# Patient Record
Sex: MALE | Race: WHITE | NOT HISPANIC OR LATINO | Employment: OTHER | URBAN - NONMETROPOLITAN AREA
[De-identification: names, ages, dates, MRNs, and addresses within clinical notes are randomized per-mention and may not be internally consistent; named-entity substitution may affect disease eponyms.]

---

## 2017-04-18 ENCOUNTER — TRANSCRIBE ORDERS (OUTPATIENT)
Dept: ADMINISTRATIVE | Facility: HOSPITAL | Age: 52
End: 2017-04-18

## 2017-04-18 ENCOUNTER — APPOINTMENT (OUTPATIENT)
Dept: LAB | Facility: HOSPITAL | Age: 52
End: 2017-04-18
Attending: PODIATRIST
Payer: MEDICARE

## 2017-04-18 ENCOUNTER — ALLSCRIPTS OFFICE VISIT (OUTPATIENT)
Dept: WOUND CARE | Facility: HOSPITAL | Age: 52
End: 2017-04-18
Payer: MEDICARE

## 2017-04-18 DIAGNOSIS — L60.0 INGROWING NAIL: ICD-10-CM

## 2017-04-18 PROCEDURE — 99213 OFFICE O/P EST LOW 20 MIN: CPT | Performed by: PODIATRIST

## 2017-04-18 PROCEDURE — 87205 SMEAR GRAM STAIN: CPT

## 2017-04-18 PROCEDURE — 87070 CULTURE OTHR SPECIMN AEROBIC: CPT

## 2017-04-18 PROCEDURE — 87077 CULTURE AEROBIC IDENTIFY: CPT

## 2017-04-18 PROCEDURE — 97597 DBRDMT OPN WND 1ST 20 CM/<: CPT | Performed by: PODIATRIST

## 2017-04-18 PROCEDURE — 87147 CULTURE TYPE IMMUNOLOGIC: CPT

## 2017-04-18 PROCEDURE — 11730 AVULSION NAIL PLATE SIMPLE 1: CPT | Performed by: PODIATRIST

## 2017-04-18 PROCEDURE — 87186 SC STD MICRODIL/AGAR DIL: CPT

## 2017-04-21 LAB
BACTERIA WND AEROBE CULT: ABNORMAL
BACTERIA WND AEROBE CULT: ABNORMAL
GRAM STN SPEC: ABNORMAL

## 2017-04-25 ENCOUNTER — ALLSCRIPTS OFFICE VISIT (OUTPATIENT)
Dept: WOUND CARE | Facility: HOSPITAL | Age: 52
End: 2017-04-25
Payer: MEDICARE

## 2017-04-25 PROCEDURE — 99212 OFFICE O/P EST SF 10 MIN: CPT | Performed by: PODIATRIST

## 2017-04-25 PROCEDURE — 97597 DBRDMT OPN WND 1ST 20 CM/<: CPT | Performed by: PODIATRIST

## 2017-05-09 ENCOUNTER — ALLSCRIPTS OFFICE VISIT (OUTPATIENT)
Dept: WOUND CARE | Facility: HOSPITAL | Age: 52
End: 2017-05-09
Payer: MEDICARE

## 2017-05-09 PROCEDURE — 99213 OFFICE O/P EST LOW 20 MIN: CPT | Performed by: PODIATRIST

## 2017-05-23 ENCOUNTER — ALLSCRIPTS OFFICE VISIT (OUTPATIENT)
Dept: WOUND CARE | Facility: HOSPITAL | Age: 52
End: 2017-05-23
Payer: MEDICARE

## 2017-05-23 PROCEDURE — 99213 OFFICE O/P EST LOW 20 MIN: CPT | Performed by: PODIATRIST

## 2017-06-06 ENCOUNTER — ALLSCRIPTS OFFICE VISIT (OUTPATIENT)
Dept: WOUND CARE | Facility: HOSPITAL | Age: 52
End: 2017-06-06
Payer: MEDICARE

## 2017-06-06 PROCEDURE — 99213 OFFICE O/P EST LOW 20 MIN: CPT | Performed by: PODIATRIST

## 2017-06-21 ENCOUNTER — GENERIC CONVERSION - ENCOUNTER (OUTPATIENT)
Dept: OTHER | Facility: OTHER | Age: 52
End: 2017-06-21

## 2017-06-27 ENCOUNTER — APPOINTMENT (OUTPATIENT)
Dept: WOUND CARE | Facility: HOSPITAL | Age: 52
End: 2017-06-27
Payer: MEDICARE

## 2017-06-27 PROCEDURE — 97597 DBRDMT OPN WND 1ST 20 CM/<: CPT

## 2017-07-13 ENCOUNTER — APPOINTMENT (OUTPATIENT)
Dept: WOUND CARE | Facility: HOSPITAL | Age: 52
End: 2017-07-13
Payer: MEDICARE

## 2017-07-13 PROCEDURE — 97597 DBRDMT OPN WND 1ST 20 CM/<: CPT | Performed by: REGISTERED NURSE

## 2017-08-01 ENCOUNTER — APPOINTMENT (OUTPATIENT)
Dept: WOUND CARE | Facility: HOSPITAL | Age: 52
End: 2017-08-01
Payer: MEDICARE

## 2017-08-01 PROCEDURE — 97597 DBRDMT OPN WND 1ST 20 CM/<: CPT

## 2017-08-01 PROCEDURE — 99214 OFFICE O/P EST MOD 30 MIN: CPT

## 2017-08-08 ENCOUNTER — APPOINTMENT (OUTPATIENT)
Dept: WOUND CARE | Facility: HOSPITAL | Age: 52
End: 2017-08-08
Payer: MEDICARE

## 2017-08-08 PROCEDURE — 97597 DBRDMT OPN WND 1ST 20 CM/<: CPT

## 2017-08-29 ENCOUNTER — APPOINTMENT (OUTPATIENT)
Dept: WOUND CARE | Facility: HOSPITAL | Age: 52
End: 2017-08-29
Payer: MEDICARE

## 2017-08-29 PROCEDURE — 99213 OFFICE O/P EST LOW 20 MIN: CPT

## 2017-08-29 PROCEDURE — 97597 DBRDMT OPN WND 1ST 20 CM/<: CPT

## 2017-09-12 ENCOUNTER — APPOINTMENT (OUTPATIENT)
Dept: WOUND CARE | Facility: HOSPITAL | Age: 52
End: 2017-09-12
Payer: MEDICARE

## 2017-09-12 PROCEDURE — 99214 OFFICE O/P EST MOD 30 MIN: CPT

## 2017-09-12 PROCEDURE — 97597 DBRDMT OPN WND 1ST 20 CM/<: CPT

## 2017-09-26 ENCOUNTER — APPOINTMENT (OUTPATIENT)
Dept: WOUND CARE | Facility: HOSPITAL | Age: 52
End: 2017-09-26
Payer: MEDICARE

## 2017-09-26 PROCEDURE — 97597 DBRDMT OPN WND 1ST 20 CM/<: CPT

## 2017-10-17 ENCOUNTER — APPOINTMENT (OUTPATIENT)
Dept: WOUND CARE | Facility: HOSPITAL | Age: 52
End: 2017-10-17
Payer: MEDICARE

## 2017-10-17 PROCEDURE — 99212 OFFICE O/P EST SF 10 MIN: CPT

## 2017-11-21 ENCOUNTER — APPOINTMENT (OUTPATIENT)
Dept: WOUND CARE | Facility: HOSPITAL | Age: 52
End: 2017-11-21
Payer: MEDICARE

## 2017-11-21 PROCEDURE — 97597 DBRDMT OPN WND 1ST 20 CM/<: CPT

## 2017-11-21 PROCEDURE — 99213 OFFICE O/P EST LOW 20 MIN: CPT

## 2017-11-21 PROCEDURE — 11042 DBRDMT SUBQ TIS 1ST 20SQCM/<: CPT

## 2017-11-28 ENCOUNTER — APPOINTMENT (OUTPATIENT)
Dept: WOUND CARE | Facility: HOSPITAL | Age: 52
End: 2017-11-28
Payer: MEDICARE

## 2017-11-28 PROCEDURE — 97597 DBRDMT OPN WND 1ST 20 CM/<: CPT

## 2017-11-28 PROCEDURE — 11042 DBRDMT SUBQ TIS 1ST 20SQCM/<: CPT

## 2017-12-05 ENCOUNTER — APPOINTMENT (OUTPATIENT)
Dept: WOUND CARE | Facility: HOSPITAL | Age: 52
End: 2017-12-05
Payer: MEDICARE

## 2017-12-05 PROCEDURE — 11042 DBRDMT SUBQ TIS 1ST 20SQCM/<: CPT

## 2017-12-12 ENCOUNTER — APPOINTMENT (OUTPATIENT)
Dept: WOUND CARE | Facility: HOSPITAL | Age: 52
End: 2017-12-12
Payer: MEDICARE

## 2017-12-12 PROCEDURE — 11042 DBRDMT SUBQ TIS 1ST 20SQCM/<: CPT

## 2017-12-19 ENCOUNTER — APPOINTMENT (OUTPATIENT)
Dept: WOUND CARE | Facility: HOSPITAL | Age: 52
End: 2017-12-19
Payer: MEDICARE

## 2017-12-19 PROCEDURE — 97597 DBRDMT OPN WND 1ST 20 CM/<: CPT

## 2017-12-27 ENCOUNTER — APPOINTMENT (OUTPATIENT)
Dept: WOUND CARE | Facility: HOSPITAL | Age: 52
End: 2017-12-27
Payer: MEDICARE

## 2017-12-27 PROCEDURE — 11042 DBRDMT SUBQ TIS 1ST 20SQCM/<: CPT

## 2018-01-02 ENCOUNTER — APPOINTMENT (OUTPATIENT)
Dept: WOUND CARE | Facility: HOSPITAL | Age: 53
End: 2018-01-02
Payer: MEDICARE

## 2018-01-02 PROCEDURE — 11042 DBRDMT SUBQ TIS 1ST 20SQCM/<: CPT

## 2018-01-09 ENCOUNTER — APPOINTMENT (OUTPATIENT)
Dept: WOUND CARE | Facility: HOSPITAL | Age: 53
End: 2018-01-09
Payer: MEDICARE

## 2018-01-09 PROCEDURE — 11042 DBRDMT SUBQ TIS 1ST 20SQCM/<: CPT

## 2018-01-12 VITALS
TEMPERATURE: 95.6 F | WEIGHT: 315 LBS | SYSTOLIC BLOOD PRESSURE: 110 MMHG | DIASTOLIC BLOOD PRESSURE: 58 MMHG | HEART RATE: 64 BPM | HEIGHT: 72 IN | RESPIRATION RATE: 20 BRPM | BODY MASS INDEX: 42.66 KG/M2

## 2018-01-12 VITALS
BODY MASS INDEX: 42.66 KG/M2 | DIASTOLIC BLOOD PRESSURE: 68 MMHG | SYSTOLIC BLOOD PRESSURE: 138 MMHG | RESPIRATION RATE: 20 BRPM | HEART RATE: 60 BPM | HEIGHT: 72 IN | WEIGHT: 315 LBS | TEMPERATURE: 96.1 F

## 2018-01-12 VITALS
WEIGHT: 315 LBS | DIASTOLIC BLOOD PRESSURE: 76 MMHG | TEMPERATURE: 97 F | SYSTOLIC BLOOD PRESSURE: 142 MMHG | HEIGHT: 72 IN | HEART RATE: 68 BPM | RESPIRATION RATE: 20 BRPM | BODY MASS INDEX: 42.66 KG/M2

## 2018-01-13 VITALS
BODY MASS INDEX: 42.66 KG/M2 | WEIGHT: 315 LBS | RESPIRATION RATE: 20 BRPM | HEIGHT: 72 IN | SYSTOLIC BLOOD PRESSURE: 136 MMHG | HEART RATE: 72 BPM | DIASTOLIC BLOOD PRESSURE: 78 MMHG | TEMPERATURE: 97.2 F

## 2018-01-14 VITALS
HEIGHT: 72 IN | RESPIRATION RATE: 20 BRPM | HEART RATE: 64 BPM | WEIGHT: 315 LBS | BODY MASS INDEX: 42.66 KG/M2 | TEMPERATURE: 97 F | SYSTOLIC BLOOD PRESSURE: 130 MMHG | DIASTOLIC BLOOD PRESSURE: 64 MMHG

## 2018-01-23 ENCOUNTER — APPOINTMENT (OUTPATIENT)
Dept: WOUND CARE | Facility: HOSPITAL | Age: 53
End: 2018-01-23
Payer: MEDICARE

## 2018-01-23 PROCEDURE — 11042 DBRDMT SUBQ TIS 1ST 20SQCM/<: CPT

## 2018-01-23 NOTE — PROGRESS NOTES
Assessment    1  Chronic venous hypertension with ulcer and inflammation (274 33) (U13 109)   2  Non-pressure chronic ulcer of other part of left lower leg limited to breakdown of skin   (707 19) (P61 302)    Plan  Non-pressure chronic ulcer of other part of left lower leg limited to breakdown of skin    · Follow-up visit in 1 week Evaluation and Treatment  Follow-up  Status: Hold For -  Scheduling  Requested for: [id number]   Ordered; For: Non-pressure chronic ulcer of other part of left lower leg limited to breakdown of skin; Ordered By: Maria Eugenia Currie Performed:  Due: 01PDM9267   · Silver Alginate ([employer], Silvercell, Aquacel Silver) instructions given;  Status:Complete;   Done: [id number] 01:13PM   Ordered;  For:Non-pressure chronic ulcer of other part of left lower leg limited to breakdown of skin; Ordered By:Jackie Denis;   · [facility]; Side::Bilateral; Status:Active - Perform Order; Requested  UIO:[id number];    Perform: In Office; [facility]:[id number];VCTQHLY;  For:Non-pressure chronic ulcer of other part of left lower leg limited to breakdown of skin; Ordered By:Sher Denis;  frequency: : weekly at wound center    Wound Care Orders/Instructions    Wound Identification and Instructions   Wound #2: right lower leg    Wound Care Instructions  Discussed with Patient/Caregiver  Dressing Type: Acticoat 7  Wash with mild soap and water, normal saline, wound cleanser  Apply specified dressing to wound base/bed  To periwound apply: Remedy skin repair cream  Secondary dressing apply: ABD  Dressing change frequency: Weekly  Unna boot -- Change every 7 days and as needed for increased drainage, discomfort or excessive swelling of toes   Apply compression usin" Ace Wrap, 6" Ace Wrap and Tubifast yellow  Wound #3: left lower leg   Wound Care Instructions  Discussed with Patient/Caregiver  Dressing Type: Acticoat 7  Wash with mild soap and water, normal saline, wound cleanser     Apply specified dressing to wound base/bed  To periwound apply: Remedy skin repair cream  Secondary dressing apply: ABD  Secure with: unna wrap  Dressing change frequency: Weekly  Unna boot -- Change every 7 days and as needed for increased drainage, discomfort or excessive swelling of toes   Apply compression usin" Ace Wrap, 6" Ace Wrap, Coban and Tubifast yellow  Wound Goals  Wound Goals:   Healing Goals:   Fair healing potential secondary to moderate comorbid conditions  Wound will be free of infection   Patient will achieve full wound closure and return to full ADLs       Discussion/Summary    Small wound to left leg improved today  Apply UNNA boots to both legs today  Last week had ordered new circ-aid stockings, still awaiting approval  F/U 1 week  The treatment plan was reviewed with the patient/guardian  The patient/guardian understands and agrees with the treatment plan      Chief Complaint  Follow up visit to the wound management center for bilateral lower leg wounds  History of Present Illness    Wound Identification HPI   Wound #1: left heel-healed    Wound #2: right lower leg    Wound #3: left lower leg    Wound #4: left lower leg posterior healed    Wound #5: right 3rd toe lateral-healed          The patient came to Wound Care and was ambulatory  The patient is being seen for a follow-up with MD and Lori Benton DPM at the [address] [place] [address]  The patient's identification was verified  A secondary verification process was completed  Orientation: oriented to person, oriented to place and oriented to time  Blood Glucose:  mg/dL as reported by patient  2016: dressing intact  Patient states PCP put him on Keflex 2 days ago for cellulitis  Redness has extended up over knee into thigh  Patient will go to ER for admission  2016: dressing intact  [facility]  Had intravenous antibiotics   Wound on right leg is covered with dry eschar- no drainage  Healed  Wound also healed on right 3rd toe  8/16/2016: dressings on B/L legs- anterior  Wounds reopened last week  8/23/2016: Patient arrived to the wound center with unna boot on the left lower leg and tubi over the dressing on the right   Patient reports the right dressing falls down and the drainage is moderate   Noted decrease in the size of the left lower leg   Patient with circaid over the unna and the tubi    History of Falling: Yes = 25   Secondary Diagnosis: No = 0   Ambulatory Aid None, Bedrest, Nurse Assist = 0   No = 0   Gait: Normal = 0   Mental Status: Oriented to own ability = 0   Total Score:    25 - 45 = Moderate Risk        The most recent fall occurred 5/2016 and Patient reports no falls  Number of falls in the last year were 0  The fall resulted from tripping  The fall resulted in abrasion(s) to left knee  Nutrition Assessment Screening: Food intake over the last 3 months due to the loss of appetite, digestive problems, chewing or swallowing difficulties is graded as: 2 = no decrease in food intake   Weight loss during the last 3 months: 3 = no weight loss   Mobility scored as: 2 = goes out  Neuropsychological problems scored as: 2 = no psychological problems  Body Mass Index (BMI) scored as: 3 = BMI 23 or greater  Nutritional Assessment Screening Score: 12 - 14 points - Normal nutritional status  Provider Wound Care HPI: Yayo Zavala is seen today for re-evaluation of left lower extremity ulceration which had recurred several weeks ago  No F/C/NS  No other acute complaints  Tolerated UNNA boot to left leg well this past week  Pain Assessment   the patient states they do not have pain  (on a scale of 0 to 10, the patient rates the pain at 0 )   Abuse And Domestic Violence Screen    Yes, the patient is safe at home  The patient states no one is hurting them  Depression And Suicide Screen  No, the patient has not had thoughts of hurting themself  No, the patient has not felt depressed in the past 7 days  Prefered Language is  [place]  Primary Language is  English  Readiness To Learn: Receptive  Barriers To Learning: none  Preferred Learning: demonstration and verbal   Education Completed: treatment/procedure   Teaching Method: verbal   Person Taught: patient   Evaluation Of Learning: verbalized/demonstrated understanding      Review of Systems    Constitutional: no fever or chills, feels well, no tiredness, no recent weight loss or weight gain  ENT: no complaints of earache, no loss of hearing, no nosebleeds or nasal discharge, no sore throat or hoarseness  Cardiovascular: no complaints of slow or fast heart rate, no chest pain, no palpitations, no leg claudication or lower extremity edema  Respiratory: no complaints of shortness of breath, no wheezing or cough, no dyspnea on exertion, no orthopnea or PND  Gastrointestinal: no complaints of abdominal pain, no constipation, no nausea or vomiting, no diarrhea or bloody stools  Genitourinary: no complaints of dysuria or incontinence, no hesitancy, no nocturia, no genital lesion, no inadequacy of penile erection  Musculoskeletal: no complaints of arthralgia, no myalgia, no joint swelling or stiffness, no limb pain or swelling  Integumentary: skin wound  Neurological: no complaints of headache, no confusion, no numbness or tingling, no dizziness or fainting  Psychiatric: no depression, no mood disorders, no anxiety  ROS reviewed  Active Problems    1  Cellulitis of right lower leg (682 6) (L03 115)   2  Chronic venous hypertension with ulcer and inflammation (459 33) (I87 339)   3  Congestive heart failure (428 0) (I50 9)   4  Constipation (564 00) (K59 00)   5  Diabetes Mellitus (250 00)   6  Diabetes mellitus with neuropathy (250 60,357 2) (E11 40)   7  Diabetic ulcer of left foot (250 80,707 15) (E11 621,L97 529)   8   Diabetic ulcer of right foot associated with diabetes mellitus due to underlying condition   (249 80,707 15) (E08 621,L97 519)   9  Edema (782 3) (R60 9)   10  Edema (782 3) (R60 9)   11  Hypertension (401 9) (I10)   12  Hypothyroidism (244 9) (E03 9)   13  Lymphedema (457 1) (I89 0)   14  Neuropathic ulcer of left heel (707 14) (L97 429)   15  Non-pressure chronic ulcer of other part of left lower leg limited to breakdown of skin    (707 19) (L97 821)   16  Osteomyelitis (730 20) (M86 9)   17  Pulmonary Embolism (V12 55)    Past Medical History    1  History of Abnormal weight gain (783 1) (R63 5)   2  History of Arthritis (V13 4)   3  History of Currently Wearing Eyeglasses   4  Diabetes Mellitus (250 00)   5  History of Hearing Loss (389 9)   6  History of constipation (V12 79) (Z87 19)   7  History of shortness of breath (V13 89) (Z87 898)   8  History of thyroid disease (V12 29) (Z86 39)   9  Hypertension (401 9) (I10)   10  History of Reported History Poor Vision   11  History of Staggering gait (781 2) (R26 0)   12  History of Thromboembolic Disease (J52 11)    The active problems and past medical history were reviewed and updated today  Surgical History    1  History of Gallbladder Surgery    The surgical history was reviewed and updated today  Family History    1  Family history of Cancer   2  Family history of Diabetes Mellitus (V18 0)    3  Family history of Acute Myocardial Infarction (V17 3)    The family history was reviewed and updated today  Social History    · Denied: History of Alcohol Use (History)   · Denied: History of Drug Use   · Never A Smoker  The social history was reviewed and updated today  Current Meds   1  Acetaminophen 500 MG Oral Tablet; TAKE 1 TABLET BID Recorded   2  AF Loratadine 10 MG TABS; TAKE 1 TABLET DAILY; Therapy: (Recorded:24May2016) to Recorded   3  Amitriptyline HCl - 25 MG Oral Tablet; TAKE 1 TABLET AT BEDTIME Recorded   4  Fenofibrate 145 MG Oral Tablet;    Therapy: (Recorded:01Mar2016) to Recorded 5  Furosemide 80 MG Oral Tablet; TAKE 2 TABLETS TWICE DAILY; Therapy: (Recorded:21Apr2015) to Recorded   6  Gabapentin 300 MG Oral Capsule; TAKE 1 CAPSULE TWICE DAILY; Therapy: (Recorded:01Mar2016) to Recorded   7  HumuLIN R SOLN; 25 units am and pm;   Therapy: (Recorded:23Aug2016) to Recorded   8  Klor-Con M20 20 MEQ Oral Tablet Extended Release; TAKE 2 TABLETS TWICE DAILY; Therapy: (Recorded:01Mar2016) to Recorded   9  Montelukast Sodium 10 MG Oral Tablet; TAKE 1 TABLET DAILY; Therapy: (Recorded:24May2016) to Recorded   10  Pantoprazole Sodium 40 MG Oral Tablet Delayed Release; Therapy: (Recorded:01Mar2016) to Recorded   11  ROPINIRole HCl - 0 5 MG Oral Tablet; Therapy: 60IUY7861 to Recorded   12  Saline Nasal Spray SOLN; USE 1 SPRAY IN EACH NOSTRIL TWICE DAILY; Therapy: (Recorded:21Apr2015) to Recorded   13  Simvastatin 40 MG Oral Tablet; TAKE 1 TABLET DAILY AT BEDTIME; Therapy: (Recorded:21Apr2015) to Recorded   14  Synthroid 150 MCG Oral Tablet; Therapy: (Recorded:01Mar2016) to Recorded   15  Synthroid 300 MCG Oral Tablet; TAKE 1 TABLET DAILY; Therapy: (Recorded:01Mar2016) to Recorded   16  Warfarin Sodium 5 MG Oral Tablet Recorded    The medication list was reviewed and updated today  Allergies    1  Dilantin CAPS    Vitals  Vital Signs    Recorded: 66YIV1904 00:83RP   Systolic 981   Diastolic 62   Heart Rate 72   Respiration 20   Temperature 97 F   Height 6 ft    Weight 400 lb    BMI Calculated 54 25   BSA Calculated 2 86     Physical Exam    Wound #2 Assessment wound #2 Location:, right lower leg, started on reopened 8/13/2016, Care for this wound started on 8/16/2016  Wound Status: not healed  Venous Ulcer: Full Thickness  Length: 0 7cm x Width: 0 7cm x Depth: 0 1cm   Total: 0 49sq cm   Wound Volume: 0 049cm3           Color of Wound: Red - 100% and Yellow -   Exudate Amount:  Moderate   Exudate Type: Serous   Odor: None   Exudate Color: Yellow   Wound Edges: Intact Periwound Skin Condition: Intact, Hemosiderin pigmentation, Scaly, lower leg edema        Physician/Provider Wound #2 Exam   I agree with the nursing assessment and documentation  Wound #3 Assessment wound #3 Location:, left lower leg, started on 8/12/2016  Care for this wound started on 8/16/2016   Wound Status: not healed  Full Thickness   Length: 0 7cm x Width: 0 5cm x Depth: 0 1cm   Total: 0 35sq cm   Wound Volume: 0 035cm3           Tissue type: Subcutaneous, Granulation and Slough   Color of Wound: Red - 100%, Yellow - and Pink - 60%   Exudate Amount: Minimal   Exudate Type: Serosangiunous   Odor: None   Exudate Color: Yellow and pink   Wound Edges: Intact   Periwound Skin Condition: Hemosiderin pigmentation, Scaly, lower leg edema        Physician/Provider Wound #3 Exam   I agree with the nursing assessment and documentation  Constitutional   General appearance: No acute distress, well appearing and well nourished  Eyes   Conjunctiva and lids: No swelling, erythema, or discharge  Pupils and irises: Equal, round and reactive to light  Sclera non-icteric  Ears, Nose, Mouth, and Throat   External inspection of ears and nose: Normal     Neck   Supple, symmetric, trachea midline, no masses   Pulmonary   Respiratory effort: No increased work of breathing or signs of respiratory distress  Auscultation of lungs: Clear to auscultation, equal breath sounds bilaterally, no wheezes, no rales, no rhonci  Cardiovascular   Auscultation of heart: Normal rate and rhythm, normal S1 and S2, without murmurs  Examination of extremities for edema and/or varicosities: Normal     Carotid pulses: Normal     Abdomen   Abdomen: Non-tender, no masses  Liver and spleen: No hepatomegaly or splenomegaly  Lymphatic   Palpation of lymph nodes in neck: No lymphadenopathy  Musculoskeletal   Digits and nails: Normal without clubbing or cyanosis      Extremities: No clubbing, no cyanosis, no edema   Skin Skin and subcutaneous tissue: Normal without rashes or lesions  Neurologic   Sensation: Motor and sensory grossly intact  Psychiatric   Orientation to person, place and time: Normal     Mood and affect: Normal        Trg Chintan 1: Wound Nursing Care Plan   Impaired Tissue Integrity related to: wounds right and left lower legs   Risk for Infection related to open wound:   Risk for Unstable Blood Glucose related to increased metabolic demand required for wound healing and/or immune response to wound infection:   Fluid Volume Excess related to venous insufficiency, ineffective offloading, improper positioning, and/or disease process:   Goals   Patient will achieve 100% epithelialization:  Patient or caregiver will demonstrate ability to perform wound dressing changes:    Barriers to plan of care will be identified and interventions to remove them will be initiated: Fide Araujo Wound Nursing Care Interventions:   Provide moist wound healing:  Teach patient and caregiver how to change wound dressing:  Teach patient on edema reducing measures:  Implement measures to remove or circumvent all barriers to care as able: Fide Araujo Evaluate effectiveness of all above measures every 4 weeks with Patient Specific CQI:    Other:  POC initiated 7/21/2016  Care plan resolved 8/2/2016  Reopened 8/16/2016  , Reviewed 8/23/16      Procedure      Wound #2: right lower leg     Nurse Dressing Change:   Wound #2 The wound located on the right lower leg  Applied 4% topical Lidocaine solution to wound/ulcer prior to debridement for pain control  Wound care rendered as per Physician/Advanced Practitioner order/plan  Return to [address] [place] [address] in 1 week    Comments:,    Procedure: Unna Boot Application (Two or Three Layers):   Prior to the procedure, the patient was identified using two identifiers, the general consent was signed, the proper site of procedure was identified, and a time out was taken  Unna boot application for the compression of right leg was performed  The wound was cleansed and dressed with acticoat 7  With the foot in a dorsiflexed position, a zinc oxide paste bandage was applied from the base of the toes to the patellar notch  CPT Code(s) H009901 RT [facility]  Wound #3: left lower leg     Nurse Dressing Change:   Wound #3 The wound located on the left lower leg  Applied 4% topical Lidocaine solution to wound/ulcer prior to debridement for pain control  Wound care rendered as per Physician/Advanced Practitioner order/plan  Return to [address] [place] [address] in 1 week  Comments:,    Procedure: Unna Boot Application (Two or Three Layers):   Prior to the procedure, the patient was identified using two identifiers, the general consent was signed, the proper site of procedure was identified, and a time out was taken  Unna boot application for the compression of left leg was performed  The wound was cleansed and dressed with acticoat 7  With the foot in a dorsiflexed position, a zinc oxide paste bandage was applied from the base of the toes to the patellar notch  CPT Code(s) G551601 LT [facility] Appl  Removal Devitalized Tissue: Prior to the procedure, the patient was identified using two identifiers, the general consent was signed, the proper site of procedure was identified, and a time out was taken  Due to the presence of senescent cells and/or biofilm in the wound, a medical decision was made to perform a selective debridement  Anesthesia: Local anesthesia with 4% topical lidocaine was utilized prior to the procedure for pain control  #15 surgical blade was utilized to debride non-viable tissue  The non-viable tissue was removed  There was minimal bleeding controlled with gentle pressure  The total surface area that was selectively debrided for the above site was 0 35sq cm  The patient tolerated the procedure well without complication    Codes:   67817 Removal Of Devitalized Tissue First 20 sq cm or less        Future Appointments    Date/Time Provider Specialty Site   08/30/2016 01:00 PM Jd Carbajal DPM [address]     Signatures   Electronically signed by : Nathan Tuttle DPM; Aug 23 2016  1:14PM EST                       (Author)    Electronically signed by : Nathan Tuttle DPM; Aug 23 2016  3:11PM EST                       (Author)

## 2018-01-30 ENCOUNTER — APPOINTMENT (OUTPATIENT)
Dept: WOUND CARE | Facility: HOSPITAL | Age: 53
End: 2018-01-30
Payer: MEDICARE

## 2018-01-30 PROCEDURE — 97597 DBRDMT OPN WND 1ST 20 CM/<: CPT

## 2018-02-06 ENCOUNTER — APPOINTMENT (OUTPATIENT)
Dept: WOUND CARE | Facility: HOSPITAL | Age: 53
End: 2018-02-06
Payer: MEDICARE

## 2018-02-06 PROCEDURE — 97597 DBRDMT OPN WND 1ST 20 CM/<: CPT

## 2018-02-06 PROCEDURE — 99213 OFFICE O/P EST LOW 20 MIN: CPT

## 2018-02-27 ENCOUNTER — APPOINTMENT (OUTPATIENT)
Dept: WOUND CARE | Facility: HOSPITAL | Age: 53
End: 2018-02-27
Payer: MEDICARE

## 2018-02-27 PROCEDURE — 97597 DBRDMT OPN WND 1ST 20 CM/<: CPT

## 2018-02-27 PROCEDURE — 11042 DBRDMT SUBQ TIS 1ST 20SQCM/<: CPT

## 2018-03-06 ENCOUNTER — APPOINTMENT (OUTPATIENT)
Dept: WOUND CARE | Facility: HOSPITAL | Age: 53
End: 2018-03-06
Payer: MEDICARE

## 2018-03-06 PROCEDURE — 97597 DBRDMT OPN WND 1ST 20 CM/<: CPT

## 2018-03-20 ENCOUNTER — APPOINTMENT (OUTPATIENT)
Dept: WOUND CARE | Facility: HOSPITAL | Age: 53
End: 2018-03-20
Payer: MEDICARE

## 2018-03-20 PROCEDURE — 97597 DBRDMT OPN WND 1ST 20 CM/<: CPT

## 2018-03-27 ENCOUNTER — APPOINTMENT (OUTPATIENT)
Dept: WOUND CARE | Facility: HOSPITAL | Age: 53
End: 2018-03-27
Payer: MEDICARE

## 2018-03-27 PROCEDURE — 97597 DBRDMT OPN WND 1ST 20 CM/<: CPT

## 2018-04-03 ENCOUNTER — APPOINTMENT (OUTPATIENT)
Dept: WOUND CARE | Facility: HOSPITAL | Age: 53
End: 2018-04-03
Payer: MEDICARE

## 2018-04-03 PROCEDURE — 97597 DBRDMT OPN WND 1ST 20 CM/<: CPT

## 2018-04-17 ENCOUNTER — APPOINTMENT (OUTPATIENT)
Dept: WOUND CARE | Facility: HOSPITAL | Age: 53
End: 2018-04-17
Payer: MEDICARE

## 2018-04-17 PROCEDURE — 97597 DBRDMT OPN WND 1ST 20 CM/<: CPT

## 2018-04-24 ENCOUNTER — APPOINTMENT (OUTPATIENT)
Dept: WOUND CARE | Facility: HOSPITAL | Age: 53
End: 2018-04-24
Attending: PODIATRIST
Payer: MEDICARE

## 2018-04-24 PROCEDURE — 97597 DBRDMT OPN WND 1ST 20 CM/<: CPT

## 2018-05-01 ENCOUNTER — APPOINTMENT (OUTPATIENT)
Dept: WOUND CARE | Facility: HOSPITAL | Age: 53
End: 2018-05-01
Attending: PODIATRIST
Payer: MEDICARE

## 2018-05-01 PROCEDURE — 99212 OFFICE O/P EST SF 10 MIN: CPT

## 2018-10-02 ENCOUNTER — APPOINTMENT (OUTPATIENT)
Dept: WOUND CARE | Facility: CLINIC | Age: 53
End: 2018-10-02
Payer: MEDICARE

## 2018-10-02 PROCEDURE — 99213 OFFICE O/P EST LOW 20 MIN: CPT

## 2018-10-02 PROCEDURE — 97597 DBRDMT OPN WND 1ST 20 CM/<: CPT

## 2018-10-16 ENCOUNTER — APPOINTMENT (OUTPATIENT)
Dept: WOUND CARE | Facility: CLINIC | Age: 53
End: 2018-10-16
Payer: MEDICARE

## 2018-10-16 PROCEDURE — 97597 DBRDMT OPN WND 1ST 20 CM/<: CPT

## 2018-11-06 ENCOUNTER — APPOINTMENT (OUTPATIENT)
Dept: WOUND CARE | Facility: CLINIC | Age: 53
End: 2018-11-06
Payer: MEDICARE

## 2018-11-06 PROCEDURE — 97598 DBRDMT OPN WND ADDL 20CM/<: CPT

## 2018-11-06 PROCEDURE — 97597 DBRDMT OPN WND 1ST 20 CM/<: CPT

## 2018-11-20 ENCOUNTER — APPOINTMENT (OUTPATIENT)
Dept: WOUND CARE | Facility: CLINIC | Age: 53
End: 2018-11-20
Payer: MEDICARE

## 2018-11-20 PROCEDURE — 99212 OFFICE O/P EST SF 10 MIN: CPT

## 2019-02-05 ENCOUNTER — APPOINTMENT (OUTPATIENT)
Dept: WOUND CARE | Facility: CLINIC | Age: 54
End: 2019-02-05
Payer: MEDICARE

## 2019-02-05 PROCEDURE — 11042 DBRDMT SUBQ TIS 1ST 20SQCM/<: CPT

## 2019-02-05 PROCEDURE — 99213 OFFICE O/P EST LOW 20 MIN: CPT

## 2019-02-26 ENCOUNTER — APPOINTMENT (OUTPATIENT)
Dept: WOUND CARE | Facility: CLINIC | Age: 54
End: 2019-02-26
Payer: MEDICARE

## 2019-02-26 PROCEDURE — 11042 DBRDMT SUBQ TIS 1ST 20SQCM/<: CPT

## 2019-03-12 ENCOUNTER — APPOINTMENT (OUTPATIENT)
Dept: WOUND CARE | Facility: CLINIC | Age: 54
End: 2019-03-12
Payer: MEDICARE

## 2019-03-12 PROCEDURE — 11042 DBRDMT SUBQ TIS 1ST 20SQCM/<: CPT

## 2019-03-19 ENCOUNTER — APPOINTMENT (OUTPATIENT)
Dept: WOUND CARE | Facility: CLINIC | Age: 54
End: 2019-03-19
Payer: MEDICARE

## 2019-03-19 PROCEDURE — 97597 DBRDMT OPN WND 1ST 20 CM/<: CPT

## 2019-03-26 ENCOUNTER — APPOINTMENT (OUTPATIENT)
Dept: WOUND CARE | Facility: CLINIC | Age: 54
End: 2019-03-26
Payer: MEDICARE

## 2019-03-26 PROCEDURE — 99213 OFFICE O/P EST LOW 20 MIN: CPT

## 2019-03-26 PROCEDURE — 11042 DBRDMT SUBQ TIS 1ST 20SQCM/<: CPT

## 2019-03-26 PROCEDURE — 97597 DBRDMT OPN WND 1ST 20 CM/<: CPT

## 2019-04-02 ENCOUNTER — APPOINTMENT (OUTPATIENT)
Dept: WOUND CARE | Facility: CLINIC | Age: 54
End: 2019-04-02

## 2019-04-09 ENCOUNTER — APPOINTMENT (OUTPATIENT)
Dept: WOUND CARE | Facility: CLINIC | Age: 54
End: 2019-04-09
Payer: MEDICARE

## 2019-04-09 PROCEDURE — 11042 DBRDMT SUBQ TIS 1ST 20SQCM/<: CPT

## 2019-04-16 ENCOUNTER — APPOINTMENT (OUTPATIENT)
Dept: WOUND CARE | Facility: CLINIC | Age: 54
End: 2019-04-16
Payer: MEDICARE

## 2019-04-16 PROCEDURE — 11042 DBRDMT SUBQ TIS 1ST 20SQCM/<: CPT

## 2019-04-23 ENCOUNTER — APPOINTMENT (OUTPATIENT)
Dept: WOUND CARE | Facility: CLINIC | Age: 54
End: 2019-04-23
Payer: MEDICARE

## 2019-04-23 PROCEDURE — 97597 DBRDMT OPN WND 1ST 20 CM/<: CPT

## 2019-04-30 ENCOUNTER — APPOINTMENT (OUTPATIENT)
Dept: WOUND CARE | Facility: CLINIC | Age: 54
End: 2019-04-30
Payer: MEDICARE

## 2019-04-30 PROCEDURE — 99213 OFFICE O/P EST LOW 20 MIN: CPT

## 2019-05-21 ENCOUNTER — APPOINTMENT (OUTPATIENT)
Dept: WOUND CARE | Facility: CLINIC | Age: 54
End: 2019-05-21
Payer: MEDICARE

## 2019-05-21 PROCEDURE — 99212 OFFICE O/P EST SF 10 MIN: CPT

## 2019-06-04 ENCOUNTER — APPOINTMENT (OUTPATIENT)
Dept: WOUND CARE | Facility: CLINIC | Age: 54
End: 2019-06-04
Payer: MEDICARE

## 2019-06-04 PROCEDURE — 11042 DBRDMT SUBQ TIS 1ST 20SQCM/<: CPT

## 2019-06-04 PROCEDURE — 97597 DBRDMT OPN WND 1ST 20 CM/<: CPT

## 2019-06-11 ENCOUNTER — APPOINTMENT (OUTPATIENT)
Dept: WOUND CARE | Facility: CLINIC | Age: 54
End: 2019-06-11
Payer: MEDICARE

## 2019-06-11 PROCEDURE — 97598 DBRDMT OPN WND ADDL 20CM/<: CPT

## 2019-06-11 PROCEDURE — 11042 DBRDMT SUBQ TIS 1ST 20SQCM/<: CPT

## 2019-06-11 PROCEDURE — 97597 DBRDMT OPN WND 1ST 20 CM/<: CPT

## 2019-06-18 ENCOUNTER — APPOINTMENT (OUTPATIENT)
Dept: WOUND CARE | Facility: CLINIC | Age: 54
End: 2019-06-18
Payer: MEDICARE

## 2019-06-18 PROCEDURE — 97597 DBRDMT OPN WND 1ST 20 CM/<: CPT

## 2019-06-25 ENCOUNTER — APPOINTMENT (OUTPATIENT)
Dept: WOUND CARE | Facility: CLINIC | Age: 54
End: 2019-06-25
Payer: MEDICARE

## 2019-06-25 PROCEDURE — 97597 DBRDMT OPN WND 1ST 20 CM/<: CPT

## 2019-06-25 PROCEDURE — 11042 DBRDMT SUBQ TIS 1ST 20SQCM/<: CPT

## 2019-06-25 PROCEDURE — 29581 APPL MULTLAYER CMPRN SYS LEG: CPT

## 2019-07-08 ENCOUNTER — APPOINTMENT (OUTPATIENT)
Dept: WOUND CARE | Facility: CLINIC | Age: 54
End: 2019-07-08
Payer: MEDICARE

## 2019-07-08 PROCEDURE — 29581 APPL MULTLAYER CMPRN SYS LEG: CPT | Performed by: REGISTERED NURSE

## 2019-07-16 ENCOUNTER — APPOINTMENT (OUTPATIENT)
Dept: WOUND CARE | Facility: CLINIC | Age: 54
End: 2019-07-16
Payer: MEDICARE

## 2019-07-16 PROCEDURE — 29581 APPL MULTLAYER CMPRN SYS LEG: CPT | Performed by: REGISTERED NURSE

## 2020-04-30 LAB — HCV AB SER-ACNC: NEGATIVE

## 2020-12-11 LAB
CREAT ?TM UR-SCNC: 86.7 UMOL/L
EXT PROTEIN URINE: 10.3
PROT/CREAT UR: 0.12 MG/G{CREAT}

## 2020-12-21 ENCOUNTER — OFFICE VISIT (OUTPATIENT)
Dept: FAMILY MEDICINE CLINIC | Facility: CLINIC | Age: 55
End: 2020-12-21
Payer: COMMERCIAL

## 2020-12-21 VITALS
HEART RATE: 98 BPM | OXYGEN SATURATION: 97 % | TEMPERATURE: 98.7 F | BODY MASS INDEX: 45.1 KG/M2 | WEIGHT: 315 LBS | HEIGHT: 70 IN

## 2020-12-21 DIAGNOSIS — Z79.4 TYPE 2 DIABETES MELLITUS WITHOUT COMPLICATION, WITH LONG-TERM CURRENT USE OF INSULIN (HCC): ICD-10-CM

## 2020-12-21 DIAGNOSIS — K59.00 CONSTIPATION, UNSPECIFIED CONSTIPATION TYPE: ICD-10-CM

## 2020-12-21 DIAGNOSIS — E11.9 TYPE 2 DIABETES MELLITUS WITHOUT COMPLICATION, WITH LONG-TERM CURRENT USE OF INSULIN (HCC): ICD-10-CM

## 2020-12-21 DIAGNOSIS — Z86.79 HISTORY OF HEART FAILURE: ICD-10-CM

## 2020-12-21 DIAGNOSIS — E66.01 MORBID OBESITY (HCC): Primary | ICD-10-CM

## 2020-12-21 DIAGNOSIS — I10 ESSENTIAL HYPERTENSION: ICD-10-CM

## 2020-12-21 PROBLEM — Z00.00 ENCOUNTER FOR MEDICAL EXAMINATION TO ESTABLISH CARE: Status: ACTIVE | Noted: 2020-12-21

## 2020-12-21 PROCEDURE — 99213 OFFICE O/P EST LOW 20 MIN: CPT | Performed by: FAMILY MEDICINE

## 2020-12-21 RX ORDER — INSULIN ASPART 100 [IU]/ML
INJECTION, SOLUTION INTRAVENOUS; SUBCUTANEOUS
COMMUNITY
Start: 2020-10-29 | End: 2021-02-05 | Stop reason: SDUPTHER

## 2020-12-21 RX ORDER — GABAPENTIN 600 MG/1
600 TABLET ORAL
COMMUNITY
Start: 2020-12-08 | End: 2021-12-02 | Stop reason: SDUPTHER

## 2020-12-21 RX ORDER — POLYETHYLENE GLYCOL 3350 17 G/17G
17 POWDER, FOR SOLUTION ORAL DAILY
Qty: 30 EACH | Refills: 0 | Status: SHIPPED | OUTPATIENT
Start: 2020-12-21 | End: 2021-04-15

## 2020-12-21 RX ORDER — PREGABALIN 100 MG/1
100 CAPSULE ORAL
COMMUNITY
End: 2021-01-08 | Stop reason: SDUPTHER

## 2020-12-21 RX ORDER — METOPROLOL TARTRATE 50 MG/1
25 TABLET, FILM COATED ORAL
COMMUNITY
End: 2021-02-05 | Stop reason: SDUPTHER

## 2020-12-21 RX ORDER — FLUOXETINE HYDROCHLORIDE 40 MG/1
40 CAPSULE ORAL
COMMUNITY
End: 2021-06-09 | Stop reason: CLARIF

## 2020-12-21 RX ORDER — LEVOTHYROXINE SODIUM 0.12 MG/1
125 TABLET ORAL DAILY
COMMUNITY
End: 2021-02-05 | Stop reason: SDUPTHER

## 2020-12-21 RX ORDER — TAMSULOSIN HYDROCHLORIDE 0.4 MG/1
0.4 CAPSULE ORAL
COMMUNITY
End: 2021-06-09 | Stop reason: SDUPTHER

## 2020-12-21 RX ORDER — LEVETIRACETAM 500 MG/1
500 TABLET ORAL 2 TIMES DAILY
COMMUNITY
Start: 2020-12-08 | End: 2021-02-05 | Stop reason: SDUPTHER

## 2020-12-21 RX ORDER — LISINOPRIL 2.5 MG/1
2.5 TABLET ORAL
COMMUNITY
Start: 2020-06-02 | End: 2021-02-05 | Stop reason: SDUPTHER

## 2020-12-21 RX ORDER — ECHINACEA PURPUREA EXTRACT 125 MG
1 TABLET ORAL 2 TIMES DAILY
COMMUNITY
End: 2021-02-05 | Stop reason: ALTCHOICE

## 2020-12-21 RX ORDER — POTASSIUM CHLORIDE 1.5 G/1.77G
20 POWDER, FOR SOLUTION ORAL
COMMUNITY
End: 2021-06-09 | Stop reason: SDUPTHER

## 2020-12-21 RX ORDER — AMOXICILLIN AND CLAVULANATE POTASSIUM 875; 125 MG/1; MG/1
TABLET, FILM COATED ORAL
COMMUNITY
Start: 2020-11-03 | End: 2021-02-05 | Stop reason: ALTCHOICE

## 2020-12-21 RX ORDER — MELOXICAM 15 MG/1
15 TABLET ORAL
COMMUNITY
End: 2021-06-09 | Stop reason: CLARIF

## 2020-12-21 RX ORDER — DOCUSATE SODIUM 100 MG/1
100 CAPSULE, LIQUID FILLED ORAL 2 TIMES DAILY PRN
COMMUNITY
End: 2021-06-09 | Stop reason: ALTCHOICE

## 2020-12-21 RX ORDER — APIXABAN 5 MG/1
5 TABLET, FILM COATED ORAL 2 TIMES DAILY
COMMUNITY
Start: 2020-12-07 | End: 2021-01-25 | Stop reason: SDUPTHER

## 2020-12-21 RX ORDER — ROPINIROLE 5 MG/1
5 TABLET, FILM COATED ORAL 2 TIMES DAILY
COMMUNITY
End: 2021-06-09 | Stop reason: DRUGHIGH

## 2020-12-21 RX ORDER — LEVOTHYROXINE SODIUM 0.1 MG/1
100 TABLET ORAL DAILY
COMMUNITY
End: 2021-02-05 | Stop reason: SDUPTHER

## 2020-12-21 RX ORDER — FLUTICASONE PROPIONATE 50 MCG
1 SPRAY, SUSPENSION (ML) NASAL
COMMUNITY
End: 2021-02-05 | Stop reason: SDUPTHER

## 2020-12-21 RX ORDER — INSULIN GLARGINE 100 [IU]/ML
25 INJECTION, SOLUTION SUBCUTANEOUS DAILY
COMMUNITY
Start: 2020-10-28 | End: 2021-02-05 | Stop reason: SDUPTHER

## 2020-12-21 RX ORDER — TRIAMCINOLONE ACETONIDE 1 MG/G
CREAM TOPICAL 2 TIMES DAILY
COMMUNITY
Start: 2020-09-04 | End: 2021-03-29 | Stop reason: SDUPTHER

## 2020-12-21 RX ORDER — CYCLOBENZAPRINE HCL 10 MG
10 TABLET ORAL 2 TIMES DAILY PRN
COMMUNITY
End: 2021-07-28

## 2020-12-21 RX ORDER — LAMOTRIGINE 25 MG/1
25 TABLET ORAL DAILY
COMMUNITY
Start: 2020-10-19 | End: 2021-01-25 | Stop reason: SDUPTHER

## 2020-12-21 RX ORDER — TEA TREE OIL 100 %
SPRAY, NON-AEROSOL (ML) TOPICAL
COMMUNITY
End: 2021-06-09

## 2020-12-21 RX ORDER — CLONAZEPAM 1 MG/1
2 TABLET ORAL 2 TIMES DAILY
COMMUNITY
Start: 2020-12-19 | End: 2021-02-05 | Stop reason: SDUPTHER

## 2020-12-21 RX ORDER — CALCIUM CARBONATE 300MG(750)
1 TABLET,CHEWABLE ORAL
COMMUNITY
End: 2021-02-05 | Stop reason: SDUPTHER

## 2020-12-21 RX ORDER — CETIRIZINE HYDROCHLORIDE 10 MG/1
10 TABLET ORAL
COMMUNITY
End: 2021-06-09 | Stop reason: CLARIF

## 2020-12-21 RX ORDER — ACETAMINOPHEN 500 MG
1 TABLET ORAL 2 TIMES DAILY
COMMUNITY
End: 2021-07-28

## 2020-12-21 RX ORDER — LISINOPRIL 2.5 MG/1
2.5 TABLET ORAL DAILY
COMMUNITY
Start: 2020-12-07 | End: 2021-02-05 | Stop reason: SDUPTHER

## 2020-12-21 RX ORDER — FENOFIBRATE 200 MG/1
200 CAPSULE ORAL
COMMUNITY
End: 2021-06-09 | Stop reason: CLARIF

## 2020-12-21 RX ORDER — ONDANSETRON 4 MG/1
4 TABLET, FILM COATED ORAL
COMMUNITY
End: 2021-06-09 | Stop reason: ALTCHOICE

## 2020-12-21 RX ORDER — ATORVASTATIN CALCIUM 20 MG/1
20 TABLET, FILM COATED ORAL
COMMUNITY
End: 2021-01-08 | Stop reason: SDUPTHER

## 2020-12-21 RX ORDER — SPIRONOLACTONE 50 MG/1
TABLET, FILM COATED ORAL
COMMUNITY
Start: 2020-12-17 | End: 2021-06-09 | Stop reason: CLARIF

## 2020-12-21 RX ORDER — ALBUTEROL SULFATE 90 UG/1
2 AEROSOL, METERED RESPIRATORY (INHALATION)
COMMUNITY
Start: 2020-06-02 | End: 2021-02-05 | Stop reason: SDUPTHER

## 2021-01-04 ENCOUNTER — OFFICE VISIT (OUTPATIENT)
Dept: FAMILY MEDICINE CLINIC | Facility: CLINIC | Age: 56
End: 2021-01-04
Payer: COMMERCIAL

## 2021-01-04 DIAGNOSIS — L97.929 CHRONIC VENOUS HYPERTENSION (IDIOPATHIC) WITH ULCER AND INFLAMMATION OF LEFT LOWER EXTREMITY (HCC): ICD-10-CM

## 2021-01-04 DIAGNOSIS — E66.01 MORBID OBESITY (HCC): ICD-10-CM

## 2021-01-04 DIAGNOSIS — M86.9 OSTEOMYELITIS, UNSPECIFIED SITE, UNSPECIFIED TYPE (HCC): ICD-10-CM

## 2021-01-04 DIAGNOSIS — J44.1 COPD WITH ACUTE EXACERBATION (HCC): ICD-10-CM

## 2021-01-04 DIAGNOSIS — L97.508 DIABETIC ULCER OF FOOT ASSOCIATED WITH TYPE 2 DIABETES MELLITUS, WITH OTHER ULCER SEVERITY, UNSPECIFIED LATERALITY, UNSPECIFIED PART OF FOOT (HCC): ICD-10-CM

## 2021-01-04 DIAGNOSIS — I87.332 CHRONIC VENOUS HYPERTENSION (IDIOPATHIC) WITH ULCER AND INFLAMMATION OF LEFT LOWER EXTREMITY (HCC): ICD-10-CM

## 2021-01-04 DIAGNOSIS — N40.1 BENIGN PROSTATIC HYPERPLASIA WITH URINARY HESITANCY: Primary | ICD-10-CM

## 2021-01-04 DIAGNOSIS — I48.91 ATRIAL FIBRILLATION, UNSPECIFIED TYPE (HCC): ICD-10-CM

## 2021-01-04 DIAGNOSIS — I50.9 CONGESTIVE HEART FAILURE, UNSPECIFIED HF CHRONICITY, UNSPECIFIED HEART FAILURE TYPE (HCC): ICD-10-CM

## 2021-01-04 DIAGNOSIS — E11.621 DIABETIC ULCER OF FOOT ASSOCIATED WITH TYPE 2 DIABETES MELLITUS, WITH OTHER ULCER SEVERITY, UNSPECIFIED LATERALITY, UNSPECIFIED PART OF FOOT (HCC): ICD-10-CM

## 2021-01-04 DIAGNOSIS — R39.11 BENIGN PROSTATIC HYPERPLASIA WITH URINARY HESITANCY: Primary | ICD-10-CM

## 2021-01-04 PROBLEM — L97.509 DIABETIC FOOT ULCER ASSOCIATED WITH TYPE 2 DIABETES MELLITUS (HCC): Status: ACTIVE | Noted: 2021-01-04

## 2021-01-04 PROCEDURE — 99214 OFFICE O/P EST MOD 30 MIN: CPT | Performed by: FAMILY MEDICINE

## 2021-01-04 RX ORDER — TAMSULOSIN HYDROCHLORIDE 0.4 MG/1
0.4 CAPSULE ORAL
Qty: 90 CAPSULE | Refills: 3 | Status: SHIPPED | OUTPATIENT
Start: 2021-01-04 | End: 2021-02-05 | Stop reason: SDUPTHER

## 2021-01-05 ENCOUNTER — TELEPHONE (OUTPATIENT)
Dept: FAMILY MEDICINE CLINIC | Facility: CLINIC | Age: 56
End: 2021-01-05

## 2021-01-05 NOTE — TELEPHONE ENCOUNTER
Patients nurse called and stated that she gave wrong inf for medication  Lyrica is to be 100mg BID  Instead of daily

## 2021-01-07 NOTE — PROGRESS NOTES
Virtual Regular Visit      Assessment/Plan:    Problem List Items Addressed This Visit        Endocrine    Diabetic foot ulcer associated with type 2 diabetes mellitus ([facility] )    Relevant Medications    tamsulosin (FLOMAX) 0 4 mg    Other Relevant Orders    CBC and differential    Comprehensive metabolic panel    TSH, 3rd generation    T4, free    NT-BNP PRO    HEMOGLOBIN A1C W/ EAG ESTIMATION    Lipid panel       Respiratory    COPD with acute exacerbation (HCC)    Relevant Medications    tamsulosin (FLOMAX) 0 4 mg    Other Relevant Orders    CBC and differential    Comprehensive metabolic panel    TSH, 3rd generation    T4, free    NT-BNP PRO    HEMOGLOBIN A1C W/ EAG ESTIMATION    Lipid panel       Cardiovascular and Mediastinum    Chronic venous hypertension (idiopathic) with ulcer and inflammation of left lower extremity (HCC)    Relevant Medications    tamsulosin (FLOMAX) 0 4 mg    Other Relevant Orders    CBC and differential    Comprehensive metabolic panel    TSH, 3rd generation    T4, free    NT-BNP PRO    HEMOGLOBIN A1C W/ EAG ESTIMATION    Lipid panel    Congestive heart failure (HCC)    Relevant Medications    tamsulosin (FLOMAX) 0 4 mg    Other Relevant Orders    CBC and differential    Comprehensive metabolic panel    TSH, 3rd generation    T4, free    NT-BNP PRO    HEMOGLOBIN A1C W/ EAG ESTIMATION    Lipid panel    Atrial fibrillation (HCC)    Relevant Medications    tamsulosin (FLOMAX) 0 4 mg    Other Relevant Orders    CBC and differential    Comprehensive metabolic panel    TSH, 3rd generation    T4, free    NT-BNP PRO    HEMOGLOBIN A1C W/ EAG ESTIMATION    Lipid panel       Musculoskeletal and Integument    Osteomyelitis (HCC)    Relevant Medications    tamsulosin (FLOMAX) 0 4 mg    Other Relevant Orders    CBC and differential    Comprehensive metabolic panel    TSH, 3rd generation    T4, free    NT-BNP PRO    HEMOGLOBIN A1C W/ EAG ESTIMATION    Lipid panel       Other    Morbid obesity ([facility] ) Relevant Medications    tamsulosin (FLOMAX) 0 4 mg    Other Relevant Orders    CBC and differential    Comprehensive metabolic panel    TSH, 3rd generation    T4, free    NT-BNP PRO    HEMOGLOBIN A1C W/ EAG ESTIMATION    Lipid panel      Other Visit Diagnoses     Benign prostatic hyperplasia with urinary hesitancy    -  Primary    Relevant Medications    tamsulosin (FLOMAX) 0 4 mg               Reason for visit is No chief complaint on file  Encounter provider Praveena Jara MD    Provider located at [address] [id number]      Recent Visits  Date Type Provider Dept   01/05/21 Telephone [facility] Carissa Johnson   01/04/21 Office Visit Praveena Jara MD [facility]   Showing recent visits within past 7 days and meeting all other requirements     Future Appointments  No visits were found meeting these conditions  Showing future appointments within next 150 days and meeting all other requirements        The patient was identified by name and date of birth  Luane Bence was informed that this is a telemedicine visit and that the visit is being conducted through [address] [place] [address] and patient was informed that this is not a secure, HIPAA-compliant platform  He agrees to proceed     My office door was closed  No one else was in the room  He acknowledged consent and understanding of privacy and security of the video platform  The patient has agreed to participate and understands they can discontinue the visit at any time  Patient is aware this is a billable service  Subjective  Luane Bence is a 60 y o  male    I spent greater than 20 minutes going over his medication list and current problem list with his visiting nurse  We systematically went through his discharge medication list, recent progress notes from other providers and the pill bottles he had at home  His medication list is up-to-date  He offer no complaints      His DM is not well controlled, but his medical regimen was not at all clear  His BP is at goal as per home health care  Past Medical History:   Diagnosis Date    Arthritis     Atrial fibrillation (HCC)     Chronic kidney disease, stage 3     COPD with acute exacerbation (HCC)     Hyperkalemia     Hyperlipidemia     Hypertension     Hypoglycemia     Hypomagnesemia     Hyponatremia     Hypothyroidism     Morbid obesity ([facility] )     Pulmonary embolism (HCC)     Thrombocytopenia (HCC)     [facility]     Urinary retention     Vitamin D deficiency        Past Surgical History:   Procedure Laterality Date    GALLBLADDER SURGERY      TONSILLECTOMY AND ADENOIDECTOMY         Current Outpatient Medications   Medication Sig Dispense Refill    acetaminophen (TYLENOL) 500 mg tablet Take 1 tablet by mouth 2 (two) times a day      albuterol (PROVENTIL HFA,VENTOLIN HFA) 90 mcg/act inhaler Inhale 2 puffs      amitriptyline (ELAVIL) 25 mg tablet Take 25 mg by mouth daily at bedtime   amoxicillin-clavulanate (AUGMENTIN) 875-125 mg per tablet take 1 tablet by mouth every 12 hours for 7 days      atorvastatin (LIPITOR) 20 mg tablet Take 20 mg by mouth      cetirizine (ZyrTEC) 10 mg tablet Take 10 mg by mouth      Cholecalciferol 25 MCG (1000 UT) capsule Take 2,000 Units by mouth      clonazePAM (KlonoPIN) 1 mg tablet 2 mg 2 (two) times a day       cyclobenzaprine (FLEXERIL) 10 mg tablet Take 10 mg by mouth 2 (two) times a day as needed      Dextrose, Diabetic Use, (Glucose) 15 GM/59ML LIQD Take by mouth      docusate sodium (COLACE) 100 mg capsule Take 100 mg by mouth 2 (two) times a day as needed      Eliquis 5 MG Take 5 mg by mouth 2 (two) times a day      fenofibrate (TRICOR) 145 mg tablet Take 145 mg by mouth daily        fenofibrate micronized (LOFIBRA) 200 MG capsule Take 200 mg by mouth every morning before breakfast      FLUoxetine (PROzac) 40 MG capsule Take 40 mg by mouth      fluticasone (FLONASE) 50 mcg/act nasal spray 1 spray into each nostril      FUROSEMIDE PO Take 160 mg by mouth 2 (two) times a day Indications: takes with breakfast and lunch   gabapentin (NEURONTIN) 300 mg capsule Take 300 mg by mouth 2 (two) times a day   gabapentin (NEURONTIN) 600 MG tablet Take 1,200 mg by mouth daily at bedtime      HumaLOG 100 UNIT/ML injection 5 Units       insulin aspart (NovoLOG FlexPen) 100 UNIT/ML injection pen 12 units with each meal plus ISF 20 scale as needed  TTD 75 units      insulin glargine (LANTUS) 100 units/mL subcutaneous injection 25 Units daily       insulin regular (HumuLIN R,NovoLIN R) 100 units/mL injection Inject under the skin 3 (three) times a day before meals   lamoTRIgine (LaMICtal) 25 mg tablet Take 25 mg by mouth daily      levETIRAcetam (KEPPRA) 500 mg tablet Take 500 mg by mouth 2 (two) times a day      levothyroxine 100 mcg tablet Take 100 mcg by mouth daily      levothyroxine 125 mcg tablet Take 125 mcg by mouth daily      levothyroxine 150 mcg tablet Take 125 mcg by mouth daily   levothyroxine 300 MCG tablet Take 300 mcg by mouth daily   lisinopril (ZESTRIL) 2 5 mg tablet Take 2 5 mg by mouth      lisinopril (ZESTRIL) 2 5 mg tablet Take 2 5 mg by mouth daily      loratadine (CLARITIN) 10 mg tablet Take 10 mg by mouth daily   Magnesium 400 MG TABS Take 1 tablet by mouth      meloxicam (MOBIC) 15 mg tablet Take 15 mg by mouth      metFORMIN (GLUCOPHAGE) 1000 MG tablet Take 1,000 mg by mouth      metoprolol tartrate (LOPRESSOR) 50 mg tablet Take 25 mg by mouth      montelukast (SINGULAIR) 10 mg tablet Take 10 mg by mouth daily at bedtime   ondansetron (ZOFRAN) 4 mg tablet Take 4 mg by mouth      pantoprazole (PROTONIX) 40 mg tablet Take 40 mg by mouth daily        polyethylene glycol (MIRALAX) 17 g packet Take 17 g by mouth daily 30 each 0    potassium chloride (K-DUR,KLOR-CON) 20 mEq tablet Take 20 mEq by mouth 2 (two) times a day   potassium chloride (KLOR-CON) 20 mEq packet Take 20 mEq by mouth      pregabalin (LYRICA) 100 mg capsule Take 100 mg by mouth      rOPINIRole (REQUIP) 0 5 mg tablet Take 0 5 mg by mouth daily   rOPINIRole (REQUIP) 5 MG tablet Take 5 mg by mouth 2 (two) times a day       simvastatin (ZOCOR) 40 mg tablet Take 40 mg by mouth daily at bedtime   sodium chloride (OCEAN) 0 65 % nasal spray 1 spray into each nostril 2 (two) times a day      spironolactone (ALDACTONE) 50 mg tablet       tamsulosin (FLOMAX) 0 4 mg Take 0 4 mg by mouth      tamsulosin (FLOMAX) 0 4 mg Take 1 capsule (0 4 mg total) by mouth daily with dinner 90 capsule 3    triamcinolone (KENALOG) 0 1 % cream Apply topically 2 (two) times a day      warfarin (COUMADIN) 7 5 mg tablet Take 7 5 mg by mouth daily  No current facility-administered medications for this visit  Allergies   Allergen Reactions    Carbamazepine Other (See Comments)     Elevated liver functions    "Enlarges liver"    Clindamycin Rash    Phenytoin Rash     Other reaction(s): Itching    Pneumococcal Vaccine Hives, Itching and Rash       Review of Systems   All other systems reviewed and are negative  Video Exam    There were no vitals filed for this visit  Physical Exam  Constitutional:       Appearance: Normal appearance  He is obese  Pulmonary:      Effort: Pulmonary effort is normal    Neurological:      General: No focal deficit present  Mental Status: He is alert and oriented to person, place, and time  Mental status is at baseline  Psychiatric:         Mood and Affect: Mood normal          Behavior: Behavior normal          Thought Content: Thought content normal          Judgment: Judgment normal           I spent 15 minutes directly with the patient during this visit      VIRTUAL VISIT ALDO Mckenzie Eliz acknowledges that he has consented to an online visit or consultation   He understands that the online visit is based solely on information provided by him, and that, in the absence of a face-to-face physical evaluation by the physician, the diagnosis he receives is both limited and provisional in terms of accuracy and completeness  This is not intended to replace a full medical face-to-face evaluation by the physician  Luane Bence understands and accepts these terms

## 2021-01-08 DIAGNOSIS — G89.29 OTHER CHRONIC PAIN: Primary | ICD-10-CM

## 2021-01-08 DIAGNOSIS — Z86.79 HISTORY OF HEART FAILURE: Primary | ICD-10-CM

## 2021-01-08 RX ORDER — ATORVASTATIN CALCIUM 20 MG/1
40 TABLET, FILM COATED ORAL DAILY
Qty: 90 TABLET | Refills: 3 | Status: SHIPPED | OUTPATIENT
Start: 2021-01-08 | End: 2021-02-05 | Stop reason: SDUPTHER

## 2021-01-08 RX ORDER — PREGABALIN 100 MG/1
100 CAPSULE ORAL 2 TIMES DAILY
Qty: 60 CAPSULE | Refills: 1 | Status: SHIPPED | OUTPATIENT
Start: 2021-01-08 | End: 2021-04-15

## 2021-01-13 ENCOUNTER — TELEPHONE (OUTPATIENT)
Dept: FAMILY MEDICINE CLINIC | Facility: CLINIC | Age: 56
End: 2021-01-13

## 2021-01-13 LAB — HBA1C MFR BLD HPLC: 9.9 %

## 2021-01-13 PROCEDURE — 3046F HEMOGLOBIN A1C LEVEL >9.0%: CPT | Performed by: FAMILY MEDICINE

## 2021-01-21 ENCOUNTER — TELEPHONE (OUTPATIENT)
Dept: FAMILY MEDICINE CLINIC | Facility: CLINIC | Age: 56
End: 2021-01-21

## 2021-01-25 DIAGNOSIS — I48.91 ATRIAL FIBRILLATION, UNSPECIFIED TYPE (HCC): Primary | ICD-10-CM

## 2021-01-25 RX ORDER — LAMOTRIGINE 25 MG/1
25 TABLET ORAL DAILY
Qty: 30 TABLET | Refills: 5 | Status: SHIPPED | OUTPATIENT
Start: 2021-01-25 | End: 2021-02-05 | Stop reason: SDUPTHER

## 2021-01-25 RX ORDER — APIXABAN 5 MG/1
5 TABLET, FILM COATED ORAL 2 TIMES DAILY
Qty: 60 TABLET | Refills: 5 | Status: SHIPPED | OUTPATIENT
Start: 2021-01-25 | End: 2021-02-05 | Stop reason: SDUPTHER

## 2021-02-05 DIAGNOSIS — Z86.79 HISTORY OF HEART FAILURE: ICD-10-CM

## 2021-02-05 DIAGNOSIS — E11.621 DIABETIC ULCER OF FOOT ASSOCIATED WITH TYPE 2 DIABETES MELLITUS, WITH OTHER ULCER SEVERITY, UNSPECIFIED LATERALITY, UNSPECIFIED PART OF FOOT (HCC): ICD-10-CM

## 2021-02-05 DIAGNOSIS — E11.65 TYPE 2 DIABETES MELLITUS WITH HYPERGLYCEMIA, WITH LONG-TERM CURRENT USE OF INSULIN (HCC): ICD-10-CM

## 2021-02-05 DIAGNOSIS — Z79.4 TYPE 2 DIABETES MELLITUS WITH HYPERGLYCEMIA, WITH LONG-TERM CURRENT USE OF INSULIN (HCC): ICD-10-CM

## 2021-02-05 DIAGNOSIS — M86.9 OSTEOMYELITIS, UNSPECIFIED SITE, UNSPECIFIED TYPE (HCC): ICD-10-CM

## 2021-02-05 DIAGNOSIS — J44.1 COPD WITH ACUTE EXACERBATION (HCC): ICD-10-CM

## 2021-02-05 DIAGNOSIS — N40.1 BENIGN PROSTATIC HYPERPLASIA WITH URINARY HESITANCY: ICD-10-CM

## 2021-02-05 DIAGNOSIS — I10 ESSENTIAL HYPERTENSION: ICD-10-CM

## 2021-02-05 DIAGNOSIS — I87.332 CHRONIC VENOUS HYPERTENSION (IDIOPATHIC) WITH ULCER AND INFLAMMATION OF LEFT LOWER EXTREMITY (HCC): ICD-10-CM

## 2021-02-05 DIAGNOSIS — L97.929 CHRONIC VENOUS HYPERTENSION (IDIOPATHIC) WITH ULCER AND INFLAMMATION OF LEFT LOWER EXTREMITY (HCC): ICD-10-CM

## 2021-02-05 DIAGNOSIS — E03.9 HYPOTHYROIDISM, UNSPECIFIED TYPE: Primary | ICD-10-CM

## 2021-02-05 DIAGNOSIS — I48.91 ATRIAL FIBRILLATION, UNSPECIFIED TYPE (HCC): ICD-10-CM

## 2021-02-05 DIAGNOSIS — E66.01 MORBID OBESITY (HCC): ICD-10-CM

## 2021-02-05 DIAGNOSIS — R39.11 BENIGN PROSTATIC HYPERPLASIA WITH URINARY HESITANCY: ICD-10-CM

## 2021-02-05 DIAGNOSIS — I50.9 CONGESTIVE HEART FAILURE, UNSPECIFIED HF CHRONICITY, UNSPECIFIED HEART FAILURE TYPE (HCC): ICD-10-CM

## 2021-02-05 DIAGNOSIS — L97.508 DIABETIC ULCER OF FOOT ASSOCIATED WITH TYPE 2 DIABETES MELLITUS, WITH OTHER ULCER SEVERITY, UNSPECIFIED LATERALITY, UNSPECIFIED PART OF FOOT (HCC): ICD-10-CM

## 2021-02-05 PROCEDURE — 4010F ACE/ARB THERAPY RXD/TAKEN: CPT | Performed by: FAMILY MEDICINE

## 2021-02-05 RX ORDER — POTASSIUM CHLORIDE 20 MEQ/1
20 TABLET, EXTENDED RELEASE ORAL 2 TIMES DAILY
Qty: 60 TABLET | Refills: 3 | Status: SHIPPED | OUTPATIENT
Start: 2021-02-05 | End: 2021-07-20 | Stop reason: SDUPTHER

## 2021-02-05 RX ORDER — LEVOTHYROXINE SODIUM 0.1 MG/1
100 TABLET ORAL DAILY
Qty: 30 TABLET | Refills: 3 | Status: SHIPPED | OUTPATIENT
Start: 2021-02-05 | End: 2022-04-21

## 2021-02-05 RX ORDER — LEVOTHYROXINE SODIUM 300 UG/1
300 TABLET ORAL DAILY
Qty: 30 TABLET | Refills: 3 | Status: SHIPPED | OUTPATIENT
Start: 2021-02-05 | End: 2022-04-21

## 2021-02-05 RX ORDER — LISINOPRIL 2.5 MG/1
2.5 TABLET ORAL DAILY
Qty: 30 TABLET | Refills: 3 | Status: SHIPPED | OUTPATIENT
Start: 2021-02-05 | End: 2021-06-08 | Stop reason: SDUPTHER

## 2021-02-05 RX ORDER — INSULIN GLARGINE 100 [IU]/ML
50 INJECTION, SOLUTION SUBCUTANEOUS DAILY
Qty: 10 ML | Refills: 5 | Status: SHIPPED | OUTPATIENT
Start: 2021-02-05 | End: 2021-12-02

## 2021-02-05 RX ORDER — MONTELUKAST SODIUM 10 MG/1
10 TABLET ORAL
Qty: 30 TABLET | Refills: 3 | Status: SHIPPED | OUTPATIENT
Start: 2021-02-05 | End: 2021-06-09 | Stop reason: CLARIF

## 2021-02-05 RX ORDER — AMITRIPTYLINE HYDROCHLORIDE 25 MG/1
25 TABLET, FILM COATED ORAL
Qty: 30 TABLET | Refills: 3 | Status: SHIPPED | OUTPATIENT
Start: 2021-02-05 | End: 2022-05-23 | Stop reason: SDUPTHER

## 2021-02-05 RX ORDER — FUROSEMIDE 40 MG/1
40 TABLET ORAL DAILY
Qty: 30 TABLET | Refills: 3 | Status: SHIPPED | OUTPATIENT
Start: 2021-02-05 | End: 2022-06-23

## 2021-02-05 RX ORDER — APIXABAN 5 MG/1
5 TABLET, FILM COATED ORAL 2 TIMES DAILY
Qty: 60 TABLET | Refills: 5 | Status: SHIPPED | OUTPATIENT
Start: 2021-02-05 | End: 2021-07-20 | Stop reason: SDUPTHER

## 2021-02-05 RX ORDER — ALBUTEROL SULFATE 90 UG/1
2 AEROSOL, METERED RESPIRATORY (INHALATION) EVERY 6 HOURS PRN
Qty: 3 INHALER | Refills: 3 | Status: SHIPPED | OUTPATIENT
Start: 2021-02-05

## 2021-02-05 RX ORDER — ROPINIROLE 4 MG/1
4 TABLET, FILM COATED ORAL 3 TIMES DAILY
Qty: 90 TABLET | Refills: 3 | Status: SHIPPED | OUTPATIENT
Start: 2021-02-05 | End: 2021-06-09 | Stop reason: CLARIF

## 2021-02-05 RX ORDER — LEVOTHYROXINE SODIUM 0.12 MG/1
125 TABLET ORAL DAILY
Qty: 30 TABLET | Refills: 3 | Status: SHIPPED | OUTPATIENT
Start: 2021-02-05 | End: 2022-04-21

## 2021-02-05 RX ORDER — LEVETIRACETAM 500 MG/1
500 TABLET ORAL 2 TIMES DAILY
Qty: 60 TABLET | Refills: 3 | Status: SHIPPED | OUTPATIENT
Start: 2021-02-05 | End: 2022-05-04 | Stop reason: SDUPTHER

## 2021-02-05 RX ORDER — INSULIN ASPART 100 [IU]/ML
12 INJECTION, SOLUTION INTRAVENOUS; SUBCUTANEOUS
Qty: 5 PEN | Refills: 5 | Status: SHIPPED | OUTPATIENT
Start: 2021-02-05 | End: 2021-12-02

## 2021-02-05 RX ORDER — CALCIUM CARBONATE 300MG(750)
1 TABLET,CHEWABLE ORAL 2 TIMES DAILY
Qty: 60 TABLET | Refills: 3 | Status: SHIPPED | OUTPATIENT
Start: 2021-02-05 | End: 2021-06-09 | Stop reason: SDUPTHER

## 2021-02-05 RX ORDER — ATORVASTATIN CALCIUM 20 MG/1
40 TABLET, FILM COATED ORAL DAILY
Qty: 30 TABLET | Refills: 3 | Status: SHIPPED | OUTPATIENT
Start: 2021-02-05 | End: 2021-03-26

## 2021-02-05 RX ORDER — FLUTICASONE PROPIONATE 50 MCG
1 SPRAY, SUSPENSION (ML) NASAL DAILY PRN
Qty: 3 BOTTLE | Refills: 3 | Status: SHIPPED | OUTPATIENT
Start: 2021-02-05 | End: 2021-07-28

## 2021-02-05 RX ORDER — LAMOTRIGINE 25 MG/1
25 TABLET ORAL DAILY
Qty: 30 TABLET | Refills: 3 | Status: SHIPPED | OUTPATIENT
Start: 2021-02-05 | End: 2022-05-23 | Stop reason: SDUPTHER

## 2021-02-05 RX ORDER — PANTOPRAZOLE SODIUM 40 MG/1
40 TABLET, DELAYED RELEASE ORAL DAILY
Qty: 30 TABLET | Refills: 3 | Status: SHIPPED | OUTPATIENT
Start: 2021-02-05 | End: 2021-07-20 | Stop reason: SDUPTHER

## 2021-02-05 RX ORDER — ROPINIROLE 4 MG/1
4 TABLET, FILM COATED ORAL 3 TIMES DAILY
COMMUNITY
End: 2021-02-05 | Stop reason: SDUPTHER

## 2021-02-05 RX ORDER — TAMSULOSIN HYDROCHLORIDE 0.4 MG/1
0.4 CAPSULE ORAL
Qty: 30 CAPSULE | Refills: 3 | Status: SHIPPED | OUTPATIENT
Start: 2021-02-05 | End: 2021-07-20 | Stop reason: SDUPTHER

## 2021-02-05 RX ORDER — CLONAZEPAM 1 MG/1
2 TABLET ORAL 2 TIMES DAILY
Qty: 60 TABLET | Refills: 3 | Status: SHIPPED | OUTPATIENT
Start: 2021-02-05 | End: 2021-03-26 | Stop reason: SDUPTHER

## 2021-02-09 ENCOUNTER — OFFICE VISIT (OUTPATIENT)
Dept: FAMILY MEDICINE CLINIC | Facility: CLINIC | Age: 56
End: 2021-02-09
Payer: COMMERCIAL

## 2021-02-09 VITALS
WEIGHT: 315 LBS | TEMPERATURE: 97.6 F | OXYGEN SATURATION: 98 % | BODY MASS INDEX: 50.94 KG/M2 | DIASTOLIC BLOOD PRESSURE: 58 MMHG | SYSTOLIC BLOOD PRESSURE: 100 MMHG

## 2021-02-09 DIAGNOSIS — N18.32 ANEMIA DUE TO STAGE 3B CHRONIC KIDNEY DISEASE (HCC): ICD-10-CM

## 2021-02-09 DIAGNOSIS — I48.91 ATRIAL FIBRILLATION, UNSPECIFIED TYPE (HCC): ICD-10-CM

## 2021-02-09 DIAGNOSIS — Z00.00 MEDICARE ANNUAL WELLNESS VISIT, SUBSEQUENT: ICD-10-CM

## 2021-02-09 DIAGNOSIS — Z23 NEED FOR INFLUENZA VACCINATION: ICD-10-CM

## 2021-02-09 DIAGNOSIS — D63.1 ANEMIA DUE TO STAGE 3B CHRONIC KIDNEY DISEASE (HCC): ICD-10-CM

## 2021-02-09 DIAGNOSIS — L97.421 DIABETIC ULCER OF LEFT HEEL ASSOCIATED WITH TYPE 2 DIABETES MELLITUS, LIMITED TO BREAKDOWN OF SKIN (HCC): Primary | ICD-10-CM

## 2021-02-09 DIAGNOSIS — E11.621 DIABETIC ULCER OF LEFT HEEL ASSOCIATED WITH TYPE 2 DIABETES MELLITUS, LIMITED TO BREAKDOWN OF SKIN (HCC): Primary | ICD-10-CM

## 2021-02-09 PROCEDURE — G0439 PPPS, SUBSEQ VISIT: HCPCS | Performed by: FAMILY MEDICINE

## 2021-02-09 PROCEDURE — 99214 OFFICE O/P EST MOD 30 MIN: CPT | Performed by: FAMILY MEDICINE

## 2021-02-09 RX ORDER — CALCIUM CITRATE/VITAMIN D3 200MG-6.25
TABLET ORAL 4 TIMES DAILY
COMMUNITY
Start: 2021-01-23 | End: 2021-07-28

## 2021-02-09 RX ORDER — CIPROFLOXACIN 500 MG/1
TABLET, FILM COATED ORAL
COMMUNITY
Start: 2021-01-20 | End: 2021-06-09 | Stop reason: ALTCHOICE

## 2021-02-09 RX ORDER — METRONIDAZOLE 500 MG/1
500 TABLET ORAL 3 TIMES DAILY
COMMUNITY
Start: 2021-01-20 | End: 2021-06-09 | Stop reason: ALTCHOICE

## 2021-02-09 RX ORDER — PEN NEEDLE, DIABETIC 31 GX5/16"
NEEDLE, DISPOSABLE MISCELLANEOUS
COMMUNITY
Start: 2021-01-18 | End: 2021-07-28

## 2021-02-09 NOTE — ASSESSMENT & PLAN NOTE
Lab Results   Component Value Date    HGBA1C 9 9 (H) 01/13/2021     Continue plan per [facility] wound care

## 2021-02-09 NOTE — PROGRESS NOTES
Assessment and Plan:     Problem List Items Addressed This Visit     None           Preventive health issues were discussed with patient, and age appropriate screening tests were ordered as noted in patient's After Visit Summary  Personalized health advice and appropriate referrals for health education or preventive services given if needed, as noted in patient's After Visit Summary  History of Present Illness:     Patient presents for Welcome to Medicare visit       Patient Care Team:  Gabbi Sandy MD as PCP - General (Family Medicine)  Merlinda Fan, DPM     Review of Systems:     Review of Systems   Problem List:     Patient Active Problem List   Diagnosis    Cellulitis of right lower extremity    [facility]    Diabetes mellitus ([facility]    Morbid obesity ([facility]    Hypothyroid    Anxiety    H/O CHF    Neuropathy    HTN (hypertension)    H/O osteomyelitis    Hx pulmonary embolism    Encounter for medical examination to establish care    Diabetic foot ulcer associated with type 2 diabetes mellitus ([facility]    Osteomyelitis ([address] )    Chronic venous hypertension (idiopathic) with ulcer and inflammation of left lower extremity ([facility] )    COPD with acute exacerbation (HCC)    Congestive heart failure ([address] )    Atrial fibrillation ([address] )      Past Medical and Surgical History:     Past Medical History:   Diagnosis Date    Arthritis     Atrial fibrillation ([address] [facility]     Chronic kidney disease, stage 3     COPD with acute exacerbation ([address] )     Hyperkalemia     Hyperlipidemia     Hypertension     Hypoglycemia     Hypomagnesemia     Hyponatremia     Hypothyroidism     Morbid obesity ([address] [facility]     Pulmonary embolism (HCC)     Thrombocytopenia (HCC)     Type 2 diabetes mellitus ([address]     Urinary retention     Vitamin D deficiency      Past Surgical History:   Procedure Laterality Date    GALLBLADDER SURGERY      TONSILLECTOMY AND ADENOIDECTOMY        Family History:     Family History   Problem Relation Age of Onset   Caroline Mustafa Cancer Mother     Hypertension Mother     Heart disease Father     Hypertension Father     Diabetes Sister     Cancer Maternal Grandmother       Social History:        Social History     Socioeconomic History    Marital status: Single     Spouse name: Not on file    Number of children: Not on file    Years of education: Not on file    Highest education level: Not on file   Occupational History    Not on file   Social Needs    Financial resource strain: Not on file    Food insecurity     Worry: Not on file     Inability: Not on file    Transportation needs     Medical: Not on file     Non-medical: Not on file   Tobacco Use    Smoking status: Never Smoker    Smokeless tobacco: Never Used   Substance and Sexual Activity    Alcohol use: No    Drug use: No    Sexual activity: Not on file   Lifestyle    Physical activity     Days per week: Not on file     Minutes per session: Not on file    Stress: Not on file   Relationships    Social connections     Talks on phone: Not on file     Gets together: Not on file     Attends Restorationism service: Not on file     Active member of club or organization: Not on file     Attends meetings of clubs or organizations: Not on file     Relationship status: Not on file    Intimate partner violence     Fear of current or ex partner: Not on file     Emotionally abused: Not on file     Physically abused: Not on file     Forced sexual activity: Not on file   Other Topics Concern    Not on file   Social History Narrative    Not on file      Medications and Allergies:     Current Outpatient Medications   Medication Sig Dispense Refill    acetaminophen (TYLENOL) 500 mg tablet Take 1 tablet by mouth 2 (two) times a day      albuterol (PROVENTIL HFA,VENTOLIN HFA) 90 mcg/act inhaler Inhale 2 puffs every 6 (six) hours as needed for wheezing 3 Inhaler 3    amitriptyline (ELAVIL) 25 mg tablet Take 1 tablet (25 mg total) by mouth daily at bedtime 30 tablet 3    atorvastatin (LIPITOR) 20 mg tablet Take 2 tablets (40 mg total) by mouth daily 30 tablet 3    B-D UF III MINI PEN NEEDLES 31G X 5 MM MISC use 1 four times a day      cetirizine (ZyrTEC) 10 mg tablet Take 10 mg by mouth      Cholecalciferol 25 MCG (1000 UT) capsule Take 2,000 Units by mouth      ciprofloxacin (CIPRO) 500 mg tablet take 1 tablet by mouth twice a day for 7 days      clonazePAM (KlonoPIN) 1 mg tablet Take 2 tablets (2 mg total) by mouth 2 (two) times a day 60 tablet 3    cyclobenzaprine (FLEXERIL) 10 mg tablet Take 10 mg by mouth 2 (two) times a day as needed      Dextrose, Diabetic Use, (Glucose) 15 GM/59ML LIQD Take by mouth      docusate sodium (COLACE) 100 mg capsule Take 100 mg by mouth 2 (two) times a day as needed      Eliquis 5 MG Take 1 tablet (5 mg total) by mouth 2 (two) times a day 60 tablet 5    fenofibrate (TRICOR) 145 mg tablet Take 145 mg by mouth daily   fenofibrate micronized (LOFIBRA) 200 MG capsule Take 200 mg by mouth every morning before breakfast      FLUoxetine (PROzac) 40 MG capsule Take 40 mg by mouth      fluticasone (FLONASE) 50 mcg/act nasal spray 1 spray into each nostril daily as needed for rhinitis 3 Bottle 3    furosemide (LASIX) 40 mg tablet Take 1 tablet (40 mg total) by mouth daily 30 tablet 3    gabapentin (NEURONTIN) 300 mg capsule Take 300 mg by mouth 2 (two) times a day   gabapentin (NEURONTIN) 600 MG tablet Take 1,200 mg by mouth daily at bedtime      HumaLOG 100 UNIT/ML injection 5 Units       insulin aspart (NovoLOG FlexPen) 100 UNIT/ML injection pen Inject 12 Units under the skin 3 (three) times a day with meals 5 pen 5    insulin glargine (LANTUS) 100 units/mL subcutaneous injection Inject 50 Units under the skin daily 10 mL 5    insulin regular (HumuLIN R,NovoLIN R) 100 units/mL injection Inject under the skin 3 (three) times a day before meals        lamoTRIgine (LaMICtal) 25 mg tablet Take 1 tablet (25 mg total) by mouth daily 30 tablet 3    levETIRAcetam (KEPPRA) 500 mg tablet Take 1 tablet (500 mg total) by mouth 2 (two) times a day 60 tablet 3    levothyroxine 100 mcg tablet Take 1 tablet (100 mcg total) by mouth daily 30 tablet 3    levothyroxine 125 mcg tablet Take 1 tablet (125 mcg total) by mouth daily 30 tablet 3    levothyroxine 150 mcg tablet Take 125 mcg by mouth daily   levothyroxine 300 MCG tablet Take 1 tablet (300 mcg total) by mouth daily 30 tablet 3    lisinopril (ZESTRIL) 2 5 mg tablet Take 1 tablet (2 5 mg total) by mouth daily 30 tablet 3    loratadine (CLARITIN) 10 mg tablet Take 10 mg by mouth daily   Magnesium 400 MG TABS Take 1 tablet (400 mg total) by mouth 2 (two) times a day 60 tablet 3    meloxicam (MOBIC) 15 mg tablet Take 15 mg by mouth      metFORMIN (GLUCOPHAGE) 1000 MG tablet Take 1,000 mg by mouth      metoprolol tartrate (LOPRESSOR) 25 mg tablet Take 1 tablet (25 mg total) by mouth 2 (two) times a day 60 tablet 3    metroNIDAZOLE (FLAGYL) 500 mg tablet Take 500 mg by mouth 3 (three) times a day      montelukast (SINGULAIR) 10 mg tablet Take 1 tablet (10 mg total) by mouth daily at bedtime 30 tablet 3    ondansetron (ZOFRAN) 4 mg tablet Take 4 mg by mouth      pantoprazole (PROTONIX) 40 mg tablet Take 1 tablet (40 mg total) by mouth daily 30 tablet 3    polyethylene glycol (MIRALAX) 17 g packet Take 17 g by mouth daily 30 each 0    potassium chloride (K-DUR,KLOR-CON) 20 mEq tablet Take 1 tablet (20 mEq total) by mouth 2 (two) times a day 60 tablet 3    potassium chloride (KLOR-CON) 20 mEq packet Take 20 mEq by mouth      pregabalin (LYRICA) 100 mg capsule Take 1 capsule (100 mg total) by mouth 2 (two) times a day 60 capsule 1    rOPINIRole (REQUIP) 0 5 mg tablet Take 0 5 mg by mouth daily        rOPINIRole (REQUIP) 4 mg tablet Take 1 tablet (4 mg total) by mouth 3 (three) times a day 90 tablet 3    rOPINIRole (REQUIP) 5 MG tablet Take 5 mg by mouth 2 (two) times a day       simvastatin (ZOCOR) 40 mg tablet Take 40 mg by mouth daily at bedtime   spironolactone (ALDACTONE) 50 mg tablet       tamsulosin (FLOMAX) 0 4 mg Take 0 4 mg by mouth      tamsulosin (FLOMAX) 0 4 mg Take 1 capsule (0 4 mg total) by mouth daily with dinner 30 capsule 3    triamcinolone (KENALOG) 0 1 % cream Apply topically 2 (two) times a day      True Metrix Blood Glucose Test test strip 4 (four) times a day Test      warfarin (COUMADIN) 7 5 mg tablet Take 7 5 mg by mouth daily  No current facility-administered medications for this visit  Allergies   Allergen Reactions    Carbamazepine Other (See Comments)     Elevated liver functions    "Enlarges liver"    Clindamycin Rash    Phenytoin Rash     Other reaction(s): Itching    Pneumococcal Vaccine Hives, Itching and Rash      Immunizations:     Immunization History   Administered Date(s) Administered    INFLUENZA 02/07/2020, 10/21/2020    Tdap 11/08/2019, 06/10/2020      Health Maintenance:         Topic Date Due    Hepatitis C Screening  1965    HIV Screening  07/11/1978    Colorectal Cancer Screening  07/11/2013         Topic Date Due    Pneumococcal Vaccine: Pediatrics (0 to 5 Years) and At-Risk Patients (6 to 59 Years) (1 of 1 - PPSV23) [birth year]11/1969      Medicare Screening Tests and Risk Assessments:     Rafael Nation is here for his Subsequent Wellness visit  Health Risk Assessment:   Patient rates overall health as poor  Patient feels that their physical health rating is much worse  Eyesight was rated as slightly worse  Hearing was rated as slightly worse  Patient feels that their emotional and mental health rating is same  Pain experienced in the last 7 days has been a lot  Patient's pain rating has been 7/10  Patient states that he has experienced no weight loss or gain in last 6 months  Fall Risk Screening:    In the past year, patient has experienced: history of falling in past year Feels unsteady when standing or walking?: Yes    Worried about falling?: Yes      Home Safety:  Patient does not have trouble with stairs inside or outside of their home  Patient has working smoke alarms and has no working carbon monoxide detector  Home safety hazards include: none  Nutrition:   Current diet is Diabetic, No Added Salt, Low Carb and Frequent junk food  Medications:   Patient is currently taking over-the-counter supplements  OTC medications include: see medication list  Patient is not able to manage medications  Activities of Daily Living (ADLs)/Instrumental Activities of Daily Living (IADLs):   Walk and transfer into and out of bed and chair?: Yes  Dress and groom yourself?: Yes    Bathe or shower yourself?: Yes    Feed yourself? Yes  Do your laundry/housekeeping?: No  Manage your money, pay your bills and track your expenses?: Yes  Make your own meals?: Yes    Do your own shopping?: Yes    Previous Hospitalizations:   Any hospitalizations or ED visits within the last 12 months?: Yes      Advance Care Planning:   Living will: Yes    Durable POA for healthcare: Yes    Advanced directive: Yes      Cognitive Screening:   Provider or family/friend/caregiver concerned regarding cognition?: Yes    Cognition Comments: He sees neurology on a regular basis      PREVENTIVE SCREENINGS      Cardiovascular Screening:    General: Screening Current      Diabetes Screening:     General: Screening Not Indicated and History Diabetes      Colorectal Cancer Screening:     General: Screening Current      Prostate Cancer Screening:    General: Screening Not Indicated      Osteoporosis Screening:    General: Screening Not Indicated      Abdominal Aortic Aneurysm (AAA) Screening:        General: Screening Not Indicated      Lung Cancer Screening:     General: Screening Not Indicated    No exam data present     Physical Exam:     Temp 97 6 °F (36 4 °C) (Temporal)   Wt (!) 161 kg (355 lb)   SpO2 98%   PF 70 L/min   BMI 50 94 kg/m²     Physical Exam     Pedrito Bear MD

## 2021-02-09 NOTE — PATIENT INSTRUCTIONS

## 2021-02-09 NOTE — PROGRESS NOTES
Diabetic Foot Exam    Patient's shoes and socks removed  Right Foot/Ankle   Right Foot Inspection  Skin Exam: skin normal and skin intact no dry skin, no warmth, no callus, no erythema, no maceration, no abnormal color, no pre-ulcer, no ulcer and no callus                          Toe Exam: ROM and strength within normal limits  Sensory   Vibration: absent  Proprioception: absent   Monofilament testing: absent  Vascular  Capillary refills: < 3 seconds  The right DP pulse is 1+  The right PT pulse is 1+  Left Foot/Ankle  Left Foot Inspection  Skin Exam: skin normal and skin intactno dry skin, no warmth, no erythema, no maceration, normal color, no pre-ulcer, no ulcer and no callus                         Toe Exam: ROM and strength within normal limits                   Sensory   Vibration: absent  Proprioception: absent  Monofilament: absent  Vascular  Capillary refills: < 3 seconds  The left DP pulse is 1+  The left PT pulse is 1+  Assign Risk Category:  No deformity present;  Loss of protective sensation; Weak pulses       Risk: 2

## 2021-02-11 ENCOUNTER — TELEPHONE (OUTPATIENT)
Dept: GASTROENTEROLOGY | Facility: CLINIC | Age: 56
End: 2021-02-11

## 2021-03-09 ENCOUNTER — NURSE TRIAGE (OUTPATIENT)
Dept: OTHER | Facility: OTHER | Age: 56
End: 2021-03-09

## 2021-03-09 DIAGNOSIS — R25.2 CRAMPS OF LEFT LOWER EXTREMITY: Primary | ICD-10-CM

## 2021-03-09 NOTE — TELEPHONE ENCOUNTER
Tiger connect message sent to Wally Beck "Patient Onel Antunez [birth year] had left hand and arm cramping and also right leg cramping  The cramping has subsided  Patient has no other symptoms  Patient did not forget to take his potassium  How should I advise patient?"    Tiger connect message from Wally Beck "Ill have his visiting nurse come out to draw his labs tomorrow  He can try Tylenol and warm compresses  He can take an additional 20 meq of potassium tonight "    Notified patient and patient verbalized understanding  Patient is having blood work tomorrow and order was placed in [employer], patient can have additional blood work drawn tomorrow

## 2021-03-09 NOTE — TELEPHONE ENCOUNTER
Regarding: cramps in my left arm, hand and my right leg  ----- Message from Nicolasa Arguello sent at 3/9/2021  5:20 PM EST -----  "I'm having lots of cramps in my left arm, hand and my right leg"

## 2021-03-09 NOTE — TELEPHONE ENCOUNTER
Reason for Disposition   Hand or wrist pain    Answer Assessment - Initial Assessment Questions  1  ONSET: "When did the pain start?"      40 minutes ago  2  LOCATION: "Where is the pain located?"      Left arm and hand, right leg  3  PAIN: "How bad is the pain?" (Scale 1-10; or mild, moderate, severe)    - MILD (1-3): doesn't interfere with normal activities    - MODERATE (4-7): interferes with normal activities (e g , work or school) or awakens from sleep    - SEVERE (8-10): excruciating pain, unable to use hand at all      No pain, cramping  4  WORK OR EXERCISE: "Has there been any recent work or exercise that involved this part of the body?"      no  5  CAUSE: "What do you think is causing the pain?"      unknown  6  AGGRAVATING FACTORS: "What makes the pain worse?" (e g , using computer)      nothing  7  OTHER SYMPTOMS: "Do you have any other symptoms?" (e g , neck pain, swelling, rash, numbness, fever)      denies    Patient states the cramping lasted maybe ten minutes then subsided      Protocols used: HAND AND WRIST PAIN-ADULT-

## 2021-03-16 ENCOUNTER — OFFICE VISIT (OUTPATIENT)
Dept: FAMILY MEDICINE CLINIC | Facility: CLINIC | Age: 56
End: 2021-03-16
Payer: COMMERCIAL

## 2021-03-16 VITALS
SYSTOLIC BLOOD PRESSURE: 110 MMHG | DIASTOLIC BLOOD PRESSURE: 64 MMHG | OXYGEN SATURATION: 97 % | WEIGHT: 315 LBS | TEMPERATURE: 97.8 F | HEART RATE: 101 BPM | HEIGHT: 70 IN | BODY MASS INDEX: 45.1 KG/M2

## 2021-03-16 DIAGNOSIS — L97.508 DIABETIC ULCER OF FOOT ASSOCIATED WITH TYPE 2 DIABETES MELLITUS, WITH OTHER ULCER SEVERITY, UNSPECIFIED LATERALITY, UNSPECIFIED PART OF FOOT (HCC): ICD-10-CM

## 2021-03-16 DIAGNOSIS — E66.01 MORBID OBESITY (HCC): ICD-10-CM

## 2021-03-16 DIAGNOSIS — D69.6 THROMBOCYTOPENIA (HCC): ICD-10-CM

## 2021-03-16 DIAGNOSIS — I87.332 CHRONIC VENOUS HYPERTENSION (IDIOPATHIC) WITH ULCER AND INFLAMMATION OF LEFT LOWER EXTREMITY (HCC): ICD-10-CM

## 2021-03-16 DIAGNOSIS — L97.929 CHRONIC VENOUS HYPERTENSION (IDIOPATHIC) WITH ULCER AND INFLAMMATION OF LEFT LOWER EXTREMITY (HCC): ICD-10-CM

## 2021-03-16 DIAGNOSIS — J96.90 RESPIRATORY FAILURE, UNSPECIFIED CHRONICITY, UNSPECIFIED WHETHER WITH HYPOXIA OR HYPERCAPNIA (HCC): ICD-10-CM

## 2021-03-16 DIAGNOSIS — J43.8 OTHER EMPHYSEMA (HCC): ICD-10-CM

## 2021-03-16 DIAGNOSIS — M86.9 OSTEOMYELITIS, UNSPECIFIED SITE, UNSPECIFIED TYPE (HCC): ICD-10-CM

## 2021-03-16 DIAGNOSIS — L97.421 DIABETIC ULCER OF LEFT HEEL ASSOCIATED WITH TYPE 2 DIABETES MELLITUS, LIMITED TO BREAKDOWN OF SKIN (HCC): Primary | ICD-10-CM

## 2021-03-16 DIAGNOSIS — I50.9 CONGESTIVE HEART FAILURE, UNSPECIFIED HF CHRONICITY, UNSPECIFIED HEART FAILURE TYPE (HCC): ICD-10-CM

## 2021-03-16 DIAGNOSIS — E11.621 DIABETIC ULCER OF FOOT ASSOCIATED WITH TYPE 2 DIABETES MELLITUS, WITH OTHER ULCER SEVERITY, UNSPECIFIED LATERALITY, UNSPECIFIED PART OF FOOT (HCC): ICD-10-CM

## 2021-03-16 DIAGNOSIS — E11.621 DIABETIC ULCER OF LEFT HEEL ASSOCIATED WITH TYPE 2 DIABETES MELLITUS, LIMITED TO BREAKDOWN OF SKIN (HCC): Primary | ICD-10-CM

## 2021-03-16 DIAGNOSIS — I48.91 ATRIAL FIBRILLATION, UNSPECIFIED TYPE (HCC): ICD-10-CM

## 2021-03-16 PROBLEM — G47.33 OBSTRUCTIVE SLEEP APNEA SYNDROME: Status: ACTIVE | Noted: 2020-03-18

## 2021-03-16 PROBLEM — F32.9 MAJOR DEPRESSIVE DISORDER: Status: ACTIVE | Noted: 2020-06-04

## 2021-03-16 PROBLEM — N18.30 STAGE 3 CHRONIC KIDNEY DISEASE (HCC): Status: ACTIVE | Noted: 2020-03-09

## 2021-03-16 PROCEDURE — 99214 OFFICE O/P EST MOD 30 MIN: CPT | Performed by: FAMILY MEDICINE

## 2021-03-16 RX ORDER — METOPROLOL SUCCINATE 25 MG/1
TABLET, EXTENDED RELEASE ORAL
COMMUNITY
End: 2021-06-09 | Stop reason: CLARIF

## 2021-03-16 RX ORDER — NYSTATIN 100000 [USP'U]/G
POWDER TOPICAL 4 TIMES DAILY
Qty: 15 G | Refills: 5 | Status: SHIPPED | OUTPATIENT
Start: 2021-03-16 | End: 2021-03-29 | Stop reason: SDUPTHER

## 2021-03-16 RX ORDER — FERROUS SULFATE 325(65) MG
325 TABLET ORAL
COMMUNITY
Start: 2021-03-11 | End: 2021-06-09 | Stop reason: CLARIF

## 2021-03-16 NOTE — PATIENT INSTRUCTIONS
Increase Lantus to 54 units at night  Continue to check your blood sugar with every meal   Bring your blood sugar log with you to your next visit

## 2021-03-16 NOTE — PROGRESS NOTES
Assessment/Plan:    No problem-specific Assessment & Plan notes found for this encounter  Diagnoses and all orders for this visit:    Diabetic ulcer of left heel associated with type 2 diabetes mellitus, limited to breakdown of skin (HCC)  -     nystatin (MYCOSTATIN) powder; Apply topically 4 (four) times a day    Diabetic ulcer of foot associated with type 2 diabetes mellitus, with other ulcer severity, unspecified laterality, unspecified part of foot (HCC)  -     nystatin (MYCOSTATIN) powder; Apply topically 4 (four) times a day    Chronic venous hypertension (idiopathic) with ulcer and inflammation of left lower extremity (HCC)  -     nystatin (MYCOSTATIN) powder; Apply topically 4 (four) times a day    Congestive heart failure, unspecified HF chronicity, unspecified heart failure type (HCC)  -     nystatin (MYCOSTATIN) powder; Apply topically 4 (four) times a day    Atrial fibrillation, unspecified type (HCC)  -     nystatin (MYCOSTATIN) powder; Apply topically 4 (four) times a day    Osteomyelitis, unspecified site, unspecified type (HCC)  -     nystatin (MYCOSTATIN) powder; Apply topically 4 (four) times a day    Morbid obesity (HCC)  -     nystatin (MYCOSTATIN) powder; Apply topically 4 (four) times a day    Other emphysema (HCC)  -     nystatin (MYCOSTATIN) powder; Apply topically 4 (four) times a day    Respiratory failure, unspecified chronicity, unspecified whether with hypoxia or hypercapnia (HCC)  -     nystatin (MYCOSTATIN) powder; Apply topically 4 (four) times a day    Thrombocytopenia (HCC)  -     nystatin (MYCOSTATIN) powder; Apply topically 4 (four) times a day    Other orders  -     metoprolol succinate (TOPROL-XL) 25 mg 24 hr tablet; Take by mouth  -     ferrous sulfate 325 (65 Fe) mg tablet; Take 325 mg by mouth          Subjective:      Patient ID: Tanner Feng is a 60 y o  male  He has anemia likely due to CKD  His baseline Hbg is about 10 g/dL    He has no increased SOB or weakness  He has T2DM  His A1c is not at goal   He has no hypoglycemia or side effects  He has diabetic peripheral neuropathy and stage 3 CKD  His BP is at goal   He has no CP or SOB  He has no PND or orthopnea  He has no MILLER or weight gain  His lipids are at goal on his current regimen  He has no myalgia or muscle weakness  He has COPD by history  He denies smoking  He has no sputum production or wheezing  He would benefit from diabetic shoes  He has b/l shoulder pain (L>R)  Some days are better than others  He's tried Tylenol with some relief  He has not done PT  He was told he had "bursitis "    He has thrombocytopenia  He has no bleeding or rash  He has afib  He is rate controlled and anticoagulated  He has no syncope or near syncope  He has no palpitations  The following portions of the patient's history were reviewed and updated as appropriate:   He  has a past medical history of Ambulatory dysfunction, Anemia, Anxiety, Arthritis, Atrial fibrillation (HCC), CHF (congestive heart failure) ([facility] ), Chronic kidney disease, stage 3, Chronic ulcer of left foot ([facility] ), COPD with acute exacerbation ([facility] ), Encephalopathy, History of depression, History of osteomyelitis, Hyperkalemia, Hyperlipidemia, Hypertension, Hypoglycemia, Hypomagnesemia, Hyponatremia, Hypothyroidism, Morbid obesity ([facility] ), Pneumonia, Pulmonary embolism ([facility] ), RLS (restless legs syndrome), Sepsis ([facility] ), Sleep apnea, Thrombocytopenia ([facility] ), Type 2 diabetes mellitus ([facility] ), Urinary retention, and Vitamin D deficiency    He   Patient Active Problem List    Diagnosis Date Noted    Other emphysema ([facility] ) 03/16/2021    Respiratory failure ([facility] ) 03/16/2021    Thrombocytopenia ([facility] ) 03/16/2021    Diabetic foot ulcer associated with type 2 diabetes mellitus ([facility] ) 01/04/2021    Osteomyelitis ([facility] ) 01/04/2021    Chronic venous hypertension (idiopathic) with ulcer and inflammation of left lower extremity ([facility] ) 01/04/2021    COPD with acute exacerbation ([place] [address] ) 01/04/2021    Congestive heart failure ([place] [address] ) 01/04/2021    Atrial fibrillation ([place] [address] ) 01/04/2021    Encounter for medical examination to establish care 12/21/2020    Major depressive disorder 06/04/2020    Obstructive sleep apnea syndrome 03/18/2020    Stage 3 chronic kidney disease 03/09/2020    Cellulitis of right lower extremity 07/21/2016    Diabetic foot ulcer ([place] [address] ) 07/21/2016    Diabetes mellitus ([place] [address] ) 07/21/2016    Morbid obesity ([place] [address] ) 07/21/2016    Hypothyroid 07/21/2016    Anxiety 07/21/2016    H/O CHF 07/21/2016    Neuropathy 07/21/2016    HTN (hypertension) 07/21/2016    H/O osteomyelitis 07/21/2016    Hx pulmonary embolism 07/21/2016     He  has a past surgical history that includes Tonsillectomy and adenoidectomy and Gallbladder surgery  His family history includes Cancer in his maternal grandmother and mother; Diabetes in his sister; Heart disease in his father; Hypertension in his father and mother  He  reports that he has never smoked  He has never used smokeless tobacco  He reports that he does not drink alcohol or use drugs    Current Outpatient Medications   Medication Sig Dispense Refill    acetaminophen (TYLENOL) 500 mg tablet Take 1 tablet by mouth 2 (two) times a day      albuterol (PROVENTIL HFA,VENTOLIN HFA) 90 mcg/act inhaler Inhale 2 puffs every 6 (six) hours as needed for wheezing 3 Inhaler 3    amitriptyline (ELAVIL) 25 mg tablet Take 1 tablet (25 mg total) by mouth daily at bedtime 30 tablet 3    atorvastatin (LIPITOR) 20 mg tablet Take 2 tablets (40 mg total) by mouth daily 30 tablet 3    B-D UF III MINI PEN NEEDLES 31G X 5 MM MISC use 1 four times a day      cetirizine (ZyrTEC) 10 mg tablet Take 10 mg by mouth      Cholecalciferol 25 MCG (1000 UT) capsule Take 2,000 Units by mouth      ciprofloxacin (CIPRO) 500 mg tablet take 1 tablet by mouth twice a day for 7 days      clonazePAM (KlonoPIN) 1 mg tablet Take 2 tablets (2 mg total) by mouth 2 (two) times a day 60 tablet 3    cyclobenzaprine (FLEXERIL) 10 mg tablet Take 10 mg by mouth 2 (two) times a day as needed      Dextrose, Diabetic Use, (Glucose) 15 GM/59ML LIQD Take by mouth      docusate sodium (COLACE) 100 mg capsule Take 100 mg by mouth 2 (two) times a day as needed      Eliquis 5 MG Take 1 tablet (5 mg total) by mouth 2 (two) times a day 60 tablet 5    fenofibrate (TRICOR) 145 mg tablet Take 145 mg by mouth daily   fenofibrate micronized (LOFIBRA) 200 MG capsule Take 200 mg by mouth every morning before breakfast      ferrous sulfate 325 (65 Fe) mg tablet Take 325 mg by mouth      FLUoxetine (PROzac) 40 MG capsule Take 40 mg by mouth      fluticasone (FLONASE) 50 mcg/act nasal spray 1 spray into each nostril daily as needed for rhinitis 3 Bottle 3    furosemide (LASIX) 40 mg tablet Take 1 tablet (40 mg total) by mouth daily 30 tablet 3    gabapentin (NEURONTIN) 300 mg capsule Take 300 mg by mouth 2 (two) times a day   gabapentin (NEURONTIN) 600 MG tablet Take 1,200 mg by mouth daily at bedtime      HumaLOG 100 UNIT/ML injection 5 Units       insulin aspart (NovoLOG FlexPen) 100 UNIT/ML injection pen Inject 12 Units under the skin 3 (three) times a day with meals 5 pen 5    insulin glargine (LANTUS) 100 units/mL subcutaneous injection Inject 50 Units under the skin daily 10 mL 5    insulin regular (HumuLIN R,NovoLIN R) 100 units/mL injection Inject under the skin 3 (three) times a day before meals        lamoTRIgine (LaMICtal) 25 mg tablet Take 1 tablet (25 mg total) by mouth daily 30 tablet 3    levETIRAcetam (KEPPRA) 500 mg tablet Take 1 tablet (500 mg total) by mouth 2 (two) times a day 60 tablet 3    levothyroxine 100 mcg tablet Take 1 tablet (100 mcg total) by mouth daily 30 tablet 3    levothyroxine 125 mcg tablet Take 1 tablet (125 mcg total) by mouth daily 30 tablet 3    levothyroxine 150 mcg tablet Take 125 mcg by mouth daily         levothyroxine 300 MCG tablet Take 1 tablet (300 mcg total) by mouth daily 30 tablet 3    lisinopril (ZESTRIL) 2 5 mg tablet Take 1 tablet (2 5 mg total) by mouth daily 30 tablet 3    loratadine (CLARITIN) 10 mg tablet Take 10 mg by mouth daily   Magnesium 400 MG TABS Take 1 tablet (400 mg total) by mouth 2 (two) times a day 60 tablet 3    meloxicam (MOBIC) 15 mg tablet Take 15 mg by mouth      metFORMIN (GLUCOPHAGE) 1000 MG tablet Take 1,000 mg by mouth      metoprolol succinate (TOPROL-XL) 25 mg 24 hr tablet Take by mouth      metoprolol tartrate (LOPRESSOR) 25 mg tablet Take 1 tablet (25 mg total) by mouth 2 (two) times a day 60 tablet 3    metroNIDAZOLE (FLAGYL) 500 mg tablet Take 500 mg by mouth 3 (three) times a day      montelukast (SINGULAIR) 10 mg tablet Take 1 tablet (10 mg total) by mouth daily at bedtime 30 tablet 3    ondansetron (ZOFRAN) 4 mg tablet Take 4 mg by mouth      pantoprazole (PROTONIX) 40 mg tablet Take 1 tablet (40 mg total) by mouth daily 30 tablet 3    polyethylene glycol (MIRALAX) 17 g packet Take 17 g by mouth daily 30 each 0    potassium chloride (K-DUR,KLOR-CON) 20 mEq tablet Take 1 tablet (20 mEq total) by mouth 2 (two) times a day 60 tablet 3    potassium chloride (KLOR-CON) 20 mEq packet Take 20 mEq by mouth      pregabalin (LYRICA) 100 mg capsule Take 1 capsule (100 mg total) by mouth 2 (two) times a day 60 capsule 1    rOPINIRole (REQUIP) 0 5 mg tablet Take 0 5 mg by mouth daily   rOPINIRole (REQUIP) 4 mg tablet Take 1 tablet (4 mg total) by mouth 3 (three) times a day 90 tablet 3    rOPINIRole (REQUIP) 5 MG tablet Take 5 mg by mouth 2 (two) times a day       simvastatin (ZOCOR) 40 mg tablet Take 40 mg by mouth daily at bedtime        spironolactone (ALDACTONE) 50 mg tablet       tamsulosin (FLOMAX) 0 4 mg Take 0 4 mg by mouth      tamsulosin (FLOMAX) 0 4 mg Take 1 capsule (0 4 mg total) by mouth daily with dinner 30 capsule 3    triamcinolone (KENALOG) 0 1 % cream Apply topically 2 (two) times a day      True Metrix Blood Glucose Test test strip 4 (four) times a day Test      nystatin (MYCOSTATIN) powder Apply topically 4 (four) times a day 15 g 5     No current facility-administered medications for this visit  Current Outpatient Medications on File Prior to Visit   Medication Sig    acetaminophen (TYLENOL) 500 mg tablet Take 1 tablet by mouth 2 (two) times a day    albuterol (PROVENTIL HFA,VENTOLIN HFA) 90 mcg/act inhaler Inhale 2 puffs every 6 (six) hours as needed for wheezing    amitriptyline (ELAVIL) 25 mg tablet Take 1 tablet (25 mg total) by mouth daily at bedtime    atorvastatin (LIPITOR) 20 mg tablet Take 2 tablets (40 mg total) by mouth daily    B-D UF III MINI PEN NEEDLES 31G X 5 MM MISC use 1 four times a day    cetirizine (ZyrTEC) 10 mg tablet Take 10 mg by mouth    Cholecalciferol 25 MCG (1000 UT) capsule Take 2,000 Units by mouth    ciprofloxacin (CIPRO) 500 mg tablet take 1 tablet by mouth twice a day for 7 days    clonazePAM (KlonoPIN) 1 mg tablet Take 2 tablets (2 mg total) by mouth 2 (two) times a day    cyclobenzaprine (FLEXERIL) 10 mg tablet Take 10 mg by mouth 2 (two) times a day as needed    Dextrose, Diabetic Use, (Glucose) 15 GM/59ML LIQD Take by mouth    docusate sodium (COLACE) 100 mg capsule Take 100 mg by mouth 2 (two) times a day as needed    Eliquis 5 MG Take 1 tablet (5 mg total) by mouth 2 (two) times a day    fenofibrate (TRICOR) 145 mg tablet Take 145 mg by mouth daily      fenofibrate micronized (LOFIBRA) 200 MG capsule Take 200 mg by mouth every morning before breakfast    ferrous sulfate 325 (65 Fe) mg tablet Take 325 mg by mouth    FLUoxetine (PROzac) 40 MG capsule Take 40 mg by mouth    fluticasone (FLONASE) 50 mcg/act nasal spray 1 spray into each nostril daily as needed for rhinitis    furosemide (LASIX) 40 mg tablet Take 1 tablet (40 mg total) by mouth daily    gabapentin (NEURONTIN) 300 mg capsule Take 300 mg by mouth 2 (two) times a day   gabapentin (NEURONTIN) 600 MG tablet Take 1,200 mg by mouth daily at bedtime    HumaLOG 100 UNIT/ML injection 5 Units     insulin aspart (NovoLOG FlexPen) 100 UNIT/ML injection pen Inject 12 Units under the skin 3 (three) times a day with meals    insulin glargine (LANTUS) 100 units/mL subcutaneous injection Inject 50 Units under the skin daily    insulin regular (HumuLIN R,NovoLIN R) 100 units/mL injection Inject under the skin 3 (three) times a day before meals   lamoTRIgine (LaMICtal) 25 mg tablet Take 1 tablet (25 mg total) by mouth daily    levETIRAcetam (KEPPRA) 500 mg tablet Take 1 tablet (500 mg total) by mouth 2 (two) times a day    levothyroxine 100 mcg tablet Take 1 tablet (100 mcg total) by mouth daily    levothyroxine 125 mcg tablet Take 1 tablet (125 mcg total) by mouth daily    levothyroxine 150 mcg tablet Take 125 mcg by mouth daily   levothyroxine 300 MCG tablet Take 1 tablet (300 mcg total) by mouth daily    lisinopril (ZESTRIL) 2 5 mg tablet Take 1 tablet (2 5 mg total) by mouth daily    loratadine (CLARITIN) 10 mg tablet Take 10 mg by mouth daily      Magnesium 400 MG TABS Take 1 tablet (400 mg total) by mouth 2 (two) times a day    meloxicam (MOBIC) 15 mg tablet Take 15 mg by mouth    metFORMIN (GLUCOPHAGE) 1000 MG tablet Take 1,000 mg by mouth    metoprolol succinate (TOPROL-XL) 25 mg 24 hr tablet Take by mouth    metoprolol tartrate (LOPRESSOR) 25 mg tablet Take 1 tablet (25 mg total) by mouth 2 (two) times a day    metroNIDAZOLE (FLAGYL) 500 mg tablet Take 500 mg by mouth 3 (three) times a day    montelukast (SINGULAIR) 10 mg tablet Take 1 tablet (10 mg total) by mouth daily at bedtime    ondansetron (ZOFRAN) 4 mg tablet Take 4 mg by mouth    pantoprazole (PROTONIX) 40 mg tablet Take 1 tablet (40 mg total) by mouth daily    polyethylene glycol (MIRALAX) 17 g packet Take 17 g by mouth daily    potassium chloride (K-DUR,KLOR-CON) 20 mEq tablet Take 1 tablet (20 mEq total) by mouth 2 (two) times a day    potassium chloride (KLOR-CON) 20 mEq packet Take 20 mEq by mouth    pregabalin (LYRICA) 100 mg capsule Take 1 capsule (100 mg total) by mouth 2 (two) times a day    rOPINIRole (REQUIP) 0 5 mg tablet Take 0 5 mg by mouth daily   rOPINIRole (REQUIP) 4 mg tablet Take 1 tablet (4 mg total) by mouth 3 (three) times a day    rOPINIRole (REQUIP) 5 MG tablet Take 5 mg by mouth 2 (two) times a day     simvastatin (ZOCOR) 40 mg tablet Take 40 mg by mouth daily at bedtime   spironolactone (ALDACTONE) 50 mg tablet     tamsulosin (FLOMAX) 0 4 mg Take 0 4 mg by mouth    tamsulosin (FLOMAX) 0 4 mg Take 1 capsule (0 4 mg total) by mouth daily with dinner    triamcinolone (KENALOG) 0 1 % cream Apply topically 2 (two) times a day    True Metrix Blood Glucose Test test strip 4 (four) times a day Test     No current facility-administered medications on file prior to visit  He is allergic to carbamazepine; clindamycin; phenytoin; and pneumococcal vaccine       Review of Systems   All other systems reviewed and are negative  Objective:      /64   Pulse 101   Temp 97 8 °F (36 6 °C)   Ht 5' 10" (1 778 m)   Wt (!) 161 kg (355 lb)   SpO2 97%   BMI 50 94 kg/m²          Physical Exam  Vitals signs and nursing note reviewed  Constitutional:       Appearance: Normal appearance  He is obese  Neck:      Musculoskeletal: Normal range of motion and neck supple  Cardiovascular:      Rate and Rhythm: Normal rate and regular rhythm  Pulmonary:      Effort: Pulmonary effort is normal       Breath sounds: Normal breath sounds  Abdominal:      General: Abdomen is flat  Bowel sounds are normal       Palpations: Abdomen is soft  Musculoskeletal: Normal range of motion  Skin:     General: Skin is warm and dry  Capillary Refill: Capillary refill takes less than 2 seconds     Neurological: General: No focal deficit present  Mental Status: He is alert and oriented to person, place, and time  Mental status is at baseline  Psychiatric:         Mood and Affect: Mood normal          Behavior: Behavior normal          Thought Content: Thought content normal          Judgment: Judgment normal        BMI Counseling: Body mass index is 50 94 kg/m²  The BMI is above normal  Nutrition recommendations include reducing portion sizes, decreasing overall calorie intake, 3-5 servings of fruits/vegetables daily, reducing fast food intake, consuming healthier snacks, decreasing soda and/or juice intake, moderation in carbohydrate intake, increasing intake of lean protein, reducing intake of saturated fat and trans fat and reducing intake of cholesterol  Exercise recommendations include moderate aerobic physical activity for 150 minutes/week, vigorous aerobic physical activity for 75 minutes/week, exercising 3-5 times per week, joining a gym and strength training exercises

## 2021-03-26 ENCOUNTER — TELEPHONE (OUTPATIENT)
Dept: FAMILY MEDICINE CLINIC | Facility: CLINIC | Age: 56
End: 2021-03-26

## 2021-03-26 DIAGNOSIS — E11.621 DIABETIC ULCER OF FOOT ASSOCIATED WITH TYPE 2 DIABETES MELLITUS, WITH OTHER ULCER SEVERITY, UNSPECIFIED LATERALITY, UNSPECIFIED PART OF FOOT (HCC): ICD-10-CM

## 2021-03-26 DIAGNOSIS — M86.9 OSTEOMYELITIS, UNSPECIFIED SITE, UNSPECIFIED TYPE (HCC): ICD-10-CM

## 2021-03-26 DIAGNOSIS — L97.508 DIABETIC ULCER OF FOOT ASSOCIATED WITH TYPE 2 DIABETES MELLITUS, WITH OTHER ULCER SEVERITY, UNSPECIFIED LATERALITY, UNSPECIFIED PART OF FOOT (HCC): ICD-10-CM

## 2021-03-26 DIAGNOSIS — R39.11 BENIGN PROSTATIC HYPERPLASIA WITH URINARY HESITANCY: ICD-10-CM

## 2021-03-26 DIAGNOSIS — I50.9 CONGESTIVE HEART FAILURE, UNSPECIFIED HF CHRONICITY, UNSPECIFIED HEART FAILURE TYPE (HCC): ICD-10-CM

## 2021-03-26 DIAGNOSIS — Z79.4 TYPE 2 DIABETES MELLITUS WITH HYPERGLYCEMIA, WITH LONG-TERM CURRENT USE OF INSULIN (HCC): ICD-10-CM

## 2021-03-26 DIAGNOSIS — E11.65 TYPE 2 DIABETES MELLITUS WITH HYPERGLYCEMIA, WITH LONG-TERM CURRENT USE OF INSULIN (HCC): ICD-10-CM

## 2021-03-26 DIAGNOSIS — E03.9 HYPOTHYROIDISM, UNSPECIFIED TYPE: ICD-10-CM

## 2021-03-26 DIAGNOSIS — J44.1 COPD WITH ACUTE EXACERBATION (HCC): ICD-10-CM

## 2021-03-26 DIAGNOSIS — I10 ESSENTIAL HYPERTENSION: ICD-10-CM

## 2021-03-26 DIAGNOSIS — I87.332 CHRONIC VENOUS HYPERTENSION (IDIOPATHIC) WITH ULCER AND INFLAMMATION OF LEFT LOWER EXTREMITY (HCC): ICD-10-CM

## 2021-03-26 DIAGNOSIS — I48.91 ATRIAL FIBRILLATION, UNSPECIFIED TYPE (HCC): ICD-10-CM

## 2021-03-26 DIAGNOSIS — L97.929 CHRONIC VENOUS HYPERTENSION (IDIOPATHIC) WITH ULCER AND INFLAMMATION OF LEFT LOWER EXTREMITY (HCC): ICD-10-CM

## 2021-03-26 DIAGNOSIS — N40.1 BENIGN PROSTATIC HYPERPLASIA WITH URINARY HESITANCY: ICD-10-CM

## 2021-03-26 DIAGNOSIS — Z86.79 HISTORY OF HEART FAILURE: ICD-10-CM

## 2021-03-26 DIAGNOSIS — E66.01 MORBID OBESITY (HCC): ICD-10-CM

## 2021-03-26 RX ORDER — ATORVASTATIN CALCIUM 40 MG/1
40 TABLET, FILM COATED ORAL DAILY
Qty: 30 TABLET | Refills: 2 | Status: SHIPPED | OUTPATIENT
Start: 2021-03-26 | End: 2021-07-20 | Stop reason: SDUPTHER

## 2021-03-26 RX ORDER — CLONAZEPAM 1 MG/1
2 TABLET ORAL 2 TIMES DAILY
Qty: 120 TABLET | Refills: 2 | Status: SHIPPED | OUTPATIENT
Start: 2021-03-26 | End: 2022-05-17 | Stop reason: SDUPTHER

## 2021-03-29 ENCOUNTER — OFFICE VISIT (OUTPATIENT)
Dept: FAMILY MEDICINE CLINIC | Facility: CLINIC | Age: 56
End: 2021-03-29
Payer: COMMERCIAL

## 2021-03-29 VITALS
DIASTOLIC BLOOD PRESSURE: 72 MMHG | SYSTOLIC BLOOD PRESSURE: 142 MMHG | HEART RATE: 100 BPM | BODY MASS INDEX: 45.1 KG/M2 | OXYGEN SATURATION: 97 % | HEIGHT: 70 IN | TEMPERATURE: 98.9 F | WEIGHT: 315 LBS

## 2021-03-29 DIAGNOSIS — L97.929 CHRONIC VENOUS HYPERTENSION (IDIOPATHIC) WITH ULCER AND INFLAMMATION OF LEFT LOWER EXTREMITY (HCC): ICD-10-CM

## 2021-03-29 DIAGNOSIS — E11.621 DIABETIC ULCER OF LEFT HEEL ASSOCIATED WITH TYPE 2 DIABETES MELLITUS, LIMITED TO BREAKDOWN OF SKIN (HCC): ICD-10-CM

## 2021-03-29 DIAGNOSIS — J43.8 OTHER EMPHYSEMA (HCC): ICD-10-CM

## 2021-03-29 DIAGNOSIS — I48.91 ATRIAL FIBRILLATION, UNSPECIFIED TYPE (HCC): ICD-10-CM

## 2021-03-29 DIAGNOSIS — E66.01 MORBID OBESITY (HCC): ICD-10-CM

## 2021-03-29 DIAGNOSIS — J96.90 RESPIRATORY FAILURE, UNSPECIFIED CHRONICITY, UNSPECIFIED WHETHER WITH HYPOXIA OR HYPERCAPNIA (HCC): ICD-10-CM

## 2021-03-29 DIAGNOSIS — D69.6 THROMBOCYTOPENIA (HCC): ICD-10-CM

## 2021-03-29 DIAGNOSIS — L85.3 XEROSIS OF SKIN: Primary | ICD-10-CM

## 2021-03-29 DIAGNOSIS — I87.332 CHRONIC VENOUS HYPERTENSION (IDIOPATHIC) WITH ULCER AND INFLAMMATION OF LEFT LOWER EXTREMITY (HCC): ICD-10-CM

## 2021-03-29 DIAGNOSIS — I50.9 CONGESTIVE HEART FAILURE, UNSPECIFIED HF CHRONICITY, UNSPECIFIED HEART FAILURE TYPE (HCC): ICD-10-CM

## 2021-03-29 DIAGNOSIS — L97.421 DIABETIC ULCER OF LEFT HEEL ASSOCIATED WITH TYPE 2 DIABETES MELLITUS, LIMITED TO BREAKDOWN OF SKIN (HCC): ICD-10-CM

## 2021-03-29 DIAGNOSIS — E11.621 DIABETIC ULCER OF FOOT ASSOCIATED WITH TYPE 2 DIABETES MELLITUS, WITH OTHER ULCER SEVERITY, UNSPECIFIED LATERALITY, UNSPECIFIED PART OF FOOT (HCC): ICD-10-CM

## 2021-03-29 DIAGNOSIS — M86.9 OSTEOMYELITIS, UNSPECIFIED SITE, UNSPECIFIED TYPE (HCC): ICD-10-CM

## 2021-03-29 DIAGNOSIS — L97.508 DIABETIC ULCER OF FOOT ASSOCIATED WITH TYPE 2 DIABETES MELLITUS, WITH OTHER ULCER SEVERITY, UNSPECIFIED LATERALITY, UNSPECIFIED PART OF FOOT (HCC): ICD-10-CM

## 2021-03-29 PROCEDURE — 99213 OFFICE O/P EST LOW 20 MIN: CPT | Performed by: FAMILY MEDICINE

## 2021-03-29 PROCEDURE — 1036F TOBACCO NON-USER: CPT | Performed by: FAMILY MEDICINE

## 2021-03-29 RX ORDER — AMMONIUM LACTATE 12 G/100G
LOTION TOPICAL 2 TIMES DAILY PRN
Qty: 400 G | Refills: 0 | Status: SHIPPED | OUTPATIENT
Start: 2021-03-29 | End: 2021-06-09 | Stop reason: ALTCHOICE

## 2021-03-29 RX ORDER — NYSTATIN 100000 [USP'U]/G
POWDER TOPICAL 4 TIMES DAILY
Qty: 15 G | Refills: 5 | Status: SHIPPED | OUTPATIENT
Start: 2021-03-29 | End: 2021-04-15

## 2021-03-29 RX ORDER — TRIAMCINOLONE ACETONIDE 1 MG/G
CREAM TOPICAL 2 TIMES DAILY
Qty: 30 G | Refills: 1 | Status: SHIPPED | OUTPATIENT
Start: 2021-03-29 | End: 2021-04-15

## 2021-03-29 NOTE — PROGRESS NOTES
Assessment/Plan:    Nikolai Gentile is a 60years old male with past medical history of type 2 diabetes, NEAL, atrial fibrillation on Eliquis, hypertension, CHF complaining of few days duration pruritic maculopapular rash in bilateral extensor surface of forearm and right knee  Likely Xerosis  Prescribed Lac-Hydrin and Kenalog 0 1% cream apply BID  Apply Lac-Hydrin on the rash especially after shower and frequent moisturizer application  F/u on 4/15/21 for scheduled visit  Case d/w Dr Jara Bolds  Diagnoses and all orders for this visit:    Xerosis of skin  -     triamcinolone (KENALOG) 0 1 % cream; Apply topically 2 (two) times a day  -     ammonium lactate (Lac-Hydrin) 12 % lotion; Apply topically 2 (two) times a day as needed for dry skin    Diabetic ulcer of left heel associated with type 2 diabetes mellitus, limited to breakdown of skin (HCC)  -     nystatin (MYCOSTATIN) powder; Apply topically 4 (four) times a day      Subjective:      Patient ID: Luane Bence is a 60 y o  male  HPI     Patient is a 60years old male with past medical history of type 2 diabetes, NEAL, atrial fibrillation on Eliquis, hypertension, CHF complaining of few days duration itchy rash in bilateral extensor surface of forearm and right knee  Denied any known triggers denied any new exposure to detergents, lotions, soaps  Patient tried applying Eucerin without relief  Denied any recent antibiotic use  The following portions of the patient's history were reviewed and updated as appropriate: allergies, current medications, past family history, past medical history, past social history, past surgical history and problem list     Review of Systems   Constitutional: Negative for chills and fever  HENT: Negative for congestion, rhinorrhea and sore throat  Eyes: Negative for visual disturbance  Respiratory: Negative for cough and shortness of breath  Cardiovascular: Negative for chest pain and palpitations  Gastrointestinal: Negative for abdominal pain, nausea and vomiting  Genitourinary: Negative for dysuria  Musculoskeletal: Negative for back pain  Skin: Positive for rash  Negative for color change  Neurological: Negative for dizziness and headaches  Hematological: Does not bruise/bleed easily  Psychiatric/Behavioral: Negative for confusion  Objective:      /72   Pulse 100   Temp 98 9 °F (37 2 °C)   Ht 5' 10" (1 778 m)   Wt (!) 161 kg (355 lb)   SpO2 97%   BMI 50 94 kg/m²          Physical Exam  Vitals signs and nursing note reviewed  Constitutional:       General: He is not in acute distress  Appearance: He is well-developed  HENT:      Head: Normocephalic  Mouth/Throat:      Pharynx: No oropharyngeal exudate  Eyes:      General: No scleral icterus  Right eye: No discharge  Left eye: No discharge  Conjunctiva/sclera: Conjunctivae normal    Neck:      Musculoskeletal: Normal range of motion  Cardiovascular:      Rate and Rhythm: Normal rate and regular rhythm  Heart sounds: Normal heart sounds  No murmur  Pulmonary:      Effort: Pulmonary effort is normal  No respiratory distress  Breath sounds: Normal breath sounds  No wheezing  Abdominal:      General: Bowel sounds are normal  There is no distension  Palpations: Abdomen is soft  Tenderness: There is no abdominal tenderness  Musculoskeletal:         General: No tenderness  Lymphadenopathy:      Cervical: No cervical adenopathy  Skin:     Findings: Rash present  No erythema  Comments: Nonblanching Maculopapular rash in bilateral extensor surface of forearm and right knee   Neurological:      Mental Status: He is alert and oriented to person, place, and time     Psychiatric:         Behavior: Behavior normal

## 2021-04-01 ENCOUNTER — PROCEDURE VISIT (OUTPATIENT)
Dept: CARDIOLOGY CLINIC | Facility: CLINIC | Age: 56
End: 2021-04-01
Payer: COMMERCIAL

## 2021-04-01 ENCOUNTER — OFFICE VISIT (OUTPATIENT)
Dept: CARDIOLOGY CLINIC | Facility: CLINIC | Age: 56
End: 2021-04-01
Payer: COMMERCIAL

## 2021-04-01 VITALS
HEART RATE: 63 BPM | BODY MASS INDEX: 45.1 KG/M2 | HEIGHT: 70 IN | DIASTOLIC BLOOD PRESSURE: 60 MMHG | SYSTOLIC BLOOD PRESSURE: 106 MMHG | WEIGHT: 315 LBS

## 2021-04-01 DIAGNOSIS — I48.91 ATRIAL FIBRILLATION, UNSPECIFIED TYPE (HCC): Primary | ICD-10-CM

## 2021-04-01 DIAGNOSIS — I48.91 ATRIAL FIBRILLATION, UNSPECIFIED TYPE (HCC): ICD-10-CM

## 2021-04-01 PROCEDURE — 93246 EXT ECG>7D<15D RECORDING: CPT | Performed by: INTERNAL MEDICINE

## 2021-04-01 PROCEDURE — 99204 OFFICE O/P NEW MOD 45 MIN: CPT | Performed by: INTERNAL MEDICINE

## 2021-04-01 PROCEDURE — 93000 ELECTROCARDIOGRAM COMPLETE: CPT | Performed by: INTERNAL MEDICINE

## 2021-04-01 PROCEDURE — 3008F BODY MASS INDEX DOCD: CPT | Performed by: FAMILY MEDICINE

## 2021-04-01 NOTE — PROGRESS NOTES
[facility]   Outpatient Note  June Zarate  1965  [id number]  [facility], [facility] Javon Patten  [address] [place] [id number] [phone]    June Zarate is a 60 y o  male    Assessment and Plan:   Hx pulmonary embolism  PE in 2013    Remains on life-long Eliquis  Most recent vascular study shows no DVT  No recent CT of the lungs   See additional comments     Atrial fibrillation (HCC)  Transient Afib that was related to infectious pneumonia    Will assess with Zio for two weeks  If there is no afib will not need Eliquis from a heart perspective  It is of note that he has been taking Eliquis for chronic PE  See additional comments   It will be up to PCP to consider discontinuation  Congestive heart failure (HCC)  Chronic dHFpEF of 55-60%   Edema is out of proportion to symptoms   Continue on lasix, potassium, metoprolol and ACE-I          HTN (hypertension)  Controlled on current medical regimen    Obstructive sleep apnea syndrome  Not wearing C-PAP or Bi-PAP        Additional Plan:   Medications as detailed above  Pertinent testing orders as outlined  Available lab and test results are reviewed with the patient and any additional required labs are ordered as above  Return visit will be in three months or earlier if there are problems  The patient is encouraged to call in the meantime if there are questions or concerns  Subjective: The patient is seen after having been hospitalized for pneumonia with reported but not recorded transient afib  He has Hx of PE in 2013 and has been on chronic AC tx, HTN, HLD morbid obesity and chronic dHF  He is doing fairly well from a heart perspective  He sleeps in a recliner due to a "back fracture " He has chronic edema and ulcers on his lower extremities which he normally keeps wrapped     From a cardiac perspective, he is without complaints of exertional chest pain   He has no palpitations syncope or near syncope, and denies TIA or CVA symptoms  He denies and exertional neck, jaw, arm or back pain           Social History  Social History     Tobacco Use   Smoking Status Never Smoker   Smokeless Tobacco Never Used   ,   Social History     Substance and Sexual Activity   Alcohol Use No   ,   Social History     Substance and Sexual Activity   Drug Use No     Family History   Problem Relation Age of Onset    Cancer Mother     Hypertension Mother     Heart disease Father     Hypertension Father     Diabetes Sister     Cancer Maternal Grandmother        Medical and Surgical History  Past Medical History:   Diagnosis Date    Ambulatory dysfunction     Anemia     Anxiety     Arthritis     Atrial fibrillation (HCC)     CHF (congestive heart failure) (HCC)     Chronic kidney disease, stage 3     Chronic ulcer of left foot (HCC)     COPD with acute exacerbation (HCC)     Encephalopathy     History of depression     History of osteomyelitis     Hyperkalemia     Hyperlipidemia     Hypertension     Hypoglycemia     Hypomagnesemia     Hyponatremia     Hypothyroidism     Morbid obesity (HCC)     Pneumonia     Pulmonary embolism (HCC)     RLS (restless legs syndrome)     Sepsis (HCC)     Sleep apnea     Thrombocytopenia (HCC)     Type 2 diabetes mellitus ([address] )     Urinary retention     Vitamin D deficiency      Past Surgical History:   Procedure Laterality Date    GALLBLADDER SURGERY      TONSILLECTOMY AND ADENOIDECTOMY           Current Outpatient Medications:     acetaminophen (TYLENOL) 500 mg tablet, Take 1 tablet by mouth 2 (two) times a day, Disp: , Rfl:     albuterol (PROVENTIL HFA,VENTOLIN HFA) 90 mcg/act inhaler, Inhale 2 puffs every 6 (six) hours as needed for wheezing, Disp: 3 Inhaler, Rfl: 3    amitriptyline (ELAVIL) 25 mg tablet, Take 1 tablet (25 mg total) by mouth daily at bedtime, Disp: 30 tablet, Rfl: 3    ammonium lactate (Lac-Hydrin) 12 % lotion, Apply topically 2 (two) times a day as needed for dry skin, Disp: 400 g, Rfl: 0    atorvastatin (LIPITOR) 40 mg tablet, Take 1 tablet (40 mg total) by mouth daily, Disp: 30 tablet, Rfl: 2    B-D UF III MINI PEN NEEDLES 31G X 5 MM MISC, use 1 four times a day, Disp: , Rfl:     Cholecalciferol 25 MCG (1000 UT) capsule, Take 2,000 Units by mouth, Disp: , Rfl:     ciprofloxacin (CIPRO) 500 mg tablet, take 1 tablet by mouth twice a day for 7 days, Disp: , Rfl:     clonazePAM (KlonoPIN) 1 mg tablet, Take 2 tablets (2 mg total) by mouth 2 (two) times a day, Disp: 120 tablet, Rfl: 2    docusate sodium (COLACE) 100 mg capsule, Take 100 mg by mouth 2 (two) times a day as needed, Disp: , Rfl:     Eliquis 5 MG, Take 1 tablet (5 mg total) by mouth 2 (two) times a day, Disp: 60 tablet, Rfl: 5    fluticasone (FLONASE) 50 mcg/act nasal spray, 1 spray into each nostril daily as needed for rhinitis, Disp: 3 Bottle, Rfl: 3    furosemide (LASIX) 40 mg tablet, Take 1 tablet (40 mg total) by mouth daily, Disp: 30 tablet, Rfl: 3    insulin aspart (NovoLOG FlexPen) 100 UNIT/ML injection pen, Inject 12 Units under the skin 3 (three) times a day with meals, Disp: 5 pen, Rfl: 5    insulin glargine (LANTUS) 100 units/mL subcutaneous injection, Inject 50 Units under the skin daily, Disp: 10 mL, Rfl: 5    lamoTRIgine (LaMICtal) 25 mg tablet, Take 1 tablet (25 mg total) by mouth daily, Disp: 30 tablet, Rfl: 3    levETIRAcetam (KEPPRA) 500 mg tablet, Take 1 tablet (500 mg total) by mouth 2 (two) times a day, Disp: 60 tablet, Rfl: 3    levothyroxine 100 mcg tablet, Take 1 tablet (100 mcg total) by mouth daily, Disp: 30 tablet, Rfl: 3    levothyroxine 125 mcg tablet, Take 1 tablet (125 mcg total) by mouth daily, Disp: 30 tablet, Rfl: 3    levothyroxine 300 MCG tablet, Take 1 tablet (300 mcg total) by mouth daily, Disp: 30 tablet, Rfl: 3    lisinopril (ZESTRIL) 2 5 mg tablet, Take 1 tablet (2 5 mg total) by mouth daily, Disp: 30 tablet, Rfl: 3    loratadine (CLARITIN) 10 mg tablet, Take 10 mg by mouth daily  , Disp: , Rfl:     Magnesium 400 MG TABS, Take 1 tablet (400 mg total) by mouth 2 (two) times a day, Disp: 60 tablet, Rfl: 3    metoprolol tartrate (LOPRESSOR) 25 mg tablet, Take 1 tablet (25 mg total) by mouth 2 (two) times a day, Disp: 60 tablet, Rfl: 3    metroNIDAZOLE (FLAGYL) 500 mg tablet, Take 500 mg by mouth 3 (three) times a day, Disp: , Rfl:     montelukast (SINGULAIR) 10 mg tablet, Take 1 tablet (10 mg total) by mouth daily at bedtime, Disp: 30 tablet, Rfl: 3    nystatin (MYCOSTATIN) powder, Apply topically 4 (four) times a day, Disp: 15 g, Rfl: 5    pantoprazole (PROTONIX) 40 mg tablet, Take 1 tablet (40 mg total) by mouth daily, Disp: 30 tablet, Rfl: 3    potassium chloride (K-DUR,KLOR-CON) 20 mEq tablet, Take 1 tablet (20 mEq total) by mouth 2 (two) times a day, Disp: 60 tablet, Rfl: 3    rOPINIRole (REQUIP) 4 mg tablet, Take 1 tablet (4 mg total) by mouth 3 (three) times a day, Disp: 90 tablet, Rfl: 3    tamsulosin (FLOMAX) 0 4 mg, Take 1 capsule (0 4 mg total) by mouth daily with dinner, Disp: 30 capsule, Rfl: 3    triamcinolone (KENALOG) 0 1 % cream, Apply topically 2 (two) times a day, Disp: 30 g, Rfl: 1    True Metrix Blood Glucose Test test strip, 4 (four) times a day Test, Disp: , Rfl:     cetirizine (ZyrTEC) 10 mg tablet, Take 10 mg by mouth, Disp: , Rfl:     cyclobenzaprine (FLEXERIL) 10 mg tablet, Take 10 mg by mouth 2 (two) times a day as needed, Disp: , Rfl:     Dextrose, Diabetic Use, (Glucose) 15 GM/59ML LIQD, Take by mouth, Disp: , Rfl:     fenofibrate (TRICOR) 145 mg tablet, Take 145 mg by mouth daily  , Disp: , Rfl:     fenofibrate micronized (LOFIBRA) 200 MG capsule, Take 200 mg by mouth every morning before breakfast, Disp: , Rfl:     ferrous sulfate 325 (65 Fe) mg tablet, Take 325 mg by mouth, Disp: , Rfl:     FLUoxetine (PROzac) 40 MG capsule, Take 40 mg by mouth, Disp: , Rfl:     gabapentin (NEURONTIN) 300 mg capsule, Take 300 mg by mouth 2 (two) times a day , Disp: , Rfl:     gabapentin (NEURONTIN) 600 MG tablet, Take 1,200 mg by mouth daily at bedtime, Disp: , Rfl:     HumaLOG 100 UNIT/ML injection, 5 Units , Disp: , Rfl:     insulin regular (HumuLIN R,NovoLIN R) 100 units/mL injection, Inject under the skin 3 (three) times a day before meals  , Disp: , Rfl:     levothyroxine 150 mcg tablet, Take 125 mcg by mouth daily  , Disp: , Rfl:     meloxicam (MOBIC) 15 mg tablet, Take 15 mg by mouth, Disp: , Rfl:     metFORMIN (GLUCOPHAGE) 1000 MG tablet, Take 1,000 mg by mouth, Disp: , Rfl:     metoprolol succinate (TOPROL-XL) 25 mg 24 hr tablet, Take by mouth, Disp: , Rfl:     ondansetron (ZOFRAN) 4 mg tablet, Take 4 mg by mouth, Disp: , Rfl:     polyethylene glycol (MIRALAX) 17 g packet, Take 17 g by mouth daily (Patient not taking: Reported on 4/1/2021), Disp: 30 each, Rfl: 0    potassium chloride (KLOR-CON) 20 mEq packet, Take 20 mEq by mouth, Disp: , Rfl:     pregabalin (LYRICA) 100 mg capsule, Take 1 capsule (100 mg total) by mouth 2 (two) times a day (Patient not taking: Reported on 4/1/2021), Disp: 60 capsule, Rfl: 1    rOPINIRole (REQUIP) 0 5 mg tablet, Take 0 5 mg by mouth daily  , Disp: , Rfl:     rOPINIRole (REQUIP) 5 MG tablet, Take 5 mg by mouth 2 (two) times a day , Disp: , Rfl:     simvastatin (ZOCOR) 40 mg tablet, Take 40 mg by mouth daily at bedtime  , Disp: , Rfl:     spironolactone (ALDACTONE) 50 mg tablet, , Disp: , Rfl:     tamsulosin (FLOMAX) 0 4 mg, Take 0 4 mg by mouth, Disp: , Rfl:   Allergies   Allergen Reactions    Carbamazepine Other (See Comments)     Elevated liver functions    "Enlarges liver"    Clindamycin Rash    Phenytoin Rash     Other reaction(s): Itching    Pneumococcal Vaccine Hives, Itching and Rash       Review of Systems   Constitution: Negative  HENT: Negative  Eyes: Negative      Cardiovascular: Positive for leg swelling (with ulcerations ) and orthopnea  Negative for chest pain, claudication, cyanosis, dyspnea on exertion, irregular heartbeat, near-syncope, palpitations, paroxysmal nocturnal dyspnea and syncope  Respiratory: Negative  Negative for cough, hemoptysis, shortness of breath, sleep disturbances due to breathing, snoring, sputum production and wheezing  Endocrine: Negative  Hematologic/Lymphatic: Negative  Skin: Negative  Musculoskeletal: Negative  Gastrointestinal: Negative  Genitourinary: Negative  Neurological: Negative  Psychiatric/Behavioral: Negative  Allergic/Immunologic: Negative  Objective:   /60   Pulse 63   Ht 5' 10" (1 778 m)   Wt (!) 159 kg (350 lb)   BMI 50 22 kg/m²   Physical Exam   Constitutional: He is oriented to person, place, and time  He appears well-developed and well-nourished  Morbid obesity    HENT:   Head: Normocephalic and atraumatic  Mouth/Throat: Oropharynx is clear and moist    Eyes: Conjunctivae are normal  No scleral icterus  Neck: Normal range of motion  Neck supple  No JVD present  No thyromegaly present  Cardiovascular: Normal rate, regular rhythm, S1 normal, S2 normal and normal heart sounds  Exam reveals no gallop and no friction rub  No murmur heard  Pulmonary/Chest: No respiratory distress  He has no wheezes  He has no rales  Abdominal: Soft  Bowel sounds are normal  He exhibits no distension  There is no abdominal tenderness  Aorta not palpable   Musculoskeletal: Normal range of motion  General: Edema (1+ bilaterally ) present  No tenderness or deformity  Neurological: He is alert and oriented to person, place, and time  Skin: Skin is warm and dry  Psychiatric: He has a normal mood and affect  His behavior is normal    Nursing note and vitals reviewed        Lab Review:   Lab Results   Component Value Date    CHOL 71 05/02/2015     Lab Results   Component Value Date    HDL 28 05/02/2015     Lab Results   Component Value Date    LDLCALC 17 05/02/2015     Lab Results   Component Value Date    TRIG 131 05/02/2015     Results Reviewed     None        Results Reviewed     None        Results Reviewed     None          Recent Cardiovascular Testing:   Echo 2-4-21 Normal LVEF 55-60%, no WMA    ECG Review:   Sinus rhythm   First degree AV block   NS ST changes

## 2021-04-01 NOTE — ASSESSMENT & PLAN NOTE
Chronic dHFpEF of 55-60%   Edema is out of proportion to symptoms   Continue on lasix, potassium, metoprolol and ACE-I

## 2021-04-01 NOTE — ASSESSMENT & PLAN NOTE
Transient Afib that was related to infectious pneumonia    Will assess with Zio for two weeks  If there is no afib will not need Eliquis from a heart perspective  It is of note that he has been taking Eliquis for chronic PE  See additional comments   It will be up to PCP to consider discontinuation

## 2021-04-01 NOTE — ASSESSMENT & PLAN NOTE
PE in 2013    Remains on life-long Eliquis  Most recent vascular study shows no DVT  No recent CT of the lungs   See additional comments

## 2021-04-12 ENCOUNTER — RA CDI HCC (OUTPATIENT)
Dept: OTHER | Facility: HOSPITAL | Age: 56
End: 2021-04-12

## 2021-04-15 ENCOUNTER — OFFICE VISIT (OUTPATIENT)
Dept: FAMILY MEDICINE CLINIC | Facility: CLINIC | Age: 56
End: 2021-04-15
Payer: COMMERCIAL

## 2021-04-15 VITALS
OXYGEN SATURATION: 96 % | DIASTOLIC BLOOD PRESSURE: 68 MMHG | HEIGHT: 70 IN | WEIGHT: 315 LBS | SYSTOLIC BLOOD PRESSURE: 116 MMHG | BODY MASS INDEX: 45.1 KG/M2 | HEART RATE: 61 BPM | TEMPERATURE: 97.6 F

## 2021-04-15 DIAGNOSIS — Z79.4 TYPE 2 DIABETES MELLITUS WITH HYPERGLYCEMIA, WITH LONG-TERM CURRENT USE OF INSULIN (HCC): Primary | ICD-10-CM

## 2021-04-15 DIAGNOSIS — L97.421 DIABETIC ULCER OF LEFT HEEL ASSOCIATED WITH TYPE 2 DIABETES MELLITUS, LIMITED TO BREAKDOWN OF SKIN (HCC): ICD-10-CM

## 2021-04-15 DIAGNOSIS — G47.33 OBSTRUCTIVE SLEEP APNEA SYNDROME: ICD-10-CM

## 2021-04-15 DIAGNOSIS — D69.6 THROMBOCYTOPENIA (HCC): ICD-10-CM

## 2021-04-15 DIAGNOSIS — I50.9 CONGESTIVE HEART FAILURE, UNSPECIFIED HF CHRONICITY, UNSPECIFIED HEART FAILURE TYPE (HCC): ICD-10-CM

## 2021-04-15 DIAGNOSIS — N18.30 STAGE 3 CHRONIC KIDNEY DISEASE, UNSPECIFIED WHETHER STAGE 3A OR 3B CKD (HCC): ICD-10-CM

## 2021-04-15 DIAGNOSIS — Z86.711 HX PULMONARY EMBOLISM: ICD-10-CM

## 2021-04-15 DIAGNOSIS — G47.33 OSA (OBSTRUCTIVE SLEEP APNEA): ICD-10-CM

## 2021-04-15 DIAGNOSIS — E11.621 DIABETIC ULCER OF LEFT HEEL ASSOCIATED WITH TYPE 2 DIABETES MELLITUS, LIMITED TO BREAKDOWN OF SKIN (HCC): ICD-10-CM

## 2021-04-15 DIAGNOSIS — I48.91 ATRIAL FIBRILLATION, UNSPECIFIED TYPE (HCC): ICD-10-CM

## 2021-04-15 DIAGNOSIS — Z11.4 SCREENING FOR HIV (HUMAN IMMUNODEFICIENCY VIRUS): ICD-10-CM

## 2021-04-15 DIAGNOSIS — E11.65 TYPE 2 DIABETES MELLITUS WITH HYPERGLYCEMIA, WITH LONG-TERM CURRENT USE OF INSULIN (HCC): Primary | ICD-10-CM

## 2021-04-15 DIAGNOSIS — Z12.11 SCREENING FOR COLORECTAL CANCER: ICD-10-CM

## 2021-04-15 DIAGNOSIS — Z11.59 NEED FOR HEPATITIS C SCREENING TEST: ICD-10-CM

## 2021-04-15 DIAGNOSIS — E66.01 MORBID OBESITY (HCC): ICD-10-CM

## 2021-04-15 DIAGNOSIS — L97.929 CHRONIC VENOUS HYPERTENSION (IDIOPATHIC) WITH ULCER AND INFLAMMATION OF LEFT LOWER EXTREMITY (HCC): ICD-10-CM

## 2021-04-15 DIAGNOSIS — I87.332 CHRONIC VENOUS HYPERTENSION (IDIOPATHIC) WITH ULCER AND INFLAMMATION OF LEFT LOWER EXTREMITY (HCC): ICD-10-CM

## 2021-04-15 DIAGNOSIS — Z87.39 H/O OSTEOMYELITIS: ICD-10-CM

## 2021-04-15 DIAGNOSIS — Z12.12 SCREENING FOR COLORECTAL CANCER: ICD-10-CM

## 2021-04-15 PROBLEM — D63.1 ANEMIA IN CHRONIC KIDNEY DISEASE: Status: ACTIVE | Noted: 2020-03-18

## 2021-04-15 PROBLEM — R26.2 AMBULATORY DYSFUNCTION: Status: ACTIVE | Noted: 2020-05-15

## 2021-04-15 PROBLEM — N18.9 ANEMIA IN CHRONIC KIDNEY DISEASE: Status: ACTIVE | Noted: 2020-03-18

## 2021-04-15 PROBLEM — G25.81 RESTLESS LEGS SYNDROME (RLS): Status: ACTIVE | Noted: 2020-02-06

## 2021-04-15 LAB — SL AMB POCT HEMOGLOBIN AIC: 10.1 (ref ?–6.5)

## 2021-04-15 PROCEDURE — 83036 HEMOGLOBIN GLYCOSYLATED A1C: CPT | Performed by: FAMILY MEDICINE

## 2021-04-15 PROCEDURE — 3725F SCREEN DEPRESSION PERFORMED: CPT | Performed by: FAMILY MEDICINE

## 2021-04-15 PROCEDURE — 3008F BODY MASS INDEX DOCD: CPT | Performed by: FAMILY MEDICINE

## 2021-04-15 PROCEDURE — 1036F TOBACCO NON-USER: CPT | Performed by: FAMILY MEDICINE

## 2021-04-15 PROCEDURE — 99214 OFFICE O/P EST MOD 30 MIN: CPT | Performed by: FAMILY MEDICINE

## 2021-04-15 RX ORDER — GLUCOSAM/CHON-MSM1/C/MANG/BOSW 500-416.6
TABLET ORAL 4 TIMES DAILY
COMMUNITY
Start: 2021-03-29 | End: 2021-07-28

## 2021-04-15 RX ORDER — BLOOD-GLUCOSE METER
EACH MISCELLANEOUS
COMMUNITY
Start: 2021-04-02 | End: 2021-07-28

## 2021-04-15 NOTE — PROGRESS NOTES
Assessment/Plan:    No problem-specific Assessment & Plan notes found for this encounter  Diagnoses and all orders for this visit:    Type 2 diabetes mellitus with hyperglycemia, with long-term current use of insulin ([facility] )  -     POCT hemoglobin A1c  -     Ambulatory referral to Ophthalmology; Future  -     TSH, 3rd generation; Future  -     HEMOGLOBIN A1C W/ EAG ESTIMATION; Future    Need for hepatitis C screening test  -     Hepatitis C Antibody ([facility]); Future  -     TSH, 3rd generation; Future  -     HEMOGLOBIN A1C W/ EAG ESTIMATION; Future    Screening for HIV (human immunodeficiency virus)  -     HIV 1/2 Antigen/Antibody (4th Generation) w Reflex [facility]; Future  -     TSH, 3rd generation; Future  -     HEMOGLOBIN A1C W/ EAG ESTIMATION; Future    Screening for colorectal cancer  -     TSH, 3rd generation; Future  -     HEMOGLOBIN A1C W/ EAG ESTIMATION; Future    Diabetic ulcer of left heel associated with type 2 diabetes mellitus, limited to breakdown of skin (HCC)  -     TSH, 3rd generation; Future  -     HEMOGLOBIN A1C W/ EAG ESTIMATION; Future    NEAL (obstructive sleep apnea)  -     Diagnostic Sleep Study; Future  -     TSH, 3rd generation; Future  -     HEMOGLOBIN A1C W/ EAG ESTIMATION; Future    Obstructive sleep apnea syndrome    Chronic venous hypertension (idiopathic) with ulcer and inflammation of left lower extremity (HCC)    Congestive heart failure, unspecified HF chronicity, unspecified heart failure type (HCC)    Atrial fibrillation, unspecified type (HCC)    Stage 3 chronic kidney disease, unspecified whether stage 3a or 3b CKD    Morbid obesity ([facility] )    H/O osteomyelitis    Hx pulmonary embolism    Thrombocytopenia ([facility] )    Other orders  -     Cancel: Ambulatory referral to Gastroenterology; Future  -     Cancel: Hemoglobin A1C ([facility]);  Future  -     TRUEplus Lancets 33G MISC; 4 (four) times a day Test  -     Easy Comfort Insulin Syringe 31G X 5/16" 1 ML MISC; Use as directed          Subjective:      Patient ID: June Zarate is a 60 y o  male  His A1c is 10 1% in the office today  He sees Dr Yonas Santiago (endocrinolgy)  He is managing his insulin  He c/o fatigue  This is multifactorial:  Anemia (iron deficiency and CKD), NEAL, medications    He needs to follow-up with GI for work-up of iron deficiency  His BP is well controlled on his current regimen  He has no CP or SOB  He has no PND or orthopnea  His lipids are at goal on his current regimen  He has PAF  He is chronically anticoagulated for recurrent DVT/PE      The following portions of the patient's history were reviewed and updated as appropriate:   He  has a past medical history of Ambulatory dysfunction, Anemia, Anxiety, Arthritis, Atrial fibrillation ([facility] ), CHF (congestive heart failure) ([facility] ), Chronic kidney disease, stage 3, Chronic ulcer of left foot ([facility] ), COPD with acute exacerbation ([facility] ), Encephalopathy, History of depression, History of osteomyelitis, Hyperkalemia, Hyperlipidemia, Hypertension, Hypoglycemia, Hypomagnesemia, Hyponatremia, Hypothyroidism, Morbid obesity ([address] ), Pneumonia, Pulmonary embolism ([address] ), RLS (restless legs syndrome), Sepsis ([facility] ), Sleep apnea, Thrombocytopenia ([facility] ), Type 2 diabetes mellitus ([address] ), Urinary retention, and Vitamin D deficiency    He   Patient Active Problem List    Diagnosis Date Noted    Other emphysema ([facility] ) 03/16/2021    Respiratory failure ([facility] ) 03/16/2021    Thrombocytopenia ([facility] ) 03/16/2021    Diabetic foot ulcer associated with type 2 diabetes mellitus ([address] ) 01/04/2021    Osteomyelitis ([address] ) 01/04/2021    Chronic venous hypertension (idiopathic) with ulcer and inflammation of left lower extremity ([address] ) 01/04/2021    COPD with acute exacerbation ([address] ) 01/04/2021    Congestive heart failure ([address] ) 01/04/2021    Atrial fibrillation ([address] ) 01/04/2021    Encounter for medical examination to establish care 12/21/2020    Major depressive disorder 06/04/2020    Ambulatory dysfunction 05/15/2020    Obstructive sleep apnea syndrome 03/18/2020    Anemia in chronic kidney disease 03/18/2020    Stage 3 chronic kidney disease 03/09/2020    Restless legs syndrome (RLS) 02/06/2020    Cellulitis of right lower extremity 07/21/2016    Diabetic foot ulcer ([facility] ) 07/21/2016    Diabetes mellitus ([facility] ) 07/21/2016    Morbid obesity ([facility] ) 07/21/2016    Hypothyroid 07/21/2016    Anxiety 07/21/2016    Neuropathy 07/21/2016    HTN (hypertension) 07/21/2016    H/O osteomyelitis 07/21/2016    Hx pulmonary embolism 07/21/2016     He  has a past surgical history that includes Tonsillectomy and adenoidectomy and Gallbladder surgery  His family history includes Cancer in his maternal grandmother and mother; Diabetes in his sister; Heart disease in his father; Hypertension in his father and mother  He  reports that he has never smoked  He has never used smokeless tobacco  He reports that he does not drink alcohol or use drugs    Current Outpatient Medications   Medication Sig Dispense Refill    acetaminophen (TYLENOL) 500 mg tablet Take 1 tablet by mouth 2 (two) times a day      albuterol (PROVENTIL HFA,VENTOLIN HFA) 90 mcg/act inhaler Inhale 2 puffs every 6 (six) hours as needed for wheezing 3 Inhaler 3    amitriptyline (ELAVIL) 25 mg tablet Take 1 tablet (25 mg total) by mouth daily at bedtime 30 tablet 3    atorvastatin (LIPITOR) 40 mg tablet Take 1 tablet (40 mg total) by mouth daily 30 tablet 2    cetirizine (ZyrTEC) 10 mg tablet Take 10 mg by mouth      Cholecalciferol 25 MCG (1000 UT) capsule Take 2,000 Units by mouth      ciprofloxacin (CIPRO) 500 mg tablet take 1 tablet by mouth twice a day for 7 days      clonazePAM (KlonoPIN) 1 mg tablet Take 2 tablets (2 mg total) by mouth 2 (two) times a day 120 tablet 2    docusate sodium (COLACE) 100 mg capsule Take 100 mg by mouth 2 (two) times a day as needed      Eliquis 5 MG Take 1 tablet (5 mg total) by mouth 2 (two) times a day 60 tablet 5    fluticasone (FLONASE) 50 mcg/act nasal spray 1 spray into each nostril daily as needed for rhinitis 3 Bottle 3    furosemide (LASIX) 40 mg tablet Take 1 tablet (40 mg total) by mouth daily 30 tablet 3    insulin aspart (NovoLOG FlexPen) 100 UNIT/ML injection pen Inject 12 Units under the skin 3 (three) times a day with meals 5 pen 5    lamoTRIgine (LaMICtal) 25 mg tablet Take 1 tablet (25 mg total) by mouth daily 30 tablet 3    levETIRAcetam (KEPPRA) 500 mg tablet Take 1 tablet (500 mg total) by mouth 2 (two) times a day 60 tablet 3    levothyroxine 100 mcg tablet Take 1 tablet (100 mcg total) by mouth daily 30 tablet 3    levothyroxine 125 mcg tablet Take 1 tablet (125 mcg total) by mouth daily 30 tablet 3    levothyroxine 300 MCG tablet Take 1 tablet (300 mcg total) by mouth daily 30 tablet 3    lisinopril (ZESTRIL) 2 5 mg tablet Take 1 tablet (2 5 mg total) by mouth daily 30 tablet 3    loratadine (CLARITIN) 10 mg tablet Take 10 mg by mouth daily        Magnesium 400 MG TABS Take 1 tablet (400 mg total) by mouth 2 (two) times a day 60 tablet 3    metoprolol tartrate (LOPRESSOR) 25 mg tablet Take 1 tablet (25 mg total) by mouth 2 (two) times a day 60 tablet 3    metroNIDAZOLE (FLAGYL) 500 mg tablet Take 500 mg by mouth 3 (three) times a day      montelukast (SINGULAIR) 10 mg tablet Take 1 tablet (10 mg total) by mouth daily at bedtime 30 tablet 3    pantoprazole (PROTONIX) 40 mg tablet Take 1 tablet (40 mg total) by mouth daily 30 tablet 3    potassium chloride (K-DUR,KLOR-CON) 20 mEq tablet Take 1 tablet (20 mEq total) by mouth 2 (two) times a day 60 tablet 3    rOPINIRole (REQUIP) 4 mg tablet Take 1 tablet (4 mg total) by mouth 3 (three) times a day 90 tablet 3    tamsulosin (FLOMAX) 0 4 mg Take 1 capsule (0 4 mg total) by mouth daily with dinner 30 capsule 3    ammonium lactate (Lac-Hydrin) 12 % lotion Apply topically 2 (two) times a day as needed for dry skin (Patient not taking: Reported on 4/15/2021) 400 g 0    B-D UF III MINI PEN NEEDLES 31G X 5 MM MISC use 1 four times a day      cyclobenzaprine (FLEXERIL) 10 mg tablet Take 10 mg by mouth 2 (two) times a day as needed      Dextrose, Diabetic Use, (Glucose) 15 GM/59ML LIQD Take by mouth      Easy Comfort Insulin Syringe 31G X 5/16" 1 ML MISC Use as directed      fenofibrate (TRICOR) 145 mg tablet Take 145 mg by mouth daily   fenofibrate micronized (LOFIBRA) 200 MG capsule Take 200 mg by mouth every morning before breakfast      ferrous sulfate 325 (65 Fe) mg tablet Take 325 mg by mouth      FLUoxetine (PROzac) 40 MG capsule Take 40 mg by mouth      gabapentin (NEURONTIN) 300 mg capsule Take 300 mg by mouth 2 (two) times a day   gabapentin (NEURONTIN) 600 MG tablet Take 1,200 mg by mouth daily at bedtime      HumaLOG 100 UNIT/ML injection 5 Units       insulin glargine (LANTUS) 100 units/mL subcutaneous injection Inject 50 Units under the skin daily (Patient not taking: Reported on 4/15/2021) 10 mL 5    insulin regular (HumuLIN R,NovoLIN R) 100 units/mL injection Inject under the skin 3 (three) times a day before meals   levothyroxine 150 mcg tablet Take 125 mcg by mouth daily          meloxicam (MOBIC) 15 mg tablet Take 15 mg by mouth      metFORMIN (GLUCOPHAGE) 1000 MG tablet Take 1,000 mg by mouth      metoprolol succinate (TOPROL-XL) 25 mg 24 hr tablet Take by mouth      nystatin (MYCOSTATIN) powder Apply topically 4 (four) times a day (Patient not taking: Reported on 4/15/2021) 15 g 5    ondansetron (ZOFRAN) 4 mg tablet Take 4 mg by mouth      polyethylene glycol (MIRALAX) 17 g packet Take 17 g by mouth daily (Patient not taking: Reported on 4/1/2021) 30 each 0    potassium chloride (KLOR-CON) 20 mEq packet Take 20 mEq by mouth      pregabalin (LYRICA) 100 mg capsule Take 1 capsule (100 mg total) by mouth 2 (two) times a day (Patient not taking: Reported on 4/1/2021) 60 capsule 1    rOPINIRole (REQUIP) 0 5 mg tablet Take 0 5 mg by mouth daily   rOPINIRole (REQUIP) 5 MG tablet Take 5 mg by mouth 2 (two) times a day       simvastatin (ZOCOR) 40 mg tablet Take 40 mg by mouth daily at bedtime   spironolactone (ALDACTONE) 50 mg tablet       tamsulosin (FLOMAX) 0 4 mg Take 0 4 mg by mouth      triamcinolone (KENALOG) 0 1 % cream Apply topically 2 (two) times a day (Patient not taking: Reported on 4/15/2021) 30 g 1    True Metrix Blood Glucose Test test strip 4 (four) times a day Test      TRUEplus Lancets 33G MISC 4 (four) times a day Test       No current facility-administered medications for this visit        Current Outpatient Medications on File Prior to Visit   Medication Sig    acetaminophen (TYLENOL) 500 mg tablet Take 1 tablet by mouth 2 (two) times a day    albuterol (PROVENTIL HFA,VENTOLIN HFA) 90 mcg/act inhaler Inhale 2 puffs every 6 (six) hours as needed for wheezing    amitriptyline (ELAVIL) 25 mg tablet Take 1 tablet (25 mg total) by mouth daily at bedtime    atorvastatin (LIPITOR) 40 mg tablet Take 1 tablet (40 mg total) by mouth daily    cetirizine (ZyrTEC) 10 mg tablet Take 10 mg by mouth    Cholecalciferol 25 MCG (1000 UT) capsule Take 2,000 Units by mouth    ciprofloxacin (CIPRO) 500 mg tablet take 1 tablet by mouth twice a day for 7 days    clonazePAM (KlonoPIN) 1 mg tablet Take 2 tablets (2 mg total) by mouth 2 (two) times a day    docusate sodium (COLACE) 100 mg capsule Take 100 mg by mouth 2 (two) times a day as needed    Eliquis 5 MG Take 1 tablet (5 mg total) by mouth 2 (two) times a day    fluticasone (FLONASE) 50 mcg/act nasal spray 1 spray into each nostril daily as needed for rhinitis    furosemide (LASIX) 40 mg tablet Take 1 tablet (40 mg total) by mouth daily    insulin aspart (NovoLOG FlexPen) 100 UNIT/ML injection pen Inject 12 Units under the skin 3 (three) times a day with meals    lamoTRIgine (LaMICtal) 25 mg tablet Take 1 tablet (25 mg total) by mouth daily    levETIRAcetam (KEPPRA) 500 mg tablet Take 1 tablet (500 mg total) by mouth 2 (two) times a day    levothyroxine 100 mcg tablet Take 1 tablet (100 mcg total) by mouth daily    levothyroxine 125 mcg tablet Take 1 tablet (125 mcg total) by mouth daily    levothyroxine 300 MCG tablet Take 1 tablet (300 mcg total) by mouth daily    lisinopril (ZESTRIL) 2 5 mg tablet Take 1 tablet (2 5 mg total) by mouth daily    loratadine (CLARITIN) 10 mg tablet Take 10 mg by mouth daily   Magnesium 400 MG TABS Take 1 tablet (400 mg total) by mouth 2 (two) times a day    metoprolol tartrate (LOPRESSOR) 25 mg tablet Take 1 tablet (25 mg total) by mouth 2 (two) times a day    metroNIDAZOLE (FLAGYL) 500 mg tablet Take 500 mg by mouth 3 (three) times a day    montelukast (SINGULAIR) 10 mg tablet Take 1 tablet (10 mg total) by mouth daily at bedtime    pantoprazole (PROTONIX) 40 mg tablet Take 1 tablet (40 mg total) by mouth daily    potassium chloride (K-DUR,KLOR-CON) 20 mEq tablet Take 1 tablet (20 mEq total) by mouth 2 (two) times a day    rOPINIRole (REQUIP) 4 mg tablet Take 1 tablet (4 mg total) by mouth 3 (three) times a day    tamsulosin (FLOMAX) 0 4 mg Take 1 capsule (0 4 mg total) by mouth daily with dinner    ammonium lactate (Lac-Hydrin) 12 % lotion Apply topically 2 (two) times a day as needed for dry skin (Patient not taking: Reported on 4/15/2021)    B-D UF III MINI PEN NEEDLES 31G X 5 MM MISC use 1 four times a day    cyclobenzaprine (FLEXERIL) 10 mg tablet Take 10 mg by mouth 2 (two) times a day as needed    Dextrose, Diabetic Use, (Glucose) 15 GM/59ML LIQD Take by mouth    Easy Comfort Insulin Syringe 31G X 5/16" 1 ML MISC Use as directed    fenofibrate (TRICOR) 145 mg tablet Take 145 mg by mouth daily      fenofibrate micronized (LOFIBRA) 200 MG capsule Take 200 mg by mouth every morning before breakfast    ferrous sulfate 325 (65 Fe) mg tablet Take 325 mg by mouth    FLUoxetine (PROzac) 40 MG capsule Take 40 mg by mouth    gabapentin (NEURONTIN) 300 mg capsule Take 300 mg by mouth 2 (two) times a day   gabapentin (NEURONTIN) 600 MG tablet Take 1,200 mg by mouth daily at bedtime    HumaLOG 100 UNIT/ML injection 5 Units     insulin glargine (LANTUS) 100 units/mL subcutaneous injection Inject 50 Units under the skin daily (Patient not taking: Reported on 4/15/2021)    insulin regular (HumuLIN R,NovoLIN R) 100 units/mL injection Inject under the skin 3 (three) times a day before meals   levothyroxine 150 mcg tablet Take 125 mcg by mouth daily   meloxicam (MOBIC) 15 mg tablet Take 15 mg by mouth    metFORMIN (GLUCOPHAGE) 1000 MG tablet Take 1,000 mg by mouth    metoprolol succinate (TOPROL-XL) 25 mg 24 hr tablet Take by mouth    nystatin (MYCOSTATIN) powder Apply topically 4 (four) times a day (Patient not taking: Reported on 4/15/2021)    ondansetron (ZOFRAN) 4 mg tablet Take 4 mg by mouth    polyethylene glycol (MIRALAX) 17 g packet Take 17 g by mouth daily (Patient not taking: Reported on 4/1/2021)    potassium chloride (KLOR-CON) 20 mEq packet Take 20 mEq by mouth    pregabalin (LYRICA) 100 mg capsule Take 1 capsule (100 mg total) by mouth 2 (two) times a day (Patient not taking: Reported on 4/1/2021)    rOPINIRole (REQUIP) 0 5 mg tablet Take 0 5 mg by mouth daily   rOPINIRole (REQUIP) 5 MG tablet Take 5 mg by mouth 2 (two) times a day     simvastatin (ZOCOR) 40 mg tablet Take 40 mg by mouth daily at bedtime   spironolactone (ALDACTONE) 50 mg tablet     tamsulosin (FLOMAX) 0 4 mg Take 0 4 mg by mouth    triamcinolone (KENALOG) 0 1 % cream Apply topically 2 (two) times a day (Patient not taking: Reported on 4/15/2021)    True Metrix Blood Glucose Test test strip 4 (four) times a day Test    TRUEplus Lancets 33G MISC 4 (four) times a day Test     No current facility-administered medications on file prior to visit        He is allergic to carbamazepine; clindamycin; phenytoin; and pneumococcal vaccine       Review of Systems   All other systems reviewed and are negative  Objective:      /68   Pulse 61   Temp 97 6 °F (36 4 °C)   Ht 5' 10" (1 778 m)   Wt (!) 160 kg (353 lb)   SpO2 96%   BMI 50 65 kg/m²          Physical Exam  Vitals signs and nursing note reviewed  Constitutional:       Appearance: Normal appearance  He is obese  HENT:      Head: Normocephalic and atraumatic  Neck:      Musculoskeletal: Normal range of motion and neck supple  Cardiovascular:      Rate and Rhythm: Normal rate and regular rhythm  Pulses: Normal pulses  Heart sounds: Normal heart sounds  Pulmonary:      Effort: Pulmonary effort is normal       Breath sounds: Normal breath sounds  Abdominal:      General: Abdomen is flat  Bowel sounds are normal       Palpations: Abdomen is soft  Musculoskeletal: Normal range of motion  Skin:     General: Skin is warm and dry  Capillary Refill: Capillary refill takes less than 2 seconds  Neurological:      General: No focal deficit present  Mental Status: He is alert and oriented to person, place, and time  Mental status is at baseline  Psychiatric:         Mood and Affect: Mood normal          Behavior: Behavior normal          Thought Content:  Thought content normal          Judgment: Judgment normal

## 2021-04-15 NOTE — PATIENT INSTRUCTIONS
You need to follow-up with Dr Eleazar Cobos group  We need to investigate the cause of your iron deficiency  Anemia will make you tired  You need to have a sleep study  Sleep apnea can make you tired

## 2021-04-19 ENCOUNTER — CLINICAL SUPPORT (OUTPATIENT)
Dept: CARDIOLOGY CLINIC | Facility: CLINIC | Age: 56
End: 2021-04-19
Payer: COMMERCIAL

## 2021-04-19 DIAGNOSIS — I48.91 ATRIAL FIBRILLATION, UNSPECIFIED TYPE (HCC): ICD-10-CM

## 2021-04-19 PROCEDURE — 93248 EXT ECG>7D<15D REV&INTERPJ: CPT | Performed by: INTERNAL MEDICINE

## 2021-04-21 LAB — HBA1C MFR BLD HPLC: 10.8 %

## 2021-04-21 PROCEDURE — 3046F HEMOGLOBIN A1C LEVEL >9.0%: CPT | Performed by: FAMILY MEDICINE

## 2021-04-25 NOTE — RESULT ENCOUNTER NOTE
Sinus rhythm throughout with rate ranging from /minute  Isolated SVEs were rare (<1 0%), SVE Couplets were  rare (<1 0%), and no SVE Triplets were present  Isolated VEs were rare(<1 0%), and no VE Couplets or VE Triplets were present  No change in rate or rhythm with triggered events      Kayla Martinez MD

## 2021-04-26 ENCOUNTER — TELEPHONE (OUTPATIENT)
Dept: FAMILY MEDICINE CLINIC | Facility: CLINIC | Age: 56
End: 2021-04-26

## 2021-04-26 NOTE — TELEPHONE ENCOUNTER
Dr Shannan Corrigan office called informing they are prescribing his Clonazepam, and the pharmacy is not going to fill his script that was sent in

## 2021-05-17 ENCOUNTER — OFFICE VISIT (OUTPATIENT)
Dept: FAMILY MEDICINE CLINIC | Facility: CLINIC | Age: 56
End: 2021-05-17
Payer: COMMERCIAL

## 2021-05-17 VITALS
DIASTOLIC BLOOD PRESSURE: 68 MMHG | BODY MASS INDEX: 45.1 KG/M2 | SYSTOLIC BLOOD PRESSURE: 110 MMHG | HEIGHT: 70 IN | OXYGEN SATURATION: 96 % | TEMPERATURE: 97.8 F | HEART RATE: 62 BPM | WEIGHT: 315 LBS

## 2021-05-17 DIAGNOSIS — E66.01 MORBID OBESITY (HCC): ICD-10-CM

## 2021-05-17 DIAGNOSIS — I48.91 ATRIAL FIBRILLATION, UNSPECIFIED TYPE (HCC): ICD-10-CM

## 2021-05-17 DIAGNOSIS — I10 ESSENTIAL HYPERTENSION: Primary | ICD-10-CM

## 2021-05-17 PROCEDURE — 1036F TOBACCO NON-USER: CPT | Performed by: FAMILY MEDICINE

## 2021-05-17 PROCEDURE — 3008F BODY MASS INDEX DOCD: CPT | Performed by: FAMILY MEDICINE

## 2021-05-17 PROCEDURE — 3078F DIAST BP <80 MM HG: CPT | Performed by: FAMILY MEDICINE

## 2021-05-17 PROCEDURE — 99214 OFFICE O/P EST MOD 30 MIN: CPT | Performed by: FAMILY MEDICINE

## 2021-05-17 PROCEDURE — 3074F SYST BP LT 130 MM HG: CPT | Performed by: FAMILY MEDICINE

## 2021-05-17 RX ORDER — MAGNESIUM OXIDE 400 MG/1
1 TABLET ORAL 2 TIMES DAILY
COMMUNITY
Start: 2021-04-23 | End: 2021-07-20 | Stop reason: SDUPTHER

## 2021-05-17 RX ORDER — SENNOSIDES 8.6 MG
8.6 TABLET ORAL 2 TIMES DAILY
COMMUNITY
Start: 2021-04-23 | End: 2021-06-09 | Stop reason: ALTCHOICE

## 2021-05-17 RX ORDER — CITALOPRAM 10 MG/1
10 TABLET ORAL DAILY
COMMUNITY
Start: 2021-04-26 | End: 2022-06-22 | Stop reason: SDUPTHER

## 2021-05-17 NOTE — PROGRESS NOTES
Assessment/Plan:    No problem-specific Assessment & Plan notes found for this encounter  Diagnoses and all orders for this visit:    Essential hypertension    Atrial fibrillation, unspecified type ([facility] )    Morbid obesity ([facility] )    Other orders  -     senna (SENOKOT) 8 6 mg; Take 8 6 mg by mouth 2 (two) times a day  -     magnesium oxide (MAG-OX) 400 mg tablet; Take 1 tablet by mouth 2 (two) times a day  -     citalopram (CeleXA) 10 mg tablet; Take 10 mg by mouth daily        Xiao Tam is to [facility] in 3 months for f/u  He understood, acknowledged and agrees to plan above  All his questions and concerns were answered and addressed  Case discussed with Dr Karel Spain    Subjective:      Patient ID: Megan Alba is a 60 y o  male  Xiao Tam is a 61 y/o man that was seen and examined in the office today for follow-up  Has no complaints at this time  Has a colonoscopy scheduled for next week  He has a lump on his right shoulder from COVID vaccine and continues to "pick" at it  The following portions of the patient's history were reviewed and updated as appropriate: allergies, current medications, past family history, past medical history, past social history, past surgical history and problem list     Review of Systems   All other systems reviewed and are negative  Objective:      /68 (BP Location: Right arm, Patient Position: Sitting, Cuff Size: Extra-Large)   Pulse 62   Temp 97 8 °F (36 6 °C)   Ht 5' 10" (1 778 m)   Wt (!) 160 kg (353 lb)   SpO2 96%   BMI 50 65 kg/m²          Physical Exam  Vitals signs and nursing note reviewed  Constitutional:       Appearance: Normal appearance  He is obese  HENT:      Head: Normocephalic and atraumatic  Cardiovascular:      Rate and Rhythm: Normal rate and regular rhythm  Pulmonary:      Effort: Pulmonary effort is normal       Breath sounds: Normal breath sounds  Abdominal:      General: Abdomen is flat   Bowel sounds are normal  Palpations: Abdomen is soft  Musculoskeletal:      Right lower leg: No edema  Left lower leg: No edema  Comments: Has a hard boot on left foot ambulating with walker   Skin:     General: Skin is warm  Comments: Right shoulder healed scab   Neurological:      General: No focal deficit present  Mental Status: He is alert and oriented to person, place, and time

## 2021-06-04 ENCOUNTER — DOCTOR'S OFFICE (OUTPATIENT)
Dept: URBAN - NONMETROPOLITAN AREA CLINIC 1 | Facility: CLINIC | Age: 56
Setting detail: OPHTHALMOLOGY
End: 2021-06-04
Payer: COMMERCIAL

## 2021-06-04 DIAGNOSIS — H43.813: ICD-10-CM

## 2021-06-04 DIAGNOSIS — E11.3293: ICD-10-CM

## 2021-06-04 DIAGNOSIS — H43.393: ICD-10-CM

## 2021-06-04 PROCEDURE — 92134 CPTRZ OPH DX IMG PST SGM RTA: CPT | Performed by: OPHTHALMOLOGY

## 2021-06-04 PROCEDURE — 92201 OPSCPY EXTND RTA DRAW UNI/BI: CPT | Performed by: OPHTHALMOLOGY

## 2021-06-04 PROCEDURE — 92014 COMPRE OPH EXAM EST PT 1/>: CPT | Performed by: OPHTHALMOLOGY

## 2021-06-04 ASSESSMENT — REFRACTION_MANIFEST
OS_CYLINDER: SPH
OD_CYLINDER: -0.50
OS_VA2: 20/25-2
OD_VA2: 20/30-2
OD_AXIS: 020
OD_VA1: 20/30-2
OS_VA1: 20/25-2
OD_ADD: +2.50
OD_SPHERE: +0.50
OS_SPHERE: +1.00
OS_ADD: +2.50

## 2021-06-04 ASSESSMENT — REFRACTION_AUTOREFRACTION
OS_AXIS: 077
OD_AXIS: 013
OS_CYLINDER: -0.25
OD_SPHERE: +0.50
OS_SPHERE: +1.25
OD_CYLINDER: -1.00

## 2021-06-04 ASSESSMENT — REFRACTION_CURRENTRX
OS_SPHERE: +0.75
OD_CYLINDER: -2.00
OD_SPHERE: +0.50
OS_VPRISM_DIRECTION: BF
OD_AXIS: 023
OS_ADD: +2.50
OS_OVR_VA: 20/
OD_VPRISM_DIRECTION: BF
OD_OVR_VA: 20/
OS_CYLINDER: SPH
OD_ADD: +2.50

## 2021-06-04 ASSESSMENT — SPHEQUIV_DERIVED
OS_SPHEQUIV: 1.125
OD_SPHEQUIV: 0.25
OD_SPHEQUIV: 0

## 2021-06-04 ASSESSMENT — CONFRONTATIONAL VISUAL FIELD TEST (CVF)
OD_FINDINGS: FULL
OS_FINDINGS: FULL

## 2021-06-04 ASSESSMENT — VISUAL ACUITY
OS_BCVA: 20/40
OD_BCVA: 20/50-1

## 2021-06-08 DIAGNOSIS — I10 ESSENTIAL HYPERTENSION: ICD-10-CM

## 2021-06-09 PROCEDURE — 4010F ACE/ARB THERAPY RXD/TAKEN: CPT | Performed by: FAMILY MEDICINE

## 2021-06-09 RX ORDER — LISINOPRIL 2.5 MG/1
2.5 TABLET ORAL DAILY
Qty: 30 TABLET | Refills: 3 | Status: SHIPPED | OUTPATIENT
Start: 2021-06-09 | End: 2021-11-02 | Stop reason: SDUPTHER

## 2021-06-09 RX ORDER — MONTELUKAST SODIUM 10 MG/1
10 TABLET ORAL
COMMUNITY
End: 2021-07-23 | Stop reason: SDUPTHER

## 2021-06-21 ENCOUNTER — DOCTOR'S OFFICE (OUTPATIENT)
Dept: URBAN - NONMETROPOLITAN AREA CLINIC 1 | Facility: CLINIC | Age: 56
Setting detail: OPHTHALMOLOGY
End: 2021-06-21
Payer: COMMERCIAL

## 2021-06-21 DIAGNOSIS — H43.393: ICD-10-CM

## 2021-06-21 DIAGNOSIS — H43.813: ICD-10-CM

## 2021-06-21 DIAGNOSIS — E11.3293: ICD-10-CM

## 2021-06-21 PROCEDURE — 92014 COMPRE OPH EXAM EST PT 1/>: CPT | Performed by: OPHTHALMOLOGY

## 2021-06-21 PROCEDURE — 92250 FUNDUS PHOTOGRAPHY W/I&R: CPT | Performed by: OPHTHALMOLOGY

## 2021-06-21 PROCEDURE — 92235 FLUORESCEIN ANGRPH MLTIFRAME: CPT | Performed by: OPHTHALMOLOGY

## 2021-06-21 ASSESSMENT — REFRACTION_MANIFEST
OD_VA2: 20/30-2
OD_SPHERE: +0.50
OD_CYLINDER: -0.50
OS_VA1: 20/25-2
OS_CYLINDER: SPH
OS_ADD: +2.50
OS_SPHERE: +1.00
OS_VA2: 20/25-2
OD_AXIS: 020
OD_VA1: 20/30-2
OD_ADD: +2.50

## 2021-06-21 ASSESSMENT — REFRACTION_CURRENTRX
OS_SPHERE: +0.75
OD_VPRISM_DIRECTION: BF
OD_SPHERE: +0.50
OD_AXIS: 023
OS_CYLINDER: SPH
OD_ADD: +2.50
OS_VPRISM_DIRECTION: BF
OD_CYLINDER: -2.00
OS_ADD: +2.50
OD_OVR_VA: 20/
OS_OVR_VA: 20/

## 2021-06-21 ASSESSMENT — SPHEQUIV_DERIVED
OS_SPHEQUIV: 1.125
OD_SPHEQUIV: 0.25
OD_SPHEQUIV: 0

## 2021-06-21 ASSESSMENT — CONFRONTATIONAL VISUAL FIELD TEST (CVF)
OD_FINDINGS: FULL
OS_FINDINGS: FULL

## 2021-06-21 ASSESSMENT — REFRACTION_AUTOREFRACTION
OS_CYLINDER: -0.25
OD_AXIS: 013
OS_SPHERE: +1.25
OS_AXIS: 077
OD_CYLINDER: -1.00
OD_SPHERE: +0.50

## 2021-06-21 ASSESSMENT — VISUAL ACUITY
OS_BCVA: 20/30-1
OD_BCVA: 20/25-1

## 2021-07-01 ENCOUNTER — OFFICE VISIT (OUTPATIENT)
Dept: CARDIOLOGY CLINIC | Facility: CLINIC | Age: 56
End: 2021-07-01
Payer: COMMERCIAL

## 2021-07-01 VITALS
HEART RATE: 60 BPM | SYSTOLIC BLOOD PRESSURE: 114 MMHG | HEIGHT: 70 IN | DIASTOLIC BLOOD PRESSURE: 64 MMHG | BODY MASS INDEX: 45.1 KG/M2 | WEIGHT: 315 LBS

## 2021-07-01 DIAGNOSIS — Z86.711 HX PULMONARY EMBOLISM: ICD-10-CM

## 2021-07-01 DIAGNOSIS — E66.01 MORBID OBESITY (HCC): Primary | ICD-10-CM

## 2021-07-01 DIAGNOSIS — Z86.718 HISTORY OF DEEP VEIN THROMBOSIS (DVT) OF LOWER EXTREMITY: ICD-10-CM

## 2021-07-01 DIAGNOSIS — E11.9 TYPE 2 DIABETES MELLITUS WITHOUT COMPLICATION, WITH LONG-TERM CURRENT USE OF INSULIN (HCC): ICD-10-CM

## 2021-07-01 DIAGNOSIS — I10 ESSENTIAL HYPERTENSION: ICD-10-CM

## 2021-07-01 DIAGNOSIS — Z79.4 TYPE 2 DIABETES MELLITUS WITHOUT COMPLICATION, WITH LONG-TERM CURRENT USE OF INSULIN (HCC): ICD-10-CM

## 2021-07-01 DIAGNOSIS — I48.0 PAROXYSMAL ATRIAL FIBRILLATION (HCC): ICD-10-CM

## 2021-07-01 DIAGNOSIS — G47.33 OBSTRUCTIVE SLEEP APNEA SYNDROME: ICD-10-CM

## 2021-07-01 DIAGNOSIS — I50.32 CHRONIC DIASTOLIC CONGESTIVE HEART FAILURE (HCC): ICD-10-CM

## 2021-07-01 PROCEDURE — 99214 OFFICE O/P EST MOD 30 MIN: CPT | Performed by: INTERNAL MEDICINE

## 2021-07-01 PROCEDURE — 1036F TOBACCO NON-USER: CPT | Performed by: INTERNAL MEDICINE

## 2021-07-01 PROCEDURE — 3008F BODY MASS INDEX DOCD: CPT | Performed by: INTERNAL MEDICINE

## 2021-07-01 PROCEDURE — 3078F DIAST BP <80 MM HG: CPT | Performed by: INTERNAL MEDICINE

## 2021-07-01 PROCEDURE — 3074F SYST BP LT 130 MM HG: CPT | Performed by: INTERNAL MEDICINE

## 2021-07-01 RX ORDER — ROPINIROLE 4 MG/1
4 TABLET, FILM COATED ORAL 4 TIMES DAILY
COMMUNITY
End: 2022-05-12 | Stop reason: SDUPTHER

## 2021-07-01 NOTE — PATIENT INSTRUCTIONS
A-fib (Atrial Fibrillation)   AMBULATORY CARE:   Atrial fibrillation (A-fib)  is an irregular heartbeat  It reduces your heart's ability to pump blood through your body  A-fib may come and go, or it may be a long-term condition  A-fib can cause life-threatening blood clots, stroke, or heart failure  It is important to treat and manage A-fib to help prevent these problems  Common signs and symptoms include the following:   · A heartbeat that races, pounds, or flutters    · Weakness, severe tiredness, or confusion    · Feeling lightheaded, sweaty, dizzy, or faint    · Shortness of breath or anxiety    · Chest pain or pressure    Call your local emergency number (911 in the [address] [facility] [facility]) or have someone call if:   · You have any of the following signs of a heart attack:      ? Squeezing, pressure, or pain in your chest    ? You may  also have any of the following:     § Discomfort or pain in your back, neck, jaw, stomach, or arm    § Shortness of breath    § Nausea or vomiting    § Lightheadedness or a sudden cold sweat    · You have any of the following signs of a stroke:      ? Numbness or drooping on one side of your face     ? Weakness in an arm or leg    ? Confusion or difficulty speaking    ? Dizziness, a severe headache, or vision loss    Call your doctor or cardiologist if:   · Your arm or leg feels warm, tender, and painful  It may look swollen and red  · Your heart rate is more than 110 beats per minute  · You have new or worsening swelling in your legs, feet, ankles, or abdomen  · You are short of breath, even at rest     · You have questions or concerns about your condition or care  Treatment for A-fib:  Conditions that cause A-fib, such as thyroid disease, will be treated  You may also need any of the following:  · Heart medicines  help control your heart rate or rhythm  You may need more than one medicine to treat your symptoms      · Antiplatelet or blood thinner medicines  help prevent blood clots and stroke  · Cardioversion  is a procedure to return your heart rate and rhythm to normal  It can be done using medicines or electric shock  · A-fib ablation  is a procedure that uses energy to burn a small area of heart tissue  This creates scar tissue and prevents electrical signals that cause A-fib  You may need this procedure more than once  Ask for more information on A-fib ablation  · A pacemaker  may be inserted into your heart  A pacemaker is a device that controls your heartbeat  A pacemaker may be inserted during an ablation procedure or surgery  Ask your healthcare provider for more information on pacemakers  · Surgery  may be needed if other procedures do not work  During surgery your healthcare provider will make cuts in the upper part of your heart  The provider will stitch the cuts together to create scar tissue  The scar tissue will prevent electrical signals that cause A-fib  Manage A-fib:   · Know your target heart rate  Learn how to check your pulse and monitor your heart rate  · Know the risks if you choose to drink alcohol  Alcohol can increase your risk for A-fib or make A-fib harder to manage  Ask your healthcare provider if it is okay for you to drink any alcohol  He or she can help you set limits for the number of drinks you have in 24 hours and in a week  A drink of alcohol is 12 ounces of beer, 5 ounces of wine, or 1½ ounces of liquor  · Do not smoke  Nicotine can cause heart damage and make it more difficult to manage your A-fib  Do not use e-cigarettes or smokeless tobacco in place of cigarettes or to help you quit  They still contain nicotine  Ask your healthcare provider for information if you currently smoke and need help quitting  · Eat heart-healthy foods  Heart healthy foods will help keep your cholesterol low  These include fruits, vegetables, whole-grain breads, low-fat dairy products, beans, lean meats, and fish   Replace butter and margarine with heart-healthy oils such as olive oil and canola oil  · Maintain a healthy weight  Ask your healthcare provider what a healthy weight is for you  Ask him or her to help you create a safe weight loss plan if you are overweight  Even a small goal of a 10% weight loss can improve your heart health  · Get regular physical activity  Physical activity helps improve your heart health  Get at least 150 minutes of moderate aerobic physical activity each week  Your healthcare provider can help you create an activity plan  · Manage other health conditions  This includes high blood pressure or cholesterol, sleep apnea, diabetes, and other heart conditions  Take medicine as directed and follow your treatment plan  Your healthcare provider may need to change a medicine you are taking if it is causing your A-fib  Do not  stop taking any medicine unless directed by your provider  Follow up with your doctor or cardiologist as directed: You will need regular blood tests and monitoring  Write down your questions so you remember to ask them during your visits  © Copyright [employer] 2021 Information is for End User's use only and may not be sold, redistributed or otherwise used for commercial purposes  All illustrations and images included in CareNotes® are the copyrighted property of A D A M , Inc  or [address] [place], [place] [address]   The above information is an  only  It is not intended as medical advice for individual conditions or treatments  Talk to your doctor, nurse or pharmacist before following any medical regimen to see if it is safe and effective for you

## 2021-07-01 NOTE — PROGRESS NOTES
Subjective:        Patient ID: Sanjiv Fernandez is a 60 y o  male  Chief Complaint:  [facility] is new to me here to review ambulatory Holter testing  This showed no AFib  He has a CHADS2 stroke risk score of at least 2, documented PAF, clinically remains in sinus rhythm by recent monitor and pulse today, has a history of recurrent DVT on chronic NOAC therapy  He offers no chest pain complaints no alarming shortness of breath complaints, is not using his CPAP, he says no one taught him how to clean his sleep apnea machine  He has transportation difficulties and does not thinking get a sleep study done at [facility] because he does not know what he would do for 5 hours at [facility] waiting for a ride  My appointment today is rushed so he can get a ride back to home  I did the best I could reviewing his records in the short amount of time allotted  I relayed this information to him today  He is not having any unusual edema,  unaboot on left lower extremity  Denies any palpitations presyncope or syncope  No overt bleeding or bruising  Tells me he has anemia, I look back to 2016 CBC, his hemoglobin is unchanged since  Tells me had a normal gastroenterology workup recently  This I cannot find on the portal     Echo Recently ordered not completed      The following portions of the patient's history were reviewed and updated as appropriate: allergies, current medications, past family history, past medical history, past social history, past surgical history and problem list   Review of Systems   Constitutional: Negative for chills, diaphoresis, malaise/fatigue and weight gain  HENT: Negative for nosebleeds and stridor  Eyes: Negative for double vision, vision loss in left eye, vision loss in right eye and visual disturbance  Cardiovascular: Positive for leg swelling ( chronic, no recent flare)   Negative for chest pain, claudication, cyanosis, dyspnea on exertion, irregular heartbeat, near-syncope, orthopnea, palpitations, paroxysmal nocturnal dyspnea and syncope  Respiratory: Negative for cough, shortness of breath, snoring and wheezing  Endocrine: Negative for polydipsia, polyphagia and polyuria  Hematologic/Lymphatic: Negative for bleeding problem  Does not bruise/bleed easily  Skin: Negative for flushing and rash  Musculoskeletal: Positive for arthritis, muscle weakness (Generalized, uses walker) and stiffness  Negative for falls and myalgias  Gastrointestinal: Negative for abdominal pain, heartburn, hematemesis, hematochezia, melena and nausea  Genitourinary: Negative for hematuria  Neurological: Negative for brief paralysis, dizziness, focal weakness, headaches, light-headedness, loss of balance and vertigo  Psychiatric/Behavioral: Negative for altered mental status and substance abuse  Allergic/Immunologic: Negative for hives  Objective:      /64   Pulse 60   Ht 5' 10" (1 778 m)   Wt (!) 160 kg (353 lb)   BMI 50 65 kg/m²   Physical Exam  Constitutional:       General: He is not in acute distress  Appearance: He is well-developed  HENT:      Head: Normocephalic and atraumatic  Eyes:      General: No scleral icterus  Pupils: Pupils are equal, round, and reactive to light  Neck:      Thyroid: No thyromegaly  Vascular: No JVD  Cardiovascular:      Rate and Rhythm: Normal rate and regular rhythm  Heart sounds: Normal heart sounds  No murmur heard  No friction rub  No gallop  Pulmonary:      Effort: Pulmonary effort is normal  No respiratory distress  Breath sounds: Normal breath sounds  No stridor  No wheezing or rales  Abdominal:      General: Bowel sounds are normal  There is no distension  Palpations: Abdomen is soft  There is no mass  Tenderness: There is no abdominal tenderness  Musculoskeletal:         General: Swelling (Legs wrapped) present  No deformity  Normal range of motion        Cervical back: Normal range of motion and neck supple  Right lower leg: Edema present  Left lower leg: Edema present  Skin:     General: Skin is warm and dry  Coloration: Skin is not pale  Findings: No erythema  Neurological:      Mental Status: He is alert and oriented to person, place, and time  Coordination: Coordination normal    Psychiatric:         Behavior: Behavior normal          Lab Review:   No visits with results within 2 Month(s) from this visit  Latest known visit with results is:   Orders Only on 04/21/2021   Component Date Value    Hemoglobin A1C 04/21/2021 10 8      No results found  Assessment:       1  Morbid obesity (HCC)     2  Paroxysmal atrial fibrillation ([facility] )     3  Chronic diastolic congestive [facility] failure [facility] )     4  Essential hypertension     5  Hx pulmonary embolism     6  Type 2 diabetes mellitus without complication, with long-term current use of insulin ([facility] )     7  Obstructive sleep apnea syndrome     8  History of deep vein thrombosis (DVT) of lower extremity          Plan:         Ayad Goff has no signs or symptoms reminiscent of unstable angina, decompensated heart failure, nor malignant dysrhythmia  Ayad Goff has multiple indications for chronic anticoagulation, feel benefit outweighs risk of ongoing novel oral anticoagulant therapy  No changes here advised  His blood pressure is well controlled, no changes here advised  He is tolerating statin therapy without myalgia, goal LDL recommended less than 100 with his diabetes  He currently examines euvolemic, diuretic dose seems appropriate  I reschedule the echocardiogram     I encouraged she discuss with you whether not he should go back on CPAP, it appears you trying to get him retested appropriately, this may prove difficult with his transportation issues      As long as the echocardiogram returns without significant abnormality I will see him back every 6 months with you, asked to give me a call sooner with any concerning potential cardiac symptoms in the meantime  Certainly should any major surgery be warranted we may consider entertaining preoperative risk stratifying stress testing as he lives quite a sedentary lifestyle

## 2021-07-08 ENCOUNTER — OPTICAL OFFICE (OUTPATIENT)
Dept: URBAN - NONMETROPOLITAN AREA CLINIC 4 | Facility: CLINIC | Age: 56
Setting detail: OPHTHALMOLOGY
End: 2021-07-08
Payer: COMMERCIAL

## 2021-07-08 ENCOUNTER — DOCTOR'S OFFICE (OUTPATIENT)
Dept: URBAN - NONMETROPOLITAN AREA CLINIC 1 | Facility: CLINIC | Age: 56
Setting detail: OPHTHALMOLOGY
End: 2021-07-08
Payer: COMMERCIAL

## 2021-07-08 DIAGNOSIS — H52.4: ICD-10-CM

## 2021-07-08 DIAGNOSIS — H52.223: ICD-10-CM

## 2021-07-08 PROCEDURE — V2020 VISION SVCS FRAMES PURCHASES: HCPCS | Performed by: OPTOMETRIST

## 2021-07-08 PROCEDURE — V2203 LENS SPHCYL BIFOCAL 4.00D/.1: HCPCS | Performed by: OPTOMETRIST

## 2021-07-08 PROCEDURE — 92012 INTRM OPH EXAM EST PATIENT: CPT | Performed by: OPTOMETRIST

## 2021-07-08 PROCEDURE — 92015 DETERMINE REFRACTIVE STATE: CPT | Performed by: OPTOMETRIST

## 2021-07-08 ASSESSMENT — REFRACTION_AUTOREFRACTION
OD_SPHERE: +0.12
OS_AXIS: 30
OS_CYLINDER: -0.75
OD_AXIS: 15
OD_CYLINDER: -0.62
OS_SPHERE: +0.62

## 2021-07-08 ASSESSMENT — SPHEQUIV_DERIVED
OS_SPHEQUIV: 0.245
OS_SPHEQUIV: 0.625
OD_SPHEQUIV: 0
OD_SPHEQUIV: -0.19

## 2021-07-08 ASSESSMENT — REFRACTION_MANIFEST
OS_VA2: 20/25-2
OD_AXIS: 020
OS_CYLINDER: -0.25
OD_ADD: +2.50
OS_SPHERE: +0.75
OD_VA1: 20/40+
OS_AXIS: 025
OD_SPHERE: +0.25
OS_ADD: +2.50
OD_CYLINDER: -0.50
OS_VA1: 20/40+

## 2021-07-08 ASSESSMENT — REFRACTION_CURRENTRX
OD_CYLINDER: -0.50
OS_CYLINDER: SPH
OD_SPHERE: +0.50
OD_VPRISM_DIRECTION: BF
OD_ADD: +2.50
OD_AXIS: 179
OS_SPHERE: +0.75
OS_VPRISM_DIRECTION: BF
OS_OVR_VA: 20/
OS_ADD: +2.50
OD_OVR_VA: 20/

## 2021-07-08 ASSESSMENT — VISUAL ACUITY
OD_BCVA: 20/40
OS_BCVA: 20/40

## 2021-07-20 DIAGNOSIS — R39.11 BENIGN PROSTATIC HYPERPLASIA WITH URINARY HESITANCY: ICD-10-CM

## 2021-07-20 DIAGNOSIS — L97.929 CHRONIC VENOUS HYPERTENSION (IDIOPATHIC) WITH ULCER AND INFLAMMATION OF LEFT LOWER EXTREMITY (HCC): ICD-10-CM

## 2021-07-20 DIAGNOSIS — I87.332 CHRONIC VENOUS HYPERTENSION (IDIOPATHIC) WITH ULCER AND INFLAMMATION OF LEFT LOWER EXTREMITY (HCC): ICD-10-CM

## 2021-07-20 DIAGNOSIS — E66.01 MORBID OBESITY (HCC): ICD-10-CM

## 2021-07-20 DIAGNOSIS — J44.1 COPD WITH ACUTE EXACERBATION (HCC): ICD-10-CM

## 2021-07-20 DIAGNOSIS — E03.9 HYPOTHYROIDISM, UNSPECIFIED TYPE: ICD-10-CM

## 2021-07-20 DIAGNOSIS — M86.9 OSTEOMYELITIS, UNSPECIFIED SITE, UNSPECIFIED TYPE (HCC): ICD-10-CM

## 2021-07-20 DIAGNOSIS — I10 ESSENTIAL HYPERTENSION: ICD-10-CM

## 2021-07-20 DIAGNOSIS — I50.9 CONGESTIVE HEART FAILURE, UNSPECIFIED HF CHRONICITY, UNSPECIFIED HEART FAILURE TYPE (HCC): ICD-10-CM

## 2021-07-20 DIAGNOSIS — I48.91 ATRIAL FIBRILLATION, UNSPECIFIED TYPE (HCC): ICD-10-CM

## 2021-07-20 DIAGNOSIS — E11.621 DIABETIC ULCER OF FOOT ASSOCIATED WITH TYPE 2 DIABETES MELLITUS, WITH OTHER ULCER SEVERITY, UNSPECIFIED LATERALITY, UNSPECIFIED PART OF FOOT (HCC): ICD-10-CM

## 2021-07-20 DIAGNOSIS — E11.65 TYPE 2 DIABETES MELLITUS WITH HYPERGLYCEMIA, WITH LONG-TERM CURRENT USE OF INSULIN (HCC): ICD-10-CM

## 2021-07-20 DIAGNOSIS — Z79.4 TYPE 2 DIABETES MELLITUS WITH HYPERGLYCEMIA, WITH LONG-TERM CURRENT USE OF INSULIN (HCC): ICD-10-CM

## 2021-07-20 DIAGNOSIS — L97.508 DIABETIC ULCER OF FOOT ASSOCIATED WITH TYPE 2 DIABETES MELLITUS, WITH OTHER ULCER SEVERITY, UNSPECIFIED LATERALITY, UNSPECIFIED PART OF FOOT (HCC): ICD-10-CM

## 2021-07-20 DIAGNOSIS — N40.1 BENIGN PROSTATIC HYPERPLASIA WITH URINARY HESITANCY: ICD-10-CM

## 2021-07-20 DIAGNOSIS — Z86.79 HISTORY OF HEART FAILURE: ICD-10-CM

## 2021-07-20 RX ORDER — POTASSIUM CHLORIDE 20 MEQ/1
20 TABLET, EXTENDED RELEASE ORAL 2 TIMES DAILY
Qty: 60 TABLET | Refills: 3 | Status: SHIPPED | OUTPATIENT
Start: 2021-07-20 | End: 2021-11-19 | Stop reason: SDUPTHER

## 2021-07-20 RX ORDER — PANTOPRAZOLE SODIUM 40 MG/1
40 TABLET, DELAYED RELEASE ORAL DAILY
Qty: 30 TABLET | Refills: 3 | Status: SHIPPED | OUTPATIENT
Start: 2021-07-20 | End: 2021-12-03 | Stop reason: SDUPTHER

## 2021-07-20 RX ORDER — MAGNESIUM OXIDE 400 MG/1
1 TABLET ORAL 2 TIMES DAILY
Qty: 60 TABLET | Refills: 3 | Status: SHIPPED | OUTPATIENT
Start: 2021-07-20 | End: 2021-11-19 | Stop reason: SDUPTHER

## 2021-07-20 RX ORDER — APIXABAN 5 MG/1
5 TABLET, FILM COATED ORAL 2 TIMES DAILY
Qty: 60 TABLET | Refills: 5 | Status: SHIPPED | OUTPATIENT
Start: 2021-07-20 | End: 2022-01-17 | Stop reason: SDUPTHER

## 2021-07-20 RX ORDER — TAMSULOSIN HYDROCHLORIDE 0.4 MG/1
0.4 CAPSULE ORAL
Qty: 30 CAPSULE | Refills: 3 | Status: SHIPPED | OUTPATIENT
Start: 2021-07-20 | End: 2021-12-03 | Stop reason: SDUPTHER

## 2021-07-20 RX ORDER — ATORVASTATIN CALCIUM 40 MG/1
40 TABLET, FILM COATED ORAL DAILY
Qty: 30 TABLET | Refills: 2 | Status: SHIPPED | OUTPATIENT
Start: 2021-07-20 | End: 2021-10-26 | Stop reason: SDUPTHER

## 2021-07-23 ENCOUNTER — TELEPHONE (OUTPATIENT)
Dept: FAMILY MEDICINE CLINIC | Facility: CLINIC | Age: 56
End: 2021-07-23

## 2021-07-23 DIAGNOSIS — J44.1 COPD WITH ACUTE EXACERBATION (HCC): Primary | ICD-10-CM

## 2021-07-23 RX ORDER — MONTELUKAST SODIUM 10 MG/1
10 TABLET ORAL
Qty: 90 TABLET | Refills: 3 | Status: SHIPPED | OUTPATIENT
Start: 2021-07-23 | End: 2022-06-30

## 2021-07-28 ENCOUNTER — OFFICE VISIT (OUTPATIENT)
Dept: FAMILY MEDICINE CLINIC | Facility: CLINIC | Age: 56
End: 2021-07-28
Payer: COMMERCIAL

## 2021-07-28 VITALS
DIASTOLIC BLOOD PRESSURE: 64 MMHG | HEART RATE: 66 BPM | WEIGHT: 315 LBS | HEIGHT: 70 IN | SYSTOLIC BLOOD PRESSURE: 114 MMHG | TEMPERATURE: 98.4 F | OXYGEN SATURATION: 95 % | BODY MASS INDEX: 45.1 KG/M2

## 2021-07-28 DIAGNOSIS — E11.9 TYPE 2 DIABETES MELLITUS WITHOUT COMPLICATION, WITH LONG-TERM CURRENT USE OF INSULIN (HCC): ICD-10-CM

## 2021-07-28 DIAGNOSIS — Z86.79 HISTORY OF HEART FAILURE: ICD-10-CM

## 2021-07-28 DIAGNOSIS — L97.508 DIABETIC ULCER OF FOOT ASSOCIATED WITH TYPE 2 DIABETES MELLITUS, WITH OTHER ULCER SEVERITY, UNSPECIFIED LATERALITY, UNSPECIFIED PART OF FOOT (HCC): ICD-10-CM

## 2021-07-28 DIAGNOSIS — Z12.12 SCREENING FOR COLORECTAL CANCER: ICD-10-CM

## 2021-07-28 DIAGNOSIS — Z11.59 NEED FOR HEPATITIS C SCREENING TEST: ICD-10-CM

## 2021-07-28 DIAGNOSIS — N39.41 URGE INCONTINENCE: ICD-10-CM

## 2021-07-28 DIAGNOSIS — Z79.4 TYPE 2 DIABETES MELLITUS WITHOUT COMPLICATION, WITH LONG-TERM CURRENT USE OF INSULIN (HCC): ICD-10-CM

## 2021-07-28 DIAGNOSIS — Z12.11 SCREENING FOR COLORECTAL CANCER: ICD-10-CM

## 2021-07-28 DIAGNOSIS — R21 RASH: ICD-10-CM

## 2021-07-28 DIAGNOSIS — J44.1 COPD WITH ACUTE EXACERBATION (HCC): Primary | ICD-10-CM

## 2021-07-28 DIAGNOSIS — Z00.00 ROUTINE GENERAL MEDICAL EXAMINATION AT A HEALTH CARE FACILITY: ICD-10-CM

## 2021-07-28 DIAGNOSIS — I48.91 ATRIAL FIBRILLATION, UNSPECIFIED TYPE (HCC): ICD-10-CM

## 2021-07-28 DIAGNOSIS — E11.621 DIABETIC ULCER OF FOOT ASSOCIATED WITH TYPE 2 DIABETES MELLITUS, WITH OTHER ULCER SEVERITY, UNSPECIFIED LATERALITY, UNSPECIFIED PART OF FOOT (HCC): ICD-10-CM

## 2021-07-28 DIAGNOSIS — Z11.4 SCREENING FOR HIV (HUMAN IMMUNODEFICIENCY VIRUS): ICD-10-CM

## 2021-07-28 PROCEDURE — 3008F BODY MASS INDEX DOCD: CPT | Performed by: INTERNAL MEDICINE

## 2021-07-28 PROCEDURE — 99213 OFFICE O/P EST LOW 20 MIN: CPT | Performed by: FAMILY MEDICINE

## 2021-07-28 RX ORDER — SULFAMETHOXAZOLE AND TRIMETHOPRIM 800; 160 MG/1; MG/1
TABLET ORAL
COMMUNITY
Start: 2021-07-15 | End: 2021-07-28

## 2021-07-28 RX ORDER — TRIAMCINOLONE ACETONIDE 1 MG/ML
LOTION TOPICAL 3 TIMES DAILY
Qty: 60 ML | Refills: 0 | Status: SHIPPED | OUTPATIENT
Start: 2021-07-28 | End: 2021-07-28 | Stop reason: SDUPTHER

## 2021-07-28 RX ORDER — TRIAMCINOLONE ACETONIDE 1 MG/ML
LOTION TOPICAL 3 TIMES DAILY
Qty: 60 ML | Refills: 0 | Status: SHIPPED | OUTPATIENT
Start: 2021-07-28 | End: 2022-04-21 | Stop reason: ALTCHOICE

## 2021-07-28 RX ORDER — COLLAGENASE SANTYL 250 [ARB'U]/G
OINTMENT TOPICAL
COMMUNITY
Start: 2021-07-09 | End: 2021-07-28

## 2021-07-28 NOTE — PROGRESS NOTES
Assessment/Plan:     Problem List Items Addressed This Visit        Endocrine    Diabetes mellitus ([facility] )    Relevant Orders    Ambulatory Referral to Ophthalmology    Diabetic foot ulcer associated with type 2 diabetes mellitus ([facility] )       Respiratory    COPD with acute exacerbation ([place] [address] ) - Primary       Cardiovascular and Mediastinum    Atrial fibrillation ([place] [address] )      Other Visit Diagnoses     Need for hepatitis C screening test        Relevant Orders    Hepatitis C Antibody ([facility])    Screening for HIV (human immunodeficiency virus)        Relevant Orders    HIV 1/2 Antigen/Antibody (4th Generation) w Reflex [facility]    Screening for colorectal cancer        History of heart failure        Rash        Relevant Medications    triamcinolone (KENALOG) 0 1 % lotion    Urge incontinence        Relevant Orders    UA w Reflex to Microscopic w Reflex to Culture -Lab Collect          Discussed with Dr Zack Servin:    A 25-year-old male with past medical history of AFib, diabetes mellitus, hypertension presents to office today for follow-up  Patient also has a rash on bilateral lower extremities and abdominal region  Patient rash likely dermatitis  Patient prescribed Kenalog to be used 3 times a day  Additionally patient having urge incontinence likely secondary to mechanical causes  Patient has a boot on which is probably compromised his activity and thus causing him to have urge incontinence when going to the bathroom  Patient given a urinalysis screen test to complete  Patient also educated on lifestyle modification including have an alarm set when going to use the bathroom periodically  Additionally patient is to set up appointment for an echocardiogram and sleep study  Both orders were placed inb the past   Patient currently hemodynamically stable in no acute distress      Subjective:      Patient ID: Jeremías Lyles is a 60 y o  male past medical history of morbid obesity, paroxysmal atrial fibrillation, congestive heart failure, essential hypertension, pulmonary embolism, type 2 diabetes presents to office today for follow-up visit  Patient denies fevers, chills, night sweats, shortness of breath, chest pain  Patient's only complaint today he H a rash that has been ongoing for the past 2 days  Rashes itches  Rashes evident around upper abdominal region in bilateral lower extremities  Patient denies any recent lotions or creams  Patient denies any recent new contacts or traveling  Patient thinks maybe to change in weather could be causing this  He also admits to incontinence for the past 2 days  Patient states that while trying to go to the biopsy urinary which is drip alone resolved  This has never happened before in the past   Patient has no symptoms of dysuria hematuria  Patient otherwise is hemodynamically stable and afebrile  He is compliant with all his medications  He follows a Cardiology Dr Deana Wiley    The following portions of the patient's history were reviewed and updated as appropriate: allergies, current medications, past family history, past medical history, past social history, past surgical history and problem list       Review of Systems   Constitutional: Negative for chills and fever  HENT: Negative for ear pain and sore throat  Eyes: Negative for pain and visual disturbance  Respiratory: Negative for cough and shortness of breath  Cardiovascular: Negative for chest pain and palpitations  Gastrointestinal: Negative for abdominal pain and vomiting  Genitourinary: Negative for dysuria and hematuria  Urge incontinence   Musculoskeletal: Negative for arthralgias and back pain  Skin: Positive for rash  Negative for color change  Neurological: Negative for seizures and syncope  Hematological: Negative  Psychiatric/Behavioral: Negative  All other systems reviewed and are negative          Objective:  /64   Pulse 66   Temp 98 4 °F (36 9 °C)   Ht 5' 10" (1 778 m)   Wt (!) 162 kg (358 lb)   SpO2 95%   BMI 51 37 kg/m²      Physical Exam  Constitutional:       Appearance: Normal appearance  He is obese  HENT:      Head: Normocephalic and atraumatic  Cardiovascular:      Rate and Rhythm: Normal rate and regular rhythm  Pulses: Normal pulses  Heart sounds: Normal heart sounds  Pulmonary:      Effort: Pulmonary effort is normal       Breath sounds: Normal breath sounds  Abdominal:      General: Abdomen is flat  Bowel sounds are normal       Palpations: Abdomen is soft  Musculoskeletal:         General: Normal range of motion  Skin:     General: Skin is warm  Capillary Refill: Capillary refill takes less than 2 seconds  Comments: Patient has a rash  On bilateral lower extremities  Erythematous macular presentation slightly excoriated  Neurological:      General: No focal deficit present  Mental Status: He is alert and oriented to person, place, and time     Psychiatric:         Mood and Affect: Mood normal          Behavior: Behavior normal

## 2021-07-29 ENCOUNTER — DOCTOR'S OFFICE (OUTPATIENT)
Dept: URBAN - NONMETROPOLITAN AREA CLINIC 1 | Facility: CLINIC | Age: 56
Setting detail: OPHTHALMOLOGY
End: 2021-07-29
Payer: COMMERCIAL

## 2021-07-29 DIAGNOSIS — E11.3293: ICD-10-CM

## 2021-07-29 DIAGNOSIS — H43.813: ICD-10-CM

## 2021-07-29 DIAGNOSIS — H52.4: ICD-10-CM

## 2021-07-29 DIAGNOSIS — H43.393: ICD-10-CM

## 2021-07-29 PROCEDURE — 92134 CPTRZ OPH DX IMG PST SGM RTA: CPT | Performed by: OPHTHALMOLOGY

## 2021-07-29 PROCEDURE — 92201 OPSCPY EXTND RTA DRAW UNI/BI: CPT | Performed by: OPHTHALMOLOGY

## 2021-07-29 PROCEDURE — 92014 COMPRE OPH EXAM EST PT 1/>: CPT | Performed by: OPHTHALMOLOGY

## 2021-07-29 ASSESSMENT — REFRACTION_CURRENTRX
OD_SPHERE: +0.50
OD_OVR_VA: 20/
OD_ADD: +2.50
OS_VPRISM_DIRECTION: BF
OS_SPHERE: +0.75
OD_CYLINDER: -0.50
OD_AXIS: 179
OS_OVR_VA: 20/
OD_VPRISM_DIRECTION: BF
OS_ADD: +2.50
OS_CYLINDER: SPH

## 2021-07-29 ASSESSMENT — REFRACTION_AUTOREFRACTION
OD_AXIS: 15
OS_SPHERE: +0.62
OS_AXIS: 30
OD_SPHERE: +0.12
OD_CYLINDER: -0.62
OS_CYLINDER: -0.75

## 2021-07-29 ASSESSMENT — REFRACTION_MANIFEST
OD_VA1: 20/40+
OD_ADD: +2.50
OS_AXIS: 025
OS_SPHERE: +0.75
OS_ADD: +2.50
OS_VA2: 20/25-2
OD_SPHERE: +0.25
OS_VA1: 20/40+
OD_AXIS: 020
OD_CYLINDER: -0.50
OS_CYLINDER: -0.25

## 2021-07-29 ASSESSMENT — VISUAL ACUITY
OD_BCVA: 20/30-1
OS_BCVA: 20/30+1

## 2021-07-29 ASSESSMENT — SPHEQUIV_DERIVED
OD_SPHEQUIV: -0.19
OS_SPHEQUIV: 0.625
OS_SPHEQUIV: 0.245
OD_SPHEQUIV: 0

## 2021-07-29 ASSESSMENT — CONFRONTATIONAL VISUAL FIELD TEST (CVF)
OS_FINDINGS: FULL
OD_FINDINGS: FULL

## 2021-10-13 ENCOUNTER — TELEPHONE (OUTPATIENT)
Dept: SLEEP CENTER | Facility: CLINIC | Age: 56
End: 2021-10-13

## 2021-10-13 DIAGNOSIS — I50.32 CHRONIC DIASTOLIC CONGESTIVE HEART FAILURE (HCC): Primary | ICD-10-CM

## 2021-10-14 ENCOUNTER — DOCTOR'S OFFICE (OUTPATIENT)
Dept: URBAN - NONMETROPOLITAN AREA CLINIC 1 | Facility: CLINIC | Age: 56
Setting detail: OPHTHALMOLOGY
End: 2021-10-14
Payer: COMMERCIAL

## 2021-10-14 DIAGNOSIS — H43.813: ICD-10-CM

## 2021-10-14 DIAGNOSIS — H43.393: ICD-10-CM

## 2021-10-14 DIAGNOSIS — E11.3293: ICD-10-CM

## 2021-10-14 PROCEDURE — 92014 COMPRE OPH EXAM EST PT 1/>: CPT | Performed by: OPHTHALMOLOGY

## 2021-10-14 PROCEDURE — 92134 CPTRZ OPH DX IMG PST SGM RTA: CPT | Performed by: OPHTHALMOLOGY

## 2021-10-14 PROCEDURE — 92201 OPSCPY EXTND RTA DRAW UNI/BI: CPT | Performed by: OPHTHALMOLOGY

## 2021-10-14 ASSESSMENT — REFRACTION_MANIFEST
OS_SPHERE: +0.75
OS_VA1: 20/40+
OD_SPHERE: +0.25
OS_CYLINDER: -0.25
OD_AXIS: 020
OD_CYLINDER: -0.50
OS_VA2: 20/25-2
OD_VA1: 20/40+
OS_ADD: +2.50
OS_AXIS: 025
OD_ADD: +2.50

## 2021-10-14 ASSESSMENT — REFRACTION_AUTOREFRACTION
OS_CYLINDER: -0.75
OS_AXIS: 30
OS_SPHERE: +0.62
OD_CYLINDER: -0.62
OD_SPHERE: +0.12
OD_AXIS: 15

## 2021-10-14 ASSESSMENT — SPHEQUIV_DERIVED
OS_SPHEQUIV: 0.625
OD_SPHEQUIV: -0.19
OS_SPHEQUIV: 0.245
OD_SPHEQUIV: 0

## 2021-10-14 ASSESSMENT — REFRACTION_CURRENTRX
OD_SPHERE: +0.50
OD_VPRISM_DIRECTION: BF
OS_VPRISM_DIRECTION: BF
OD_ADD: +2.50
OS_CYLINDER: SPH
OS_OVR_VA: 20/
OS_SPHERE: +0.75
OD_AXIS: 179
OD_CYLINDER: -0.50
OS_ADD: +2.50
OD_OVR_VA: 20/

## 2021-10-14 ASSESSMENT — VISUAL ACUITY
OS_BCVA: 20/40
OD_BCVA: 20/30

## 2021-10-14 ASSESSMENT — CONFRONTATIONAL VISUAL FIELD TEST (CVF)
OS_FINDINGS: FULL
OD_FINDINGS: FULL

## 2021-10-22 ENCOUNTER — RA CDI HCC (OUTPATIENT)
Dept: OTHER | Facility: HOSPITAL | Age: 56
End: 2021-10-22

## 2021-10-26 ENCOUNTER — TELEPHONE (OUTPATIENT)
Dept: ADMINISTRATIVE | Facility: OTHER | Age: 56
End: 2021-10-26

## 2021-10-26 DIAGNOSIS — Z86.79 HISTORY OF HEART FAILURE: ICD-10-CM

## 2021-10-26 RX ORDER — ATORVASTATIN CALCIUM 40 MG/1
40 TABLET, FILM COATED ORAL DAILY
Qty: 30 TABLET | Refills: 2 | Status: SHIPPED | OUTPATIENT
Start: 2021-10-26 | End: 2022-02-03

## 2021-11-02 DIAGNOSIS — I10 ESSENTIAL HYPERTENSION: ICD-10-CM

## 2021-11-02 RX ORDER — LISINOPRIL 2.5 MG/1
2.5 TABLET ORAL DAILY
Qty: 30 TABLET | Refills: 3 | Status: SHIPPED | OUTPATIENT
Start: 2021-11-02 | End: 2022-03-29

## 2021-11-19 DIAGNOSIS — M86.9 OSTEOMYELITIS, UNSPECIFIED SITE, UNSPECIFIED TYPE (HCC): ICD-10-CM

## 2021-11-19 DIAGNOSIS — Z79.4 TYPE 2 DIABETES MELLITUS WITH HYPERGLYCEMIA, WITH LONG-TERM CURRENT USE OF INSULIN (HCC): ICD-10-CM

## 2021-11-19 DIAGNOSIS — I48.91 ATRIAL FIBRILLATION, UNSPECIFIED TYPE (HCC): ICD-10-CM

## 2021-11-19 DIAGNOSIS — I10 ESSENTIAL HYPERTENSION: ICD-10-CM

## 2021-11-19 DIAGNOSIS — I50.9 CONGESTIVE HEART FAILURE, UNSPECIFIED HF CHRONICITY, UNSPECIFIED HEART FAILURE TYPE (HCC): ICD-10-CM

## 2021-11-19 DIAGNOSIS — N40.1 BENIGN PROSTATIC HYPERPLASIA WITH URINARY HESITANCY: ICD-10-CM

## 2021-11-19 DIAGNOSIS — E11.65 TYPE 2 DIABETES MELLITUS WITH HYPERGLYCEMIA, WITH LONG-TERM CURRENT USE OF INSULIN (HCC): ICD-10-CM

## 2021-11-19 DIAGNOSIS — L97.508 DIABETIC ULCER OF FOOT ASSOCIATED WITH TYPE 2 DIABETES MELLITUS, WITH OTHER ULCER SEVERITY, UNSPECIFIED LATERALITY, UNSPECIFIED PART OF FOOT (HCC): ICD-10-CM

## 2021-11-19 DIAGNOSIS — R39.11 BENIGN PROSTATIC HYPERPLASIA WITH URINARY HESITANCY: ICD-10-CM

## 2021-11-19 DIAGNOSIS — Z86.79 HISTORY OF HEART FAILURE: ICD-10-CM

## 2021-11-19 DIAGNOSIS — E66.01 MORBID OBESITY (HCC): ICD-10-CM

## 2021-11-19 DIAGNOSIS — J44.1 COPD WITH ACUTE EXACERBATION (HCC): ICD-10-CM

## 2021-11-19 DIAGNOSIS — E11.621 DIABETIC ULCER OF FOOT ASSOCIATED WITH TYPE 2 DIABETES MELLITUS, WITH OTHER ULCER SEVERITY, UNSPECIFIED LATERALITY, UNSPECIFIED PART OF FOOT (HCC): ICD-10-CM

## 2021-11-19 DIAGNOSIS — E03.9 HYPOTHYROIDISM, UNSPECIFIED TYPE: ICD-10-CM

## 2021-11-19 DIAGNOSIS — L97.929 CHRONIC VENOUS HYPERTENSION (IDIOPATHIC) WITH ULCER AND INFLAMMATION OF LEFT LOWER EXTREMITY (HCC): ICD-10-CM

## 2021-11-19 DIAGNOSIS — I87.332 CHRONIC VENOUS HYPERTENSION (IDIOPATHIC) WITH ULCER AND INFLAMMATION OF LEFT LOWER EXTREMITY (HCC): ICD-10-CM

## 2021-11-19 RX ORDER — POTASSIUM CHLORIDE 20 MEQ/1
20 TABLET, EXTENDED RELEASE ORAL 2 TIMES DAILY
Qty: 60 TABLET | Refills: 3 | Status: SHIPPED | OUTPATIENT
Start: 2021-11-19 | End: 2022-04-08

## 2021-11-19 RX ORDER — MAGNESIUM OXIDE 400 MG/1
1 TABLET ORAL 2 TIMES DAILY
Qty: 60 TABLET | Refills: 3 | Status: SHIPPED | OUTPATIENT
Start: 2021-11-19 | End: 2022-03-11

## 2021-12-02 ENCOUNTER — OFFICE VISIT (OUTPATIENT)
Dept: FAMILY MEDICINE CLINIC | Facility: CLINIC | Age: 56
End: 2021-12-02
Payer: COMMERCIAL

## 2021-12-02 VITALS
BODY MASS INDEX: 45.1 KG/M2 | HEIGHT: 70 IN | TEMPERATURE: 97.9 F | HEART RATE: 88 BPM | RESPIRATION RATE: 18 BRPM | SYSTOLIC BLOOD PRESSURE: 150 MMHG | WEIGHT: 315 LBS | DIASTOLIC BLOOD PRESSURE: 80 MMHG | OXYGEN SATURATION: 92 %

## 2021-12-02 DIAGNOSIS — Z23 ENCOUNTER FOR IMMUNIZATION: ICD-10-CM

## 2021-12-02 DIAGNOSIS — Z79.4 TYPE 2 DIABETES MELLITUS WITH HYPERGLYCEMIA, WITH LONG-TERM CURRENT USE OF INSULIN (HCC): Primary | ICD-10-CM

## 2021-12-02 DIAGNOSIS — Z11.4 SCREENING FOR HIV (HUMAN IMMUNODEFICIENCY VIRUS): ICD-10-CM

## 2021-12-02 DIAGNOSIS — E11.65 TYPE 2 DIABETES MELLITUS WITH HYPERGLYCEMIA, WITH LONG-TERM CURRENT USE OF INSULIN (HCC): Primary | ICD-10-CM

## 2021-12-02 DIAGNOSIS — E66.01 MORBID OBESITY (HCC): ICD-10-CM

## 2021-12-02 DIAGNOSIS — E08.42 DIABETIC POLYNEUROPATHY ASSOCIATED WITH DIABETES MELLITUS DUE TO UNDERLYING CONDITION (HCC): ICD-10-CM

## 2021-12-02 DIAGNOSIS — E03.9 ACQUIRED HYPOTHYROIDISM: ICD-10-CM

## 2021-12-02 DIAGNOSIS — N18.31 STAGE 3A CHRONIC KIDNEY DISEASE (HCC): ICD-10-CM

## 2021-12-02 DIAGNOSIS — I10 ESSENTIAL HYPERTENSION: ICD-10-CM

## 2021-12-02 PROCEDURE — 3008F BODY MASS INDEX DOCD: CPT | Performed by: FAMILY MEDICINE

## 2021-12-02 PROCEDURE — 3077F SYST BP >= 140 MM HG: CPT | Performed by: FAMILY MEDICINE

## 2021-12-02 PROCEDURE — 99214 OFFICE O/P EST MOD 30 MIN: CPT | Performed by: FAMILY MEDICINE

## 2021-12-02 PROCEDURE — 1036F TOBACCO NON-USER: CPT | Performed by: FAMILY MEDICINE

## 2021-12-02 PROCEDURE — 3079F DIAST BP 80-89 MM HG: CPT | Performed by: FAMILY MEDICINE

## 2021-12-02 RX ORDER — INSULIN GLARGINE 100 [IU]/ML
54 INJECTION, SOLUTION SUBCUTANEOUS DAILY
Qty: 15 ML | Refills: 0
Start: 2021-12-02 | End: 2022-08-09

## 2021-12-02 RX ORDER — INSULIN ASPART 100 [IU]/ML
18 INJECTION, SOLUTION INTRAVENOUS; SUBCUTANEOUS
Qty: 15 ML | Refills: 0
Start: 2021-12-02 | End: 2022-07-26

## 2021-12-02 RX ORDER — PEN NEEDLE, DIABETIC 31 GX5/16"
1 NEEDLE, DISPOSABLE MISCELLANEOUS 3 TIMES DAILY
COMMUNITY
Start: 2021-10-14

## 2021-12-02 RX ORDER — CALCIUM CITRATE/VITAMIN D3 200MG-6.25
TABLET ORAL
COMMUNITY
Start: 2021-11-02 | End: 2022-04-27

## 2021-12-02 RX ORDER — SENNA PLUS 8.6 MG/1
1 TABLET ORAL 2 TIMES DAILY
COMMUNITY

## 2021-12-02 RX ORDER — DOCUSATE SODIUM 100 MG/1
100 CAPSULE, LIQUID FILLED ORAL 2 TIMES DAILY
COMMUNITY

## 2021-12-02 RX ORDER — GABAPENTIN 600 MG/1
600 TABLET ORAL
Qty: 90 TABLET | Refills: 0
Start: 2021-12-02 | End: 2022-05-10 | Stop reason: SDUPTHER

## 2021-12-03 DIAGNOSIS — Z86.79 HISTORY OF HEART FAILURE: ICD-10-CM

## 2021-12-03 DIAGNOSIS — E11.65 TYPE 2 DIABETES MELLITUS WITH HYPERGLYCEMIA, WITH LONG-TERM CURRENT USE OF INSULIN (HCC): ICD-10-CM

## 2021-12-03 DIAGNOSIS — Z79.4 TYPE 2 DIABETES MELLITUS WITH HYPERGLYCEMIA, WITH LONG-TERM CURRENT USE OF INSULIN (HCC): ICD-10-CM

## 2021-12-03 DIAGNOSIS — M86.9 OSTEOMYELITIS, UNSPECIFIED SITE, UNSPECIFIED TYPE (HCC): ICD-10-CM

## 2021-12-03 DIAGNOSIS — I87.332 CHRONIC VENOUS HYPERTENSION (IDIOPATHIC) WITH ULCER AND INFLAMMATION OF LEFT LOWER EXTREMITY (HCC): ICD-10-CM

## 2021-12-03 DIAGNOSIS — L97.508 DIABETIC ULCER OF FOOT ASSOCIATED WITH TYPE 2 DIABETES MELLITUS, WITH OTHER ULCER SEVERITY, UNSPECIFIED LATERALITY, UNSPECIFIED PART OF FOOT (HCC): ICD-10-CM

## 2021-12-03 DIAGNOSIS — E11.621 DIABETIC ULCER OF FOOT ASSOCIATED WITH TYPE 2 DIABETES MELLITUS, WITH OTHER ULCER SEVERITY, UNSPECIFIED LATERALITY, UNSPECIFIED PART OF FOOT (HCC): ICD-10-CM

## 2021-12-03 DIAGNOSIS — I10 ESSENTIAL HYPERTENSION: ICD-10-CM

## 2021-12-03 DIAGNOSIS — L97.929 CHRONIC VENOUS HYPERTENSION (IDIOPATHIC) WITH ULCER AND INFLAMMATION OF LEFT LOWER EXTREMITY (HCC): ICD-10-CM

## 2021-12-03 DIAGNOSIS — E66.01 MORBID OBESITY (HCC): ICD-10-CM

## 2021-12-03 DIAGNOSIS — N40.1 BENIGN PROSTATIC HYPERPLASIA WITH URINARY HESITANCY: ICD-10-CM

## 2021-12-03 DIAGNOSIS — I48.91 ATRIAL FIBRILLATION, UNSPECIFIED TYPE (HCC): ICD-10-CM

## 2021-12-03 DIAGNOSIS — R39.11 BENIGN PROSTATIC HYPERPLASIA WITH URINARY HESITANCY: ICD-10-CM

## 2021-12-03 DIAGNOSIS — J44.1 COPD WITH ACUTE EXACERBATION (HCC): ICD-10-CM

## 2021-12-03 DIAGNOSIS — E03.9 HYPOTHYROIDISM, UNSPECIFIED TYPE: ICD-10-CM

## 2021-12-03 DIAGNOSIS — I50.9 CONGESTIVE HEART FAILURE, UNSPECIFIED HF CHRONICITY, UNSPECIFIED HEART FAILURE TYPE (HCC): ICD-10-CM

## 2021-12-05 RX ORDER — TAMSULOSIN HYDROCHLORIDE 0.4 MG/1
0.4 CAPSULE ORAL
Qty: 30 CAPSULE | Refills: 3 | Status: SHIPPED | OUTPATIENT
Start: 2021-12-05 | End: 2022-04-08

## 2021-12-05 RX ORDER — PANTOPRAZOLE SODIUM 40 MG/1
40 TABLET, DELAYED RELEASE ORAL DAILY
Qty: 30 TABLET | Refills: 3 | Status: SHIPPED | OUTPATIENT
Start: 2021-12-05 | End: 2022-04-08

## 2021-12-08 ENCOUNTER — TELEPHONE (OUTPATIENT)
Dept: SLEEP CENTER | Facility: CLINIC | Age: 56
End: 2021-12-08

## 2021-12-20 ENCOUNTER — HOSPITAL ENCOUNTER (OUTPATIENT)
Dept: SLEEP CENTER | Facility: HOSPITAL | Age: 56
Discharge: HOME | End: 2021-12-20
Attending: FAMILY MEDICINE
Payer: COMMERCIAL

## 2021-12-20 DIAGNOSIS — G47.33 OSA (OBSTRUCTIVE SLEEP APNEA): ICD-10-CM

## 2021-12-20 PROCEDURE — 95810 POLYSOM 6/> YRS 4/> PARAM: CPT

## 2021-12-21 DIAGNOSIS — G47.33 OSA (OBSTRUCTIVE SLEEP APNEA): Primary | ICD-10-CM

## 2021-12-22 ENCOUNTER — TELEPHONE (OUTPATIENT)
Dept: SLEEP CENTER | Facility: CLINIC | Age: 56
End: 2021-12-22

## 2022-01-13 ENCOUNTER — HOSPITAL ENCOUNTER (OUTPATIENT)
Dept: NON INVASIVE DIAGNOSTICS | Facility: CLINIC | Age: 57
Discharge: HOME | End: 2022-01-13
Payer: COMMERCIAL

## 2022-01-13 ENCOUNTER — OFFICE VISIT (OUTPATIENT)
Dept: CARDIOLOGY CLINIC | Facility: CLINIC | Age: 57
End: 2022-01-13
Payer: COMMERCIAL

## 2022-01-13 VITALS
HEIGHT: 70 IN | BODY MASS INDEX: 45.1 KG/M2 | SYSTOLIC BLOOD PRESSURE: 144 MMHG | HEART RATE: 76 BPM | WEIGHT: 315 LBS | DIASTOLIC BLOOD PRESSURE: 80 MMHG

## 2022-01-13 VITALS
BODY MASS INDEX: 45.1 KG/M2 | WEIGHT: 315 LBS | SYSTOLIC BLOOD PRESSURE: 150 MMHG | HEART RATE: 78 BPM | DIASTOLIC BLOOD PRESSURE: 80 MMHG | HEIGHT: 70 IN

## 2022-01-13 DIAGNOSIS — I48.0 PAROXYSMAL ATRIAL FIBRILLATION (HCC): Primary | ICD-10-CM

## 2022-01-13 DIAGNOSIS — E78.5 DYSLIPIDEMIA ASSOCIATED WITH TYPE 2 DIABETES MELLITUS (HCC): ICD-10-CM

## 2022-01-13 DIAGNOSIS — I10 PRIMARY HYPERTENSION: ICD-10-CM

## 2022-01-13 DIAGNOSIS — I50.32 CHRONIC DIASTOLIC CONGESTIVE HEART FAILURE (HCC): ICD-10-CM

## 2022-01-13 DIAGNOSIS — E11.69 DYSLIPIDEMIA ASSOCIATED WITH TYPE 2 DIABETES MELLITUS (HCC): ICD-10-CM

## 2022-01-13 DIAGNOSIS — Z86.711 HX PULMONARY EMBOLISM: ICD-10-CM

## 2022-01-13 DIAGNOSIS — G47.33 OBSTRUCTIVE SLEEP APNEA SYNDROME: ICD-10-CM

## 2022-01-13 LAB
AORTIC ROOT: 3.7 CM
AORTIC VALVE MEAN VELOCITY: 10.2 M/S
ASCENDING AORTA: 4 CM
AV AREA BY CONTINUOUS VTI: 2.5 CM2
AV AREA PEAK VELOCITY: 2.9 CM2
AV LVOT MEAN GRADIENT: 2 MMHG
AV LVOT PEAK GRADIENT: 4 MMHG
AV MEAN GRADIENT: 5 MMHG
AV PEAK GRADIENT: 9 MMHG
AV VALVE AREA: 2.51 CM2
AV VELOCITY RATIO: 0.64
DOP CALC AO PEAK VEL: 1.5 M/S
DOP CALC AO VTI: 35.2 CM
DOP CALC LVOT AREA: 4.52 CM2
DOP CALC LVOT DIAMETER: 2.4 CM
DOP CALC LVOT PEAK VEL VTI: 19.53 CM
DOP CALC LVOT PEAK VEL: 0.96 M/S
DOP CALC LVOT STROKE INDEX: 31.8 ML/M2
DOP CALC LVOT STROKE VOLUME: 88.31 CM3
E WAVE DECELERATION TIME: 209 MS
FRACTIONAL SHORTENING: 42 % (ref 28–44)
INTERVENTRICULAR SEPTUM IN DIASTOLE (PARASTERNAL SHORT AXIS VIEW): 1.4 CM
LEFT ATRIUM SIZE: 4 CM
LEFT INTERNAL DIMENSION IN SYSTOLE: 2.5 CM (ref 2.1–4)
LEFT VENTRICULAR INTERNAL DIMENSION IN DIASTOLE: 4.3 CM (ref 50.86–75.87)
LEFT VENTRICULAR POSTERIOR WALL IN END DIASTOLE: 1.3 CM
LEFT VENTRICULAR STROKE VOLUME: 62 ML
MV E'TISSUE VEL-SEP: 10 CM/S
MV PEAK A VEL: 1.05 M/S
MV PEAK E VEL: 117 CM/S
SL CV LV EF: 60
SL CV PED ECHO LEFT VENTRICLE DIASTOLIC VOLUME (MOD BIPLANE) 2D: 84 ML
SL CV PED ECHO LEFT VENTRICLE SYSTOLIC VOLUME (MOD BIPLANE) 2D: 22 ML
Z-SCORE OF LEFT VENTRICULAR DIMENSION IN END SYSTOLE: -26.7

## 2022-01-13 PROCEDURE — 93306 TTE W/DOPPLER COMPLETE: CPT | Performed by: INTERNAL MEDICINE

## 2022-01-13 PROCEDURE — 3079F DIAST BP 80-89 MM HG: CPT | Performed by: INTERNAL MEDICINE

## 2022-01-13 PROCEDURE — 3008F BODY MASS INDEX DOCD: CPT | Performed by: INTERNAL MEDICINE

## 2022-01-13 PROCEDURE — 1036F TOBACCO NON-USER: CPT | Performed by: INTERNAL MEDICINE

## 2022-01-13 PROCEDURE — 93306 TTE W/DOPPLER COMPLETE: CPT

## 2022-01-13 PROCEDURE — 99214 OFFICE O/P EST MOD 30 MIN: CPT | Performed by: INTERNAL MEDICINE

## 2022-01-13 PROCEDURE — 3077F SYST BP >= 140 MM HG: CPT | Performed by: INTERNAL MEDICINE

## 2022-01-13 NOTE — PATIENT INSTRUCTIONS
A-fib (Atrial Fibrillation)   AMBULATORY CARE:   Atrial fibrillation (A-fib)  is an irregular heartbeat  It reduces your heart's ability to pump blood through your body  A-fib may come and go, or it may be a long-term condition  A-fib can cause life-threatening blood clots, stroke, or heart failure  It is important to treat and manage A-fib to help prevent these problems  Common signs and symptoms include the following:   · A heartbeat that races, pounds, or flutters    · Weakness, severe tiredness, or confusion    · Feeling lightheaded, sweaty, dizzy, or faint    · Shortness of breath or anxiety    · Chest pain or pressure    Call your local emergency number (911 in the [address] [facility]) or have someone call if:   · You have any of the following signs of a heart attack:      ? Squeezing, pressure, or pain in your chest    ? You may  also have any of the following:     § Discomfort or pain in your back, neck, jaw, stomach, or arm    § Shortness of breath    § Nausea or vomiting    § Lightheadedness or a sudden cold sweat    · You have any of the following signs of a stroke:      ? Numbness or drooping on one side of your face     ? Weakness in an arm or leg    ? Confusion or difficulty speaking    ? Dizziness, a severe headache, or vision loss    Call your doctor or cardiologist if:   · Your arm or leg feels warm, tender, and painful  It may look swollen and red  · Your heart rate is more than 110 beats per minute  · You have new or worsening swelling in your legs, feet, ankles, or abdomen  · You are short of breath, even at rest     · You have questions or concerns about your condition or care  Treatment for A-fib:  Conditions that cause A-fib, such as thyroid disease, will be treated  You may also need any of the following:  · Heart medicines  help control your heart rate or rhythm  You may need more than one medicine to treat your symptoms      · Antiplatelet or blood thinner medicines  help prevent blood clots and stroke  · Cardioversion  is a procedure to return your heart rate and rhythm to normal  It can be done using medicines or electric shock  · A-fib ablation  is a procedure that uses energy to burn a small area of heart tissue  This creates scar tissue and prevents electrical signals that cause A-fib  You may need this procedure more than once  Ask for more information on A-fib ablation  · A pacemaker  may be inserted into your heart  A pacemaker is a device that controls your heartbeat  A pacemaker may be inserted during an ablation procedure or surgery  Ask your healthcare provider for more information on pacemakers  · Surgery  may be needed if other procedures do not work  During surgery your healthcare provider will make cuts in the upper part of your heart  The provider will stitch the cuts together to create scar tissue  The scar tissue will prevent electrical signals that cause A-fib  Manage A-fib:   · Know your target heart rate  Learn how to check your pulse and monitor your heart rate  · Know the risks if you choose to drink alcohol  Alcohol can increase your risk for A-fib or make A-fib harder to manage  Ask your healthcare provider if it is okay for you to drink any alcohol  He or she can help you set limits for the number of drinks you have in 24 hours and in a week  A drink of alcohol is 12 ounces of beer, 5 ounces of wine, or 1½ ounces of liquor  · Do not smoke  Nicotine can cause heart damage and make it more difficult to manage your A-fib  Do not use e-cigarettes or smokeless tobacco in place of cigarettes or to help you quit  They still contain nicotine  Ask your healthcare provider for information if you currently smoke and need help quitting  · Eat heart-healthy foods  Heart healthy foods will help keep your cholesterol low  These include fruits, vegetables, whole-grain breads, low-fat dairy products, beans, lean meats, and fish   Replace butter and margarine with heart-healthy oils such as olive oil and canola oil  · Maintain a healthy weight  Ask your healthcare provider what a healthy weight is for you  Ask him or her to help you create a safe weight loss plan if you are overweight  Even a small goal of a 10% weight loss can improve your heart health  · Get regular physical activity  Physical activity helps improve your heart health  Get at least 150 minutes of moderate aerobic physical activity each week  Your healthcare provider can help you create an activity plan  · Manage other health conditions  This includes high blood pressure or cholesterol, sleep apnea, diabetes, and other heart conditions  Take medicine as directed and follow your treatment plan  Your healthcare provider may need to change a medicine you are taking if it is causing your A-fib  Do not  stop taking any medicine unless directed by your provider  Follow up with your doctor or cardiologist as directed: You will need regular blood tests and monitoring  Write down your questions so you remember to ask them during your visits  © Copyright [employer] 2021 Information is for End User's use only and may not be sold, redistributed or otherwise used for commercial purposes  All illustrations and images included in CareNotes® are the copyrighted property of A D A M , Inc  or [address] [place], [place] [address]   The above information is an  only  It is not intended as medical advice for individual conditions or treatments  Talk to your doctor, nurse or pharmacist before following any medical regimen to see if it is safe and effective for you

## 2022-01-17 DIAGNOSIS — I48.91 ATRIAL FIBRILLATION, UNSPECIFIED TYPE (HCC): ICD-10-CM

## 2022-01-18 ENCOUNTER — TELEPHONE (OUTPATIENT)
Dept: SLEEP CENTER | Facility: HOSPITAL | Age: 57
End: 2022-01-18

## 2022-01-18 RX ORDER — APIXABAN 5 MG/1
5 TABLET, FILM COATED ORAL 2 TIMES DAILY
Qty: 60 TABLET | Refills: 5 | Status: SHIPPED | OUTPATIENT
Start: 2022-01-18 | End: 2022-06-30

## 2022-01-18 NOTE — TELEPHONE ENCOUNTER
----- Message from Ashley Dickens DO sent at 1/9/2022 11:28 PM EST -----  approved  ----- Message -----  From: Bob Fraser  Sent: 1/5/2022  12:07 PM EST  To: Sleep Medicine [facility] Provider    This sleep study needs approval      If approved please sign and return to clerical pool  If denied please include reasons why  Also provide alternative testing if warranted  Please sign and return to clerical pool

## 2022-02-02 DIAGNOSIS — Z86.79 HISTORY OF HEART FAILURE: ICD-10-CM

## 2022-02-03 DIAGNOSIS — Z86.79 HISTORY OF HEART FAILURE: ICD-10-CM

## 2022-02-03 RX ORDER — ATORVASTATIN CALCIUM 40 MG/1
40 TABLET, FILM COATED ORAL DAILY
Qty: 30 TABLET | Refills: 1 | Status: SHIPPED | OUTPATIENT
Start: 2022-02-03 | End: 2022-04-08

## 2022-02-03 RX ORDER — ATORVASTATIN CALCIUM 40 MG/1
TABLET, FILM COATED ORAL
Qty: 30 TABLET | Refills: 0 | Status: SHIPPED | OUTPATIENT
Start: 2022-02-03 | End: 2022-02-03 | Stop reason: SDUPTHER

## 2022-02-28 ENCOUNTER — DOCTOR'S OFFICE (OUTPATIENT)
Dept: URBAN - NONMETROPOLITAN AREA CLINIC 1 | Facility: CLINIC | Age: 57
Setting detail: OPHTHALMOLOGY
End: 2022-02-28
Payer: COMMERCIAL

## 2022-02-28 DIAGNOSIS — H43.813: ICD-10-CM

## 2022-02-28 DIAGNOSIS — H43.393: ICD-10-CM

## 2022-02-28 DIAGNOSIS — E11.3293: ICD-10-CM

## 2022-02-28 PROCEDURE — 92134 CPTRZ OPH DX IMG PST SGM RTA: CPT | Performed by: OPHTHALMOLOGY

## 2022-02-28 PROCEDURE — 99213 OFFICE O/P EST LOW 20 MIN: CPT | Performed by: OPHTHALMOLOGY

## 2022-02-28 ASSESSMENT — REFRACTION_AUTOREFRACTION
OD_CYLINDER: -0.62
OS_CYLINDER: -0.75
OS_SPHERE: +0.62
OS_AXIS: 30
OD_SPHERE: +0.12
OD_AXIS: 15

## 2022-02-28 ASSESSMENT — REFRACTION_CURRENTRX
OD_VPRISM_DIRECTION: BF
OD_AXIS: 179
OS_ADD: +2.50
OS_CYLINDER: SPH
OD_ADD: +2.50
OS_SPHERE: +0.75
OS_VPRISM_DIRECTION: BF
OS_OVR_VA: 20/
OD_CYLINDER: -0.50
OD_SPHERE: +0.50
OD_OVR_VA: 20/

## 2022-02-28 ASSESSMENT — SPHEQUIV_DERIVED
OD_SPHEQUIV: 0
OS_SPHEQUIV: 0.625
OS_SPHEQUIV: 0.245
OD_SPHEQUIV: -0.19

## 2022-02-28 ASSESSMENT — CONFRONTATIONAL VISUAL FIELD TEST (CVF)
OS_FINDINGS: FULL
OD_FINDINGS: FULL

## 2022-02-28 ASSESSMENT — REFRACTION_MANIFEST
OS_ADD: +2.50
OD_VA1: 20/40+
OS_VA1: 20/40+
OS_CYLINDER: -0.25
OD_SPHERE: +0.25
OS_SPHERE: +0.75
OD_CYLINDER: -0.50
OS_AXIS: 025
OS_VA2: 20/25-2
OD_ADD: +2.50
OD_AXIS: 020

## 2022-02-28 ASSESSMENT — VISUAL ACUITY
OD_BCVA: 20/30
OS_BCVA: 20/50

## 2022-03-07 ENCOUNTER — DOCTOR'S OFFICE (OUTPATIENT)
Dept: URBAN - NONMETROPOLITAN AREA CLINIC 1 | Facility: CLINIC | Age: 57
Setting detail: OPHTHALMOLOGY
End: 2022-03-07
Payer: COMMERCIAL

## 2022-03-07 DIAGNOSIS — E11.3293: ICD-10-CM

## 2022-03-07 DIAGNOSIS — H43.393: ICD-10-CM

## 2022-03-07 DIAGNOSIS — H43.813: ICD-10-CM

## 2022-03-07 PROCEDURE — 92250 FUNDUS PHOTOGRAPHY W/I&R: CPT | Performed by: OPHTHALMOLOGY

## 2022-03-07 PROCEDURE — 92235 FLUORESCEIN ANGRPH MLTIFRAME: CPT | Performed by: OPHTHALMOLOGY

## 2022-03-07 PROCEDURE — 99213 OFFICE O/P EST LOW 20 MIN: CPT | Performed by: OPHTHALMOLOGY

## 2022-03-07 ASSESSMENT — REFRACTION_CURRENTRX
OS_VPRISM_DIRECTION: BF
OD_OVR_VA: 20/
OS_OVR_VA: 20/
OD_AXIS: 179
OD_VPRISM_DIRECTION: BF
OS_SPHERE: +0.75
OD_ADD: +2.50
OS_ADD: +2.50
OD_SPHERE: +0.50
OD_CYLINDER: -0.50
OS_CYLINDER: SPH

## 2022-03-07 ASSESSMENT — SPHEQUIV_DERIVED
OS_SPHEQUIV: 0.245
OD_SPHEQUIV: 0
OD_SPHEQUIV: -0.19
OS_SPHEQUIV: 0.625

## 2022-03-07 ASSESSMENT — REFRACTION_AUTOREFRACTION
OS_SPHERE: +0.62
OD_SPHERE: +0.12
OD_CYLINDER: -0.62
OS_CYLINDER: -0.75
OS_AXIS: 30
OD_AXIS: 15

## 2022-03-07 ASSESSMENT — REFRACTION_MANIFEST
OS_ADD: +2.50
OS_CYLINDER: -0.25
OD_AXIS: 020
OS_VA2: 20/25-2
OS_SPHERE: +0.75
OD_SPHERE: +0.25
OD_ADD: +2.50
OS_AXIS: 025
OD_CYLINDER: -0.50
OS_VA1: 20/40+
OD_VA1: 20/40+

## 2022-03-07 ASSESSMENT — VISUAL ACUITY: OS_BCVA: 20/30-1

## 2022-03-07 ASSESSMENT — CONFRONTATIONAL VISUAL FIELD TEST (CVF)
OD_FINDINGS: FULL
OS_FINDINGS: FULL

## 2022-03-11 DIAGNOSIS — E11.65 TYPE 2 DIABETES MELLITUS WITH HYPERGLYCEMIA, WITH LONG-TERM CURRENT USE OF INSULIN (HCC): ICD-10-CM

## 2022-03-11 DIAGNOSIS — I50.9 CONGESTIVE HEART FAILURE, UNSPECIFIED HF CHRONICITY, UNSPECIFIED HEART FAILURE TYPE (HCC): ICD-10-CM

## 2022-03-11 DIAGNOSIS — E03.9 HYPOTHYROIDISM, UNSPECIFIED TYPE: ICD-10-CM

## 2022-03-11 DIAGNOSIS — Z86.79 HISTORY OF HEART FAILURE: ICD-10-CM

## 2022-03-11 DIAGNOSIS — L97.508 DIABETIC ULCER OF FOOT ASSOCIATED WITH TYPE 2 DIABETES MELLITUS, WITH OTHER ULCER SEVERITY, UNSPECIFIED LATERALITY, UNSPECIFIED PART OF FOOT (HCC): ICD-10-CM

## 2022-03-11 DIAGNOSIS — I10 ESSENTIAL HYPERTENSION: ICD-10-CM

## 2022-03-11 DIAGNOSIS — I48.91 ATRIAL FIBRILLATION, UNSPECIFIED TYPE (HCC): ICD-10-CM

## 2022-03-11 DIAGNOSIS — Z79.4 TYPE 2 DIABETES MELLITUS WITH HYPERGLYCEMIA, WITH LONG-TERM CURRENT USE OF INSULIN (HCC): ICD-10-CM

## 2022-03-11 DIAGNOSIS — R39.11 BENIGN PROSTATIC HYPERPLASIA WITH URINARY HESITANCY: ICD-10-CM

## 2022-03-11 DIAGNOSIS — M86.9 OSTEOMYELITIS, UNSPECIFIED SITE, UNSPECIFIED TYPE (HCC): ICD-10-CM

## 2022-03-11 DIAGNOSIS — I87.332 CHRONIC VENOUS HYPERTENSION (IDIOPATHIC) WITH ULCER AND INFLAMMATION OF LEFT LOWER EXTREMITY (HCC): ICD-10-CM

## 2022-03-11 DIAGNOSIS — N40.1 BENIGN PROSTATIC HYPERPLASIA WITH URINARY HESITANCY: ICD-10-CM

## 2022-03-11 DIAGNOSIS — J44.1 COPD WITH ACUTE EXACERBATION (HCC): ICD-10-CM

## 2022-03-11 DIAGNOSIS — L97.929 CHRONIC VENOUS HYPERTENSION (IDIOPATHIC) WITH ULCER AND INFLAMMATION OF LEFT LOWER EXTREMITY (HCC): ICD-10-CM

## 2022-03-11 DIAGNOSIS — E66.01 MORBID OBESITY (HCC): ICD-10-CM

## 2022-03-11 DIAGNOSIS — E11.621 DIABETIC ULCER OF FOOT ASSOCIATED WITH TYPE 2 DIABETES MELLITUS, WITH OTHER ULCER SEVERITY, UNSPECIFIED LATERALITY, UNSPECIFIED PART OF FOOT (HCC): ICD-10-CM

## 2022-03-29 DIAGNOSIS — I10 ESSENTIAL HYPERTENSION: ICD-10-CM

## 2022-03-29 RX ORDER — LISINOPRIL 2.5 MG/1
TABLET ORAL
Qty: 90 TABLET | Refills: 0 | Status: SHIPPED | OUTPATIENT
Start: 2022-03-29

## 2022-04-08 DIAGNOSIS — M86.9 OSTEOMYELITIS, UNSPECIFIED SITE, UNSPECIFIED TYPE (HCC): ICD-10-CM

## 2022-04-08 DIAGNOSIS — J44.1 COPD WITH ACUTE EXACERBATION (HCC): ICD-10-CM

## 2022-04-08 DIAGNOSIS — Z79.4 TYPE 2 DIABETES MELLITUS WITH HYPERGLYCEMIA, WITH LONG-TERM CURRENT USE OF INSULIN (HCC): ICD-10-CM

## 2022-04-08 DIAGNOSIS — E11.65 TYPE 2 DIABETES MELLITUS WITH HYPERGLYCEMIA, WITH LONG-TERM CURRENT USE OF INSULIN (HCC): ICD-10-CM

## 2022-04-08 DIAGNOSIS — N40.1 BENIGN PROSTATIC HYPERPLASIA WITH URINARY HESITANCY: ICD-10-CM

## 2022-04-08 DIAGNOSIS — I50.9 CONGESTIVE HEART FAILURE, UNSPECIFIED HF CHRONICITY, UNSPECIFIED HEART FAILURE TYPE (HCC): ICD-10-CM

## 2022-04-08 DIAGNOSIS — I87.332 CHRONIC VENOUS HYPERTENSION (IDIOPATHIC) WITH ULCER AND INFLAMMATION OF LEFT LOWER EXTREMITY (HCC): ICD-10-CM

## 2022-04-08 DIAGNOSIS — L97.929 CHRONIC VENOUS HYPERTENSION (IDIOPATHIC) WITH ULCER AND INFLAMMATION OF LEFT LOWER EXTREMITY (HCC): ICD-10-CM

## 2022-04-08 DIAGNOSIS — E11.621 DIABETIC ULCER OF FOOT ASSOCIATED WITH TYPE 2 DIABETES MELLITUS, WITH OTHER ULCER SEVERITY, UNSPECIFIED LATERALITY, UNSPECIFIED PART OF FOOT (HCC): ICD-10-CM

## 2022-04-08 DIAGNOSIS — L97.508 DIABETIC ULCER OF FOOT ASSOCIATED WITH TYPE 2 DIABETES MELLITUS, WITH OTHER ULCER SEVERITY, UNSPECIFIED LATERALITY, UNSPECIFIED PART OF FOOT (HCC): ICD-10-CM

## 2022-04-08 DIAGNOSIS — E03.9 HYPOTHYROIDISM, UNSPECIFIED TYPE: ICD-10-CM

## 2022-04-08 DIAGNOSIS — I48.91 ATRIAL FIBRILLATION, UNSPECIFIED TYPE (HCC): ICD-10-CM

## 2022-04-08 DIAGNOSIS — I10 ESSENTIAL HYPERTENSION: ICD-10-CM

## 2022-04-08 DIAGNOSIS — R39.11 BENIGN PROSTATIC HYPERPLASIA WITH URINARY HESITANCY: ICD-10-CM

## 2022-04-08 DIAGNOSIS — E66.01 MORBID OBESITY (HCC): ICD-10-CM

## 2022-04-08 DIAGNOSIS — Z86.79 HISTORY OF HEART FAILURE: ICD-10-CM

## 2022-04-08 RX ORDER — ATORVASTATIN CALCIUM 40 MG/1
TABLET, FILM COATED ORAL
Qty: 90 TABLET | Refills: 0 | Status: SHIPPED | OUTPATIENT
Start: 2022-04-08

## 2022-04-08 RX ORDER — TAMSULOSIN HYDROCHLORIDE 0.4 MG/1
CAPSULE ORAL
Qty: 90 CAPSULE | Refills: 0 | Status: SHIPPED | OUTPATIENT
Start: 2022-04-08

## 2022-04-08 RX ORDER — POTASSIUM CHLORIDE 20 MEQ/1
TABLET, EXTENDED RELEASE ORAL
Qty: 180 TABLET | Refills: 0 | Status: SHIPPED | OUTPATIENT
Start: 2022-04-08

## 2022-04-08 RX ORDER — PANTOPRAZOLE SODIUM 40 MG/1
TABLET, DELAYED RELEASE ORAL
Qty: 90 TABLET | Refills: 0 | Status: SHIPPED | OUTPATIENT
Start: 2022-04-08

## 2022-04-08 NOTE — TELEPHONE ENCOUNTER
meds refilled  I placed labs for the pt to have before his visit with me in June  Ideal to have prior so we can discuss these at the visit  [facility]  He can have them a few days prior to seeing me    Shlomo Felipe, DO

## 2022-04-15 ENCOUNTER — RA CDI HCC (OUTPATIENT)
Dept: OTHER | Facility: HOSPITAL | Age: 57
End: 2022-04-15

## 2022-04-15 NOTE — PROGRESS NOTES
[facility]  coding opportunities          Chart Reviewed number of suggestions sent to Provider: 5    [id number]  [id number]  [id number]  [id number] [phone]  [id number] [phone]         Patients Insurance     Medicare Insurance: [facility]

## 2022-04-18 RX ORDER — LINACLOTIDE 290 UG/1
1 CAPSULE, GELATIN COATED ORAL DAILY
COMMUNITY
Start: 2022-03-29

## 2022-04-18 RX ORDER — LATANOPROST 50 UG/ML
1 SOLUTION/ DROPS OPHTHALMIC DAILY
COMMUNITY
Start: 2022-03-07

## 2022-04-18 RX ORDER — SYRINGE AND NEEDLE,INSULIN,1ML 31 GX5/16"
1 SYRINGE, EMPTY DISPOSABLE MISCELLANEOUS 3 TIMES DAILY
COMMUNITY
Start: 2022-04-06

## 2022-04-18 RX ORDER — SEMAGLUTIDE 1.34 MG/ML
0.5 INJECTION, SOLUTION SUBCUTANEOUS
COMMUNITY
Start: 2022-03-25

## 2022-04-18 RX ORDER — SUVOREXANT 20 MG/1
20 TABLET, FILM COATED ORAL
COMMUNITY
Start: 2022-04-08 | End: 2022-05-10 | Stop reason: SDUPTHER

## 2022-04-21 ENCOUNTER — TELEPHONE (OUTPATIENT)
Dept: FAMILY MEDICINE CLINIC | Facility: CLINIC | Age: 57
End: 2022-04-21

## 2022-04-21 ENCOUNTER — OFFICE VISIT (OUTPATIENT)
Dept: FAMILY MEDICINE CLINIC | Facility: CLINIC | Age: 57
End: 2022-04-21
Payer: COMMERCIAL

## 2022-04-21 VITALS
TEMPERATURE: 97.9 F | HEART RATE: 76 BPM | OXYGEN SATURATION: 96 % | BODY MASS INDEX: 45.1 KG/M2 | DIASTOLIC BLOOD PRESSURE: 60 MMHG | WEIGHT: 315 LBS | HEIGHT: 70 IN | RESPIRATION RATE: 18 BRPM | SYSTOLIC BLOOD PRESSURE: 118 MMHG

## 2022-04-21 DIAGNOSIS — D50.9 IRON DEFICIENCY ANEMIA, UNSPECIFIED IRON DEFICIENCY ANEMIA TYPE: ICD-10-CM

## 2022-04-21 DIAGNOSIS — E11.9 TYPE 2 DIABETES MELLITUS WITHOUT COMPLICATION, WITH LONG-TERM CURRENT USE OF INSULIN (HCC): ICD-10-CM

## 2022-04-21 DIAGNOSIS — I48.91 ATRIAL FIBRILLATION, UNSPECIFIED TYPE (HCC): ICD-10-CM

## 2022-04-21 DIAGNOSIS — D69.6 THROMBOCYTOPENIA (HCC): ICD-10-CM

## 2022-04-21 DIAGNOSIS — I10 ESSENTIAL HYPERTENSION: ICD-10-CM

## 2022-04-21 DIAGNOSIS — Z79.4 TYPE 2 DIABETES MELLITUS WITHOUT COMPLICATION, WITH LONG-TERM CURRENT USE OF INSULIN (HCC): ICD-10-CM

## 2022-04-21 DIAGNOSIS — G25.81 RESTLESS LEG SYNDROME: ICD-10-CM

## 2022-04-21 DIAGNOSIS — E11.42 DIABETIC PERIPHERAL NEUROPATHY (HCC): ICD-10-CM

## 2022-04-21 DIAGNOSIS — E11.69 DYSLIPIDEMIA ASSOCIATED WITH TYPE 2 DIABETES MELLITUS (HCC): ICD-10-CM

## 2022-04-21 DIAGNOSIS — E66.01 MORBID OBESITY (HCC): ICD-10-CM

## 2022-04-21 DIAGNOSIS — Z79.899 POLYPHARMACY: ICD-10-CM

## 2022-04-21 DIAGNOSIS — Z11.4 SCREENING FOR HIV (HUMAN IMMUNODEFICIENCY VIRUS): ICD-10-CM

## 2022-04-21 DIAGNOSIS — E03.9 HYPOTHYROIDISM, UNSPECIFIED TYPE: ICD-10-CM

## 2022-04-21 DIAGNOSIS — E78.5 DYSLIPIDEMIA ASSOCIATED WITH TYPE 2 DIABETES MELLITUS (HCC): ICD-10-CM

## 2022-04-21 DIAGNOSIS — Z00.00 MEDICARE ANNUAL WELLNESS VISIT, SUBSEQUENT: Primary | ICD-10-CM

## 2022-04-21 LAB — SL AMB POCT HEMOGLOBIN AIC: 8.3 (ref ?–6.5)

## 2022-04-21 PROCEDURE — 3078F DIAST BP <80 MM HG: CPT | Performed by: FAMILY MEDICINE

## 2022-04-21 PROCEDURE — 99214 OFFICE O/P EST MOD 30 MIN: CPT | Performed by: FAMILY MEDICINE

## 2022-04-21 PROCEDURE — 3052F HG A1C>EQUAL 8.0%<EQUAL 9.0%: CPT | Performed by: FAMILY MEDICINE

## 2022-04-21 PROCEDURE — 83036 HEMOGLOBIN GLYCOSYLATED A1C: CPT | Performed by: FAMILY MEDICINE

## 2022-04-21 PROCEDURE — 3074F SYST BP LT 130 MM HG: CPT | Performed by: FAMILY MEDICINE

## 2022-04-21 PROCEDURE — G0439 PPPS, SUBSEQ VISIT: HCPCS | Performed by: FAMILY MEDICINE

## 2022-04-21 PROCEDURE — 1036F TOBACCO NON-USER: CPT | Performed by: FAMILY MEDICINE

## 2022-04-21 PROCEDURE — 3008F BODY MASS INDEX DOCD: CPT | Performed by: FAMILY MEDICINE

## 2022-04-21 RX ORDER — LEVOTHYROXINE SODIUM 0.12 MG/1
125 TABLET ORAL DAILY
Qty: 30 TABLET | Refills: 3
Start: 2022-04-21 | End: 2022-07-26 | Stop reason: ALTCHOICE

## 2022-04-21 RX ORDER — LEVOTHYROXINE SODIUM 0.1 MG/1
100 TABLET ORAL DAILY
Qty: 30 TABLET | Refills: 3
Start: 2022-04-21 | End: 2022-06-23

## 2022-04-21 RX ORDER — LEVOTHYROXINE SODIUM 300 UG/1
300 TABLET ORAL DAILY
Qty: 30 TABLET | Refills: 3
Start: 2022-04-21 | End: 2022-07-26 | Stop reason: ALTCHOICE

## 2022-04-21 NOTE — PROGRESS NOTES
Assessment and Plan:     AMW Visit --     Preventive health issues were discussed with patient, and age appropriate screening tests were ordered as noted in patient's After Visit Summary  Personalized health advice and appropriate referrals for health education or preventive services given if needed, as noted in patient's After Visit Summary       History of Present Illness:     Patient presents for Medicare Annual Wellness visit    Patient Care Team:  Jan Salazar DO as PCP - General (Family Medicine)  Leeanna Han DPM  [facility] (Ophthalmology)     Problem List:     Patient Active Problem List   Diagnosis    Cellulitis of right lower extremity    Diabetic foot ulcer ([address]    Dyslipidemia associated with type 2 diabetes mellitus ([address] )    Morbid obesity ([address] )    Hypothyroid    Anxiety    Neuropathy    Essential hypertension    H/O osteomyelitis    Hx pulmonary embolism    Encounter for medical examination to establish care    Diabetic foot ulcer associated with type 2 diabetes mellitus ([address] )    Osteomyelitis ([address] )    Chronic venous hypertension (idiopathic) with ulcer and inflammation of left lower extremity ([address] )    COPD with acute exacerbation ([address] )    Congestive heart failure ([address] )    Atrial fibrillation ([address] )    Major depressive disorder    Obstructive sleep apnea syndrome    Stage 3a chronic kidney disease ([address] )    Other emphysema ([address] )    Respiratory failure ([address] )    Thrombocytopenia ([address] )    Ambulatory dysfunction    Anemia in chronic kidney disease    Restless leg syndrome    Polypharmacy    Diabetic peripheral neuropathy (HCC)    Iron deficiency anemia      Past Medical and Surgical History:     Past Medical History:   Diagnosis Date    Ambulatory dysfunction     Anemia     Anxiety     Arthritis     Atrial fibrillation (HCC)     CHF (congestive heart failure) (HCC)     Chronic kidney disease, stage 3 (HCC)     Chronic ulcer of left foot ([address] )  COPD with acute exacerbation (HCC)     Encephalopathy     History of depression     History of osteomyelitis     Hyperkalemia     Hyperlipidemia     Hypertension     Hypoglycemia     Hypomagnesemia     Hyponatremia     Hypothyroidism     Morbid obesity (HCC)     Pneumonia     Pulmonary embolism (HCC)     RLS (restless legs syndrome)     Sepsis (HCC)     Sleep apnea     Thrombocytopenia (HCC)     Type 2 diabetes mellitus (HCC)     Urinary retention     Vitamin D deficiency      Past Surgical History:   Procedure Laterality Date    GALLBLADDER SURGERY      TONSILLECTOMY AND ADENOIDECTOMY        Family History:     Family History   Problem Relation Age of Onset    Cancer Mother     Hypertension Mother     Heart disease Father     Hypertension Father     Diabetes Sister     Cancer Maternal Grandmother       Social History:     Social History     Socioeconomic History    Marital status: Single     Spouse name: None    Number of children: None    Years of education: None    Highest education level: None   Occupational History    None   Tobacco Use    Smoking status: Never Smoker    Smokeless tobacco: Never Used   Vaping Use    Vaping Use: Never used   Substance and Sexual Activity    Alcohol use: No    Drug use: No    Sexual activity: None   Other Topics Concern    None   Social History Narrative    None     Social Determinants of Health     Financial Resource Strain: Not on file   Food Insecurity: Not on file   Transportation Needs: Not on file   Physical Activity: Not on file   Stress: Not on file   Social Connections: Not on file   Intimate Partner Violence: Not on file   Housing Stability: Not on file      Medications and Allergies:     Current Outpatient Medications   Medication Sig Dispense Refill    albuterol (PROVENTIL HFA,VENTOLIN HFA) 90 mcg/act inhaler Inhale 2 puffs every 6 (six) hours as needed for wheezing 3 Inhaler 3    amitriptyline (ELAVIL) 25 mg tablet Take 1 tablet (25 mg total) by mouth daily at bedtime 30 tablet 3    atorvastatin (LIPITOR) 40 mg tablet TAKE 1 TABLET BY MOUTH ONCE DAILY   90 tablet 0    Belsomra 20 MG TABS        citalopram (CeleXA) 10 mg tablet Take 10 mg by mouth daily      clonazePAM (KlonoPIN) 1 mg tablet Take 2 tablets (2 mg total) by mouth 2 (two) times a day 120 tablet 2    docusate sodium (COLACE) 100 mg capsule Take 100 mg by mouth 2 (two) times a day      Easy Touch Insulin Syringe 31G X 5/16" 1 ML MISC       Easy Touch Pen Needles 31G X 8 MM MISC       Eliquis 5 MG Take 1 tablet (5 mg total) by mouth 2 (two) times a day 60 tablet 5    furosemide (LASIX) 40 mg tablet Take 1 tablet (40 mg total) by mouth daily 30 tablet 3    gabapentin (NEURONTIN) 600 MG tablet Take 1 tablet (600 mg total) by mouth daily at bedtime Rx by Neurology 90 tablet 0    insulin aspart (NovoLOG FlexPen) 100 UNIT/ML injection pen Inject 18 Units under the skin 3 (three) times a day with meals Per sliding scale - managed by Dr Kosta Benedict + scheduled (Patient taking differently: Inject 18 Units under the skin 3 (three) times a day with meals Per pt- injects 22 units and follows sliding scale 3xs daily- managed by Dr Kosta Benedict + schedule ) 15 mL 0    insulin glargine (LANTUS) 100 units/mL subcutaneous injection Inject 54 Units under the skin daily Rx by Endo 15 mL 0    lamoTRIgine (LaMICtal) 25 mg tablet Take 1 tablet (25 mg total) by mouth daily (Patient taking differently: Take 25 mg by mouth 2 (two) times a day  ) 30 tablet 3    levETIRAcetam (KEPPRA) 500 mg tablet Take 1 tablet (500 mg total) by mouth 2 (two) times a day 60 tablet 3    levothyroxine 100 mcg tablet Take 1 tablet (100 mcg total) by mouth daily Rx by endocrinology 30 tablet 3    levothyroxine 125 mcg tablet Take 1 tablet (125 mcg total) by mouth daily Rx by endo 30 tablet 3    levothyroxine 300 MCG tablet Take 1 tablet (300 mcg total) by mouth daily Rx by endo 30 tablet 3    Linzess 290 MCG CAPS       lisinopril (ZESTRIL) 2 5 mg tablet TAKE 1 TABLET BY MOUTH ONCE DAILY  90 tablet 0    magnesium oxide (MAG-OX) 400 mg TAKE 1 TABLET BY MOUTH TWO TIMES A DAY  60 tablet 0    metoprolol tartrate (LOPRESSOR) 25 mg tablet TAKE 1 TABLET BY MOUTH TWO TIMES A DAY  180 tablet 0    montelukast (SINGULAIR) 10 mg tablet Take 1 tablet (10 mg total) by mouth daily at bedtime 90 tablet 3    Ozempic, 0 25 or 0 5 MG/DOSE, 2 MG/1 5ML SOPN 0 5 mg Inject 0 50 mg weekly       pantoprazole (PROTONIX) 40 mg tablet TAKE 1 TABLET BY MOUTH ONCE DAILY  90 tablet 0    potassium chloride (K-DUR,KLOR-CON) 20 mEq tablet TAKE 1 TABLET BY MOUTH TWO TIMES A DAY  180 tablet 0    rOPINIRole (REQUIP) 4 mg tablet Take 4 mg by mouth 4 (four) times a day        senna (SENOKOT) 8 6 MG tablet Take 1 tablet by mouth 2 (two) times a day      tamsulosin (FLOMAX) 0 4 mg TAKE 1 CAP DAILY WITH DINNER 90 capsule 0    latanoprost (XALATAN) 0 005 % ophthalmic solution       True Metrix Blood Glucose Test test strip  (Patient not taking: Reported on 4/21/2022 )       No current facility-administered medications for this visit  Allergies   Allergen Reactions    Carbamazepine Other (See Comments)     Elevated liver functions -"Enlarges liver"    Clindamycin Rash    Phenytoin Itching and Rash    Pneumococcal Vaccine Hives, Itching and Rash      Immunizations:     Immunization History   Administered Date(s) Administered    COVID-19 MODERNA VACC 0 5 ML IM 03/16/2021, 04/13/2021, 12/17/2021    INFLUENZA 02/07/2020, 10/21/2020    Tdap 11/08/2019, 06/10/2020      Health Maintenance:         Topic Date Due    HIV Screening  Never done    Colorectal Cancer Screening  03/18/2031    Hepatitis C Screening  Completed     There are no preventive care reminders to display for this patient     Medicare Health Risk Assessment:     /60   Pulse 76   Temp 97 9 °F (36 6 °C)   Resp 18   Ht 5' 10" (1 778 m)   Wt (!) 162 kg (358 lb)   SpO2 96%   BMI 51 37 kg/m²      Last Medicare Wellness visit information reviewed, patient interviewed and updates made to the record today  Health Risk Assessment:   Patient rates overall health as poor  Patient feels that their physical health rating is much worse  Patient is very dissatisfied with their life  Eyesight was rated as much worse  Hearing was rated as slightly worse  Patient feels that their emotional and mental health rating is slightly worse  Patients states they are never, rarely angry  Patient states they are never, rarely unusually tired/fatigued  Pain experienced in the last 7 days has been a lot  Patient's pain rating has been 7/10  Patient states that he has experienced weight loss or gain in last 6 months  He is living at [facility] in [place]  He has a multidisciplinary team of caretakers  He lives by his sister in the [facility] in [place]  Fall Risk Screening: In the past year, patient has experienced: history of falling in past year    Number of falls: 2 or more  Injured during fall?: No    Feels unsteady when standing or walking?: Yes    Worried about falling?: Yes      Home Safety:  Patient does not have trouble with stairs inside or outside of their home  Patient has working smoke alarms and has working carbon monoxide detector  Home safety hazards include: none  Nutrition:   Current diet is Regular  Medications:   Patient is currently taking over-the-counter supplements  OTC medications include: see medication list  Patient is not able to manage medications  Polypharmacy and he is not sure what he is/is not taking     Activities of Daily Living (ADLs)/Instrumental Activities of Daily Living (IADLs):   Walk and transfer into and out of bed and chair?: Yes  Dress and groom yourself?: Yes    Bathe or shower yourself?: Yes    Feed yourself?  Yes  Do your laundry/housekeeping?: No  Manage your money, pay your bills and track your expenses?: No  Make your own meals?: Yes    Do your own shopping?: Yes    Advance Care Planning:   Living will: Yes    Durable POA for healthcare: Yes    Advanced directive: Yes    Advanced directive counseling given: Yes    Five wishes given: Yes    End of Life Decisions reviewed with patient: Yes      Comments: His Sister, Presley Bee is his POA  Cognitive Screening:   Provider or family/friend/caregiver concerned regarding cognition?: No    PREVENTIVE SCREENINGS      Cardiovascular Screening:    General: Screening Current      Diabetes Screening:     General: History Diabetes and Screening Current      Colorectal Cancer Screening:     General: Screening Current      Prostate Cancer Screening:    General: Patient Declines      Abdominal Aortic Aneurysm (AAA) Screening:        General: Screening Not Indicated      Lung Cancer Screening:     General: Screening Not Indicated      Hepatitis C Screening:    General: Screening Current    Screening, Brief Intervention, and Referral to Treatment (SBIRT)    Screening  Typical number of drinks in a day: 0  Typical number of drinks in a week: 0  Interpretation: Low risk drinking behavior  Single Item Drug Screening:  How often have you used an illegal drug (including marijuana) or a prescription medication for non-medical reasons in the past year? never    Single Item Drug Screen Score: 0  Interpretation: Negative screen for possible drug use disorder    Other Counseling Topics:   Car/seat belt/driving safety, sunscreen and calcium and vitamin D intake and regular weightbearing exercise  BMI Counseling: Body mass index is 51 37 kg/m²  The BMI is above normal  Nutrition recommendations include decreasing portion sizes, encouraging healthy choices of fruits and vegetables, decreasing fast food intake, consuming healthier snacks, limiting drinks that contain sugar, reducing intake of saturated and trans fat and reducing intake of cholesterol   Exercise recommendations include exercising 3-5 times per week and strength training exercises  Rationale for BMI follow-up plan is due to patient being overweight or obese           Eri Horan DO

## 2022-04-21 NOTE — Clinical Note
Dr Jt Vásquez -- Neurology in [place]  He is now retired  Can we get records for this patient from him?   He was seeing for pain and neurology needs

## 2022-04-21 NOTE — PATIENT INSTRUCTIONS

## 2022-04-21 NOTE — Clinical Note
Sees Podiatry, Dr Rafael Sierra for his diabetic foot exams    I cannot otherwise see his feet as they are wrapped

## 2022-04-21 NOTE — TELEPHONE ENCOUNTER
----- Message from Diaz  [address], DO sent at 4/21/2022 11:18 AM EDT -----  Sees Podiatry, Dr Jeevan Mauricio for his diabetic foot exams    I cannot otherwise see his feet as they are wrapped

## 2022-04-21 NOTE — PROGRESS NOTES
Assessment/Plan:    1  Screening for HIV (human immunodeficiency virus)  - HIV 1/2 Antigen/Antibody (4th Generation) w Reflex [facility]; Future    2  Dyslipidemia associated with type 2 diabetes mellitus (HCC)  - cont regimen  Discussed the importance of maintaining a healthy lifestyle through heart healthy diet and exercise  4  Essential hypertension  - stable  He is on board with cardiology as well and keeps his appts up to date  - Comprehensive metabolic panel; Future    5  Atrial fibrillation, unspecified type (HCC)  - NSR today  On Eliquis  - Comprehensive metabolic panel; Future    7  Morbid obesity ([facility] [address] )  - eats poor diet  He has very little financial resources and this plays a role in this as well  He is on a fixed income  8  Hypothyroidism, unspecified type  - follows with Endo and is on alternating regimen     - TSH, 3rd generation with Free T4 reflex; Future    9  Iron deficiency anemia, unspecified iron deficiency anemia type  - did receive iron infusions  Levels stable  He was not able to fully complete a colonoscopy due to prep issues  He does follow with GI as he has Irritable Bowel     - CBC and differential; Future    10  Restless leg syndrome  - he is without Neurology now and is worried about who will control his medications  His neurologist has retired  It appears, per , he was Rx Klonopin and Belsomra - he shares there were others' that are not controlled  11  Type 2 diabetes mellitus without complication, with long-term current use of insulin (HCC)  - A1C is much improved  He is now on Ozempic  He will cont  Endo manages his DM  He is on ACE and Statin  He sees podiatry and they do his diabetic foot exam   He is also seeing Eye Doctor in [facility] [address] and is followed closely  - POCT hemoglobin A1c    12  Diabetic peripheral neuropathy (HCC)  - on suzie  Is with pain and asking what I can do for him for his chronic low back pain    He is already on polypharmacy and I do not feel that adding more to his regimen is reasonable today  We could bump his suzie up  Apparently his "neurologist" was also serving as pain management  We will acquire records  - Ambulatory Referral to Neurology; Future    13  Thrombocytopenia ([address] )  - needs to be monitored  14  Polypharmacy  - referral to pharmacy done as the patient has no idea why he is on what medications  He has multiple specialists and is unsure who is Rx what  There are many controlled meds that could potentially be interacting with one another, suppress him  We discussed this  I see Katelin Bryson on his list but do not see a history of seizure disorder on his problem list -- will need to inquire about this with him in f/u  He is new to me, was a patient to the practice however  I think in f/u if we can rectify his meds and explain to him what he is on for what, this will help him a great deal   He does not have ideal vision, either  He tells me the pharmacy prepares his medications  - Ambulatory referral to clinical pharmacy; Future    RTC in 3 months or sooner prn - pending above  To have labs done  In regards to his NEAL - he will have f/u sleep appt  He does have NEAL and will get machine  He Is worried as his sleep patterns are off  Patient/Caretaker verbalized understanding and were in agreement with today's assessment and plan  Time was taken to address any questions patient/caretaker had  Indication/Risks/Benefits of medication(s) as prescribed were discussed with the patient/caretaker  The patient verbalized understanding and agreement and elects to take medications as prescribed  Time was taken to answer any questions the patient/caretaker may have had  Chief Complaint   Patient presents with   [facility] Wellness Visit     pt would like referral to neurology as Dr Berto Brown retired       Back Pain     compression fracture - states would like to discuss       Subjective:      Patient ID: Irene Knox is a 60 y o  male  3/18/21 - colonoscopy attempted but poor prep  He tells me he did attempt to have another one, it did not work, either  He does see Dr Artem Almeida for Irritable Bowel - constipation and has f/u with him over the next one month  Iron deficiency anemia - Nephrology (Dr Yahir Humphries) is following this  Did have infusions  Current dz state is CKD 3A  Is on board with Endocrinology as well for his Hypothyroidism and DM - poorly controlled - ozempic most recently added  He does not feel it is helping (though A1C is improved today)  He has poor diet  Has very little funds to purchase healthy foods  Lives in the housing authority here in [place] ad does not like living here  He has other family near the [facility] but he will not leave his sister who lives in the unit next to him  They are close  Neurology -- Dr Andreina Leong is who he was seeing for these needs  He is just retired  He will need someone to assume his meds he was rx  He is asking for a new referral to Neurology  He tells me he saw him for neuropathy, RLS and "my vascular dz In my head "  He is unsure what all he Rx for him  He has significant neuropathy and Back Pain - he is asking what I can do to help his back pain  He does take suzie tid  He tells me tylenol does nothing to help his pain  Points to his low back  He shares also that Dr Keshawn Niño also treated his pain  [place] was in [address]  He is seeing a Wound care specialist for his vascular disease and ulcerations  NEAL - needs to f/u with Sleep clinic for tx options  He is worried about this as he does not sleep on routine schedule        The following portions of the patient's history were reviewed and updated as appropriate: allergies, current medications, past family history, past medical history, past social history, past surgical history and problem list     Review of Systems   All other systems reviewed and are negative  Objective:      /60   Pulse 76   Temp 97 9 °F (36 6 °C)   Resp 18   Ht 5' 10" (1 778 m)   Wt (!) 162 kg (358 lb)   SpO2 96%   BMI 51 37 kg/m²          Physical Exam  Vitals and nursing note reviewed  Constitutional:       General: He is not in acute distress  Appearance: Normal appearance  He is obese  He is not ill-appearing  HENT:      Head: Normocephalic and atraumatic  Eyes:      Conjunctiva/sclera: Conjunctivae normal    Cardiovascular:      Rate and Rhythm: Normal rate and regular rhythm  Comments: He is euvolemic on exam today   Pulmonary:      Effort: Pulmonary effort is normal       Breath sounds: Normal breath sounds  No wheezing, rhonchi or rales  Abdominal:      General: Bowel sounds are normal       Palpations: Abdomen is soft  Musculoskeletal:      Cervical back: Neck supple  Comments: Walks with antalgic gait  He is using a rollator  He is forward flexed at the hips    Lymphadenopathy:      Cervical: No cervical adenopathy  Skin:     General: Skin is warm and dry  Comments: He is wearing wraps to his b/l LEs  He is seeing wound care for Vascular Insufficiency    Neurological:      General: No focal deficit present  Mental Status: He is alert and oriented to person, place, and time  Psychiatric:         Mood and Affect: Mood normal          Behavior: Behavior normal          Thought Content:  Thought content normal          Judgment: Judgment normal  Patient requests all Lab and Radiology Results on their Discharge Instructions

## 2022-04-27 ENCOUNTER — CLINICAL SUPPORT (OUTPATIENT)
Dept: FAMILY MEDICINE CLINIC | Facility: CLINIC | Age: 57
End: 2022-04-27

## 2022-04-27 DIAGNOSIS — Z79.899 POLYPHARMACY: ICD-10-CM

## 2022-04-27 NOTE — PROGRESS NOTES
[address] [facility]  Lacy Patten, RichD, BCACP    Vishal Valerio is a 60 y o  male who was referred to the pharmacist for medication reconciliation and education, referred by Dr Charla Beckman  Called pharmacy to have them reconcile current med list with what is being put into his medications packages  Assessment/ Plan   Patient was counseled on current active medications and provided with a medication list which included, but was not limited to, names, dosages, indications and directions for use  Medication list updated to reflect medications pt is currently taking    Current Medication Problem Identified Recommendation for Resolution   Furosemide Taking BID instead of daily  Updated med list    Clonazepam  Taking 1 mg BID instead of 2 mg BID Updated med list    COPD  No maintenance inhaler  On montelukast which is not typically used for COPD  Recommend PFTs to assess for COPD  If pt has COPD consider discontinuing montelukast and starting a maintenance inhaler    Belsomra Intended for short-term use (?4 to 8 weeks), preferably in conjunction with nonpharmacologic therapies (ACP [Qaseem 2016]; ESRS [Riemann 2017]; Coral Caper 2021)  Limit long-term use to cases for which nonpharmacologic treatments are not available or not effective and benefits are felt to outweigh risks (AASM [Sateia 2017])  Patient on other sedating agents at bedtime such as amitriptyline, ropinirole, and clonazepam  Can consider discontinuing  CPAP would also help with insomnia  Multiple CNS depressing agents - lamotrigine, gabapentin, levetiracetam, amitriptyline, ropinerole, clonazepam, Belsoma  Recommend patient follow up with new neurologist  Unclear hx of seizure disorder versus restless legs versus signs of parkinson   Ferrous sulfate Not on med list Added to medication list        Follow-Up: 2 weeks to review med list again with patient       Subjective   Medication list reviewed with patient, reports the following discrepancies/problems:    Diabetes- managed by endo Dr Kosta Benedict   · Novolog 20 units TID + sliding scale  · Lantus 54 units daily   · Ozempic 0 5 mg weekly    Hypothyroid - managed by anisa Benedict   · Levothyroxine 100 mcg  · Levothyroxine 125 mcg  · Levothyroxine 300 mcg   · Dose is 400 mcg 5 of 7 days and 425 mcg 2 of 7 days weekly     COPD  · Albuterol   · Motelukast 10 mg daily    Atrial Fibrillation   · Eliquis 5 mg BID    HTN / CHF:  · Furosemide 40 mg daily  · Metoprolol tartrate 25 mg BID  · Potassium 20 mEq BID    HLD  · Atorvastatin 40 mg daily     Neuropathy pain (managed previously by neurologist Dr Riley Hilton who retired)   · Amitriptyline 25 mg daily  · Gabapentin 600 mg Q HS     · Citalopram 10 mg daily    Sleep (managed previously by neurologist Dr Riley Hilton who retired)   · Belsomra 20 mg daily     Seizure disorder/ Restless legs / signs of parkinsonism (managed previously by neurologist Dr Riley Hilton who retired)   · Ropinerole 4 mg QID  · Clonazepam 1 mg - 1 tabs BID  · Lamotrigine 25 mg BID  · Levetiracetam 500 mg BID     GI- IBS-C & Gastritis (managed by GI):  · Linzess 290 mcg  · Pantoprazole 40 mg daily   · Senna 8 6 BID   · Docusate 100 mg   · Follows with GI    BPH:  · tamsulosin 0 4 mg daily     Other/PRN:  · Magnesium oxide 400 mg BID  · Zyprobi (probiotic)   · Eye drops    Misses doses:  no  # of missed doses in the past week: 0    Use of supportive adherence tools:Yes, describe: pharmacy puts meds into daily packages for patient     Med cost concerns: no      Objective     BP Readings from Last 3 Encounters:   04/21/22 118/60   01/13/22 150/80   01/13/22 144/80       Pulse Readings from Last 3 Encounters:   04/21/22 76   01/13/22 78   01/13/22 76       Lab Results   Component Value Date    SODIUM 138 07/23/2016    K 3 8 07/23/2016     07/23/2016    CO2 30 07/23/2016    BUN 21 07/23/2016    CREATININE 1 33 (H) 07/23/2016    GLUC 236 (H) 07/23/2016 CALCIUM 8 6 07/23/2016         Education Provided       Reason For [employer] Pharmacist    Demographics  Interaction Method: Phone  Type of Intervention: New    Topic(s) Addressed  Polypharmacy    Intervention(s) Made    Pharmacologic:    -- Medication reconciliation    Non-Pharmacologic:    -- Chart update    Tool(s) Used  Not Applicable    Time Spent:     Time Spent in Care Coordination: 60 minutes    Recommendations  Recipient: Not Applicable  Outcome: Not Applicable

## 2022-04-28 RX ORDER — FERROUS SULFATE 325(65) MG
325 TABLET ORAL 2 TIMES DAILY WITH MEALS
COMMUNITY

## 2022-05-04 DIAGNOSIS — I10 ESSENTIAL HYPERTENSION: ICD-10-CM

## 2022-05-04 DIAGNOSIS — N40.1 BENIGN PROSTATIC HYPERPLASIA WITH URINARY HESITANCY: ICD-10-CM

## 2022-05-04 DIAGNOSIS — E03.9 HYPOTHYROIDISM, UNSPECIFIED TYPE: ICD-10-CM

## 2022-05-04 DIAGNOSIS — Z79.4 TYPE 2 DIABETES MELLITUS WITH HYPERGLYCEMIA, WITH LONG-TERM CURRENT USE OF INSULIN (HCC): ICD-10-CM

## 2022-05-04 DIAGNOSIS — L97.929 CHRONIC VENOUS HYPERTENSION (IDIOPATHIC) WITH ULCER AND INFLAMMATION OF LEFT LOWER EXTREMITY (HCC): ICD-10-CM

## 2022-05-04 DIAGNOSIS — Z86.79 HISTORY OF HEART FAILURE: ICD-10-CM

## 2022-05-04 DIAGNOSIS — M86.9 OSTEOMYELITIS, UNSPECIFIED SITE, UNSPECIFIED TYPE (HCC): ICD-10-CM

## 2022-05-04 DIAGNOSIS — I48.91 ATRIAL FIBRILLATION, UNSPECIFIED TYPE (HCC): ICD-10-CM

## 2022-05-04 DIAGNOSIS — E11.621 DIABETIC ULCER OF FOOT ASSOCIATED WITH TYPE 2 DIABETES MELLITUS, WITH OTHER ULCER SEVERITY, UNSPECIFIED LATERALITY, UNSPECIFIED PART OF FOOT (HCC): ICD-10-CM

## 2022-05-04 DIAGNOSIS — E11.65 TYPE 2 DIABETES MELLITUS WITH HYPERGLYCEMIA, WITH LONG-TERM CURRENT USE OF INSULIN (HCC): ICD-10-CM

## 2022-05-04 DIAGNOSIS — I50.9 CONGESTIVE HEART FAILURE, UNSPECIFIED HF CHRONICITY, UNSPECIFIED HEART FAILURE TYPE (HCC): ICD-10-CM

## 2022-05-04 DIAGNOSIS — L97.508 DIABETIC ULCER OF FOOT ASSOCIATED WITH TYPE 2 DIABETES MELLITUS, WITH OTHER ULCER SEVERITY, UNSPECIFIED LATERALITY, UNSPECIFIED PART OF FOOT (HCC): ICD-10-CM

## 2022-05-04 DIAGNOSIS — E66.01 MORBID OBESITY (HCC): ICD-10-CM

## 2022-05-04 DIAGNOSIS — J44.1 COPD WITH ACUTE EXACERBATION (HCC): ICD-10-CM

## 2022-05-04 DIAGNOSIS — R39.11 BENIGN PROSTATIC HYPERPLASIA WITH URINARY HESITANCY: ICD-10-CM

## 2022-05-04 DIAGNOSIS — I87.332 CHRONIC VENOUS HYPERTENSION (IDIOPATHIC) WITH ULCER AND INFLAMMATION OF LEFT LOWER EXTREMITY (HCC): ICD-10-CM

## 2022-05-04 RX ORDER — LEVETIRACETAM 500 MG/1
500 TABLET ORAL 2 TIMES DAILY
Qty: 180 TABLET | Refills: 0 | Status: SHIPPED | OUTPATIENT
Start: 2022-05-04 | End: 2022-07-29

## 2022-05-10 DIAGNOSIS — E08.42 DIABETIC POLYNEUROPATHY ASSOCIATED WITH DIABETES MELLITUS DUE TO UNDERLYING CONDITION (HCC): ICD-10-CM

## 2022-05-10 RX ORDER — SUVOREXANT 20 MG/1
20 TABLET, FILM COATED ORAL
Qty: 90 TABLET | Refills: 0 | Status: SHIPPED | OUTPATIENT
Start: 2022-05-10 | End: 2022-06-23 | Stop reason: ALTCHOICE

## 2022-05-10 RX ORDER — GABAPENTIN 600 MG/1
600 TABLET ORAL
Qty: 90 TABLET | Refills: 0 | Status: SHIPPED | OUTPATIENT
Start: 2022-05-10

## 2022-05-10 RX ORDER — ROPINIROLE 4 MG/1
4 TABLET, FILM COATED ORAL 4 TIMES DAILY
Refills: 0 | Status: CANCELLED | OUTPATIENT
Start: 2022-05-10

## 2022-05-10 NOTE — TELEPHONE ENCOUNTER
Chart reviewed, medication(s) refilled  Jo Pringle,       Staff - please verify he is on this extremely high dose Ropinirole  Verify who was Rx this  TY    Jo Pringle, DO

## 2022-05-12 DIAGNOSIS — E11.42 DIABETIC PERIPHERAL NEUROPATHY (HCC): Primary | ICD-10-CM

## 2022-05-12 RX ORDER — ROPINIROLE 4 MG/1
4 TABLET, FILM COATED ORAL 4 TIMES DAILY
Qty: 120 TABLET | Refills: 0 | Status: SHIPPED | OUTPATIENT
Start: 2022-05-12 | End: 2022-05-24 | Stop reason: SDUPTHER

## 2022-05-16 DIAGNOSIS — E61.2 MAGNESIUM DEFICIENCY: Primary | ICD-10-CM

## 2022-05-16 RX ORDER — LANOLIN ALCOHOL/MO/W.PET/CERES
CREAM (GRAM) TOPICAL
Qty: 60 TABLET | Refills: 0 | Status: SHIPPED | OUTPATIENT
Start: 2022-05-16 | End: 2022-06-30

## 2022-05-17 DIAGNOSIS — E11.621 DIABETIC ULCER OF FOOT ASSOCIATED WITH TYPE 2 DIABETES MELLITUS, WITH OTHER ULCER SEVERITY, UNSPECIFIED LATERALITY, UNSPECIFIED PART OF FOOT (HCC): ICD-10-CM

## 2022-05-17 DIAGNOSIS — L97.929 CHRONIC VENOUS HYPERTENSION (IDIOPATHIC) WITH ULCER AND INFLAMMATION OF LEFT LOWER EXTREMITY (HCC): ICD-10-CM

## 2022-05-17 DIAGNOSIS — J44.1 COPD WITH ACUTE EXACERBATION (HCC): ICD-10-CM

## 2022-05-17 DIAGNOSIS — M86.9 OSTEOMYELITIS, UNSPECIFIED SITE, UNSPECIFIED TYPE (HCC): ICD-10-CM

## 2022-05-17 DIAGNOSIS — Z79.4 TYPE 2 DIABETES MELLITUS WITH HYPERGLYCEMIA, WITH LONG-TERM CURRENT USE OF INSULIN (HCC): ICD-10-CM

## 2022-05-17 DIAGNOSIS — E11.65 TYPE 2 DIABETES MELLITUS WITH HYPERGLYCEMIA, WITH LONG-TERM CURRENT USE OF INSULIN (HCC): ICD-10-CM

## 2022-05-17 DIAGNOSIS — I50.9 CONGESTIVE HEART FAILURE, UNSPECIFIED HF CHRONICITY, UNSPECIFIED HEART FAILURE TYPE (HCC): ICD-10-CM

## 2022-05-17 DIAGNOSIS — N40.1 BENIGN PROSTATIC HYPERPLASIA WITH URINARY HESITANCY: ICD-10-CM

## 2022-05-17 DIAGNOSIS — E03.9 HYPOTHYROIDISM, UNSPECIFIED TYPE: ICD-10-CM

## 2022-05-17 DIAGNOSIS — I10 ESSENTIAL HYPERTENSION: ICD-10-CM

## 2022-05-17 DIAGNOSIS — E66.01 MORBID OBESITY (HCC): ICD-10-CM

## 2022-05-17 DIAGNOSIS — I87.332 CHRONIC VENOUS HYPERTENSION (IDIOPATHIC) WITH ULCER AND INFLAMMATION OF LEFT LOWER EXTREMITY (HCC): ICD-10-CM

## 2022-05-17 DIAGNOSIS — R39.11 BENIGN PROSTATIC HYPERPLASIA WITH URINARY HESITANCY: ICD-10-CM

## 2022-05-17 DIAGNOSIS — L97.508 DIABETIC ULCER OF FOOT ASSOCIATED WITH TYPE 2 DIABETES MELLITUS, WITH OTHER ULCER SEVERITY, UNSPECIFIED LATERALITY, UNSPECIFIED PART OF FOOT (HCC): ICD-10-CM

## 2022-05-17 DIAGNOSIS — Z86.79 HISTORY OF HEART FAILURE: ICD-10-CM

## 2022-05-17 DIAGNOSIS — I48.91 ATRIAL FIBRILLATION, UNSPECIFIED TYPE (HCC): ICD-10-CM

## 2022-05-17 RX ORDER — CLONAZEPAM 1 MG/1
1 TABLET ORAL 2 TIMES DAILY
Qty: 60 TABLET | Refills: 0 | Status: SHIPPED | OUTPATIENT
Start: 2022-05-17 | End: 2022-07-01 | Stop reason: SDUPTHER

## 2022-05-23 DIAGNOSIS — I87.332 CHRONIC VENOUS HYPERTENSION (IDIOPATHIC) WITH ULCER AND INFLAMMATION OF LEFT LOWER EXTREMITY (HCC): ICD-10-CM

## 2022-05-23 DIAGNOSIS — E03.9 HYPOTHYROIDISM, UNSPECIFIED TYPE: ICD-10-CM

## 2022-05-23 DIAGNOSIS — N40.1 BENIGN PROSTATIC HYPERPLASIA WITH URINARY HESITANCY: ICD-10-CM

## 2022-05-23 DIAGNOSIS — I50.9 CONGESTIVE HEART FAILURE, UNSPECIFIED HF CHRONICITY, UNSPECIFIED HEART FAILURE TYPE (HCC): ICD-10-CM

## 2022-05-23 DIAGNOSIS — E11.621 DIABETIC ULCER OF FOOT ASSOCIATED WITH TYPE 2 DIABETES MELLITUS, WITH OTHER ULCER SEVERITY, UNSPECIFIED LATERALITY, UNSPECIFIED PART OF FOOT (HCC): ICD-10-CM

## 2022-05-23 DIAGNOSIS — E11.65 TYPE 2 DIABETES MELLITUS WITH HYPERGLYCEMIA, WITH LONG-TERM CURRENT USE OF INSULIN (HCC): ICD-10-CM

## 2022-05-23 DIAGNOSIS — E11.42 DIABETIC PERIPHERAL NEUROPATHY (HCC): ICD-10-CM

## 2022-05-23 DIAGNOSIS — L97.508 DIABETIC ULCER OF FOOT ASSOCIATED WITH TYPE 2 DIABETES MELLITUS, WITH OTHER ULCER SEVERITY, UNSPECIFIED LATERALITY, UNSPECIFIED PART OF FOOT (HCC): ICD-10-CM

## 2022-05-23 DIAGNOSIS — J44.1 COPD WITH ACUTE EXACERBATION (HCC): ICD-10-CM

## 2022-05-23 DIAGNOSIS — Z86.79 HISTORY OF HEART FAILURE: ICD-10-CM

## 2022-05-23 DIAGNOSIS — L97.929 CHRONIC VENOUS HYPERTENSION (IDIOPATHIC) WITH ULCER AND INFLAMMATION OF LEFT LOWER EXTREMITY (HCC): ICD-10-CM

## 2022-05-23 DIAGNOSIS — R39.11 BENIGN PROSTATIC HYPERPLASIA WITH URINARY HESITANCY: ICD-10-CM

## 2022-05-23 DIAGNOSIS — I48.91 ATRIAL FIBRILLATION, UNSPECIFIED TYPE (HCC): ICD-10-CM

## 2022-05-23 DIAGNOSIS — Z79.4 TYPE 2 DIABETES MELLITUS WITH HYPERGLYCEMIA, WITH LONG-TERM CURRENT USE OF INSULIN (HCC): ICD-10-CM

## 2022-05-23 DIAGNOSIS — M86.9 OSTEOMYELITIS, UNSPECIFIED SITE, UNSPECIFIED TYPE (HCC): ICD-10-CM

## 2022-05-23 DIAGNOSIS — E66.01 MORBID OBESITY (HCC): ICD-10-CM

## 2022-05-23 DIAGNOSIS — I10 ESSENTIAL HYPERTENSION: ICD-10-CM

## 2022-05-23 RX ORDER — ROPINIROLE 4 MG/1
4 TABLET, FILM COATED ORAL 4 TIMES DAILY
Qty: 120 TABLET | Refills: 0 | Status: CANCELLED | OUTPATIENT
Start: 2022-05-23

## 2022-05-24 RX ORDER — LAMOTRIGINE 25 MG/1
25 TABLET ORAL 2 TIMES DAILY
Qty: 180 TABLET | Refills: 1 | Status: SHIPPED | OUTPATIENT
Start: 2022-05-24

## 2022-05-24 RX ORDER — ROPINIROLE 4 MG/1
4 TABLET, FILM COATED ORAL 4 TIMES DAILY
Qty: 360 TABLET | Refills: 0 | Status: SHIPPED | OUTPATIENT
Start: 2022-05-24

## 2022-05-24 RX ORDER — AMITRIPTYLINE HYDROCHLORIDE 25 MG/1
25 TABLET, FILM COATED ORAL
Qty: 90 TABLET | Refills: 1 | Status: SHIPPED | OUTPATIENT
Start: 2022-05-24

## 2022-05-24 NOTE — TELEPHONE ENCOUNTER
I keep getting a repeat request for ropinirole  I filled this on 5/12 and it went through? Please also ask how pt is taking lamictal   Rx states daily and another sig on the script says take 2, daily        [address] , DO

## 2022-05-24 NOTE — TELEPHONE ENCOUNTER
Per pt, he takes his lamictal BID  He gets pill packs made up by his pharmacy and that is the instructions listed on his pill pack  Pharmacy tells us pt had been getting his lamictal 25mg BID from Dr Lorenza Ghosh who is now retired  This is our first time filling it since Dr Lorenza Ghosh stopped  Pharmacy also states requip was sent with no refills, which is why they are trying to be proactive and get refill sent now so they do not delay his pill pack fills  Their pharmacist likes to work ahead a bit to avoid delays

## 2022-05-26 ENCOUNTER — RX ONLY (RX ONLY)
Age: 57
End: 2022-05-26

## 2022-05-26 ENCOUNTER — DOCTOR'S OFFICE (OUTPATIENT)
Dept: URBAN - NONMETROPOLITAN AREA CLINIC 1 | Facility: CLINIC | Age: 57
Setting detail: OPHTHALMOLOGY
End: 2022-05-26
Payer: COMMERCIAL

## 2022-05-26 DIAGNOSIS — H40.053: ICD-10-CM

## 2022-05-26 PROCEDURE — 92133 CPTRZD OPH DX IMG PST SGM ON: CPT | Performed by: OPHTHALMOLOGY

## 2022-05-26 PROCEDURE — 99213 OFFICE O/P EST LOW 20 MIN: CPT | Performed by: OPHTHALMOLOGY

## 2022-05-26 ASSESSMENT — REFRACTION_CURRENTRX
OS_ADD: +2.50
OD_OVR_VA: 20/
OS_CYLINDER: SPH
OD_AXIS: 179
OS_OVR_VA: 20/
OD_SPHERE: +0.50
OS_VPRISM_DIRECTION: BF
OD_ADD: +2.50
OD_CYLINDER: -0.50
OD_VPRISM_DIRECTION: BF
OS_SPHERE: +0.75

## 2022-05-26 ASSESSMENT — REFRACTION_MANIFEST
OS_AXIS: 025
OD_CYLINDER: -0.50
OS_VA1: 20/40+
OS_ADD: +2.50
OS_CYLINDER: -0.25
OD_ADD: +2.50
OD_VA1: 20/40+
OD_AXIS: 020
OS_VA2: 20/25-2
OD_SPHERE: +0.25
OS_SPHERE: +0.75

## 2022-05-26 ASSESSMENT — SPHEQUIV_DERIVED
OD_SPHEQUIV: -0.19
OS_SPHEQUIV: 0.245
OD_SPHEQUIV: 0
OS_SPHEQUIV: 0.625

## 2022-05-26 ASSESSMENT — REFRACTION_AUTOREFRACTION
OS_SPHERE: +0.62
OS_AXIS: 30
OD_CYLINDER: -0.62
OD_SPHERE: +0.12
OS_CYLINDER: -0.75
OD_AXIS: 15

## 2022-05-26 ASSESSMENT — VISUAL ACUITY
OD_BCVA: 20/25-1
OS_BCVA: 20/30-2

## 2022-05-27 ENCOUNTER — DOCTOR'S OFFICE (OUTPATIENT)
Dept: URBAN - NONMETROPOLITAN AREA CLINIC 1 | Facility: CLINIC | Age: 57
Setting detail: OPHTHALMOLOGY
End: 2022-05-27
Payer: COMMERCIAL

## 2022-05-27 DIAGNOSIS — H40.053: ICD-10-CM

## 2022-05-27 PROCEDURE — 99213 OFFICE O/P EST LOW 20 MIN: CPT | Performed by: OPHTHALMOLOGY

## 2022-05-27 ASSESSMENT — CONFRONTATIONAL VISUAL FIELD TEST (CVF)
OS_FINDINGS: FULL
OD_FINDINGS: FULL

## 2022-05-27 ASSESSMENT — REFRACTION_MANIFEST
OS_SPHERE: +0.75
OD_CYLINDER: -0.50
OS_VA1: 20/40+
OS_AXIS: 025
OS_VA2: 20/25-2
OD_VA1: 20/40+
OD_ADD: +2.50
OS_ADD: +2.50
OD_AXIS: 020
OS_CYLINDER: -0.25
OD_SPHERE: +0.25

## 2022-05-27 ASSESSMENT — AXIALLENGTH_DERIVED
OD_AL: 22.708
OS_AL: 22.7377
OD_AL: 23.0283
OS_AL: 22.8742

## 2022-05-27 ASSESSMENT — REFRACTION_CURRENTRX
OD_SPHERE: +0.50
OS_OVR_VA: 20/
OD_CYLINDER: -0.50
OD_OVR_VA: 20/
OD_VPRISM_DIRECTION: BF
OS_VPRISM_DIRECTION: BF
OS_ADD: +2.50
OS_CYLINDER: SPH
OS_SPHERE: +0.75
OD_AXIS: 179
OD_ADD: +2.50

## 2022-05-27 ASSESSMENT — SPHEQUIV_DERIVED
OD_SPHEQUIV: -0.875
OS_SPHEQUIV: 0.25
OD_SPHEQUIV: 0
OS_SPHEQUIV: 0.625

## 2022-05-27 ASSESSMENT — KERATOMETRY
OS_K1POWER_DIOPTERS: 45.00
OD_AXISANGLE_DEGREES: 013
OD_K2POWER_DIOPTERS: 45.25
OS_K2POWER_DIOPTERS: 45.50
OD_K1POWER_DIOPTERS: 46.75
OS_AXISANGLE_DEGREES: 012

## 2022-05-27 ASSESSMENT — VISUAL ACUITY
OS_BCVA: 20/30-1
OD_BCVA: 20/30+1

## 2022-05-27 ASSESSMENT — REFRACTION_AUTOREFRACTION
OS_CYLINDER: -0.50
OD_SPHERE: -0.25
OD_AXIS: 008
OS_AXIS: 33
OS_SPHERE: +0.50
OD_CYLINDER: -1.25

## 2022-05-27 ASSESSMENT — TONOMETRY
OD_IOP_MMHG: 10
OS_IOP_MMHG: 10

## 2022-06-01 ENCOUNTER — HOSPITAL ENCOUNTER (OUTPATIENT)
Dept: SLEEP CENTER | Facility: HOSPITAL | Age: 57
Discharge: HOME | End: 2022-06-01
Attending: FAMILY MEDICINE
Payer: COMMERCIAL

## 2022-06-01 DIAGNOSIS — G47.33 OSA (OBSTRUCTIVE SLEEP APNEA): ICD-10-CM

## 2022-06-01 PROCEDURE — 95811 POLYSOM 6/>YRS CPAP 4/> PARM: CPT

## 2022-06-02 ENCOUNTER — TELEPHONE (OUTPATIENT)
Dept: FAMILY MEDICINE CLINIC | Facility: CLINIC | Age: 57
End: 2022-06-02

## 2022-06-02 DIAGNOSIS — G47.33 OSA (OBSTRUCTIVE SLEEP APNEA): Primary | ICD-10-CM

## 2022-06-02 NOTE — PROGRESS NOTES
Sleep Study Documentation    Pre-Sleep Study       Sleep testing procedure explained to patient:YES    Patient napped prior to study:NO    Caffeine:Dayshift worker after 12PM   Caffeine use:YES- soda  12 to 26 ounces    Alcohol:Dayshift workers after 5PM: Alcohol use:NO    Typical day for patient:YES       Study Documentation    Sleep Study Indications: The patient is here for a CPAP titration  He had a diagnostic study done that resulted in an AHI of 12 8 consisting of hypopneas, obstructive and central apneas  Sleep Study: Treatment   Optimal PAP pressure: 10cm  Leak:Small  Snore:Eliminated  REM Obtained:yes  Supplemental O2: no    Minimum SaO2 85  Baseline SaO2 92  PAP mask tried (list all)ResMed AirFit F20  PAP mask choice (final)ResMed AirFit F20,size medium  PAP mask type:full face  PAP pressure at which snoring was eliminated 6cm  Mode of Therapy:CPAP  ETCO2:No  CPAP changed to BiPAP:No    Mode of Therapy:CPAP    EKG abnormalities: no     EEG abnormalities: no    Sleep Study Recorded < 2 hours: N/A    Sleep Study Recorded > 2 hours but incomplete study: N/A    Sleep Study Recorded 6 hours but no sleep obtained: NO    Patient classification: disabled       Post-Sleep Study    Medication used at bedtime or during sleep study:YES other prescription medications    Patient reports time it took to fall asleep:20 to 30 minutes    Patient reports waking up during study:3 or more times  Patient reports returning to sleep in 10 to 30 minutes  Patient reports sleeping 2 to 4 hours without dreaming  Patient reports sleep during study:typical    Patient rated sleepiness: Somewhat sleepy or tired    PAP treatment:yes: Post PAP treatment patient reports feeling worse and  would wear PAP mask at home

## 2022-06-03 ENCOUNTER — DOCTOR'S OFFICE (OUTPATIENT)
Dept: URBAN - NONMETROPOLITAN AREA CLINIC 1 | Facility: CLINIC | Age: 57
Setting detail: OPHTHALMOLOGY
End: 2022-06-03
Payer: COMMERCIAL

## 2022-06-03 DIAGNOSIS — H43.393: ICD-10-CM

## 2022-06-03 DIAGNOSIS — E11.3293: ICD-10-CM

## 2022-06-03 DIAGNOSIS — H40.053: ICD-10-CM

## 2022-06-03 DIAGNOSIS — H43.813: ICD-10-CM

## 2022-06-03 LAB
LEFT EYE DIABETIC RETINOPATHY: NORMAL
RIGHT EYE DIABETIC RETINOPATHY: NORMAL

## 2022-06-03 PROCEDURE — 99212 OFFICE O/P EST SF 10 MIN: CPT | Performed by: OPHTHALMOLOGY

## 2022-06-03 PROCEDURE — 2022F DILAT RTA XM EVC RTNOPTHY: CPT | Performed by: FAMILY MEDICINE

## 2022-06-03 PROCEDURE — 92134 CPTRZ OPH DX IMG PST SGM RTA: CPT | Performed by: OPHTHALMOLOGY

## 2022-06-03 ASSESSMENT — KERATOMETRY
OD_K1POWER_DIOPTERS: 46.75
OD_AXISANGLE_DEGREES: 013
OS_AXISANGLE_DEGREES: 012
OD_K2POWER_DIOPTERS: 45.25
OS_K2POWER_DIOPTERS: 45.50
OS_K1POWER_DIOPTERS: 45.00

## 2022-06-03 ASSESSMENT — REFRACTION_MANIFEST
OS_VA1: 20/40+
OS_SPHERE: +0.75
OS_AXIS: 025
OD_VA1: 20/40+
OS_ADD: +2.50
OS_VA2: 20/25-2
OD_AXIS: 020
OD_ADD: +2.50
OD_CYLINDER: -0.50
OS_CYLINDER: -0.25
OD_SPHERE: +0.25

## 2022-06-03 ASSESSMENT — REFRACTION_CURRENTRX
OS_CYLINDER: SPH
OD_CYLINDER: -0.50
OD_ADD: +2.50
OS_VPRISM_DIRECTION: BF
OS_SPHERE: +0.75
OD_SPHERE: +0.50
OD_OVR_VA: 20/
OS_ADD: +2.50
OD_VPRISM_DIRECTION: BF
OS_OVR_VA: 20/
OD_AXIS: 179

## 2022-06-03 ASSESSMENT — AXIALLENGTH_DERIVED
OS_AL: 22.7377
OS_AL: 22.8742
OD_AL: 22.708
OD_AL: 23.0283

## 2022-06-03 ASSESSMENT — SPHEQUIV_DERIVED
OD_SPHEQUIV: 0
OS_SPHEQUIV: 0.625
OD_SPHEQUIV: -0.875
OS_SPHEQUIV: 0.25

## 2022-06-03 ASSESSMENT — REFRACTION_AUTOREFRACTION
OD_CYLINDER: -1.25
OD_SPHERE: -0.25
OS_AXIS: 33
OS_SPHERE: +0.50
OD_AXIS: 008
OS_CYLINDER: -0.50

## 2022-06-03 ASSESSMENT — VISUAL ACUITY
OS_BCVA: 20/30-1
OD_BCVA: 20/25

## 2022-06-03 ASSESSMENT — CONFRONTATIONAL VISUAL FIELD TEST (CVF)
OS_FINDINGS: FULL
OD_FINDINGS: FULL

## 2022-06-06 ENCOUNTER — TELEPHONE (OUTPATIENT)
Dept: FAMILY MEDICINE CLINIC | Facility: CLINIC | Age: 57
End: 2022-06-06

## 2022-06-06 NOTE — PROGRESS NOTES
Patient left message on clinical pharmacist  with the following message  "My name is Liseth Castro  Phone number is [phone]  I'm trying to get a hold of the [facility] family practice in [place] before they changed my Synthroid medication, they must talk to my endocrinologist before they change anything  So if you could call me back, I would appreciate it because Doctor THE [facility] office does not have time to go through these different phone numbers   Thank you "    I did call patient back to confirm which dose of levothyroxine he is currently taking since he has multiple doses on med list      Tablets on hand at home  · Levothyroxine 100 mcg  · Levothyroxine 125 mcg  · Levothyroxine 300 mcg   Dose is he taking   · Takes 400 mcg on 5 of 7 days (Mon - Fri) and 425 mcg on 2 of 7 days (Sat & Sun)

## 2022-06-09 NOTE — TELEPHONE ENCOUNTER
"Patient left message on clinical pharmacist  with the following message  "My name is Yarelis Hilliard  Phone number is [phone]  I'm trying to get a hold of the [facility] family practice in [place] before they changed my Synthroid medication, they must talk to my endocrinologist before they change anything  So if you could call me back, I would appreciate it because Doctor THE [facility] office does not have time to go through these different phone numbers  Thank you "     I did call patient back to confirm which dose of levothyroxine he is currently taking since he has multiple doses on med list      Tablets on hand at home  · Levothyroxine 100 mcg  · Levothyroxine 125 mcg  · Levothyroxine 300 mcg   Dose is he taking   · Takes 400 mcg on 5 of 7 days (Mon - Fri) and 425 mcg on 2 of 7 days (Sat & Sun)"      Pt again confirmed dose Kai Meraz reported is what he is currently taking  Pt asking for update  I did fax BW to endocrinology in case they cannot see it

## 2022-06-09 NOTE — TELEPHONE ENCOUNTER
Called endo  Their nurse agreed that she could see the TSH that I faxed this morning  She also states their office will call pt with dosing instructions  Called pt  Pt verbalized understanding

## 2022-06-09 NOTE — TELEPHONE ENCOUNTER
I would have the patient follow up with his endocrinologist since the labs have been faxed  They need to know his numbers are off  I definitely would not begin to adjust as his regimen is quite complicated  Can we phone the endo office to be sure they received this? If so, he needs to follow up with them for directions on this as to avoid confusion  NANCY Roldan, DO

## 2022-06-16 ENCOUNTER — TELEPHONE (OUTPATIENT)
Dept: FAMILY MEDICINE CLINIC | Facility: CLINIC | Age: 57
End: 2022-06-16

## 2022-06-16 NOTE — TELEPHONE ENCOUNTER
Called ot f/u on foot exam - Dr Audrey Lee office says he was also seen 05/2022 now and they will send this office note

## 2022-06-22 DIAGNOSIS — F41.9 ANXIETY: Primary | ICD-10-CM

## 2022-06-22 RX ORDER — CITALOPRAM 10 MG/1
10 TABLET ORAL DAILY
Qty: 90 TABLET | Refills: 1 | Status: SHIPPED | OUTPATIENT
Start: 2022-06-22 | End: 2022-08-09 | Stop reason: DRUGHIGH

## 2022-06-22 NOTE — TELEPHONE ENCOUNTER
Requested medication(s) are due for refill today: Yes  Patient has already received a courtesy refill: No  Other reason request has been forwarded to provider: passes protocol but no diagnosis associated - please review

## 2022-06-22 NOTE — TELEPHONE ENCOUNTER
Called Dr Vivian Grimm office again  She states she did fax it this morning but we did not receive  I gave her local fax to send it to  Consent (Ear)/Introductory Paragraph: The rationale for Mohs was explained to the patient and consent was obtained. The risks, benefits and alternatives to therapy were discussed in detail. Specifically, the risks of ear deformity, infection, scarring, bleeding, prolonged wound healing, incomplete removal, allergy to anesthesia, nerve injury and recurrence were addressed. Prior to the procedure, the treatment site was clearly identified and confirmed by the patient. All components of Universal Protocol/PAUSE Rule completed.

## 2022-06-23 ENCOUNTER — OFFICE VISIT (OUTPATIENT)
Dept: FAMILY MEDICINE CLINIC | Facility: CLINIC | Age: 57
End: 2022-06-23
Payer: COMMERCIAL

## 2022-06-23 VITALS
OXYGEN SATURATION: 97 % | RESPIRATION RATE: 20 BRPM | SYSTOLIC BLOOD PRESSURE: 130 MMHG | TEMPERATURE: 97.8 F | HEIGHT: 70 IN | DIASTOLIC BLOOD PRESSURE: 60 MMHG | BODY MASS INDEX: 45.1 KG/M2 | HEART RATE: 67 BPM | WEIGHT: 315 LBS

## 2022-06-23 DIAGNOSIS — M86.9 OSTEOMYELITIS, UNSPECIFIED SITE, UNSPECIFIED TYPE (HCC): ICD-10-CM

## 2022-06-23 DIAGNOSIS — I50.9 CONGESTIVE HEART FAILURE, UNSPECIFIED HF CHRONICITY, UNSPECIFIED HEART FAILURE TYPE (HCC): ICD-10-CM

## 2022-06-23 DIAGNOSIS — I48.91 ATRIAL FIBRILLATION, UNSPECIFIED TYPE (HCC): ICD-10-CM

## 2022-06-23 DIAGNOSIS — Z79.4 TYPE 2 DIABETES MELLITUS WITH HYPERGLYCEMIA, WITH LONG-TERM CURRENT USE OF INSULIN (HCC): ICD-10-CM

## 2022-06-23 DIAGNOSIS — R06.00 DOE (DYSPNEA ON EXERTION): ICD-10-CM

## 2022-06-23 DIAGNOSIS — L97.929 CHRONIC VENOUS HYPERTENSION (IDIOPATHIC) WITH ULCER AND INFLAMMATION OF LEFT LOWER EXTREMITY (HCC): ICD-10-CM

## 2022-06-23 DIAGNOSIS — I10 ESSENTIAL HYPERTENSION: ICD-10-CM

## 2022-06-23 DIAGNOSIS — R26.2 AMBULATORY DYSFUNCTION: ICD-10-CM

## 2022-06-23 DIAGNOSIS — L97.508 DIABETIC ULCER OF FOOT ASSOCIATED WITH TYPE 2 DIABETES MELLITUS, WITH OTHER ULCER SEVERITY, UNSPECIFIED LATERALITY, UNSPECIFIED PART OF FOOT (HCC): ICD-10-CM

## 2022-06-23 DIAGNOSIS — Z86.79 HISTORY OF HEART FAILURE: ICD-10-CM

## 2022-06-23 DIAGNOSIS — E66.01 MORBID OBESITY (HCC): Primary | ICD-10-CM

## 2022-06-23 DIAGNOSIS — L21.9 SEBORRHEIC DERMATITIS OF SCALP: ICD-10-CM

## 2022-06-23 DIAGNOSIS — E11.65 TYPE 2 DIABETES MELLITUS WITH HYPERGLYCEMIA, WITH LONG-TERM CURRENT USE OF INSULIN (HCC): ICD-10-CM

## 2022-06-23 DIAGNOSIS — E11.42 DIABETIC PERIPHERAL NEUROPATHY (HCC): ICD-10-CM

## 2022-06-23 DIAGNOSIS — E03.9 HYPOTHYROIDISM, UNSPECIFIED TYPE: ICD-10-CM

## 2022-06-23 DIAGNOSIS — G62.9 NEUROPATHY: ICD-10-CM

## 2022-06-23 DIAGNOSIS — R39.11 BENIGN PROSTATIC HYPERPLASIA WITH URINARY HESITANCY: ICD-10-CM

## 2022-06-23 DIAGNOSIS — E11.621 DIABETIC ULCER OF FOOT ASSOCIATED WITH TYPE 2 DIABETES MELLITUS, WITH OTHER ULCER SEVERITY, UNSPECIFIED LATERALITY, UNSPECIFIED PART OF FOOT (HCC): ICD-10-CM

## 2022-06-23 DIAGNOSIS — N40.1 BENIGN PROSTATIC HYPERPLASIA WITH URINARY HESITANCY: ICD-10-CM

## 2022-06-23 DIAGNOSIS — I87.332 CHRONIC VENOUS HYPERTENSION (IDIOPATHIC) WITH ULCER AND INFLAMMATION OF LEFT LOWER EXTREMITY (HCC): ICD-10-CM

## 2022-06-23 DIAGNOSIS — G47.33 OSA (OBSTRUCTIVE SLEEP APNEA): ICD-10-CM

## 2022-06-23 DIAGNOSIS — J44.1 COPD WITH ACUTE EXACERBATION (HCC): ICD-10-CM

## 2022-06-23 PROCEDURE — 1036F TOBACCO NON-USER: CPT | Performed by: FAMILY MEDICINE

## 2022-06-23 PROCEDURE — 3725F SCREEN DEPRESSION PERFORMED: CPT | Performed by: FAMILY MEDICINE

## 2022-06-23 PROCEDURE — 3008F BODY MASS INDEX DOCD: CPT | Performed by: FAMILY MEDICINE

## 2022-06-23 PROCEDURE — 3078F DIAST BP <80 MM HG: CPT | Performed by: FAMILY MEDICINE

## 2022-06-23 PROCEDURE — 3075F SYST BP GE 130 - 139MM HG: CPT | Performed by: FAMILY MEDICINE

## 2022-06-23 PROCEDURE — 99215 OFFICE O/P EST HI 40 MIN: CPT | Performed by: FAMILY MEDICINE

## 2022-06-23 RX ORDER — FUROSEMIDE 40 MG/1
40 TABLET ORAL 2 TIMES DAILY
Qty: 180 TABLET | Refills: 0
Start: 2022-06-23

## 2022-06-23 RX ORDER — KETOCONAZOLE 20 MG/ML
1 SHAMPOO TOPICAL 2 TIMES WEEKLY
Qty: 120 ML | Refills: 3 | Status: SHIPPED | OUTPATIENT
Start: 2022-06-23 | End: 2022-07-29

## 2022-06-23 RX ORDER — LEVOTHYROXINE SODIUM 0.1 MG/1
100 TABLET ORAL DAILY
Qty: 90 TABLET | Refills: 0 | Status: SHIPPED | OUTPATIENT
Start: 2022-06-23 | End: 2022-07-26 | Stop reason: ALTCHOICE

## 2022-06-23 NOTE — PROGRESS NOTES
Assessment/Plan:    1  Hypothyroidism, unspecified type  - levothyroxine 100 mcg tablet; Take 1 tablet (100 mcg total) by mouth daily Take with 300 mcg dose  Dispense: 90 tablet; Refill: 0  - furosemide (LASIX) 40 mg tablet; Take 1 tablet (40 mg total) by mouth 2 (two) times a day  Dispense: 180 tablet; Refill: 0    2  Morbid obesity ([facility] [address] )  - Durable Medical Equipment  - Complete PFT with post bronchodilator; Future  - furosemide (LASIX) 40 mg tablet; Take 1 tablet (40 mg total) by mouth 2 (two) times a day  Dispense: 180 tablet; Refill: 0    3  Diabetic peripheral neuropathy ([facility] )  - Durable Medical Equipment    4  Neuropathy    5  Ambulatory dysfunction  - Durable Medical Equipment    6  MILLER (dyspnea on exertion)  - Complete PFT with post bronchodilator; Future    7  NEAL (obstructive sleep apnea)  - Complete PFT with post bronchodilator; Future    8  Seborrheic dermatitis of scalp  - ketoconazole (NIZORAL) 2 % shampoo; Apply 1 application topically 2 (two) times a week for 8 doses  Dispense: 120 mL; Refill: 3    9  Atrial fibrillation, unspecified type (HCC)  - furosemide (LASIX) 40 mg tablet; Take 1 tablet (40 mg total) by mouth 2 (two) times a day  Dispense: 180 tablet; Refill: 0    10  History of heart failure  - furosemide (LASIX) 40 mg tablet; Take 1 tablet (40 mg total) by mouth 2 (two) times a day  Dispense: 180 tablet; Refill: 0    11  Diabetic ulcer of foot associated with type 2 diabetes mellitus, with other ulcer severity, unspecified laterality, unspecified part of foot ([facility] )  - furosemide (LASIX) 40 mg tablet; Take 1 tablet (40 mg total) by mouth 2 (two) times a day  Dispense: 180 tablet; Refill: 0    12  Osteomyelitis, unspecified site, unspecified type (HCC)  - furosemide (LASIX) 40 mg tablet; Take 1 tablet (40 mg total) by mouth 2 (two) times a day  Dispense: 180 tablet; Refill: 0    13   Chronic venous hypertension (idiopathic) with ulcer and inflammation of left lower extremity (HCC)  - furosemide (LASIX) 40 mg tablet; Take 1 tablet (40 mg total) by mouth 2 (two) times a day  Dispense: 180 tablet; Refill: 0    14  COPD with acute exacerbation (HCC)  - furosemide (LASIX) 40 mg tablet; Take 1 tablet (40 mg total) by mouth 2 (two) times a day  Dispense: 180 tablet; Refill: 0    15  Congestive heart failure, unspecified HF chronicity, unspecified heart failure type (HCC)  - furosemide (LASIX) 40 mg tablet; Take 1 tablet (40 mg total) by mouth 2 (two) times a day  Dispense: 180 tablet; Refill: 0    16  Benign prostatic hyperplasia with urinary hesitancy  - furosemide (LASIX) 40 mg tablet; Take 1 tablet (40 mg total) by mouth 2 (two) times a day  Dispense: 180 tablet; Refill: 0    17  Essential hypertension  - furosemide (LASIX) 40 mg tablet; Take 1 tablet (40 mg total) by mouth 2 (two) times a day  Dispense: 180 tablet; Refill: 0    18  Type 2 diabetes mellitus with hyperglycemia, with long-term current use of insulin (HCC)  - furosemide (LASIX) 40 mg tablet; Take 1 tablet (40 mg total) by mouth 2 (two) times a day  Dispense: 180 tablet; Refill: 0    RTC in 6 weeks     Patient/Caretaker verbalized understanding and were in agreement with today's assessment and plan  Time was taken to address any questions patient/caretaker had  Indication/Risks/Benefits of medication(s) as prescribed were discussed with the patient/caretaker  The patient verbalized understanding and agreement and elects to take medications as prescribed  Time was taken to answer any questions the patient/caretaker may have had  Total time I spent caring for this patient on the day of the encounter - 40 minutes  5 minutes spent before the visit  25 minutes spent during the visit  10 minutes spent after the visit - day of  Chief Complaint   Patient presents with    Follow-up     Medication discussion and also want to talk about getting an walker, been sleeping a lot more then often   Went for cpap and they had to keep waking him up there  Want to talk about busing on left forearm recheck bp 130/60 early it was 148/72       Subjective:      Patient ID: Grant Ballard is a 60 y o  male  This patient is extremely complicated by polypharmacy and unsure of what he is on and why he is on it? Further complicating this is Dr Kylie Amanda had pt on many of these meds and has since retired and I have been asked to assume these  Patient is on board with cardiology  He has pAfib, uncontrolled NEAL, HTN, Chronic Diastolic HF, h/o PE  He is on eliquis and is compliant with regimen  He has a high CHADS2 score  He is on statin, BB, low dose ACEi, bid lasix  He will cont to see them, intervally  Pt apparently has seizure disorder and is on Keppra  He tells me these came on later in life but cannot elaborate  If he is not going to see a Neurologist In July, told me he had an appt but is not sure when, will need to get est with one to help us sort through this  He denies recent seizure disorder; it has been many years  CKD -- on board with nephrology -- [address] Nephrology - this is secondary to diabetic nephropathy and benign hypertensive nephrosclerosis, and currently is at stage IIIA, with as baseline sCr of 1 2 to 1 4 mg/dL  He is on ACEi - low dose  Avoid NSAIDs and other nephrotoxic agents  Anemia in chronic kidney disease (Chronic) - iron deficient as well  Per nephrology note, Does not require AUSTIN at this time although since transferrin saturation is low at 16% will require IV Venofer  This is facilitated through Nephrology  Completed these weekly X 4 weeks  CRC screening  Dr Efrain Ocampo  Was not able to satisfactorily complete the prep  Will need to check with pt status of this in f/u  Did have an EGD as well  Denies dark, tarry stools, BRBPR     DM - Sees Dr Chloé Hernandez for this  Insulin was adjusted  He tells me his BSs are still poorly controlled, in general   This AM it was 197    AT lunch/dinner it is between 2-300  He is getting his eye and foot exams - has podiatrist (Dr Baltazar Pham) and also sees Dr Celis Parents - ortho foot  He has Diabetic Ulcers  He uses a walker  He is asking for a bigger walker that can accommodate his size  Lab Results   Component Value Date    HGBA1C 8 5 (H) 04/29/2022     Hypothyroid - followed by Dr Serena Lopez and on a strict regimen with 3 different doses - 300 mg/day  M-F + 100 mcg + 300 mcg = 400 mcg  Sat/Sun + 125 mcg + 300 mcg = 425 mcg  This was supratherapeutic - we will have him take 400 mcg/day and stop the 425 mcg on sat/sun  He tells me he did not hear back from Dr Serena Lopez but it appears as though, per chart review that he suggested he cont regimen EXCEPT stop the 125 mcg on Sat     Pt was not aware of this        NEAL - he is not wearing his PAP, is having difficulty with this  ? COPD - does not smoke, no h/o smoking  Has not had PFTs  Is not on maintenance inhalers  If anything I worry about obesity hypoventilation syndrome (OHS)            Current Medication Problem Identified Recommendation for Resolution   Furosemide Taking BID instead of daily  Updated med list    Clonazepam  Taking 1 mg BID instead of 2 mg BID Updated med list    COPD  No maintenance inhaler  On montelukast which is not typically used for COPD  Recommend PFTs to assess for COPD  If pt has COPD consider discontinuing montelukast and starting a maintenance inhaler    Belsomra Intended for short-term use (?4 to 8 weeks), preferably in conjunction with nonpharmacologic therapies (ACP [Qaseem 2016]; ESRS [Riemann 2017]; Luli Romo 2021)  Limit long-term use to cases for which nonpharmacologic treatments are not available or not effective and benefits are felt to outweigh risks (AASM [Sateia 2017])      Patient on other sedating agents at bedtime such as amitriptyline, ropinirole, and clonazepam  Can consider discontinuing   CPAP would also help with insomnia        Multiple CNS depressing agents - lamotrigine, gabapentin, levetiracetam, amitriptyline, ropinerole, clonazepam, Belsoma  Recommend patient follow up with new neurologist  Unclear hx of seizure disorder versus restless legs versus signs of parkinson   Ferrous sulfate Not on med list Added to medication list          Follow-Up: 2 weeks to review med list again with patient       Subjective   Medication list reviewed with patient, reports the following discrepancies/problems:     Diabetes- managed by endo Dr Stoney Johansen   · Novolog 20 units TID + sliding scale  · Lantus 54 units daily   · Ozempic 0 5 mg weekly     Hypothyroid - managed by endo Dr Stoney Johansen   · Levothyroxine 100 mcg  · Levothyroxine 125 mcg  · Levothyroxine 300 mcg   · Dose is 400 mcg 5 of 7 days and 425 mcg 2 of 7 days weekly      COPD  · Albuterol   · Motelukast 10 mg daily     Atrial Fibrillation   · Eliquis 5 mg BID     HTN / CHF:  · Furosemide 40 mg daily  · Metoprolol tartrate 25 mg BID  · Potassium 20 mEq BID     HLD  · Atorvastatin 40 mg daily      Neuropathy pain (managed previously by neurologist Dr John Henry who retired)   · Amitriptyline 25 mg daily  · Gabapentin 600 mg Q HS      · Citalopram 10 mg daily     Sleep (managed previously by neurologist Dr John Henry who retired)   · Belsomra 20 mg daily      Seizure disorder/ Restless legs / signs of parkinsonism (managed previously by neurologist Dr John Henry who retired)   · Ropinerole 4 mg QID  · Clonazepam 1 mg - 1 tabs BID  · Lamotrigine 25 mg BID  · Levetiracetam 500 mg BID      GI- IBS-C & Gastritis (managed by GI):  · Linzess 290 mcg  · Pantoprazole 40 mg daily   · Senna 8 6 BID   · Docusate 100 mg   · Follows with GI     BPH:  · tamsulosin 0 4 mg daily      Other/PRN:  · Magnesium oxide 400 mg BID  · Zyprobi (probiotic)   · Eye drops            The following portions of the patient's history were reviewed and updated as appropriate: allergies, current medications, past family history, past medical history, past social history, past surgical history and problem list     Review of Systems   All other systems reviewed and are negative  Objective:      /60 (BP Location: Left arm, Patient Position: Sitting, Cuff Size: Large)   Pulse 67   Temp 97 8 °F (36 6 °C) (Temporal)   Resp 20   Ht 5' 10" (1 778 m)   Wt (!) 172 kg (380 lb)   SpO2 97%   BMI 54 52 kg/m²          Physical Exam  Vitals and nursing note reviewed  Constitutional:       General: He is not in acute distress  Appearance: Normal appearance  Comments: Morbid obesity; sleepy on exam    HENT:      Head: Normocephalic and atraumatic  Eyes:      Conjunctiva/sclera: Conjunctivae normal    Cardiovascular:      Rate and Rhythm: Normal rate and regular rhythm  Pulmonary:      Effort: Pulmonary effort is normal       Breath sounds: Normal breath sounds  Musculoskeletal:      Comments: Walks with a rollator    Skin:     General: Skin is warm  Comments: Chronic staining/venous stasis changes/likely a component of lymphedema a well to b/l LEs     Neurological:      General: No focal deficit present  Mental Status: He is alert and oriented to person, place, and time  Psychiatric:         Mood and Affect: Mood normal          Behavior: Behavior normal          Thought Content:  Thought content normal          Judgment: Judgment normal

## 2022-06-30 DIAGNOSIS — I48.91 ATRIAL FIBRILLATION, UNSPECIFIED TYPE (HCC): ICD-10-CM

## 2022-06-30 DIAGNOSIS — E61.2 MAGNESIUM DEFICIENCY: ICD-10-CM

## 2022-06-30 DIAGNOSIS — J44.1 COPD WITH ACUTE EXACERBATION (HCC): ICD-10-CM

## 2022-06-30 RX ORDER — APIXABAN 5 MG/1
TABLET, FILM COATED ORAL
Qty: 60 TABLET | Refills: 0 | Status: SHIPPED | OUTPATIENT
Start: 2022-06-30

## 2022-06-30 RX ORDER — MONTELUKAST SODIUM 10 MG/1
TABLET ORAL
Qty: 90 TABLET | Refills: 0 | Status: SHIPPED | OUTPATIENT
Start: 2022-06-30

## 2022-06-30 RX ORDER — LANOLIN ALCOHOL/MO/W.PET/CERES
CREAM (GRAM) TOPICAL
Qty: 60 TABLET | Refills: 0 | Status: SHIPPED | OUTPATIENT
Start: 2022-06-30

## 2022-07-01 ENCOUNTER — DOCTOR'S OFFICE (OUTPATIENT)
Dept: URBAN - NONMETROPOLITAN AREA CLINIC 1 | Facility: CLINIC | Age: 57
Setting detail: OPHTHALMOLOGY
End: 2022-07-01
Payer: COMMERCIAL

## 2022-07-01 ENCOUNTER — RX ONLY (RX ONLY)
Age: 57
End: 2022-07-01

## 2022-07-01 DIAGNOSIS — E66.01 MORBID OBESITY (HCC): ICD-10-CM

## 2022-07-01 DIAGNOSIS — E03.9 HYPOTHYROIDISM, UNSPECIFIED TYPE: ICD-10-CM

## 2022-07-01 DIAGNOSIS — L97.508 DIABETIC ULCER OF FOOT ASSOCIATED WITH TYPE 2 DIABETES MELLITUS, WITH OTHER ULCER SEVERITY, UNSPECIFIED LATERALITY, UNSPECIFIED PART OF FOOT (HCC): ICD-10-CM

## 2022-07-01 DIAGNOSIS — I87.332 CHRONIC VENOUS HYPERTENSION (IDIOPATHIC) WITH ULCER AND INFLAMMATION OF LEFT LOWER EXTREMITY (HCC): ICD-10-CM

## 2022-07-01 DIAGNOSIS — L97.929 CHRONIC VENOUS HYPERTENSION (IDIOPATHIC) WITH ULCER AND INFLAMMATION OF LEFT LOWER EXTREMITY (HCC): ICD-10-CM

## 2022-07-01 DIAGNOSIS — E11.65 TYPE 2 DIABETES MELLITUS WITH HYPERGLYCEMIA, WITH LONG-TERM CURRENT USE OF INSULIN (HCC): ICD-10-CM

## 2022-07-01 DIAGNOSIS — N40.1 BENIGN PROSTATIC HYPERPLASIA WITH URINARY HESITANCY: ICD-10-CM

## 2022-07-01 DIAGNOSIS — E11.621 DIABETIC ULCER OF FOOT ASSOCIATED WITH TYPE 2 DIABETES MELLITUS, WITH OTHER ULCER SEVERITY, UNSPECIFIED LATERALITY, UNSPECIFIED PART OF FOOT (HCC): ICD-10-CM

## 2022-07-01 DIAGNOSIS — E11.3293: ICD-10-CM

## 2022-07-01 DIAGNOSIS — M86.9 OSTEOMYELITIS, UNSPECIFIED SITE, UNSPECIFIED TYPE (HCC): ICD-10-CM

## 2022-07-01 DIAGNOSIS — I48.91 ATRIAL FIBRILLATION, UNSPECIFIED TYPE (HCC): ICD-10-CM

## 2022-07-01 DIAGNOSIS — H43.813: ICD-10-CM

## 2022-07-01 DIAGNOSIS — I10 ESSENTIAL HYPERTENSION: ICD-10-CM

## 2022-07-01 DIAGNOSIS — Z79.4 TYPE 2 DIABETES MELLITUS WITH HYPERGLYCEMIA, WITH LONG-TERM CURRENT USE OF INSULIN (HCC): ICD-10-CM

## 2022-07-01 DIAGNOSIS — Z86.79 HISTORY OF HEART FAILURE: ICD-10-CM

## 2022-07-01 DIAGNOSIS — I50.9 CONGESTIVE HEART FAILURE, UNSPECIFIED HF CHRONICITY, UNSPECIFIED HEART FAILURE TYPE (HCC): ICD-10-CM

## 2022-07-01 DIAGNOSIS — R39.11 BENIGN PROSTATIC HYPERPLASIA WITH URINARY HESITANCY: ICD-10-CM

## 2022-07-01 DIAGNOSIS — J44.1 COPD WITH ACUTE EXACERBATION (HCC): ICD-10-CM

## 2022-07-01 DIAGNOSIS — H43.393: ICD-10-CM

## 2022-07-01 DIAGNOSIS — H40.053: ICD-10-CM

## 2022-07-01 LAB
LEFT EYE DIABETIC RETINOPATHY: NORMAL
RIGHT EYE DIABETIC RETINOPATHY: NORMAL

## 2022-07-01 PROCEDURE — 92134 CPTRZ OPH DX IMG PST SGM RTA: CPT | Performed by: OPHTHALMOLOGY

## 2022-07-01 PROCEDURE — 2022F DILAT RTA XM EVC RTNOPTHY: CPT | Performed by: FAMILY MEDICINE

## 2022-07-01 PROCEDURE — 99214 OFFICE O/P EST MOD 30 MIN: CPT | Performed by: OPHTHALMOLOGY

## 2022-07-01 RX ORDER — CLONAZEPAM 1 MG/1
1 TABLET ORAL 2 TIMES DAILY
Qty: 60 TABLET | Refills: 0 | Status: SHIPPED | OUTPATIENT
Start: 2022-07-01 | End: 2022-08-09 | Stop reason: SDUPTHER

## 2022-07-01 ASSESSMENT — KERATOMETRY
OD_K1POWER_DIOPTERS: 46.75
OS_AXISANGLE_DEGREES: 012
OD_K2POWER_DIOPTERS: 45.25
OD_AXISANGLE_DEGREES: 013
OS_K2POWER_DIOPTERS: 45.50
OS_K1POWER_DIOPTERS: 45.00

## 2022-07-01 ASSESSMENT — SPHEQUIV_DERIVED
OS_SPHEQUIV: 0.625
OS_SPHEQUIV: 0.25
OD_SPHEQUIV: 0
OD_SPHEQUIV: -0.875

## 2022-07-01 ASSESSMENT — REFRACTION_CURRENTRX
OD_OVR_VA: 20/
OS_CYLINDER: SPH
OD_ADD: +2.50
OD_VPRISM_DIRECTION: BF
OS_ADD: +2.50
OD_SPHERE: +0.50
OS_OVR_VA: 20/
OS_VPRISM_DIRECTION: BF
OD_CYLINDER: -0.50
OS_SPHERE: +0.75
OD_AXIS: 179

## 2022-07-01 ASSESSMENT — REFRACTION_AUTOREFRACTION
OS_SPHERE: +0.50
OS_AXIS: 33
OS_CYLINDER: -0.50
OD_AXIS: 008
OD_SPHERE: -0.25
OD_CYLINDER: -1.25

## 2022-07-01 ASSESSMENT — CONFRONTATIONAL VISUAL FIELD TEST (CVF)
OS_FINDINGS: FULL
OD_FINDINGS: FULL

## 2022-07-01 ASSESSMENT — REFRACTION_MANIFEST
OD_AXIS: 020
OS_VA1: 20/40+
OS_VA2: 20/25-2
OS_SPHERE: +0.75
OS_AXIS: 025
OD_ADD: +2.50
OS_ADD: +2.50
OD_CYLINDER: -0.50
OS_CYLINDER: -0.25
OD_SPHERE: +0.25
OD_VA1: 20/40+

## 2022-07-01 ASSESSMENT — AXIALLENGTH_DERIVED
OS_AL: 22.7377
OD_AL: 22.708
OD_AL: 23.0283
OS_AL: 22.8742

## 2022-07-01 ASSESSMENT — VISUAL ACUITY
OS_BCVA: 20/30-1
OD_BCVA: 20/20-2

## 2022-07-01 ASSESSMENT — TONOMETRY
OS_IOP_MMHG: 19
OD_IOP_MMHG: 17

## 2022-07-22 ENCOUNTER — TRANSITIONAL CARE MANAGEMENT (OUTPATIENT)
Dept: FAMILY MEDICINE CLINIC | Facility: CLINIC | Age: 57
End: 2022-07-22

## 2022-07-26 ENCOUNTER — TELEPHONE (OUTPATIENT)
Dept: FAMILY MEDICINE CLINIC | Facility: CLINIC | Age: 57
End: 2022-07-26

## 2022-07-26 ENCOUNTER — OFFICE VISIT (OUTPATIENT)
Dept: FAMILY MEDICINE CLINIC | Facility: CLINIC | Age: 57
End: 2022-07-26
Payer: COMMERCIAL

## 2022-07-26 VITALS
WEIGHT: 315 LBS | SYSTOLIC BLOOD PRESSURE: 134 MMHG | TEMPERATURE: 98.3 F | DIASTOLIC BLOOD PRESSURE: 60 MMHG | RESPIRATION RATE: 18 BRPM | HEART RATE: 90 BPM | OXYGEN SATURATION: 96 % | BODY MASS INDEX: 45.1 KG/M2 | HEIGHT: 70 IN

## 2022-07-26 DIAGNOSIS — Z79.899 POLYPHARMACY: ICD-10-CM

## 2022-07-26 DIAGNOSIS — Z11.4 SCREENING FOR HIV (HUMAN IMMUNODEFICIENCY VIRUS): ICD-10-CM

## 2022-07-26 DIAGNOSIS — N18.31 ANEMIA IN STAGE 3A CHRONIC KIDNEY DISEASE (HCC): ICD-10-CM

## 2022-07-26 DIAGNOSIS — E66.01 MORBID OBESITY (HCC): ICD-10-CM

## 2022-07-26 DIAGNOSIS — N18.31 STAGE 3A CHRONIC KIDNEY DISEASE (HCC): ICD-10-CM

## 2022-07-26 DIAGNOSIS — R53.81 DEBILITY: ICD-10-CM

## 2022-07-26 DIAGNOSIS — Z79.4 TYPE 2 DIABETES MELLITUS WITH HYPERGLYCEMIA, WITH LONG-TERM CURRENT USE OF INSULIN (HCC): ICD-10-CM

## 2022-07-26 DIAGNOSIS — Z09 HOSPITAL DISCHARGE FOLLOW-UP: Primary | ICD-10-CM

## 2022-07-26 DIAGNOSIS — M54.6 CHRONIC BILATERAL THORACIC BACK PAIN: ICD-10-CM

## 2022-07-26 DIAGNOSIS — D63.1 ANEMIA IN STAGE 3A CHRONIC KIDNEY DISEASE (HCC): ICD-10-CM

## 2022-07-26 DIAGNOSIS — R45.851 SUICIDAL IDEATION: ICD-10-CM

## 2022-07-26 DIAGNOSIS — G89.29 CHRONIC BILATERAL THORACIC BACK PAIN: ICD-10-CM

## 2022-07-26 DIAGNOSIS — E03.9 ACQUIRED HYPOTHYROIDISM: ICD-10-CM

## 2022-07-26 DIAGNOSIS — F33.9 DEPRESSION, RECURRENT (HCC): ICD-10-CM

## 2022-07-26 DIAGNOSIS — E11.65 TYPE 2 DIABETES MELLITUS WITH HYPERGLYCEMIA, WITH LONG-TERM CURRENT USE OF INSULIN (HCC): ICD-10-CM

## 2022-07-26 DIAGNOSIS — R79.89 LOW TSH LEVEL: ICD-10-CM

## 2022-07-26 PROBLEM — J96.90 RESPIRATORY FAILURE (HCC): Status: RESOLVED | Noted: 2021-03-16 | Resolved: 2022-07-26

## 2022-07-26 PROCEDURE — 99496 TRANSJ CARE MGMT HIGH F2F 7D: CPT | Performed by: FAMILY MEDICINE

## 2022-07-26 RX ORDER — SENNOSIDES 8.6 MG
650 CAPSULE ORAL EVERY 8 HOURS PRN
Qty: 90 TABLET | Refills: 0 | Status: SHIPPED | OUTPATIENT
Start: 2022-07-26

## 2022-07-26 RX ORDER — LEVOTHYROXINE SODIUM 300 UG/1
300 TABLET ORAL DAILY
Qty: 90 TABLET | Refills: 0
Start: 2022-07-26

## 2022-07-26 RX ORDER — LEVOTHYROXINE SODIUM 0.05 MG/1
50 TABLET ORAL
Qty: 90 TABLET | Refills: 1 | Status: SHIPPED | OUTPATIENT
Start: 2022-07-26

## 2022-07-26 RX ORDER — INSULIN ASPART 100 [IU]/ML
26 INJECTION, SOLUTION INTRAVENOUS; SUBCUTANEOUS
Qty: 15 ML | Refills: 0 | Status: SHIPPED
Start: 2022-07-26

## 2022-07-26 NOTE — TELEPHONE ENCOUNTER
Pt called in stated that he called Dr Torres Stone office to see about a sooner appt  Stated they told him there was nothing sooner since he cannot go to [facility]  They have him on a wait list  States he only comes to [place] on Wednesdays

## 2022-07-26 NOTE — TELEPHONE ENCOUNTER
Noted  We will continue to monitor his thyroid  He needs to have his TSH checked as we discussed today after regimen adjusted        Roderick Doty DO

## 2022-07-26 NOTE — TELEPHONE ENCOUNTER
He may not meet criteria  Was wondering about safety in the home given his disabilities  If not meeting criteria, ok, just please let him know  TY    Flex Babb, DO

## 2022-07-26 NOTE — TELEPHONE ENCOUNTER
Home health has called and said they need to know what do pt need home health for they need more information  What's on the referral is not enough information

## 2022-07-26 NOTE — PROGRESS NOTES
Assessment/Plan:     1  Screening for HIV (human immunodeficiency virus)  - HIV 1/2 Antigen/Antibody (4th Generation) [facility]; Future    2  Morbid obesity ([address] )  - Discussed the importance of maintaining a healthy lifestyle through heart healthy diet and exercise  BMI Counseling: Body mass index is 52 46 kg/m²  The BMI is above normal  Nutrition recommendations include decreasing portion sizes, encouraging healthy choices of fruits and vegetables, decreasing fast food intake, consuming healthier snacks, limiting drinks that contain sugar, reducing intake of saturated and trans fat and reducing intake of cholesterol  Exercise recommendations include exercising 3-5 times per week and strength training exercises  Rationale for BMI follow-up plan is due to patient being overweight or obese  3  Acquired hypothyroidism  - TSH has been significantly low  He is on board with endo but is not able to see them as frequent as he needs too  This is a barrier  At this point he is taking 400 mcg/day  We will dec to 350 mcg/day and recheck this in 6w  He and I discussed the importance of optimizing his thyroid, especially has his behavioral health is poorly controlled  He is on board with this change     - 0 12 --> 0 06 with adjustment made to 400 mcg!  - will need to be sure he is taking this in AM not with other meds  - levothyroxine (Euthyrox) 50 mcg tablet; Take 1 tablet (50 mcg total) by mouth daily in the early morning Take with 300 mcg tablet  Dispense: 90 tablet; Refill: 1  - levothyroxine 300 MCG tablet; Take 1 tablet (300 mcg total) by mouth daily Take with 50 mcg tablet  Dispense: 90 tablet; Refill: 0  - TSH, 3rd generation with Free T4 reflex; Future    4  Low TSH level  - levothyroxine (Euthyrox) 50 mcg tablet; Take 1 tablet (50 mcg total) by mouth daily in the early morning Take with 300 mcg tablet  Dispense: 90 tablet; Refill: 1  - TSH, 3rd generation with Free T4 reflex; Future    5  Depression, recurrent ([facility] )  - stable by report  No changes made from d/c per paperwork reviewed though difficult to fully discern due to handwriting  No si/hi  6  Hospital discharge follow-up  - all paperwork reviewed  7  Suicidal ideation  - no longer      8  Chronic bilateral thoracic back pain  - tylenol  Exacerbated 2/2 laying on a flat bed  Has known chronic pain with h/o compression fracture and sleeps in a reclined position at home  - acetaminophen (TYLENOL) 650 mg CR tablet; Take 1 tablet (650 mg total) by mouth every 8 (eight) hours as needed for mild pain or moderate pain  Dispense: 90 tablet; Refill: 0    RTC in 8w, get labs in ~ 6w  ED for acute worsening    Patient/Caretaker verbalized understanding and were in agreement with today's assessment and plan  Time was taken to address any questions patient/caretaker had  Indication/Risks/Benefits of medication(s) as prescribed were discussed with the patient/caretaker  The patient verbalized understanding and agreement and elects to take medications as prescribed  Time was taken to answer any questions the patient/caretaker may have had  Subjective:     Patient ID: Malena Pack is a 59 y o  male  Admitted to Inpatient Psych 7/20-7/25  He has f/u outpt on Friday but this is in [place] - he will do this virtually but will try to have them get him something, closer  No medication changes to his knowledge  Did not have a good experience, here but this was in [place] and he was not comfortable with this  Does not have current si/hi  Gets very discouraged with his health  DM -- Sees Endocrinology  He is getting his eye and foot exams - has podiatrist (Dr John Agarwal) and also sees Dr Jf Lackey - ortho foot  He has Diabetic Ulcers  He uses a walker  Angelo Starch me he does not have f/u until November  Lab Results   Component Value Date    HGBA1C 8 5 (H) 04/29/2022     Hypothyroidism -- he is over treated  Endo has been following this though management has defaulted to me of late due to supratherapeutic nature  400 mcg/day was dose adjustment when last seen --- he has been taking his medication at 400 mcg/day  Takes as Rx  Pharmacy prepares his meds for him  He denies palpitations  Does admit to being anxious and depressed  No dizziness, cp, worsening sob/wiley  Patient is on board with cardiology  He has pAfib, uncontrolled NEAL, HTN, Chronic Diastolic HF, h/o PE  He is on eliquis and is compliant with regimen  He has a high CHADS2 score  He is on statin, BB, low dose ACEi, bid lasix  He will cont to see them, intervally        Pt has seizure disorder and is on Keppra  He tells me these came on later in life but cannot elaborate  He has a new Neurology appt but this was to be tomorrow but he had to cancel this due to being in the hospital   He plans to reschedule this  Has not had a seizure in many years  CKD -- on board with nephrology -- [address] Neph[facility] - this is secondary to diabetic nephropathy and benign hypertensive nephrosclerosis, and currently is at stage IIIA, with as baseline sCr of 1 2 to 1 4 mg/dL  He is on ACEi - low dose  Avoid NSAIDs and other nephrotoxic agents        Anemia in chronic kidney disease (Chronic) - iron deficient as well  Per nephrology note, Does not require A[place]IN at this time although since transferrin saturation is low at 16% will require IV Venofer  This is facilitated through Nephrology  Completed these weekly X 4 weeks        CRC screening  Dr Kristyn Yeboah  He is working to redo this and has the prep medication for this  He has had prior EGD as well  Denies dark, tarry stools, BRBPR      NEAL - he is not wearing his PAP, is having difficulty with this        ? COPD - does not smoke, no h/o smoking  Has not had PFTs  Is not on maintenance inhalers  If anything I worry about obesity hypoventilation syndrome (OHS)            ROS:  As per HPI, otherwise as noted       Objective:     Physical Exam  Vitals and nursing note reviewed  Constitutional:       Appearance: Normal appearance  He is obese  HENT:      Head: Normocephalic and atraumatic  Eyes:      Conjunctiva/sclera: Conjunctivae normal    Cardiovascular:      Rate and Rhythm: Normal rate and regular rhythm  Pulmonary:      Effort: Pulmonary effort is normal       Breath sounds: Normal breath sounds  Musculoskeletal:      Cervical back: Neck supple  Comments: Using a rollator  Antalgic gait     Skin:     General: Skin is warm and dry  Neurological:      General: No focal deficit present  Mental Status: He is alert and oriented to person, place, and time  Psychiatric:         Mood and Affect: Mood normal          Behavior: Behavior normal          Thought Content: Thought content normal          Judgment: Judgment normal            Vitals:    07/26/22 1003   BP: 134/60   BP Location: Right arm   Patient Position: Sitting   Cuff Size: Large   Pulse: 90   Resp: 18   Temp: 98 3 °F (36 8 °C)   TempSrc: Temporal   SpO2: 96%   Weight: (!) 166 kg (365 lb 9 6 oz)   Height: 5' 10" (1 778 m)       Transitional Care Management Review:  Gregorio Oconnell is a 59 y o  male here for TCM follow up       During the TCM phone call patient stated:    TCM Call     None      TCM Call     None          Saba Mckeon DO

## 2022-07-27 ENCOUNTER — TELEPHONE (OUTPATIENT)
Dept: FAMILY MEDICINE CLINIC | Facility: CLINIC | Age: 57
End: 2022-07-27

## 2022-07-27 DIAGNOSIS — L03.119 CELLULITIS OF LOWER EXTREMITY, UNSPECIFIED LATERALITY: Primary | ICD-10-CM

## 2022-07-27 NOTE — TELEPHONE ENCOUNTER
Pt called and said his wound has open back up on left foot he just notices when he was at the pharmacy  Pt  Will like to know what should he do

## 2022-07-27 NOTE — TELEPHONE ENCOUNTER
The patient needs to see wound care  Does he have them on board? If so, he is to phone them and get an appt    If not, I will need to do a referral       Alexandru Le, DO

## 2022-07-28 NOTE — TELEPHONE ENCOUNTER
Pt is aware he said he's going to give them a call and he was asking about an anabiotic for cellulitis

## 2022-07-29 ENCOUNTER — OFFICE VISIT (OUTPATIENT)
Dept: FAMILY MEDICINE CLINIC | Facility: CLINIC | Age: 57
End: 2022-07-29
Payer: COMMERCIAL

## 2022-07-29 ENCOUNTER — TELEPHONE (OUTPATIENT)
Dept: FAMILY MEDICINE CLINIC | Facility: CLINIC | Age: 57
End: 2022-07-29

## 2022-07-29 VITALS
OXYGEN SATURATION: 95 % | BODY MASS INDEX: 45.1 KG/M2 | WEIGHT: 315 LBS | SYSTOLIC BLOOD PRESSURE: 98 MMHG | TEMPERATURE: 97.7 F | RESPIRATION RATE: 18 BRPM | HEART RATE: 60 BPM | DIASTOLIC BLOOD PRESSURE: 60 MMHG | HEIGHT: 70 IN

## 2022-07-29 DIAGNOSIS — I10 ESSENTIAL HYPERTENSION: ICD-10-CM

## 2022-07-29 DIAGNOSIS — L97.929 CHRONIC VENOUS HYPERTENSION (IDIOPATHIC) WITH ULCER AND INFLAMMATION OF LEFT LOWER EXTREMITY (HCC): ICD-10-CM

## 2022-07-29 DIAGNOSIS — J44.1 COPD WITH ACUTE EXACERBATION (HCC): ICD-10-CM

## 2022-07-29 DIAGNOSIS — Z79.4 TYPE 2 DIABETES MELLITUS WITH HYPERGLYCEMIA, WITH LONG-TERM CURRENT USE OF INSULIN (HCC): ICD-10-CM

## 2022-07-29 DIAGNOSIS — E11.65 TYPE 2 DIABETES MELLITUS WITH HYPERGLYCEMIA, WITH LONG-TERM CURRENT USE OF INSULIN (HCC): ICD-10-CM

## 2022-07-29 DIAGNOSIS — I87.332 CHRONIC VENOUS HYPERTENSION (IDIOPATHIC) WITH ULCER AND INFLAMMATION OF LEFT LOWER EXTREMITY (HCC): ICD-10-CM

## 2022-07-29 DIAGNOSIS — Z86.718 HISTORY OF DVT (DEEP VEIN THROMBOSIS): ICD-10-CM

## 2022-07-29 DIAGNOSIS — E11.621 DIABETIC ULCER OF OTHER PART OF LEFT FOOT ASSOCIATED WITH TYPE 2 DIABETES MELLITUS, LIMITED TO BREAKDOWN OF SKIN (HCC): ICD-10-CM

## 2022-07-29 DIAGNOSIS — E11.621 DIABETIC ULCER OF LEFT HEEL ASSOCIATED WITH TYPE 2 DIABETES MELLITUS, LIMITED TO BREAKDOWN OF SKIN (HCC): Primary | ICD-10-CM

## 2022-07-29 DIAGNOSIS — L97.508 DIABETIC ULCER OF FOOT ASSOCIATED WITH TYPE 2 DIABETES MELLITUS, WITH OTHER ULCER SEVERITY, UNSPECIFIED LATERALITY, UNSPECIFIED PART OF FOOT (HCC): ICD-10-CM

## 2022-07-29 DIAGNOSIS — M86.9 OSTEOMYELITIS, UNSPECIFIED SITE, UNSPECIFIED TYPE (HCC): ICD-10-CM

## 2022-07-29 DIAGNOSIS — R39.11 BENIGN PROSTATIC HYPERPLASIA WITH URINARY HESITANCY: ICD-10-CM

## 2022-07-29 DIAGNOSIS — N40.1 BENIGN PROSTATIC HYPERPLASIA WITH URINARY HESITANCY: ICD-10-CM

## 2022-07-29 DIAGNOSIS — L97.421 DIABETIC ULCER OF LEFT HEEL ASSOCIATED WITH TYPE 2 DIABETES MELLITUS, LIMITED TO BREAKDOWN OF SKIN (HCC): Primary | ICD-10-CM

## 2022-07-29 DIAGNOSIS — E03.9 HYPOTHYROIDISM, UNSPECIFIED TYPE: ICD-10-CM

## 2022-07-29 DIAGNOSIS — I50.9 CONGESTIVE HEART FAILURE, UNSPECIFIED HF CHRONICITY, UNSPECIFIED HEART FAILURE TYPE (HCC): ICD-10-CM

## 2022-07-29 DIAGNOSIS — I48.91 ATRIAL FIBRILLATION, UNSPECIFIED TYPE (HCC): ICD-10-CM

## 2022-07-29 DIAGNOSIS — L03.116 CELLULITIS OF LEFT LOWER EXTREMITY: ICD-10-CM

## 2022-07-29 DIAGNOSIS — Z86.79 HISTORY OF HEART FAILURE: ICD-10-CM

## 2022-07-29 DIAGNOSIS — E11.42 DIABETIC PERIPHERAL NEUROPATHY (HCC): ICD-10-CM

## 2022-07-29 DIAGNOSIS — E66.01 MORBID OBESITY (HCC): ICD-10-CM

## 2022-07-29 DIAGNOSIS — L97.521 DIABETIC ULCER OF OTHER PART OF LEFT FOOT ASSOCIATED WITH TYPE 2 DIABETES MELLITUS, LIMITED TO BREAKDOWN OF SKIN (HCC): ICD-10-CM

## 2022-07-29 DIAGNOSIS — E11.621 DIABETIC ULCER OF FOOT ASSOCIATED WITH TYPE 2 DIABETES MELLITUS, WITH OTHER ULCER SEVERITY, UNSPECIFIED LATERALITY, UNSPECIFIED PART OF FOOT (HCC): ICD-10-CM

## 2022-07-29 DIAGNOSIS — R22.42 LOCALIZED SWELLING OF LEFT LOWER EXTREMITY: ICD-10-CM

## 2022-07-29 PROCEDURE — 3074F SYST BP LT 130 MM HG: CPT | Performed by: FAMILY MEDICINE

## 2022-07-29 PROCEDURE — 99215 OFFICE O/P EST HI 40 MIN: CPT | Performed by: FAMILY MEDICINE

## 2022-07-29 PROCEDURE — 3078F DIAST BP <80 MM HG: CPT | Performed by: FAMILY MEDICINE

## 2022-07-29 RX ORDER — LEVETIRACETAM 500 MG/1
TABLET ORAL
Qty: 180 TABLET | Refills: 0 | Status: SHIPPED | OUTPATIENT
Start: 2022-07-29 | End: 2022-10-06 | Stop reason: ALTCHOICE

## 2022-07-29 RX ORDER — DOXYCYCLINE HYCLATE 100 MG/1
CAPSULE ORAL
COMMUNITY
Start: 2022-04-26 | End: 2022-07-29 | Stop reason: ALTCHOICE

## 2022-07-29 RX ORDER — TIMOLOL MALEATE 5 MG/ML
1 SOLUTION/ DROPS OPHTHALMIC 2 TIMES DAILY
COMMUNITY
Start: 2022-07-05

## 2022-07-29 RX ORDER — CEPHALEXIN 500 MG/1
500 CAPSULE ORAL 4 TIMES DAILY
Qty: 40 CAPSULE | Refills: 0 | Status: SHIPPED | OUTPATIENT
Start: 2022-07-29 | End: 2022-07-29 | Stop reason: ALTCHOICE

## 2022-07-29 RX ORDER — SUVOREXANT 20 MG/1
TABLET, FILM COATED ORAL
COMMUNITY
Start: 2022-07-19 | End: 2022-08-09 | Stop reason: SDUPTHER

## 2022-07-29 NOTE — TELEPHONE ENCOUNTER
Called [facility] home care to make them aware   Referral and last OV faxed to f [phone]

## 2022-07-29 NOTE — TELEPHONE ENCOUNTER
Sent an antibiotic - keflex for patient  4 times/day to the pharmacy  If worsening, he needs to be seen, preferably by wound care given the significance of his disease        Flex Babb, DO

## 2022-07-29 NOTE — TELEPHONE ENCOUNTER
My referral does [facility] -- see referral shell  I placed nursing care for wound care  TY  It is ok to start on Monday!!      Question Answer Comment   Disciplines Requested:  Other (comment)     Nursing    Follow-up Provider PCP

## 2022-07-29 NOTE — TELEPHONE ENCOUNTER
Patient understood the imp of having Duplex  We attempted to get him this done, he is not able to make this  He will need to go to ED for any acute worsening LLE swelling/redness        Gianfranco Jackson DO

## 2022-07-29 NOTE — TELEPHONE ENCOUNTER
Joshua Villareal from [facility] called stating that the order for home health needs to say for nursing care  She also stated that they have no weekend availability, so the earliest they could see the patient would be Monday  They wanted to know if this would be ok? Pt has an appt with wound care on Monday   Phone # [phone]

## 2022-07-29 NOTE — PROGRESS NOTES
Assessment/Plan:    1  Diabetic ulcer of left heel associated with type 2 diabetes mellitus, limited to breakdown of skin ([facility] )  - he had already phoned Dr Chris Wisdom who put him on Bactrim  I Rx Keflex  Will go with Bactrim DS - cover MRSA  These were cleaned and dressed back today  Has Wound appt on Monday  [facility]/dressing [facility]  Has had this in the past   To keep clean and take Abx  ED for acute worsening  NO need for emergent care based on examination of cellulitis today  ? DVT however -- see below  - EXTERNAL Referral to Home Health; Future    2  Diabetic ulcer of other part of left foot associated with type 2 diabetes mellitus, limited to breakdown of skin ([facility] )  - EXTERNAL Referral to Home Health; Future    3  Cellulitis of left lower extremity  - this Is moderate in nature  He was Rx Bactrim and I emphasized the importance of him getting started on this ASAP  He should have STAT Duplex given pain in the calf and increased swelling and cellulitis  We tried for ~ 30 min to accommodate his needs and nothing worked for him  I emphasized the importance of ruling this out  He is on Eliquis and takes "most often" but was in psychiatric hospital and "not sure what they were giving me "  He misses doses of meds but on rare occasions  DVT should be ruled out given appearance  He acknowledges importance of having the Duplex and if not --> potential PE   ED precautions given for acute worsening     - EXTERNAL Referral to [address] [place] [address] [place]; Future  - VAS lower limb venous duplex study, unilateral/limited; Future    4  Type 2 diabetes mellitus with hyperglycemia, with long-term current use of insulin ([facility] )  Seen recently for this  He is on board with Endo  Poor control     - EXTERNAL Referral to Home Health; Future    5  Diabetic peripheral neuropathy ([facility] )  - complicating his case  - EXTERNAL Referral to Home Health; Future    6   Chronic venous hypertension (idiopathic) with ulcer and inflammation of left lower extremity ([facility] )  - see above  7  Localized swelling of left lower extremity  - VAS lower limb venous duplex study, unilateral/limited; Future    8  History of DVT (deep vein thrombosis)  - high risk  Is on eliquis  See above  - VAS lower limb venous duplex study, unilateral/limited; Future    9  Morbid obesity ([facility] )    10  Atrial fibrillation, unspecified type (HCC)  - VAS lower limb venous duplex study, unilateral/limited; Future    RTC as scheduled or sooner, otherwise  ED precautions given  Patient/Caretaker verbalized understanding and were in agreement with today's assessment and plan  Time was taken to address any questions patient/caretaker had  Indication/Risks/Benefits of medication(s) as prescribed were discussed with the patient/caretaker  The patient verbalized understanding and agreement and elects to take medications as prescribed  Time was taken to answer any questions the patient/caretaker may have had  Total time I spent caring for this patient on the day of the encounter - 40 minutes  0 minutes spent before the visit  25 minutes spent during the visit  15 minutes spent after the visit - documenting and trying to coordinate testing          Chief Complaint   Patient presents with    Edema     Left lower extremity swelling tender  No heat to area per pt  Right side swollen as well but not as much and not tender  Does have appt with wound care on Monday  Subjective:      Patient ID: Zackery Gilbert is a 59 y o  male  Here with acute on chronic LLE pain and swelling, redness/warmth and drainage  2 wounds on his L foot, One on the 1st Metatarsal head area and one on the heel  They are draining, foul smell  He has been trying to care for this on his own    He has been using Betadine and wrapped it this AM   No f/c/s to report, "I am surprised though because usually I do with this "  He tells me that the L calf hurts and points to behind the L knee  He has h/o DVT - on eliquis and has been taking this "most of the time "  He was in the psychiatric hospital and not sure what he was/was not getting  He phoned Dr Estrella Delacruz for this and has an appt on Monday  He sent in Bactrim for this; has not picked this up, yet  He is with baseline sob, walked here from the [facility], where he lives  Was doing well with this when he had [facility] wound care nurse  Has not had in a couple of months  The following portions of the patient's history were reviewed and updated as appropriate: allergies, current medications, past family history, past medical history, past social history, past surgical history and problem list     Review of Systems   All other systems reviewed and are negative  Objective:      BP 98/60   Pulse 60   Temp 97 7 °F (36 5 °C)   Resp 18   Ht 5' 10" (1 778 m)   Wt (!) 166 kg (365 lb) Comment: pt declined scale today  SpO2 95%   BMI 52 37 kg/m²          Physical Exam  Vitals and nursing note reviewed  Constitutional:       General: He is not in acute distress  Appearance: He is obese  He is not ill-appearing  HENT:      Head: Normocephalic and atraumatic  Cardiovascular:      Rate and Rhythm: Normal rate and regular rhythm  Pulmonary:      Effort: Pulmonary effort is normal    Musculoskeletal:      Comments: Antalgic gait; using rollator  Very slow to ambulate    Skin:     Comments: B/l LEs wrapped  Wearing soft shoes    LLE is unwrapped  There is a foul odor as unwrapping    There is very mild diabetic ulcer on the L dorsal metatarsal head area - limited to superficial breakdown  There is brown staining on this for betadine that he put on  There is foul odor  No surrounding erythema  There is mild, stage 1 heel ulcer - no surrounding erythema noted, brown staining  The L shin is with ~ 2+ pitting edema, serosanguinous drainage from the ant lower shin    The erythema extends to just below the L knee/popliteal space  The skin is hard but it is similar on the R  He tells me there is increased pain on the L  Neurological:      General: No focal deficit present  Mental Status: He is alert and oriented to person, place, and time     Psychiatric:      Comments: Flat affect, congruent with mood

## 2022-07-29 NOTE — TELEPHONE ENCOUNTER
Dr Harjeet Ceja had did and referral for pt for VAS lower limb venous duplex study, unilateral/limited and today 7/29/2022 I had called and made pt an appt at the [facility] at 3 pm and pt siad he will need an ride, so I had to call and cancel  Then pt told me that he has an appt at [facility] on 8/2/2022, so I called and tried to reschedule that and they had an appt the same day for him at 7 am and pt declined, because he said he will not be picked up until 8 am  So pt is aware that he is declining service at his own risk  I did make him aware

## 2022-07-29 NOTE — TELEPHONE ENCOUNTER
Called pt to let him know antibiotic was sent to the pharmacy  He stated that he thinks it is getting worse, and wants to be seen  I scheduled him an appt for today 07/29/22

## 2022-08-02 ENCOUNTER — TELEPHONE (OUTPATIENT)
Dept: FAMILY MEDICINE CLINIC | Facility: CLINIC | Age: 57
End: 2022-08-02

## 2022-08-02 NOTE — TELEPHONE ENCOUNTER
Received message from coworker that "[facility]" called stating pt is being admitted so we will need to cancel our home health referral for this pt  Unknown who from hospital called

## 2022-08-08 ENCOUNTER — TELEPHONE (OUTPATIENT)
Dept: FAMILY MEDICINE CLINIC | Facility: CLINIC | Age: 57
End: 2022-08-08

## 2022-08-08 NOTE — TELEPHONE ENCOUNTER
Pt called and said he's having real bad depression and when want to be refer to therapist and a psychiatrist, Pt did say he has an appt with you tomorrow if he can get those referrals during tomorrow visit

## 2022-08-09 ENCOUNTER — OFFICE VISIT (OUTPATIENT)
Dept: FAMILY MEDICINE CLINIC | Facility: CLINIC | Age: 57
End: 2022-08-09
Payer: COMMERCIAL

## 2022-08-09 VITALS
HEART RATE: 80 BPM | TEMPERATURE: 97.9 F | BODY MASS INDEX: 45.1 KG/M2 | OXYGEN SATURATION: 95 % | WEIGHT: 315 LBS | DIASTOLIC BLOOD PRESSURE: 80 MMHG | RESPIRATION RATE: 18 BRPM | HEIGHT: 70 IN | SYSTOLIC BLOOD PRESSURE: 132 MMHG

## 2022-08-09 DIAGNOSIS — J44.1 COPD WITH ACUTE EXACERBATION (HCC): ICD-10-CM

## 2022-08-09 DIAGNOSIS — E11.65 TYPE 2 DIABETES MELLITUS WITH HYPERGLYCEMIA, WITH LONG-TERM CURRENT USE OF INSULIN (HCC): ICD-10-CM

## 2022-08-09 DIAGNOSIS — F51.04 PSYCHOPHYSIOLOGICAL INSOMNIA: ICD-10-CM

## 2022-08-09 DIAGNOSIS — Z86.79 HISTORY OF HEART FAILURE: ICD-10-CM

## 2022-08-09 DIAGNOSIS — M86.9 OSTEOMYELITIS, UNSPECIFIED SITE, UNSPECIFIED TYPE (HCC): ICD-10-CM

## 2022-08-09 DIAGNOSIS — I48.91 ATRIAL FIBRILLATION, UNSPECIFIED TYPE (HCC): ICD-10-CM

## 2022-08-09 DIAGNOSIS — N40.1 BENIGN PROSTATIC HYPERPLASIA WITH URINARY HESITANCY: ICD-10-CM

## 2022-08-09 DIAGNOSIS — E66.01 MORBID OBESITY (HCC): ICD-10-CM

## 2022-08-09 DIAGNOSIS — L97.508 DIABETIC ULCER OF FOOT ASSOCIATED WITH TYPE 2 DIABETES MELLITUS, WITH OTHER ULCER SEVERITY, UNSPECIFIED LATERALITY, UNSPECIFIED PART OF FOOT (HCC): ICD-10-CM

## 2022-08-09 DIAGNOSIS — I87.332 CHRONIC VENOUS HYPERTENSION (IDIOPATHIC) WITH ULCER AND INFLAMMATION OF LEFT LOWER EXTREMITY (HCC): ICD-10-CM

## 2022-08-09 DIAGNOSIS — E03.9 HYPOTHYROIDISM, UNSPECIFIED TYPE: ICD-10-CM

## 2022-08-09 DIAGNOSIS — I10 ESSENTIAL HYPERTENSION: ICD-10-CM

## 2022-08-09 DIAGNOSIS — Z79.899 POLYPHARMACY: ICD-10-CM

## 2022-08-09 DIAGNOSIS — F41.9 ANXIETY: ICD-10-CM

## 2022-08-09 DIAGNOSIS — E03.9 ACQUIRED HYPOTHYROIDISM: ICD-10-CM

## 2022-08-09 DIAGNOSIS — E11.621 DIABETIC ULCER OF FOOT ASSOCIATED WITH TYPE 2 DIABETES MELLITUS, WITH OTHER ULCER SEVERITY, UNSPECIFIED LATERALITY, UNSPECIFIED PART OF FOOT (HCC): ICD-10-CM

## 2022-08-09 DIAGNOSIS — Z79.4 TYPE 2 DIABETES MELLITUS WITH HYPERGLYCEMIA, WITH LONG-TERM CURRENT USE OF INSULIN (HCC): ICD-10-CM

## 2022-08-09 DIAGNOSIS — Z11.4 SCREENING FOR HIV (HUMAN IMMUNODEFICIENCY VIRUS): ICD-10-CM

## 2022-08-09 DIAGNOSIS — L97.929 CHRONIC VENOUS HYPERTENSION (IDIOPATHIC) WITH ULCER AND INFLAMMATION OF LEFT LOWER EXTREMITY (HCC): ICD-10-CM

## 2022-08-09 DIAGNOSIS — R39.11 BENIGN PROSTATIC HYPERPLASIA WITH URINARY HESITANCY: ICD-10-CM

## 2022-08-09 DIAGNOSIS — I50.9 CONGESTIVE HEART FAILURE, UNSPECIFIED HF CHRONICITY, UNSPECIFIED HEART FAILURE TYPE (HCC): ICD-10-CM

## 2022-08-09 DIAGNOSIS — F33.2 SEVERE EPISODE OF RECURRENT MAJOR DEPRESSIVE DISORDER, WITHOUT PSYCHOTIC FEATURES (HCC): Primary | ICD-10-CM

## 2022-08-09 PROCEDURE — 3075F SYST BP GE 130 - 139MM HG: CPT | Performed by: FAMILY MEDICINE

## 2022-08-09 PROCEDURE — 3079F DIAST BP 80-89 MM HG: CPT | Performed by: FAMILY MEDICINE

## 2022-08-09 PROCEDURE — 99215 OFFICE O/P EST HI 40 MIN: CPT | Performed by: FAMILY MEDICINE

## 2022-08-09 RX ORDER — LANCETS 33 GAUGE
EACH MISCELLANEOUS
Qty: 400 EACH | Refills: 3 | Status: SHIPPED | OUTPATIENT
Start: 2022-08-09

## 2022-08-09 RX ORDER — CLONAZEPAM 1 MG/1
1 TABLET ORAL 2 TIMES DAILY
Qty: 60 TABLET | Refills: 0 | Status: SHIPPED | OUTPATIENT
Start: 2022-08-09 | End: 2022-10-06

## 2022-08-09 RX ORDER — INSULIN GLARGINE 100 [IU]/ML
56 INJECTION, SOLUTION SUBCUTANEOUS DAILY
Qty: 15 ML | Refills: 0 | Status: SHIPPED
Start: 2022-08-09 | End: 2022-09-02 | Stop reason: ALTCHOICE

## 2022-08-09 RX ORDER — SULFAMETHOXAZOLE AND TRIMETHOPRIM 800; 160 MG/1; MG/1
TABLET ORAL
COMMUNITY
Start: 2022-07-29 | End: 2022-08-09 | Stop reason: ALTCHOICE

## 2022-08-09 RX ORDER — SUVOREXANT 20 MG/1
1 TABLET, FILM COATED ORAL
Qty: 90 TABLET | Refills: 0
Start: 2022-08-09 | End: 2022-08-16 | Stop reason: SDUPTHER

## 2022-08-09 RX ORDER — BLOOD SUGAR DIAGNOSTIC
STRIP MISCELLANEOUS
Qty: 400 EACH | Refills: 3 | Status: SHIPPED | OUTPATIENT
Start: 2022-08-09

## 2022-08-09 RX ORDER — CITALOPRAM 20 MG/1
20 TABLET ORAL DAILY
Qty: 30 TABLET | Refills: 3 | Status: SHIPPED | OUTPATIENT
Start: 2022-08-09 | End: 2022-10-18 | Stop reason: DRUGHIGH

## 2022-08-09 NOTE — PROGRESS NOTES
Assessment/Plan:    1  Severe episode of recurrent major depressive disorder, without psychotic features ([facility] )  - pt had admission in June; was d/c to Psych in [place], unfortunately  We will attempt to get him plugged in around here with the understanding there are many barriers to this  He can only see physicians along the STS line -- has no other transportation  We will increase his Celexa  Unfortunately, came to me after Dr Eleuterio Castorena (neurology) retired and asked that I assume all meds  He was treating him for a plethora of conditions, some non neurological   He is on polypharmacy which further complicates things  Patient with poor healthcare literacy and relies on [facility] to prepare his pill packs - does not know what he takes and why  Will continue the klonopin bid as well  He is also on Lamictal - pt tells me he has seizures  Is on gabapentin and Keppra as well  He is also taking Belsomra at night  No current si/hi but CESAR/PHQ high  For si/hi --> ED     - Ambulatory Referral to Psychiatry; Future  - Ambulatory Referral to [facility]; Future  - citalopram (CeleXA) 20 mg tablet; Take 1 tablet (20 mg total) by mouth daily  Dispense: 30 tablet; Refill: 3    2  Anxiety  - see above  - Ambulatory Referral to Psychiatry; Future  - Ambulatory Referral to [facility]; Future  - citalopram (CeleXA) 20 mg tablet; Take 1 tablet (20 mg total) by mouth daily  Dispense: 30 tablet; Refill: 3    3  Type 2 diabetes mellitus with hyperglycemia, with long-term current use of insulin ([facility])  - follows with [facility] Dr Marlena Townsend - Endo  He does not see him, often  His TSH is poorly controlled - overtreated  We have been working with this, down to 350 mcg/day  Will cont current insulin/Ozempic dosing  No lows to report  He is on ACEi and statin    We did another referral for him, "I cannot go far; it has to be on STS route and when it is in [place] I am stuck there all day "    Lab Results Component Value Date    HGBA1C 7 8 (H) 07/30/2022   - insulin glargine (LANTUS) 100 units/mL subcutaneous injection; Inject 56 Units under the skin daily Rx by Endo  Dispense: 15 mL; Refill: 0  - glucose blood (OneTouch Verio) test strip; Check blood sugars four times daily  Please substitute with appropriate alternative as covered by patient's insurance  Dx: E11 65  Dispense: 400 each; Refill: 3  - OneTouch Delica Lancets 22O MISC; Check blood sugars four times daily  Please substitute with appropriate alternative as covered by patient's insurance  Dx: E11 65  Dispense: 400 each; Refill: 3  - Ambulatory Referral to Endocrinology; Future  - clonazePAM (KlonoPIN) 1 mg tablet; Take 1 tablet (1 mg total) by mouth 2 (two) times a day  Dispense: 60 tablet; Refill: 0    4  Atrial fibrillation, unspecified type [facility])  - sees cardiology  NSR today  On Eliquis  Does have diastolic dysfunction  5  History of heart failure  - see above  6  Diabetic ulcer of foot associated with type 2 diabetes mellitus, with other ulcer severity, unspecified laterality, unspecified part of foot ([facility] )  - follows with Podiatry for this  - clonazePAM (KlonoPIN) 1 mg tablet; Take 1 tablet (1 mg total) by mouth 2 (two) times a day  Dispense: 60 tablet; Refill: 0    7  Osteomyelitis, unspecified site, unspecified type ([facility] )      8  Chronic venous hypertension (idiopathic) with ulcer and inflammation of left lower extremity (HCC)    Morbid obesity ([facility] )  - continue to monitor     - clonazePAM (KlonoPIN) 1 mg tablet; Take 1 tablet (1 mg total) by mouth 2 (two) times a day  Dispense: 60 tablet; Refill: 0    12  Benign prostatic hyperplasia with urinary hesitancy  - taking meds  Essential hypertension  - stable  Discussed the importance of maintaining a healthy lifestyle through heart healthy diet and exercise  - clonazePAM (KlonoPIN) 1 mg tablet;  Take 1 tablet (1 mg total) by mouth 2 (two) times a day  Dispense: 60 tablet; Refill: 0    15  Polypharmacy  - will tap into our pharmacist again  Most of my visits with him are prolonged and are centered around him complaining about his meds/not knowing what he takes and why  I have spent time with him at prior visits discussing this  Many of these meds were Rx by a provider that he no longer sees that I have been expected to assume  He did have a f/u to see Neurology - apparent seizure disorder  This visit did not happen, "I need to reschedule this "  He is encouraged to do this  He is on many CNS depressing medications  At this point today, I again rectified his meds; we got a list from [facility] and I updated our list based on this  Patient is fairly new to me and has a lot of barriers to optimal wellness  We will attempt to continue to work with him to optimize his tx, pare down meds as we are able  His thyroid is poorly controlled - we are overtreating and this is further complicating things for him; antonietta in setting of h/o Afib/Anxiety and Depression  He understands this  He is also taking his thyroid meds with all other meds; this is not ideal!! Discussed this with him as well  I attempt to get him plugged in with specialty care -- it is not on STS line or "I get stuck in [employer] for 1/2 the day when I have appts there "    - Ambulatory referral to clinical pharmacy; Future    16  Psychophysiological insomnia  - Belsomra 20 MG TABS; Take 1 tablet (20 mg total) by mouth daily at bedtime  Dispense: 90 tablet; Refill: 0    17  Acquired hypothyroidism  - over treated  Adjusted his meds and has recheck for September  Will continue to adjust, accordingly  He understands the risk of being overtreated  - Ambulatory Referral to Endocrinology; Future  - TSH, 3rd generation with Free T4 reflex; Future    18  Screening for HIV (human immunodeficiency virus)  - HIV 1/2 Antigen/Antibody (4th Generation) w Reflex [facility]; Future    Keep appt on 9/21; recheck TSH    Check where he is with his CPAP  Has NEAL     Patient/Caretaker verbalized understanding and were in agreement with today's assessment and plan  Time was taken to address any questions patient/caretaker had  Indication/Risks/Benefits of medication(s) as prescribed were discussed with the patient/caretaker  The patient verbalized understanding and agreement and elects to take medications as prescribed  Time was taken to answer any questions the patient/caretaker may have had  Total time I spent caring for this patient on the day of the encounter - 60 minutes  5 minutes spent before the visit  45 minutes spent during the visit - med review, phoning pharmacy, writing down all meds for patients, explaining things to him  10 minutes spent after the visit - day of       Chief Complaint   Patient presents with    Follow-up     F/u after hospital admission unable to complete TCM as there is no TCM call   Depression     States "I hope someone can help me before it is too late"    Cellulitis     States this is still seeping  Sees wound care/general surgery tomorrow  Subjective:      Patient ID: David Rogers is a 59 y o  male  Cont to have depression  Unsure of meds and what/why  Did have psychiatry f/u after hospital stay for [place] [facility] in June, did not f/u on this, was televisit from [facility] , "I missed the call "  No active si today  See plan above  PHQ -- 19 - no si/hi  CESAR -- 10     Hypothyroidism - see plan above  DM - on insulin and ozempic  BG - some controlled, some not  Was seeing Dr Trisha Artis but does not have q 3 month visits with him; next visit in in Nov ???  OK with another referral for this but "I cannot go very far for this "      PAfib/Diastolic Dysfunction - sees cardiology  On eliquis  Has NEAL was not wearing his PAP as of last visit due to issues with the machine -- will need to check in f/u  Cellulitis/Diabetic neuropathy -- seeing Dr Dalia Rooney    Was hospitalized most recent for Cellulitis  This has improved  Cont to have wounds/dry skin/PVD  See Plan above for other things addressed at this visit  The following portions of the patient's history were reviewed and updated as appropriate: allergies, current medications, past family history, past medical history, past social history, past surgical history and problem list     Review of Systems      Objective:      /80   Pulse 80   Temp 97 9 °F (36 6 °C)   Resp 18   Ht 5' 10" (1 778 m)   Wt (!) 172 kg (378 lb 3 2 oz)   SpO2 95%   BMI 54 27 kg/m²          Physical Exam  Vitals and nursing note reviewed  Constitutional:       General: He is not in acute distress  Appearance: Normal appearance  He is obese  HENT:      Head: Normocephalic and atraumatic  Cardiovascular:      Rate and Rhythm: Normal rate and regular rhythm  Pulmonary:      Effort: Pulmonary effort is normal       Breath sounds: Normal breath sounds  Musculoskeletal:      Cervical back: Neck supple  Comments: Uses rollator  Chronic Venous stasis changes to LEs  LEs are wrapped  Swelling is improved today from prior visits  Skin:     General: Skin is warm and dry  Neurological:      General: No focal deficit present  Mental Status: He is alert and oriented to person, place, and time     Psychiatric:      Comments: Flat affect, congruent with mood

## 2022-08-10 ENCOUNTER — TELEPHONE (OUTPATIENT)
Dept: BEHAVIORAL/MENTAL HEALTH CLINIC | Facility: CLINIC | Age: 57
End: 2022-08-10

## 2022-08-11 ENCOUNTER — TELEPHONE (OUTPATIENT)
Dept: PSYCHIATRY | Facility: CLINIC | Age: 57
End: 2022-08-11

## 2022-08-15 ENCOUNTER — TELEPHONE (OUTPATIENT)
Dept: FAMILY MEDICINE CLINIC | Facility: CLINIC | Age: 57
End: 2022-08-15

## 2022-08-15 NOTE — TELEPHONE ENCOUNTER
Referral placed for polypharmacy and med rec by General Mckeon, DO      Patient doesn't know what he is taking or why he is on his medications  He uses [facility] who fills his medications packs  Asked them to fax his most recent med list to the office

## 2022-08-16 ENCOUNTER — TELEPHONE (OUTPATIENT)
Dept: PSYCHIATRY | Facility: CLINIC | Age: 57
End: 2022-08-16

## 2022-08-16 DIAGNOSIS — F51.04 PSYCHOPHYSIOLOGICAL INSOMNIA: ICD-10-CM

## 2022-08-16 RX ORDER — SUVOREXANT 20 MG/1
1 TABLET, FILM COATED ORAL
Qty: 90 TABLET | Refills: 0 | Status: SHIPPED | OUTPATIENT
Start: 2022-08-16 | End: 2022-09-21 | Stop reason: ALTCHOICE

## 2022-08-18 ENCOUNTER — TELEPHONE (OUTPATIENT)
Dept: FAMILY MEDICINE CLINIC | Facility: CLINIC | Age: 57
End: 2022-08-18

## 2022-08-18 NOTE — TELEPHONE ENCOUNTER
Called patient to provide medication reconciliation and education  Called earlier today and he asked that I call him back at 2 PM today  I scheduled for a phone follow up and called at 2 PM  No answer on phone  Left message to return call  Will attempt to call back at later time

## 2022-08-19 ENCOUNTER — TELEPHONE (OUTPATIENT)
Dept: FAMILY MEDICINE CLINIC | Facility: CLINIC | Age: 57
End: 2022-08-19

## 2022-08-19 NOTE — TELEPHONE ENCOUNTER
Pt called in wanted to let you know that he has to stay with Dr Anson Esposito for Endo because pt doesn't qualify for medical assistance so [facility]S wont take him to [place]

## 2022-08-22 DIAGNOSIS — Z86.79 HISTORY OF HEART FAILURE: ICD-10-CM

## 2022-08-22 DIAGNOSIS — R39.11 BENIGN PROSTATIC HYPERPLASIA WITH URINARY HESITANCY: ICD-10-CM

## 2022-08-22 DIAGNOSIS — E11.65 TYPE 2 DIABETES MELLITUS WITH HYPERGLYCEMIA, WITH LONG-TERM CURRENT USE OF INSULIN (HCC): ICD-10-CM

## 2022-08-22 DIAGNOSIS — I48.91 ATRIAL FIBRILLATION, UNSPECIFIED TYPE (HCC): ICD-10-CM

## 2022-08-22 DIAGNOSIS — I50.9 CONGESTIVE HEART FAILURE, UNSPECIFIED HF CHRONICITY, UNSPECIFIED HEART FAILURE TYPE (HCC): ICD-10-CM

## 2022-08-22 DIAGNOSIS — L97.929 CHRONIC VENOUS HYPERTENSION (IDIOPATHIC) WITH ULCER AND INFLAMMATION OF LEFT LOWER EXTREMITY (HCC): ICD-10-CM

## 2022-08-22 DIAGNOSIS — M86.9 OSTEOMYELITIS, UNSPECIFIED SITE, UNSPECIFIED TYPE (HCC): ICD-10-CM

## 2022-08-22 DIAGNOSIS — L97.508 DIABETIC ULCER OF FOOT ASSOCIATED WITH TYPE 2 DIABETES MELLITUS, WITH OTHER ULCER SEVERITY, UNSPECIFIED LATERALITY, UNSPECIFIED PART OF FOOT (HCC): ICD-10-CM

## 2022-08-22 DIAGNOSIS — E66.01 MORBID OBESITY (HCC): ICD-10-CM

## 2022-08-22 DIAGNOSIS — J44.1 COPD WITH ACUTE EXACERBATION (HCC): ICD-10-CM

## 2022-08-22 DIAGNOSIS — I10 ESSENTIAL HYPERTENSION: ICD-10-CM

## 2022-08-22 DIAGNOSIS — E03.9 HYPOTHYROIDISM, UNSPECIFIED TYPE: ICD-10-CM

## 2022-08-22 DIAGNOSIS — I87.332 CHRONIC VENOUS HYPERTENSION (IDIOPATHIC) WITH ULCER AND INFLAMMATION OF LEFT LOWER EXTREMITY (HCC): ICD-10-CM

## 2022-08-22 DIAGNOSIS — E11.621 DIABETIC ULCER OF FOOT ASSOCIATED WITH TYPE 2 DIABETES MELLITUS, WITH OTHER ULCER SEVERITY, UNSPECIFIED LATERALITY, UNSPECIFIED PART OF FOOT (HCC): ICD-10-CM

## 2022-08-22 DIAGNOSIS — N40.1 BENIGN PROSTATIC HYPERPLASIA WITH URINARY HESITANCY: ICD-10-CM

## 2022-08-22 DIAGNOSIS — Z79.4 TYPE 2 DIABETES MELLITUS WITH HYPERGLYCEMIA, WITH LONG-TERM CURRENT USE OF INSULIN (HCC): ICD-10-CM

## 2022-08-22 RX ORDER — PANTOPRAZOLE SODIUM 40 MG/1
TABLET, DELAYED RELEASE ORAL
Qty: 90 TABLET | Refills: 0 | Status: SHIPPED | OUTPATIENT
Start: 2022-08-22

## 2022-08-22 NOTE — TELEPHONE ENCOUNTER
Noted  He should call and get put on their list to see him sooner should their be a cancellation or we will manage his DM and Thyroid until he is seen    Piero Leader, DO

## 2022-08-23 ENCOUNTER — TELEPHONE (OUTPATIENT)
Dept: PSYCHIATRY | Facility: CLINIC | Age: 57
End: 2022-08-23

## 2022-08-25 ENCOUNTER — TELEPHONE (OUTPATIENT)
Dept: FAMILY MEDICINE CLINIC | Facility: CLINIC | Age: 57
End: 2022-08-25

## 2022-08-25 DIAGNOSIS — K58.9 IRRITABLE BOWEL SYNDROME, UNSPECIFIED TYPE: Primary | ICD-10-CM

## 2022-08-25 RX ORDER — LINACLOTIDE 290 UG/1
1 CAPSULE, GELATIN COATED ORAL DAILY
Qty: 90 CAPSULE | Refills: 1 | Status: SHIPPED | OUTPATIENT
Start: 2022-08-25

## 2022-08-25 NOTE — TELEPHONE ENCOUNTER
Pt called back with blood sugars  He states he has not been taking them the same times every day because he forgets    08/22 - breakfast 105 / dinner 111  08/23 - lunch 196 / supper 103  08/24 - morning 151  08/25 - lunch 209

## 2022-08-29 ENCOUNTER — SOCIAL WORK (OUTPATIENT)
Dept: BEHAVIORAL/MENTAL HEALTH CLINIC | Facility: CLINIC | Age: 57
End: 2022-08-29
Payer: COMMERCIAL

## 2022-08-29 ENCOUNTER — DOCUMENTATION (OUTPATIENT)
Dept: BEHAVIORAL/MENTAL HEALTH CLINIC | Facility: CLINIC | Age: 57
End: 2022-08-29

## 2022-08-29 DIAGNOSIS — F25.0 SCHIZOAFFECTIVE DISORDER, BIPOLAR TYPE (HCC): Primary | ICD-10-CM

## 2022-08-29 PROCEDURE — 90791 PSYCH DIAGNOSTIC EVALUATION: CPT | Performed by: SOCIAL WORKER

## 2022-08-29 PROCEDURE — 90837 PSYTX W PT 60 MINUTES: CPT | Performed by: SOCIAL WORKER

## 2022-08-29 NOTE — PSYCH
Psychotherapy Provided: Individual Psychotherapy 60  minutes 09:40 Am to 11:30 Am          Goals addressed in session: (intake) Met with the Client who reported an extensive history of MH services, starting on and off at the age of 19  The Client was referred by his PCP Dr Brijesh Pate for Mental health therapy  During the session we created the Client's intake, and conducted screenings on depression, anxiety, D&A, trauma and suicide  It is hoped that by addressing his weaknesses this will act a preventive a measure against the possible need for higher level of care and services  Interventions: Supportive Therapy, Strengths Therapy,  and Cognitive Behavioral Therapy where the treatment modalities utilized during the session  Assessment and Plan: The Client presented a depressed anxious mood that was congruent in effect  He was alert, goal directed and participated with prompts during the session  The Client was oriented to person, place, time, situation, and reported no suicidal/homicidal ideation, plan, or intent  As team we created the Client's recovery treatment plan, and conducted screenings on depression using the PHQ-9 with the Client scoring in the moderate severe depression severity range  On the anxiety screening CESAR-7 the Client scored in the moderate severity  Range  He had a negative screening for D&A on the KENNEDY and AUDIT-C, and also had a negative screening for Trauma on the PCL-5  The Client had a negative screening for suicide at the present time but did have a recent suicide attempt and was hospitalize  At the present time the Client is in the moderate suicide severity screening range  The Client was an active team member during the session  As his home commitment the client will complete worksheet on goals for creation of his recovery treatment plan   Next appointment was set for 2 weeks, due to the Client's schedule    Pain:      [facility] MENTAL STATUS PAIN :5 as reported by the client PHYSICAL PAIN SCALE NUMBERS:5 as reported by the Client     Current suicide risk : medium Phone number for [facility] was given  to the Client [phone]  The Client was given phone number for the [facility] [phone]  Phone number for [address] [place] [address] was given to the [address]: Diagnosis and Treatment Plan explained to June Zarate, June Zarate  relates understanding diagnosis and is agreeable to Treatment Plan  Yes

## 2022-08-29 NOTE — PSYCH
Assessment/Plan: The  Client will be seen bi-monthly to addresses his relationship and environmental weakness  During the session the therapeutic interventions that will be used, but not limited to will be Supportive Therapy, Strengths Therapy, Solutions Focused and Cognitive Behavioral Therapy  It is hoped that by addressing her weaknesses this will prevent the need for higher level of care and services  Diagnoses and all orders for this visit:    Schizoaffective disorder, bipolar type ([facility] )          Subjective:      Patient ID: Rosalie Gimenez is a 59 y o  male, who was referred by his PCP Dr Moi Trejo is seeking services due to increased problems within his relationships and environments  He reported during the session delusions/ false fixed beliefs, symptoms of feeling off or empty, sad and worthless, with other periods of increase in energy and behavior that is out of character  The Clients communication pattern are impaired, with often times giving partial answers or answers that are  completely unrelated, he has problems managing personal care including cleanliness and physical appearance  These issues have cause clinical significance distress or impairment in his occupational, social, and other important areas of functioning  Due to these symptoms The client was given the diagnosis of schizoaffective disorder bipolar type  (F25 0)    HPI:     Pre-morbid level of function and History of Present Illness: the Client's depression started for many years, but would come and go before  He reported that he has been hearing voices and seeing individual for 3 years  Previous Psychiatric/psychological treatment/year: The Client was [facility] 2 years  [facility]  He was 302 4 years ago that he had thought about killing himself, and recently he was hospitalized with an initial 36 which turned into a 201   The client cut himself with a meat rula He was hospitalized [address] for 7 day in  2022 He was hospitalized  3 times 2x for [facility] in [place]  (3 years ago)   Current Psychiatrist/Therapist: as of this writing  Susana Cohen MSW LCSW at [place] [address]  Outpatient and/or Partial and Other Community Resources Used (CTT, I[facility], [place]): The CLient is open with [address] and his SW through this program is [facility] for supports coordination starting on 22      Problem Assessment:     SOCIAL/VOCATION:  Family Constellation (include parents, relationship with each and pertinent Psych/Medical History):     Family History   Problem Relation Age of Onset    Cancer Mother     Hypertension Mother     Heart disease Father     Hypertension Father     Diabetes Sister     Cancer Maternal Grandmother        Mother: Lizet() " Great relationship" No MH or D&A issues  Father: Sil Curran () "Good Relationship up until his last year last year relationship decreased" No MH or D&A  Sibling: Obed Wadelis " Brother sister it is on and off" "Slow Learner" No D&A   Sibling: [place] Edith " Okay " No Mh or D&A  Sibling: Regan Fret " Does not communicate" History of Depression no D&A issues  Sibling: Igor Lightky " brother sister" No MH or D&A  Sibling Su Reed " Had a bad falling out have not seen in 40 years" Depression no D&A issues  Sil Curran relates best to [place]  he does live alone  Domestic Violence: No past history of domestic violence    Additional Comments related to family/relationships/peer support: The client lives in the [facility], and he has a supportive relationships with his peers from his high rise, and from his sister Obed Hickey  School or Work History (strengths/limitations/needs): [id number], no other schooling after  He worked mainly in [employer], in competitive employment  Her highest grade level achieved was 12 th grade        history include None reported    Financial status includes the Client presently gets SSDI in the amount 1400     LEISURE ASSESSMENT (Include past and present hobbies/interests and level of involvement (Ex: Group/Club Affiliations): Watching Video's  his primary language is"English" Preferred language is "EnglishEthnic considerations are White Non-   Religions affiliations and level of involvement Anabaptist    Does spirituality help you cope? yes    FUNCTIONAL STATUS: There has been a recent change in Pamela Ross ability to do the following: The CLient has a been having problems with his basic care, due to long term physical health issues  He reports that they are looking into getting into aid with the help of [facility] home health  Level of Assistance Needed/By Whom?: nobody at the present time the Client is looking into getting an aide    Pamela Ross learns best by  listening and demonstration    SUBSTANCE ABUSE ASSESSMENT: no substance abuse    Substance/Route/Age/Amount/Frequency/Last Use: none    DETOX HISTORY: none    Previous detox/rehab treatment: none    HEALTH ASSESSMENT: no referral to PCP needed Sees Dr Snowden Plate: none      Risk Assessment:   The following ratings are based on my interview(s) with The aguirre and competion of the [facility] suicide screening       Risk of Harm to Self:   Demographic risk factors include , lowest socioeconomic class, never  or  status and male  Historical Risk Factors include chronic psychiatric problems and history of suicidal behaviors/attempts  Recent Specific Risk Factors include feelings of guilt or self blame, worries about finances or work, chronic pain or health problems and diagnosis of depression   Additional Factors for a Child or Adolescent N/A    Risk of Harm to Others:   Demographic Risk Factors include male, unemployed and lower intelligence   Historical Risk Factors include victim of childhood bullying  Recent Specific Risk Factors include multiple stressors    Access to Weapons:   Edwina Morejon has access to the following weapons: none  The following steps have been taken to ensure weapons are properly secured: The Client reports no weapons  Based on the above information, the client presents the following risk of harm to self or others:  medium    The following interventions are recommended:   referral to [facility]    Notes regarding this Risk Assessment: as of this writing Phone number for [facility] was given  to the Client [phone]  The Client was given phone number for the [facility] [phone]     Phone number for [address] [place] [address] was given to the Client/988          Review Of Systems:Mental status:  Appearance calm and cooperative , poor hygiene /disheveled and good eye contact    Mood depressed, anxious and mood appropriate   Affect affect appropriate    Speech a normal rate   Thought Processes poverty of thought was observed   Hallucinations The CLient reported visual halucinations and audio over the past 3 years    Thought Content no delusions/reproted   Abnormal Thoughts no suicidal thoughts  and no homicidal thoughts at the present time   Orientation  oriented to person, oriented to place and oriented to time   Remote Memory short term memory intact and long term memory impaired   Attention Span concentration intact   Intellect Appears to be of [place] of Knowledge adequate fund of knowledge regarding past history and adequate fund of knowledge regarding vocabulary    Insight Limited insight   Judgement judgment was limited   Muscle Strength Abnormal gait   Language no difficulty naming common objects, no difficulty repeating a phrase  and no difficulty writing a sentence    Pain 5   Pain Scale 5

## 2022-08-29 NOTE — PROGRESS NOTES
08/29/22 [facility], and [facility] home health was contacted to notify them that the Client is open for [address] therapy services  [facility]  was notified as well notifying them that the Client is open for services

## 2022-09-01 ENCOUNTER — CLINICAL SUPPORT (OUTPATIENT)
Dept: FAMILY MEDICINE CLINIC | Facility: CLINIC | Age: 57
End: 2022-09-01

## 2022-09-01 DIAGNOSIS — E11.65 TYPE 2 DIABETES MELLITUS WITH HYPERGLYCEMIA, WITH LONG-TERM CURRENT USE OF INSULIN (HCC): Primary | ICD-10-CM

## 2022-09-01 DIAGNOSIS — Z79.899 POLYPHARMACY: ICD-10-CM

## 2022-09-01 DIAGNOSIS — Z79.4 TYPE 2 DIABETES MELLITUS WITH HYPERGLYCEMIA, WITH LONG-TERM CURRENT USE OF INSULIN (HCC): Primary | ICD-10-CM

## 2022-09-01 DIAGNOSIS — T78.40XS ALLERGY, SEQUELA: ICD-10-CM

## 2022-09-01 NOTE — PROGRESS NOTES
[address] [facility] Carlos Hoang, PharmD, BCACP    Sari Mars is a 59 y o  male who was referred to the pharmacist for medication reconciliation and education, referred by Dr Norma Apple  Sari Mars did present with all prescribed and OTC medications for medication review  Patient gets his medications blister packed by [id number]  Medications were reconciled with list the [facility] had faxed to our office in addition to the medication pack that lists all drug names and doses on it  Assessment/ Plan     Medication Reconciliation:  · Patient does not have a good understanding of his medications and their indications  · Patient stated several times that he does not know why he is on each medication  I am going to mail him his most recent medication list along with indications listed with each medication so he knows why he is taking them  I will call him back in 2 weeks to review this list again  He would benefit from de-prescribing of his medications, however per my discussion with patient he is reluctant to this  Current Medication Problem Identified Recommendation for Resolution   Lantus vials Pt states it is hard to draw up lantus in vial due to tremors  He has novolog pen which is easier for him  Switch Lantus vial to insulin pen    Motelukast Prescribed for COPD  Montelukast not indicated for COPD  Additionally, it has various neuropsychiatric adverse reactions  Will discuss with PCP  Lamotrigine, gabapentin, levetiracetam, amitriptyline, ropinerole, clonazepam, Belsoma  Multiple CNS depressing agents Per discussion today patient reluactant to the idea of deprescribing medications  Stated that he has no idea why he is on several medications, but also stated that he needs all of the medications   I am mailing him a medication list, which will help facilitate discussion for indications for his medications and highlight how he is on several medications for the same or similar indications  Follow-Up: in 2 weeks    Subjective   Medication list reviewed with patient, reports the following discrepancies/problems:    Diabetes   insulin aspart 26 units three times daily with meals (121- 140 +1 unit; 141- 160 +2)    insulin glargine 56 units daily- may miss sometimes but isn't sure how much   Ozempic 1 mg once weekly (Mondays)    OneTouch Delica Lancets 48J Misc   OneTouch Verio StrpBlood sugar readings     Before meals: all over the place      Hypothyroidism   · Levothyroxine 300 mcg + 50 mcg daily    COPD  · Albuterol   · Montelukast ?    HTN / CHF:  · Furosemide 40 mg daily  · Metoprolol tartrate 25 mg BID  · Potassium 20 mEq BID     HLD  · Atorvastatin 40 mg daily      Neuropathy pain (managed previously by neurologist Dr Zelda Bruce who retired)   · Amitriptyline 25 mg daily  · Gabapentin 600 mg Q HS   · Citalopram 20 mg daily     Sleep (managed previously by neurologist Dr Zelda Bruce who retired)   · Belsomra 20 mg daily      Seizure disorder/ Restless legs / signs of parkinsonism (managed previously by neurologist Dr Zelda Bruce who retired)   · Ropinerole 4 mg QID  · Clonazepam 1 mg - 1 tabs BID  · Lamotrigine 25 mg BID  · Levetiracetam 500 mg BID      GI- IBS-C & Gastritis (managed by GI):  · Linzess 290 mcg  · Pantoprazole 40 mg daily   · Senna 8 6 BID   · Docusate 100 mg   · Follows with GI     BPH:  · tamsulosin 0 4 mg daily      Other/PRN:  · Magnesium oxide 400 mg BID  · Zyprobi (probiotic)   · Eye drops      Misses doses:  no  # of missed doses in the past week: 0    # of times per day patient takes meds: 3    Use of supportive adherence tools:Yes, describe: pill packing at pharmacy    Med cost concerns: no      Objective     BP Readings from Last 3 Encounters:   08/09/22 132/80   07/29/22 98/60   07/26/22 134/60       Pulse Readings from Last 3 Encounters:   08/09/22 80   07/29/22 60   07/26/22 90       Lab Results   Component Value Date SODIUM 138 07/23/2016    K 3 8 07/23/2016     07/23/2016    CO2 30 07/23/2016    BUN 21 07/23/2016    CREATININE 1 33 (H) 07/23/2016    GLUC 236 (H) 07/23/2016    CALCIUM 8 6 07/23/2016         Education Provided     Pharmacist Tracking Tool  Reason For Outreach: Embedded Pharmacist  Demographics:  Intervention Method: Phone  Type of Intervention: Follow-Up  Topics Addressed: Polypharmacy  Pharmacologic Interventions: Medication Discontinuation and Medication Conversion  Non-Pharmacologic Interventions: Adherence addressed, Care coordination, Disease state education, Medication Monitoring and Medication/Device education  Time:  Direct Patient Care: 60 mins  Care Coordination: 30 mins  Recommendation Recipient: Patient/Caregiver and Provider  Outcome: Partially Accepted

## 2022-09-01 NOTE — Clinical Note
I am working with Stef Fuentes to help him better understand why he is on his medications  I am creating a med list for him that includes the indications and prescribers of the medications for him  I will review it with him again in 2 weeks  I did pend an order to switch Lanuts from vials to pens  He has a hard time drawing up the insulin vial due to his tremors  Please sign pended rx if you agree with plan  He is also reluctant at this time to stop medications  He states he does not know what they are for, but when discussing de-prescribing some of the CNS depressing meds, he is also very reluctant  One medication that I think would be beneficial at this time to de-prescribe would be the montelukast  It looks like the indication in his chart is for COPD, however it is not typically prescribed for that  In addition it can cause neuropsychiatric adverse reactions, therefore at this time the risks seem to outweigh benefits  Please let me know if you are in agreement with this

## 2022-09-02 RX ORDER — INSULIN GLARGINE 100 [IU]/ML
56 INJECTION, SOLUTION SUBCUTANEOUS DAILY
Qty: 60 ML | Refills: 0 | Status: SHIPPED | OUTPATIENT
Start: 2022-09-02

## 2022-09-08 RX ORDER — CETIRIZINE HYDROCHLORIDE 10 MG/1
10 TABLET ORAL DAILY
Qty: 90 TABLET | Refills: 0 | Status: SHIPPED | OUTPATIENT
Start: 2022-09-08

## 2022-09-09 ENCOUNTER — TELEPHONE (OUTPATIENT)
Dept: FAMILY MEDICINE CLINIC | Facility: CLINIC | Age: 57
End: 2022-09-09

## 2022-09-09 NOTE — TELEPHONE ENCOUNTER
Called [facility] drug to confirm that [facility] was discontinued and zyrtec was added to most recent med back  Pharmacist at [address] [place] [address] confirmed this change was made

## 2022-09-12 ENCOUNTER — TELEPHONE (OUTPATIENT)
Dept: FAMILY MEDICINE CLINIC | Facility: CLINIC | Age: 57
End: 2022-09-12

## 2022-09-12 DIAGNOSIS — I48.91 ATRIAL FIBRILLATION, UNSPECIFIED TYPE (HCC): ICD-10-CM

## 2022-09-12 RX ORDER — APIXABAN 5 MG/1
TABLET, FILM COATED ORAL
Qty: 60 TABLET | Refills: 0 | Status: SHIPPED | OUTPATIENT
Start: 2022-09-12

## 2022-09-12 NOTE — TELEPHONE ENCOUNTER
Patient called was under the impression you were to have a virtual appointment today at 10:00AM   Can you please call him and reschedule [phone]

## 2022-09-15 ENCOUNTER — CLINICAL SUPPORT (OUTPATIENT)
Dept: FAMILY MEDICINE CLINIC | Facility: CLINIC | Age: 57
End: 2022-09-15

## 2022-09-15 ENCOUNTER — PATIENT OUTREACH (OUTPATIENT)
Dept: FAMILY MEDICINE CLINIC | Facility: CLINIC | Age: 57
End: 2022-09-15

## 2022-09-15 DIAGNOSIS — E11.65 TYPE 2 DIABETES MELLITUS WITH HYPERGLYCEMIA, WITH LONG-TERM CURRENT USE OF INSULIN (HCC): Primary | ICD-10-CM

## 2022-09-15 DIAGNOSIS — Z79.4 TYPE 2 DIABETES MELLITUS WITH HYPERGLYCEMIA, WITH LONG-TERM CURRENT USE OF INSULIN (HCC): Primary | ICD-10-CM

## 2022-09-15 NOTE — PROGRESS NOTES
[address] [facility]  Jocy Cool, PharmD, BCACP    June Zarate is a 59 y o  male who was referred to the pharmacist for medication reconciliation and education, referred by Dr Kimberlee Elam  Telemedicine consent  The patient was identified by name and date of birth  June Zarate was informed that this is a telemedicine visit and that the visit is being conducted through Telephone  My office door was closed  No one else was in the room  He acknowledged consent and understanding of privacy and security of the video platform  The patient has agreed to participate and understands they can discontinue the visit at any time  Assessment/ Plan     Medication Reconciliation:  Reviewed medication list that I mailed to him in-depth today  Went over each medication along with indication for use  Plan:  · Improve compliance with insulin doses and blood sugar checks  Mailed him a daily check list to track his readings and insulin injections  · Referral placed to care management for coordination of specialists and transportation in addition to medical bills  Follow-Up: in 2 weeks    Subjective   Medication list reviewed with patient, reports the following discrepancies/problems:    Diabetes   insulin aspart 26 units three times daily with meals (121- 140 +1 unit; 141- 160 +2)    insulin glargine 56 units daily at dinner   Ozempic 1 mg once weekly (Mondays)    OneTouch Delica Lancets 45E Misc   OneTouch Verio StrpBlood sugar readings     Patient admits to non-adherence to both Novolog and Lantus  Patient states he does not want to set alarms on his phone because he does not know how to use his cell phone  He was agreeable to keeping track of taking his insulin using a daily check list  He will keep it on his kitchen table to remind himself when eating meals       Hypothyroidism   · Levothyroxine 300 mcg + 50 mcg daily    COPD  · Albuterol   · Montelukast - discontinued 9/9/22 due to potential for neuropsych side effects    Allergy   · Cetirizine 10 mg- started in place of montelukast     HTN / CHF:  · Furosemide 40 mg BID  · Metoprolol tartrate 25 mg BID  · Potassium 20 mEq BID     HLD  · Atorvastatin 40 mg daily      Neuropathy pain (managed previously by neurologist Dr Edi Vega who retired)   · Amitriptyline 25 mg daily  · Gabapentin 600 mg Q HS   · Citalopram 20 mg daily     Sleep (managed previously by neurologist Dr Edi Vega who retired)   · Belsomra 20 mg daily      Seizure disorder/ Restless legs / signs of parkinsonism (managed previously by neurologist Dr Edi Vega who retired)   · Ropinerole 4 mg QID  · Clonazepam 1 mg - 1 tabs BID  · Lamotrigine 25 mg BID  · Levetiracetam 500 mg BID      GI- IBS-C & Gastritis (managed by GI):  · Linzess 290 mcg  · Pantoprazole 40 mg daily   · Senna 8 6 BID   · Docusate 100 mg   · Follows with GI     BPH:  · tamsulosin 0 4 mg daily      Other/PRN:  · Magnesium oxide 400 mg BID  · Zyprobi (probiotic)   · Eye drops      Misses doses:  no  # of missed doses in the past week: estimates 4- 6  Patient states he cannot give a good estimate of missed doses because he cant remember if and when he forgets his meds  More common with his insulin since his oral meds are blister packed     # of times per day patient takes meds: 4    Use of supportive adherence tools:Yes, describe: pill packing at pharmacy    Med cost concerns: no; does report medical bill issues from his last hospitalization     Takes medications at mid-night but sometimes doesn't go to bed until later than that  Irregular sleep and eating schedule      Objective     BP Readings from Last 3 Encounters:   08/09/22 132/80   07/29/22 98/60   07/26/22 134/60       Pulse Readings from Last 3 Encounters:   08/09/22 80   07/29/22 60   07/26/22 90       Lab Results   Component Value Date    SODIUM 138 07/23/2016    K 3 8 07/23/2016     07/23/2016    CO2 30 07/23/2016    BUN 21 07/23/2016 CREATININE 1 33 (H) 07/23/2016    GLUC 236 (H) 07/23/2016    CALCIUM 8 6 07/23/2016       Blood Glucose Readings  The patient is currently checking blood glucose 1-3 times per day  Patient reports with SMBG logs  Date AM Lunch Dinner   9/5 273     9/6 231     9/7 167 156    9/8   252   9/9  279 121   9/10 247     9/11 131  204   9/12 165 177    9/13 199     9/14  286 127   9/15 258     Avg 209 237 176     Hypoglycemia: The patient had 0 episodes/symptoms of hypoglycemia       Education Provided     Pharmacist Tracking Tool  Reason For Outreach: Embedded Pharmacist  Demographics:  Intervention Method: Phone  Type of Intervention: Follow-Up  Topics Addressed: Polypharmacy  Pharmacologic Interventions: Med Rec  Non-Pharmacologic Interventions: Adherence addressed, Care coordination, Disease state education, Medication Monitoring and Medication/Device education  Time:  Direct Patient Care: 60 mins  Care Coordination: 30 mins  Recommendation Recipient: Patient/Caregiver  Outcome: Partially Accepted

## 2022-09-15 NOTE — PROGRESS NOTES
OP CM called to pt in regards to consult from [employer]  Pt states he goes to the PCP in [place]  Pt already has [employer]  Pt also has a  who is already following him from home care and will be out again tomorrow  Pt also referred to [facility] for additional services  Pt also open to OP  at [address]  Will let  who covers Dee know this pt already has a  in the community

## 2022-09-19 DIAGNOSIS — E61.2 MAGNESIUM DEFICIENCY: ICD-10-CM

## 2022-09-20 RX ORDER — LANOLIN ALCOHOL/MO/W.PET/CERES
CREAM (GRAM) TOPICAL
Qty: 60 TABLET | Refills: 0 | Status: SHIPPED | OUTPATIENT
Start: 2022-09-20

## 2022-09-21 ENCOUNTER — OFFICE VISIT (OUTPATIENT)
Dept: FAMILY MEDICINE CLINIC | Facility: CLINIC | Age: 57
End: 2022-09-21
Payer: COMMERCIAL

## 2022-09-21 VITALS
TEMPERATURE: 97.8 F | SYSTOLIC BLOOD PRESSURE: 128 MMHG | RESPIRATION RATE: 20 BRPM | HEART RATE: 63 BPM | DIASTOLIC BLOOD PRESSURE: 56 MMHG | WEIGHT: 315 LBS | HEIGHT: 70 IN | OXYGEN SATURATION: 97 % | BODY MASS INDEX: 45.1 KG/M2

## 2022-09-21 DIAGNOSIS — Z79.899 POLYPHARMACY: ICD-10-CM

## 2022-09-21 DIAGNOSIS — E66.01 MORBID OBESITY (HCC): ICD-10-CM

## 2022-09-21 DIAGNOSIS — L97.929 CHRONIC VENOUS HYPERTENSION (IDIOPATHIC) WITH ULCER AND INFLAMMATION OF LEFT LOWER EXTREMITY (HCC): ICD-10-CM

## 2022-09-21 DIAGNOSIS — F51.04 PSYCHOPHYSIOLOGICAL INSOMNIA: ICD-10-CM

## 2022-09-21 DIAGNOSIS — I10 ESSENTIAL HYPERTENSION: ICD-10-CM

## 2022-09-21 DIAGNOSIS — Z79.4 TYPE 2 DIABETES MELLITUS WITH HYPERGLYCEMIA, WITH LONG-TERM CURRENT USE OF INSULIN (HCC): Primary | ICD-10-CM

## 2022-09-21 DIAGNOSIS — E11.65 TYPE 2 DIABETES MELLITUS WITH HYPERGLYCEMIA, WITH LONG-TERM CURRENT USE OF INSULIN (HCC): Primary | ICD-10-CM

## 2022-09-21 DIAGNOSIS — F33.2 SEVERE EPISODE OF RECURRENT MAJOR DEPRESSIVE DISORDER, WITHOUT PSYCHOTIC FEATURES (HCC): ICD-10-CM

## 2022-09-21 DIAGNOSIS — I87.332 CHRONIC VENOUS HYPERTENSION (IDIOPATHIC) WITH ULCER AND INFLAMMATION OF LEFT LOWER EXTREMITY (HCC): ICD-10-CM

## 2022-09-21 DIAGNOSIS — F41.9 ANXIETY: ICD-10-CM

## 2022-09-21 DIAGNOSIS — E03.9 ACQUIRED HYPOTHYROIDISM: ICD-10-CM

## 2022-09-21 PROCEDURE — 3078F DIAST BP <80 MM HG: CPT | Performed by: FAMILY MEDICINE

## 2022-09-21 PROCEDURE — 99215 OFFICE O/P EST HI 40 MIN: CPT | Performed by: FAMILY MEDICINE

## 2022-09-21 PROCEDURE — 3074F SYST BP LT 130 MM HG: CPT | Performed by: FAMILY MEDICINE

## 2022-09-21 NOTE — PATIENT INSTRUCTIONS
Neurology in [place] -- Phone: [phone] - Dr Maxine Dumont -- call for your Neurology appt

## 2022-09-21 NOTE — PROGRESS NOTES
Name: Oneyda Mak      : 1965      MRN: [id number]  Encounter Provider: Stanley Osman DO  Encounter Date: 2022   Encounter department: [facility]    Assessment & Plan     Type 2 diabetes mellitus with hyperglycemia, with long-term current use of insulin ([facility] )  - follows with [facility] Dr Bryce Hilliard - Endo  He does not see him, often  TSH Is now controlled on 350 mcg/day  Will cont current insulin/Ozempic dosing  No lows to report  He is on ACEi and statin  Lab Results   Component Value Date     HGBA1C 7 8 (H) 2022      Atrial fibrillation, unspecified type [facility])  - sees cardiology  NSR today  On Eliquis  Does have diastolic dysfunction  needs to optimize risk factors        History of heart failure  - see above        Diabetic ulcer of foot associated with type 2 diabetes mellitus, with other ulcer severity, unspecified laterality, unspecified part of foot ([facility] )  - follows with Podiatry for this  Dr Wendie Edouard  Morbid obesity ([facility] )  BMI Counseling: Body mass index is 53 43 kg/m²  The BMI is above normal  Nutrition recommendations include decreasing portion sizes, encouraging healthy choices of fruits and vegetables, decreasing fast food intake, consuming healthier snacks, limiting drinks that contain sugar, reducing intake of saturated and trans fat and reducing intake of cholesterol  Exercise recommendations include exercising 3-5 times per week and strength training exercises  Rationale for BMI follow-up plan is due to patient being overweight or obese  Essential hypertension  - stable  Discussed the importance of maintaining a healthy lifestyle through heart healthy diet and exercise  Polypharmacy  - we are working to [employer] these down with our Pharmacist who has been very helpful  They have f/u on      - he is to call Neurology and see when his appt is  Dr Ismael Ray group    He is given the number     - EXCESS fatigue - STOP Belsomra  Will cont to work on discontinuing other meds, If possible over time  He is sleeping too much during the day and not sleeping at night  Psychophysiological insomnia  - STOP Belsomra - see above  Acquired hypothyroidism  - now controlled  Has Endo appt in Nov         Patient/Caretaker verbalized understanding and were in agreement with today's assessment and plan  Time was taken to address any questions patient/caretaker had  Indication/Risks/Benefits of medication(s) as prescribed were discussed with the patient/caretaker  The patient verbalized understanding and agreement and elects to take medications as prescribed  Time was taken to answer any questions the patient/caretaker may have had  RTC in 3 months  Need to check where he is in f/u -- CPAP/NEAL  See HPI below as well for plan/updates  Total time I spent caring for this patient on the day of the encounter - 40 minutes  15 minutes spent before the visit  20 minutes spent during the visit  5 minutes spent after the visit         Chief Complaint   Patient presents with    Follow-up     8 week follow up and pt said he has 6 medication that makes him sleepy  Pt will get flu shot  Pt has an medication list some of the medication not up there, and he also want to know if getting the flu shot will affect the pneumococcal        Subjective        Excessively Sleepy -- STOP Belsomra  He is sleeping during the day and NOT at night  No need for Belsomra  He is on board  He Is starting to come around to the fact that he is on too many meds  Seizure Disorder -- has an appt with neurologist -- he thought he had an appt but does not have an appt  Given number for him to call  This is important given seizure disorder -- Dr Sukh Dubose was working with him on this  Diabetes  · insulin aspart 26 units three times daily with meals (121- 140 +1 unit; 141- 160 +2)   · insulin glargine 56 units daily at dinner  · Ozempic 1 mg once weekly (Mondays)   Taking as Rx  Not due for A1C  Lab Results   Component Value Date    HGBA1C 7 8 (H) 07/30/2022        Hypothyroidism   Levothyroxine 300 mcg + 50 mcg daily - now stable  COPD  · Albuterol   · Will rescheduled PFTs  ·      Allergy   · Cetirizine 10 mg- started in place of montelukast      HTN / CHF/pAfib - also on Eliquis  ·           Furosemide 40 mg BID  ·           Metoprolol tartrate 25 mg BID  ·           Potassium 20 mEq BID     HLD  ·           Atorvastatin 40 mg daily      Neuropathy pain    ·           Amitriptyline 25 mg daily - Can certainly look to stop this in f/u  ·           Gabapentin 600 mg Q HS   ·           Citalopram 20 mg daily     Seizure disorder/ Restless legs / signs of parkinsonism - needs to see Neurology    ·           Ropinerole 4 mg QID  ·           Clonazepam 1 mg - 1 tabs BID  ·           Lamotrigine 25 mg BID  ·           Levetiracetam 500 mg BID      GI- IBS-C & Gastritis (managed by GI):  ·           Linzess 290 mcg  ·           Pantoprazole 40 mg daily   ·           Senna 8 6 BID   ·           Docusate 100 mg   ·           Follows with GI     BPH:  ·           tamsulosin 0 4 mg daily      Other/PRN:  ·           Magnesium oxide 400 mg BID  ·           Zyprobi (probiotic)   ·           Eye drops          Review of Systems   All other systems reviewed and are negative  Current Outpatient Medications on File Prior to Visit   Medication Sig    albuterol (PROVENTIL HFA,VENTOLIN HFA) 90 mcg/act inhaler Inhale 2 puffs every 6 (six) hours as needed for wheezing    amitriptyline (ELAVIL) 25 mg tablet Take 1 tablet (25 mg total) by mouth daily at bedtime    atorvastatin (LIPITOR) 40 mg tablet TAKE 1 TABLET BY MOUTH ONCE DAILY      citalopram (CeleXA) 20 mg tablet Take 1 tablet (20 mg total) by mouth daily    docusate sodium (COLACE) 100 mg capsule Take 100 mg by mouth 2 (two) times a day    Easy Touch Insulin Syringe 31G X 5/16" 1 ML MISC Use 1 each 3 (three) times a day    Easy Touch Pen Needles 31G X 8 MM MISC Use 1 each 3 (three) times a day    Eliquis 5 MG TAKE 1 TABLET BY MOUTH TWO TIMES A DAY   ferrous sulfate 325 (65 Fe) mg tablet Take 325 mg by mouth 2 (two) times a day with meals    furosemide (LASIX) 40 mg tablet Take 1 tablet (40 mg total) by mouth 2 (two) times a day    gabapentin (NEURONTIN) 600 MG tablet Take 1 tablet (600 mg total) by mouth daily at bedtime    glucose blood (OneTouch Verio) test strip Check blood sugars four times daily  Please substitute with appropriate alternative as covered by patient's insurance  Dx: E11 65    insulin aspart (NovoLOG FlexPen) 100 UNIT/ML injection pen Inject 26 Units under the skin 3 (three) times a day with meals Per sliding scale - managed by Dr Asif Islas + scheduled    Insulin Glargine Solostar (Lantus SoloStar) 100 UNIT/ML SOPN Inject 0 56 mL (56 Units total) under the skin daily    lamoTRIgine (LaMICtal) 25 mg tablet Take 1 tablet (25 mg total) by mouth in the morning and 1 tablet (25 mg total) in the evening   levETIRAcetam (KEPPRA) 500 mg tablet TAKE 1 TABLET BY MOUTH TWO TIMES A DAY   levothyroxine (Euthyrox) 50 mcg tablet Take 1 tablet (50 mcg total) by mouth daily in the early morning Take with 300 mcg tablet    levothyroxine 300 MCG tablet Take 1 tablet (300 mcg total) by mouth daily Take with 50 mcg tablet    Linzess 290 MCG CAPS Take 1 capsule by mouth daily    lisinopril (ZESTRIL) 2 5 mg tablet TAKE 1 TABLET BY MOUTH ONCE DAILY   magnesium Oxide (MAG-OX) 400 mg TABS TAKE 1 TABLET BY MOUTH TWO TIMES A DAY   metoprolol tartrate (LOPRESSOR) 25 mg tablet TAKE 1 TABLET BY MOUTH TWO TIMES A DAY   OneTouch Delica Lancets 96B MISC Check blood sugars four times daily  Please substitute with appropriate alternative as covered by patient's insurance   Dx: E11 65    Ozempic, 0 25 or 0 5 MG/DOSE, 2 MG/1 5ML SOPN 0 5 mg Inject 0 50 mg weekly     pantoprazole (PROTONIX) 40 mg tablet TAKE 1 TABLET BY MOUTH ONCE DAILY   potassium chloride (K-DUR,KLOR-CON) 20 mEq tablet TAKE 1 TABLET BY MOUTH TWO TIMES A DAY   rOPINIRole (REQUIP) 4 mg tablet Take 1 tablet (4 mg total) by mouth in the morning and 1 tablet (4 mg total) at noon and 1 tablet (4 mg total) in the evening and 1 tablet (4 mg total) before bedtime   senna (SENOKOT) 8 6 MG tablet Take 1 tablet by mouth 2 (two) times a day    tamsulosin (FLOMAX) 0 4 mg TAKE 1 CAP DAILY WITH DINNER    timolol (TIMOPTIC) 0 5 % ophthalmic solution Administer 1 drop to the right eye 2 (two) times a day    [DISCONTINUED] Belsomra 20 MG TABS Take 1 tablet (20 mg total) by mouth daily at bedtime    acetaminophen (TYLENOL) 650 mg CR tablet Take 1 tablet (650 mg total) by mouth every 8 (eight) hours as needed for mild pain or moderate pain    cetirizine (ZyrTEC) 10 mg tablet Take 1 tablet (10 mg total) by mouth daily    clonazePAM (KlonoPIN) 1 mg tablet Take 1 tablet (1 mg total) by mouth 2 (two) times a day    hydrocortisone 2 5 % cream Apply 1 application topically 3 (three) times a day as needed    ketoconazole (NIZORAL) 2 % shampoo Apply 1 application topically 2 (two) times a week for 8 doses       Objective     /56 (BP Location: Left arm, Patient Position: Sitting, Cuff Size: Large)   Pulse 63   Temp 97 8 °F (36 6 °C) (Temporal)   Resp 20   Ht 5' 10" (1 778 m)   Wt (!) 169 kg (372 lb 6 4 oz)   SpO2 97%   BMI 53 43 kg/m²     Physical Exam  Vitals and nursing note reviewed  Constitutional:       Appearance: Normal appearance  He is obese  HENT:      Head: Normocephalic and atraumatic  Neck:      Comments: Short, thick neck    Cardiovascular:      Rate and Rhythm: Normal rate and regular rhythm  Pulmonary:      Effort: Pulmonary effort is normal       Breath sounds: Normal breath sounds  No wheezing, rhonchi or rales  Musculoskeletal:      Cervical back: Neck supple        Comments: Walks with rollator  He is hunched forward     Skin:     General: Skin is warm and dry  Neurological:      General: No focal deficit present  Mental Status: He is alert and oriented to person, place, and time  Psychiatric:         Mood and Affect: Mood normal          Behavior: Behavior normal          Thought Content:  Thought content normal          Judgment: Judgment normal        Saba Ventura DO

## 2022-09-22 ENCOUNTER — SOCIAL WORK (OUTPATIENT)
Dept: BEHAVIORAL/MENTAL HEALTH CLINIC | Facility: CLINIC | Age: 57
End: 2022-09-22
Payer: COMMERCIAL

## 2022-09-22 DIAGNOSIS — F25.0 SCHIZOAFFECTIVE DISORDER, BIPOLAR TYPE (HCC): Primary | ICD-10-CM

## 2022-09-22 PROCEDURE — 90837 PSYTX W PT 60 MINUTES: CPT | Performed by: SOCIAL WORKER

## 2022-09-22 NOTE — PSYCH
Poornima Vyas  1965       Date of Initial Treatment Plan: 09/22/22  Date of Current Treatment Plan: 09/22/22    Treatment Plan Number Individual Treatment Plan    Strengths/Personal Resources for Self Care: The Client has a supportive family and peers relationships  He is receiving his physical health care with [facility] family practice with Dr Say Steven, and his mental health therapy with the therapist  Cameron REARDON [facility] [address]    Diagnosis:   1  Schizoaffective disorder, bipolar type ([facility] [address] )         Area of Needs: The Client was able to verbalize that he would like to address his depression, and anxiety that is effecting his different relationships and environments  It is hoped that by addressing his weaknesses the Client will achieve or maintain maximum functional capacity in performing daily activizes, taking into account both the functional capacity of the individual and those functional capacities appropriate for the individuals of the same age  Reduce or ameliorate the physical mental, behavioral, or developmental effects of an illness, condition, injury or disability  Present treatment plan will cover 4 months or 12 visits which ever comes first            Long Term Goal 1: The Client's depression score on his PHQ-9 will decrease from 17 to 8 or less    Target Date: 09/22/2023  Completion Date:          Short Term Objectives for Goal 1: The Client will identify 4 triggers to his depression  Long Term Goal 2:  The Client's anxiety score on his CESAR-7 will decrease from 12 to 6 or less  Target Date: 09/22/2023  Completion Date:     Short Term Objectives for Goal 2: The Client will learn 4 coping skills to his anxiety            Long Term Goal # 3: The Client will attend all of her appointments 100% of time    Target Date: 09/22/2023  Completion Date:     GOAL 1: Modality: The person(s) responsible for carrying out the plan is  The Client his support team, and this therapist     GOAL 2: Modality: The person(s) responsible for carrying out the plan is  The Client his support team, and this therapist      GOAL 3: Modality: The person(s) responsible for carrying out the plan is  The Client his and support team      [address]: Diagnosis and Treatment Plan explained to Yuliet Dolansurinder relates understanding diagnosis and is agreeable to Treatment Plan         Client Comments : Please share your thoughts, feelings, need and/or experiences regarding your treatment plan: " Sounds good"   The Client's short term goals will be reviewed and or completed on or before 01/22/23  __________________________________________________________________     __________________________________________________________________     __________________________________________________________________     __________________________________________________________________     _______________________________________                 Patient signature, Date Time: __________________________________________        Physician cosigner signature, Date, Time: ________________________________

## 2022-09-22 NOTE — PSYCH
Psychotherapy Provided: Individual Psychotherapy 60  minutes 11:00 Am to 1154 Am          Goals addressed in session:(Creation of Recovery Treatment Plan Number One ) The Client reported that he will be getting a large check from his property rent rebate, which has helped with his anxiety and depression  During the session we created his recovery treatment  Plan  It is hoped that by addressing his weaknesses this will act as a preventive measure against the possible need for higher level of care and services  Interventions: Supportive Therapy, Strengths Therapy,  and Cognitive Behavioral Therapy where the treatment modalities utilized during the session  Assessment and Plan: The Client presented a depressed anxious mood that was congruent in effect  He was alert, goal directed and participated with prompts during the session  The Client was oriented to person, place, time, situation, and reported no suicidal/homicidal ideation, plan, or intent  As team we created the Client's recovery treatment plan, addressing his depression and anxiety, which will act as a preventive measure against the possible need for higher level of care and services  Next appointment was set for 2 weeks  Pain:      PSYCH MENTAL STATUS PAIN :7 as reported by the client     PHYSICAL PAIN SCALE NUMBERS:6 as reported by the Client   Current suicide risk : Low/Phone number for [place] was given  to the Client [phone]  The Client was given phone number for the [facility] [phone]  Phone number for [address] [place] [address] was given to the Client/ [address]: Diagnosis and Treatment Plan explained to Herson Carty, Herson Carty  relates understanding diagnosis and is agreeable to Treatment Plan  Yes

## 2022-09-22 NOTE — PSYCH
Treatment Plan Tracking    1st Treatment Plan not completed within required time limits due to: missed appointment by client

## 2022-09-30 ENCOUNTER — TELEPHONE (OUTPATIENT)
Dept: FAMILY MEDICINE CLINIC | Facility: CLINIC | Age: 57
End: 2022-09-30

## 2022-09-30 NOTE — TELEPHONE ENCOUNTER
He should be OK  As long as he is not sedated, will do just partha Card, DO Ketoconazole Counseling:   Patient counseled regarding improving absorption with orange juice.  Adverse effects include but are not limited to breast enlargement, headache, diarrhea, nausea, upset stomach, liver function test abnormalities, taste disturbance, and stomach pain.  There is a rare possibility of liver failure that can occur when taking ketoconazole. The patient understands that monitoring of LFTs may be required, especially at baseline. The patient verbalized understanding of the proper use and possible adverse effects of ketoconazole.  All of the patient's questions and concerns were addressed.

## 2022-09-30 NOTE — TELEPHONE ENCOUNTER
Jacobo Hanna, pt's home health nurse called and states that pt left her a message saying that he took his morning and evening pills all at once this morning by mistake and took 3 requip  She states she advised him to go to the ER to be checked and he said he wouldn't have a ride home and she told him that he definitely cannot drive himself   She said he was alert and oriented when she spoke to him about 15 minutes ago

## 2022-10-03 ENCOUNTER — DOCTOR'S OFFICE (OUTPATIENT)
Dept: URBAN - NONMETROPOLITAN AREA CLINIC 1 | Facility: CLINIC | Age: 57
Setting detail: OPHTHALMOLOGY
End: 2022-10-03
Payer: COMMERCIAL

## 2022-10-03 DIAGNOSIS — E11.3293: ICD-10-CM

## 2022-10-03 DIAGNOSIS — H43.813: ICD-10-CM

## 2022-10-03 DIAGNOSIS — H40.053: ICD-10-CM

## 2022-10-03 DIAGNOSIS — H43.393: ICD-10-CM

## 2022-10-03 PROBLEM — E11.3291: Status: ACTIVE | Noted: 2021-06-04

## 2022-10-03 PROBLEM — E11.3292: Status: ACTIVE | Noted: 2021-06-04

## 2022-10-03 PROBLEM — H52.4 PRESBYOPIA: Status: ACTIVE | Noted: 2021-07-08

## 2022-10-03 LAB
LEFT EYE DIABETIC RETINOPATHY: NORMAL
RIGHT EYE DIABETIC RETINOPATHY: NORMAL

## 2022-10-03 PROCEDURE — 99213 OFFICE O/P EST LOW 20 MIN: CPT | Performed by: OPHTHALMOLOGY

## 2022-10-03 PROCEDURE — 92134 CPTRZ OPH DX IMG PST SGM RTA: CPT | Performed by: OPHTHALMOLOGY

## 2022-10-03 PROCEDURE — 2022F DILAT RTA XM EVC RTNOPTHY: CPT | Performed by: FAMILY MEDICINE

## 2022-10-03 ASSESSMENT — SPHEQUIV_DERIVED
OS_SPHEQUIV: 0.625
OS_SPHEQUIV: 0.25
OD_SPHEQUIV: -0.875
OD_SPHEQUIV: 0

## 2022-10-03 ASSESSMENT — REFRACTION_MANIFEST
OS_AXIS: 025
OD_AXIS: 020
OD_SPHERE: +0.25
OS_ADD: +2.50
OD_CYLINDER: -0.50
OS_CYLINDER: -0.25
OS_SPHERE: +0.75
OD_ADD: +2.50
OS_VA2: 20/25-2
OS_VA1: 20/40+
OD_VA1: 20/40+

## 2022-10-03 ASSESSMENT — REFRACTION_AUTOREFRACTION
OS_SPHERE: +0.50
OD_AXIS: 008
OS_CYLINDER: -0.50
OD_SPHERE: -0.25
OD_CYLINDER: -1.25
OS_AXIS: 33

## 2022-10-03 ASSESSMENT — KERATOMETRY
OD_AXISANGLE_DEGREES: 013
OS_K2POWER_DIOPTERS: 45.50
OS_AXISANGLE_DEGREES: 012
OD_K1POWER_DIOPTERS: 46.75
OD_K2POWER_DIOPTERS: 45.25
OS_K1POWER_DIOPTERS: 45.00

## 2022-10-03 ASSESSMENT — REFRACTION_CURRENTRX
OS_ADD: +2.50
OD_AXIS: 179
OD_OVR_VA: 20/
OS_SPHERE: +0.75
OD_CYLINDER: -0.50
OS_CYLINDER: SPH
OD_ADD: +2.50
OS_VPRISM_DIRECTION: BF
OD_SPHERE: +0.50
OS_OVR_VA: 20/
OD_VPRISM_DIRECTION: BF

## 2022-10-03 ASSESSMENT — AXIALLENGTH_DERIVED
OD_AL: 23.0283
OD_AL: 22.708
OS_AL: 22.8742
OS_AL: 22.7377

## 2022-10-03 ASSESSMENT — CONFRONTATIONAL VISUAL FIELD TEST (CVF)
OS_FINDINGS: FULL
OD_FINDINGS: FULL

## 2022-10-03 ASSESSMENT — VISUAL ACUITY
OS_BCVA: 20/40
OD_BCVA: 20/30

## 2022-10-04 ENCOUNTER — RA CDI HCC (OUTPATIENT)
Dept: OTHER | Facility: HOSPITAL | Age: 57
End: 2022-10-04

## 2022-10-04 NOTE — PROGRESS NOTES
[facility]  coding opportunities          Chart Reviewed number of suggestions sent to Provider: 3  E11 [id number]  [id number]  I13 0     Patients Insurance     Medicare Insurance: [facility]

## 2022-10-05 ENCOUNTER — TELEPHONE (OUTPATIENT)
Dept: FAMILY MEDICINE CLINIC | Facility: CLINIC | Age: 57
End: 2022-10-05

## 2022-10-05 DIAGNOSIS — N30.01 ACUTE CYSTITIS WITH HEMATURIA: Primary | ICD-10-CM

## 2022-10-05 RX ORDER — CEPHALEXIN 500 MG/1
500 CAPSULE ORAL EVERY 6 HOURS SCHEDULED
Qty: 28 CAPSULE | Refills: 0 | Status: SHIPPED | OUTPATIENT
Start: 2022-10-05 | End: 2022-10-12

## 2022-10-05 NOTE — TELEPHONE ENCOUNTER
Pt calling has hospital follow up with you for Friday but states he was told he has a UTI but was never given antibiotics for this

## 2022-10-05 NOTE — TELEPHONE ENCOUNTER
I see this:  I will send keflex qid to the pharmacy for him - sent to [facility], pharmacy on file  Saba Varela DO    Ordered Prescriptions  - documented in this encounter  Reconcile with Patient's Chart    Ordered Prescriptions  Prescription Sig Dispensed Refills Start Date End Date   cephalexin (KEFLEX) 500 MG capsule   Take 2 capsules (1,000 mg total) by mouth 2 (two) times a day for 7 days

## 2022-10-06 ENCOUNTER — CLINICAL SUPPORT (OUTPATIENT)
Dept: FAMILY MEDICINE CLINIC | Facility: CLINIC | Age: 57
End: 2022-10-06

## 2022-10-06 DIAGNOSIS — E11.65 TYPE 2 DIABETES MELLITUS WITH HYPERGLYCEMIA, WITH LONG-TERM CURRENT USE OF INSULIN (HCC): Primary | ICD-10-CM

## 2022-10-06 DIAGNOSIS — Z79.4 TYPE 2 DIABETES MELLITUS WITH HYPERGLYCEMIA, WITH LONG-TERM CURRENT USE OF INSULIN (HCC): Primary | ICD-10-CM

## 2022-10-06 RX ORDER — SEMAGLUTIDE 1.34 MG/ML
1 INJECTION, SOLUTION SUBCUTANEOUS WEEKLY
COMMUNITY

## 2022-10-06 RX ORDER — FUROSEMIDE 40 MG/1
40 TABLET ORAL DAILY
COMMUNITY

## 2022-10-06 RX ORDER — GABAPENTIN 600 MG/1
600 TABLET ORAL 2 TIMES DAILY
COMMUNITY

## 2022-10-06 RX ORDER — LEVETIRACETAM 250 MG/1
250 TABLET ORAL 2 TIMES DAILY
COMMUNITY
Start: 2022-09-28

## 2022-10-06 RX ORDER — CLONAZEPAM 0.5 MG/1
0.5 TABLET ORAL EVERY 12 HOURS PRN
COMMUNITY
Start: 2022-09-28

## 2022-10-06 NOTE — PROGRESS NOTES
[address]  Pradeep Torres, RichD, BCACP    Chau Devine is a 59 y o  male who was referred to the pharmacist for medication reconciliation and education, referred by Dr Jackson Russo  Telemedicine consent  The patient was identified by name and date of birth  Chau Devine was informed that this is a telemedicine visit and that the visit is being conducted through Telephone  My office door was closed  No one else was in the room  He acknowledged consent and understanding of privacy and security of the video platform  The patient has agreed to participate and understands they can discontinue the visit at any time  Assessment/ Plan     Medication Reconciliation:  Reviewed medication list and outside records to reconcile medication list  Also called [address] to make sure their list of his medications are up to date  Changes made by outside providers:  Neurologist changes  · Amitriptyline discontinued  · Clonzepam dose decreased from 1 mg BID to 0 5 mg BID  · Keppra dose decreased from 500 mg BID to 250 mg BID  · Gabapentin increased from 600 mg once daily to 600 mg BID    10/1/22 Hospitalization changes  · Lisinopril timing moved from AM to PM; dose stayed the same at 2 5 mg  · Furosemide decreased from 40 mg BID to 40 mg once daily    Recommendations:  Decrease potassium to once daily since furosemide dose was decreased  Plan:  · Continue to improve compliance with insulin doses and blood sugar checks  · Mailed him a daily check list to track his readings and insulin injections  Follow-Up: in 4 weeks    Subjective   Medication list reviewed with patient, reports the following discrepancies/problems:    Diabetes   insulin aspart 26 units three times daily with meals (121- 140 +1 unit; 141- 160 +2; 161-180 3 units; 181-200 4 units; 201-220 5 units (1 units per 20 mg/dL over 120 mg/dL)    insulin glargine 56 units daily at dinner      Ozempic 1 mg once weekly (Mondays)    OneTouch Delica Lancets 76X Misc   OneTouch Verio StrpBlood sugar readings     Continues to forget insulin doses  He has not been using the check list  Will mail him another insulin check list and blood sugar tracking log  Also discussed keeping insulin on kitchen table where he can remember to take it  Reviewed that insulin pen can stay at room tempeture for 28 days so he can keep his opened pen at room temp and his other unopened pens in the fridge  Hypothyroidism   · Levothyroxine 300 mcg + 50 mcg daily    COPD  · Albuterol     Allergy   · Cetirizine 10 mg    HTN / CHF:  · Furosemide 40 mg - changed from BID to daily due to hypotension during 10/1/22 hospitalization  · Metoprolol tartrate 25 mg BID  · Potassium 20 mEq BID  ·  Lisinopril 2 5 mg - changed to nightly due to hypotension during 10/1/22 hospitalization    HLD  · Atorvastatin 40 mg daily      Neuropathy pain (managed by neurologist Dr Katheryn Mota)  · Amitriptyline 25 mg daily - discontinued by neuro  · Gabapentin 600 mg daily - increased to 600 mg BID     Depression  · Citalopram 20 mg daily     Sleep (managed previously by neurologist Dr Johnny Travis who retired)   · Belsomra 20 mg daily (discontinued on 9/21/22)     Seizure disorder/ Restless legs / signs of parkinsonism (managed by neurologist Dr Katheryn Mota)  · Ropinerole 4 mg QID  · Clonazepam 1 mg BID decreased to 0 5 mg BID   · Lamotrigine 25 mg BID  · Levetiracetam 500 mg BID- decreased to 250 mg BID     GI- IBS-C & Gastritis (managed by GI):  · Linzess 290 mcg  · Pantoprazole 40 mg daily   · Senna 8 6 BID   · Docusate 100 mg      BPH:  · tamsulosin 0 4 mg daily      Other/PRN:  · Magnesium oxide 400 mg BID  · Zyprobi (probiotic)   · Eye drops    Misses doses:  no  # of missed doses in the past week: estimates 4- 6  Patient states he cannot give a good estimate of missed doses because he cant remember if and when he forgets his meds   More common with his insulin since his oral meds are blister packed     # of times per day patient takes meds: 4    Use of supportive adherence tools:Yes, describe: pill packing at pharmacy    Med cost concerns: no; does report medical bill issues from his last hospitalization     Takes medications at mid-night but sometimes doesn't go to bed until later than that  Irregular sleep and eating schedule  Objective     BP Readings from Last 3 Encounters:   09/21/22 128/56   08/09/22 132/80   07/29/22 98/60       Pulse Readings from Last 3 Encounters:   09/21/22 63   08/09/22 80   07/29/22 60       Lab Results   Component Value Date    SODIUM 138 07/23/2016    K 3 8 07/23/2016     07/23/2016    CO2 30 07/23/2016    BUN 21 07/23/2016    CREATININE 1 33 (H) 07/23/2016    GLUC 236 (H) 07/23/2016    CALCIUM 8 6 07/23/2016       Blood Glucose Readings  The patient is currently checking blood glucose 1-3 times per day  Patient reports with SMBG logs  Date AM Lunch Dinner   9/16  317    36 units Novolog 216    31 units Novolog   Lantus 56 units    9/17 273    33 units Novolog 181     30 units Novolog      Lantus 56 units   9/18 184    30 units Novolog 181    30 units Novolog     9/19  184    30 units Novolog 64      Re-check 2 hours later 186    Novolog 29 units  Lantus 56 units    9/20 220    Novolog 31 units 232    Novolog   32 units 163    Novolog 29 units    Lantus 56 units   9/21 175    26 units Novolog     9/22 9/23 310  196    Lantus 56 units    Novolog 30 units   9/24 292    Novolog 35 units  196     Lantus 56 units    Novolog 30 units   9/25 9/26 9/27 9/28 9/29 279    Novolog       9/30       10/1      10/2      10/3      10/4 310    Novolog 36 units     10/5      10/6      Avg        Hypoglycemia: The patient had 1 episodes/symptoms of hypoglycemia       Education Provided     Pharmacist Tracking Tool  Reason For Outreach: Embedded Pharmacist  Demographics:  Intervention Method: Phone  Type of Intervention: Follow-Up  Topics Addressed: Polypharmacy  Pharmacologic Interventions: Med Rec  Non-Pharmacologic Interventions: Adherence addressed, Care coordination, Disease state education, Medication Monitoring and Medication/Device education  Time:  Direct Patient Care: 60 mins  Care Coordination: 30 mins  Recommendation Recipient: Patient/Caregiver and Provider  Outcome: Pending/ Follow-up Required

## 2022-10-07 ENCOUNTER — OFFICE VISIT (OUTPATIENT)
Dept: FAMILY MEDICINE CLINIC | Facility: CLINIC | Age: 57
End: 2022-10-07
Payer: COMMERCIAL

## 2022-10-07 VITALS
BODY MASS INDEX: 45.1 KG/M2 | DIASTOLIC BLOOD PRESSURE: 60 MMHG | OXYGEN SATURATION: 93 % | WEIGHT: 315 LBS | RESPIRATION RATE: 20 BRPM | TEMPERATURE: 97.8 F | HEIGHT: 70 IN | HEART RATE: 76 BPM | SYSTOLIC BLOOD PRESSURE: 136 MMHG

## 2022-10-07 DIAGNOSIS — I48.91 ATRIAL FIBRILLATION, UNSPECIFIED TYPE (HCC): ICD-10-CM

## 2022-10-07 DIAGNOSIS — I10 ESSENTIAL HYPERTENSION: Primary | ICD-10-CM

## 2022-10-07 DIAGNOSIS — L97.508 DIABETIC ULCER OF FOOT ASSOCIATED WITH TYPE 2 DIABETES MELLITUS, WITH OTHER ULCER SEVERITY, UNSPECIFIED LATERALITY, UNSPECIFIED PART OF FOOT (HCC): ICD-10-CM

## 2022-10-07 DIAGNOSIS — E03.9 HYPOTHYROIDISM, UNSPECIFIED TYPE: ICD-10-CM

## 2022-10-07 DIAGNOSIS — E66.01 MORBID OBESITY (HCC): ICD-10-CM

## 2022-10-07 DIAGNOSIS — Z79.4 TYPE 2 DIABETES MELLITUS WITH HYPERGLYCEMIA, WITH LONG-TERM CURRENT USE OF INSULIN (HCC): ICD-10-CM

## 2022-10-07 DIAGNOSIS — Z86.79 HISTORY OF HEART FAILURE: ICD-10-CM

## 2022-10-07 DIAGNOSIS — E11.65 TYPE 2 DIABETES MELLITUS WITH HYPERGLYCEMIA, WITH LONG-TERM CURRENT USE OF INSULIN (HCC): ICD-10-CM

## 2022-10-07 DIAGNOSIS — G47.33 OBSTRUCTIVE SLEEP APNEA SYNDROME: ICD-10-CM

## 2022-10-07 DIAGNOSIS — L97.929 CHRONIC VENOUS HYPERTENSION (IDIOPATHIC) WITH ULCER AND INFLAMMATION OF LEFT LOWER EXTREMITY (HCC): ICD-10-CM

## 2022-10-07 DIAGNOSIS — I87.332 CHRONIC VENOUS HYPERTENSION (IDIOPATHIC) WITH ULCER AND INFLAMMATION OF LEFT LOWER EXTREMITY (HCC): ICD-10-CM

## 2022-10-07 DIAGNOSIS — I50.9 CONGESTIVE HEART FAILURE, UNSPECIFIED HF CHRONICITY, UNSPECIFIED HEART FAILURE TYPE (HCC): ICD-10-CM

## 2022-10-07 DIAGNOSIS — E11.621 DIABETIC ULCER OF FOOT ASSOCIATED WITH TYPE 2 DIABETES MELLITUS, WITH OTHER ULCER SEVERITY, UNSPECIFIED LATERALITY, UNSPECIFIED PART OF FOOT (HCC): ICD-10-CM

## 2022-10-07 DIAGNOSIS — Z23 NEEDS FLU SHOT: ICD-10-CM

## 2022-10-07 PROCEDURE — G0008 ADMIN INFLUENZA VIRUS VAC: HCPCS | Performed by: FAMILY MEDICINE

## 2022-10-07 PROCEDURE — 90682 RIV4 VACC RECOMBINANT DNA IM: CPT | Performed by: FAMILY MEDICINE

## 2022-10-07 PROCEDURE — 99215 OFFICE O/P EST HI 40 MIN: CPT | Performed by: FAMILY MEDICINE

## 2022-10-07 RX ORDER — POTASSIUM CHLORIDE 20 MEQ/1
20 TABLET, EXTENDED RELEASE ORAL DAILY
Qty: 90 TABLET | Refills: 0 | Status: SHIPPED | OUTPATIENT
Start: 2022-10-07

## 2022-10-07 NOTE — PROGRESS NOTES
Name: Leit Hanna      : 1965      MRN: [id number]  Encounter Provider: Liz Martin DO  Encounter Date: 10/7/2022   Encounter department: [address] [place] [address]     1  Needs flu shot  - influenza vaccine, quadrivalent, recombinant, PF, 0 5 mL, for patients 18 yr+ (FLUBLOK)    2  Essential hypertension  - stable  Discussed the importance of maintaining a healthy lifestyle through heart healthy diet and exercise  - potassium chloride (K-DUR,KLOR-CON) 20 mEq tablet; Take 1 tablet (20 mEq total) by mouth in the morning  Dispense: 90 tablet; Refill: 0    3  Atrial fibrillation, unspecified type ([facility] [address] )  Stable  On Eliquis  - potassium chloride (K-DUR,KLOR-CON) 20 mEq tablet; Take 1 tablet (20 mEq total) by mouth in the morning  Dispense: 90 tablet; Refill: 0    4  History of heart failure - Lasix decreased to once daily, therefore potassium will be decreased to once/day  - potassium chloride (K-DUR,KLOR-CON) 20 mEq tablet; Take 1 tablet (20 mEq total) by mouth in the morning  Dispense: 90 tablet; Refill: 0    5  Diabetic ulcer of foot associated with type 2 diabetes mellitus, with other ulcer severity, unspecified laterality, unspecified part of foot ([facility] )  Lab Results   Component Value Date    HGBA1C 7 8 (H) 2022   - has f/u with Endo in Nov   Urged to keep this appt  - potassium chloride (K-DUR,KLOR-CON) 20 mEq tablet; Take 1 tablet (20 mEq total) by mouth in the morning  Dispense: 90 tablet; Refill: 0    6  Chronic venous hypertension (idiopathic) with ulcer and inflammation of left lower extremity (HCC)  - legs are wrapped  Has wound care  - potassium chloride (K-DUR,KLOR-CON) 20 mEq tablet; Take 1 tablet (20 mEq total) by mouth in the morning  Dispense: 90 tablet; Refill: 0    8  Congestive heart failure, unspecified HF chronicity, unspecified heart failure type ([facility] )  - stable  No e/o volume overload, decompensation    He is doing pretty well, clinically  - potassium chloride (K-DUR,KLOR-CON) 20 mEq tablet; Take 1 tablet (20 mEq total) by mouth in the morning  Dispense: 90 tablet; Refill: 0    9  Morbid obesity (HCC)  - potassium chloride (K-DUR,KLOR-CON) 20 mEq tablet; Take 1 tablet (20 mEq total) by mouth in the morning  Dispense: 90 tablet; Refill: 0    11  Hypothyroidism, unspecified type  - stable on current regimen  Will cont  - potassium chloride (K-DUR,KLOR-CON) 20 mEq tablet; Take 1 tablet (20 mEq total) by mouth in the morning  Dispense: 90 tablet; Refill: 0    12  Type 2 diabetes mellitus with hyperglycemia, with long-term current use of insulin (HCC)  - potassium chloride (K-DUR,KLOR-CON) 20 mEq tablet; Take 1 tablet (20 mEq total) by mouth in the morning  Dispense: 90 tablet; Refill: 0    13  Obstructive sleep apnea syndrome  - NOT using his CPAP  He is falling asleep  He is highly encouraged to wear this  BMI Counseling: Body mass index is 53 46 kg/m²  The BMI is above normal  Nutrition recommendations include decreasing portion sizes, encouraging healthy choices of fruits and vegetables, decreasing fast food intake, consuming healthier snacks, limiting drinks that contain sugar, reducing intake of saturated and trans fat and reducing intake of cholesterol  Exercise recommendations include exercising 3-5 times per week and strength training exercises  Rationale for BMI follow-up plan is due to patient being overweight or obese  The patient is now with Neurology in [address] [place] [address] [address]  They are adjusting his meds and have ordered studies  Will cont to monitor his progress  He is very sleepy today, he is to wear his CPAP  Patient/Caretaker verbalized understanding and were in agreement with today's assessment and plan  Time was taken to address any questions patient/caretaker had  Indication/Risks/Benefits of medication(s) as prescribed were discussed with the patient/caretaker    The patient verbalized understanding and agreement and elects to take medications as prescribed  Time was taken to answer any questions the patient/caretaker may have had  Chief Complaint   Patient presents with    Follow-up     Er f/u       Subjective      Was in ED, took Requip by accident when he should not have  Had some low Bps, recovered, was d/c home  11/23 -- has appt with Dr Gold Forward  He cont to be sleepy but tells me he sleeps during the day and is up at night  He is not wearing his CPAP  He is encouraged to do so  Saw Neurology and they are adjusting things  His Consult note is under Media  MRI and EEG ordered  pharmD is on board and has been quite helpful  Notes below are from her most recent visit with him! Diabetes  · insulin aspart 26 units three times daily with meals (121- 140 +1 unit; 141- 160 +2; 161-180 3 units; 181-200 4 units; 201-220 5 units (1 units per 20 mg/dL over 120 mg/dL)   · insulin glargine 56 units daily at dinner  · Ozempic 1 mg once weekly (Mondays)   · OneTouch Delica Lancets 77U Misc  · OneTouch Verio StrpBlood sugar readings    · Continues to forget insulin doses  He has not been using the check list  Will mail him another insulin check list and blood sugar tracking log  Also discussed keeping insulin on kitchen table where he can remember to take it  Reviewed that insulin pen can stay at room tempeture for 28 days so he can keep his opened pen at room temp and his other unopened pens in the fridge       Hypothyroidism   · Levothyroxine 300 mcg + 50 mcg daily     COPD  · Albuterol      Allergy   · Cetirizine 10 mg     HTN / CHF:  · Furosemide 40 mg - changed from BID to daily due to hypotension during 10/1/22 hospitalization  · Metoprolol tartrate 25 mg BID  · Potassium 20 mEq BID - adjusted this to just daily since Lasix adjusted to daily      ·  Lisinopril 2 5 mg - changed to nightly due to hypotension during 10/1/22 hospitalization     HLD  · Atorvastatin 40 mg daily      Neuropathy pain (managed by neurologist Dr Hima Perkins)  · Amitriptyline 25 mg daily - discontinued by neuro  · Gabapentin 600 mg daily - increased to 600 mg BID      Depression  · Citalopram 20 mg daily     Sleep (managed previously by neurologist Dr Danilo Camacho who retired)   · Belsomra 20 mg daily (discontinued on 9/21/22)     Seizure disorder/ Restless legs / signs of parkinsonism (managed by neurologist Dr Hima Perkins)  · Ropinerole 4 mg QID  · Clonazepam 1 mg BID decreased to 0 5 mg BID   · Lamotrigine 25 mg BID  · Levetiracetam 500 mg BID- decreased to 250 mg BID     GI- IBS-C & Gastritis (managed by GI):  · Linzess 290 mcg  · Pantoprazole 40 mg daily   · Senna 8 6 BID   · Docusate 100 mg      BPH:  · tamsulosin 0 4 mg daily      Other/PRN:  · Magnesium oxide 400 mg BID  · Zyprobi (probiotic)   · Eye drops           Review of Systems   All other systems reviewed and are negative  Current Outpatient Medications on File Prior to Visit   Medication Sig    acetaminophen (TYLENOL) 650 mg CR tablet Take 1 tablet (650 mg total) by mouth every 8 (eight) hours as needed for mild pain or moderate pain    albuterol (PROVENTIL HFA,VENTOLIN HFA) 90 mcg/act inhaler Inhale 2 puffs every 6 (six) hours as needed for wheezing    atorvastatin (LIPITOR) 40 mg tablet TAKE 1 TABLET BY MOUTH ONCE DAILY   cephalexin (KEFLEX) 500 mg capsule Take 1 capsule (500 mg total) by mouth every 6 (six) hours for 7 days    cetirizine (ZyrTEC) 10 mg tablet Take 1 tablet (10 mg total) by mouth daily    citalopram (CeleXA) 20 mg tablet Take 1 tablet (20 mg total) by mouth daily    clonazePAM (KlonoPIN) 0 5 mg tablet Take 0 5 mg by mouth every 12 (twelve) hours as needed    docusate sodium (COLACE) 100 mg capsule Take 100 mg by mouth 2 (two) times a day    Easy Touch Pen Needles 31G X 8 MM MISC Use 1 each 3 (three) times a day    Eliquis 5 MG TAKE 1 TABLET BY MOUTH TWO TIMES A DAY      ferrous sulfate 325 (65 Fe) mg tablet Take 325 mg by mouth 2 (two) times a day with meals    furosemide (LASIX) 40 mg tablet Take 40 mg by mouth daily    gabapentin (NEURONTIN) 600 MG tablet Take 600 mg by mouth 2 (two) times a day    glucose blood (OneTouch Verio) test strip Check blood sugars four times daily  Please substitute with appropriate alternative as covered by patient's insurance  Dx: E11 65    hydrocortisone 2 5 % cream Apply 1 application topically 3 (three) times a day as needed    insulin aspart (NovoLOG FlexPen) 100 UNIT/ML injection pen Inject 26 Units under the skin 3 (three) times a day with meals Per sliding scale - managed by Dr Gabriela Camacho + scheduled    Insulin Glargine Solostar (Lantus SoloStar) 100 UNIT/ML SOPN Inject 0 56 mL (56 Units total) under the skin daily    lamoTRIgine (LaMICtal) 25 mg tablet Take 1 tablet (25 mg total) by mouth in the morning and 1 tablet (25 mg total) in the evening   levETIRAcetam (KEPPRA) 250 mg tablet Take 250 mg by mouth 2 (two) times a day    levothyroxine (Euthyrox) 50 mcg tablet Take 1 tablet (50 mcg total) by mouth daily in the early morning Take with 300 mcg tablet    levothyroxine 300 MCG tablet Take 1 tablet (300 mcg total) by mouth daily Take with 50 mcg tablet    Linzess 290 MCG CAPS Take 1 capsule by mouth daily    lisinopril (ZESTRIL) 2 5 mg tablet TAKE 1 TABLET BY MOUTH ONCE DAILY   magnesium Oxide (MAG-OX) 400 mg TABS TAKE 1 TABLET BY MOUTH TWO TIMES A DAY   metoprolol tartrate (LOPRESSOR) 25 mg tablet TAKE 1 TABLET BY MOUTH TWO TIMES A DAY   OneTouch Delica Lancets 83J MISC Check blood sugars four times daily  Please substitute with appropriate alternative as covered by patient's insurance  Dx: E11 65    pantoprazole (PROTONIX) 40 mg tablet TAKE 1 TABLET BY MOUTH ONCE DAILY      rOPINIRole (REQUIP) 4 mg tablet Take 1 tablet (4 mg total) by mouth in the morning and 1 tablet (4 mg total) at noon and 1 tablet (4 mg total) in the evening and 1 tablet (4 mg total) before bedtime   Semaglutide, 1 MG/DOSE, (Ozempic, 1 MG/DOSE,) 2 MG/1 5ML SOPN Inject 1 mg under the skin once a week    senna (SENOKOT) 8 6 MG tablet Take 1 tablet by mouth 2 (two) times a day    tamsulosin (FLOMAX) 0 4 mg TAKE 1 CAP DAILY WITH DINNER    timolol (TIMOPTIC) 0 5 % ophthalmic solution Administer 1 drop to the right eye 2 (two) times a day    [DISCONTINUED] potassium chloride (K-DUR,KLOR-CON) 20 mEq tablet TAKE 1 TABLET BY MOUTH TWO TIMES A DAY   ketoconazole (NIZORAL) 2 % shampoo Apply 1 application topically 2 (two) times a week for 8 doses       Objective     /60 (BP Location: Left arm, Patient Position: Sitting, Cuff Size: Large)   Pulse 76   Temp 97 8 °F (36 6 °C) (Temporal)   Resp 20   Ht 5' 10" (1 778 m)   Wt (!) 169 kg (372 lb 9 6 oz)   SpO2 93%   BMI 53 46 kg/m²     Physical Exam  Vitals and nursing note reviewed  Constitutional:       Appearance: Normal appearance  He is obese  Comments: Nodding off during our visit today    HENT:      Head: Normocephalic and atraumatic  Eyes:      Conjunctiva/sclera: Conjunctivae normal    Neck:      Comments: Short, thick neck    Cardiovascular:      Rate and Rhythm: Normal rate and regular rhythm  Pulmonary:      Effort: Pulmonary effort is normal       Breath sounds: Normal breath sounds  No wheezing, rhonchi or rales  Musculoskeletal:      Cervical back: Neck supple  Comments: He is without his rollator today  Slow, antalgic, guarded gait   Rounded shoulders    Skin:     General: Skin is warm and dry  Comments: Legs are wrapped - distal LEs    Neurological:      General: No focal deficit present  Mental Status: He is alert and oriented to person, place, and time  Psychiatric:         Mood and Affect: Mood normal          Behavior: Behavior normal          Thought Content:  Thought content normal          Judgment: Judgment normal        Saba Herrera DO

## 2022-10-10 ENCOUNTER — TELEPHONE (OUTPATIENT)
Dept: FAMILY MEDICINE CLINIC | Facility: CLINIC | Age: 57
End: 2022-10-10

## 2022-10-10 ENCOUNTER — DOCTOR'S OFFICE (OUTPATIENT)
Dept: URBAN - NONMETROPOLITAN AREA CLINIC 1 | Facility: CLINIC | Age: 57
Setting detail: OPHTHALMOLOGY
End: 2022-10-10
Payer: COMMERCIAL

## 2022-10-10 DIAGNOSIS — H40.053: ICD-10-CM

## 2022-10-10 PROCEDURE — 92083 EXTENDED VISUAL FIELD XM: CPT | Performed by: OPHTHALMOLOGY

## 2022-10-10 PROCEDURE — 76514 ECHO EXAM OF EYE THICKNESS: CPT | Performed by: OPHTHALMOLOGY

## 2022-10-10 NOTE — TELEPHONE ENCOUNTER
[address]  [facility] to confirm the medication packs match recent medication changes and updated list      · Amitriptyline discontinued  · Belsomra discontinued   · Clonzepam dose decreased from 1 mg BID to 0 5 mg BID  · Keppra dose decreased from 500 mg BID to 250 mg BID  · Gabapentin increased from 600 mg once daily to 600 mg BID  · Lisinopril timing moved from AM to PM; dose stayed the same at 2 5 mg  · Furosemide decreased from 40 mg BID to 40 mg once daily  · Potassium decreased from 20 mEQ BID to 20 meQ once daily     Also called patient to make him aware that pharmacy was contacted about his most recent medication changes       Pharmacist Tracking Tool  Reason For Outreach: Embedded Pharmacist  Demographics:  Intervention Method: Phone  Type of Intervention: Follow-Up  Topics Addressed: Polypharmacy  Pharmacologic Interventions: Med Rec  Non-Pharmacologic Interventions: Care coordination  Time:  Direct Patient Care: 5 mins  Care Coordination: 10 mins  Recommendation Recipient: N/A  Outcome: N/A

## 2022-10-18 ENCOUNTER — OFFICE VISIT (OUTPATIENT)
Dept: FAMILY MEDICINE CLINIC | Facility: CLINIC | Age: 57
End: 2022-10-18
Payer: COMMERCIAL

## 2022-10-18 VITALS
SYSTOLIC BLOOD PRESSURE: 124 MMHG | BODY MASS INDEX: 45.1 KG/M2 | OXYGEN SATURATION: 98 % | WEIGHT: 315 LBS | HEIGHT: 70 IN | HEART RATE: 74 BPM | DIASTOLIC BLOOD PRESSURE: 74 MMHG | RESPIRATION RATE: 18 BRPM | TEMPERATURE: 97.2 F

## 2022-10-18 DIAGNOSIS — B37.89 CANDIDA RASH OF GROIN: ICD-10-CM

## 2022-10-18 DIAGNOSIS — F32.2 SEVERE DEPRESSION (HCC): Primary | ICD-10-CM

## 2022-10-18 DIAGNOSIS — N48.89 PENILE IRRITATION: ICD-10-CM

## 2022-10-18 PROCEDURE — 99215 OFFICE O/P EST HI 40 MIN: CPT | Performed by: FAMILY MEDICINE

## 2022-10-18 RX ORDER — CITALOPRAM 40 MG/1
40 TABLET ORAL DAILY
Qty: 90 TABLET | Refills: 0 | Status: SHIPPED | OUTPATIENT
Start: 2022-10-18

## 2022-10-18 RX ORDER — NYSTATIN 100000 U/G
CREAM TOPICAL 2 TIMES DAILY
Qty: 60 G | Refills: 0 | Status: SHIPPED | OUTPATIENT
Start: 2022-10-18 | End: 2022-11-01

## 2022-10-18 RX ORDER — NYSTATIN 100000 [USP'U]/G
POWDER TOPICAL 2 TIMES DAILY
Qty: 60 G | Refills: 0 | Status: SHIPPED | OUTPATIENT
Start: 2022-10-18

## 2022-10-18 NOTE — Clinical Note
Stef Fuentes is set to see you today  Was very depressed yesterday, no active si/hi  Does have recent h/o such  Meds are being adjusted at the moment as when he came he was on TOO many, sleeping and nodding off in clinic, etc   Was doing okay not long ago when I saw him but recently, more depressed  Did have  infection that he is struggling with -- am working with this  I increased his Celexa  TY for helping him  He is so sweet      Saba

## 2022-10-18 NOTE — PROGRESS NOTES
Name: Roni Varela      : 1965      MRN: [id number]  Encounter Provider: Deborah Bloom DO  Encounter Date: 10/18/2022   Encounter department: [address] [place] [address]  Severe depression ([address] )  - no si/hi, actively today  He was hospitalized this summer for such  We will increase his Celexa from 20-->40 mg/day  His meds have been adjusted as well by New Neurologist  Leo Zurita came to us, from Dr Tiffany Harris, on polypharmacy  We continue to work through this  He has an appt to see counselor tomorrow and he is looking forward to this  For acute si/hi --> ED  He acknowledges this  - citalopram (CeleXA) 40 mg tablet; Take 1 tablet (40 mg total) by mouth daily  Dispense: 90 tablet; Refill: 0    2  Candida rash of groin  - will use powder due to moisture  - nystatin (MYCOSTATIN) powder; Apply topically 2 (two) times a day To skin folds/groin  Dispense: 60 g; Refill: 0    3  Penile irritation  - balanitis  There is excess foreskin  Patient had to retract this  There was significant moisture, here, white film, redness  Will use nystatin cream   He is to let me know if there is persistence, despite  No UTI symptoms to report, the "burning" is from the tip of the penis where there is irritation  - nystatin (MYCOSTATIN) cream; Apply topically 2 (two) times a day for 14 days  Dispense: 60 g; Refill: 0    RTC in 2w to reassess  Patient/Caretaker verbalized understanding and were in agreement with today's assessment and plan  Time was taken to address any questions patient/caretaker had  Indication/Risks/Benefits of medication(s) as prescribed were discussed with the patient/caretaker  The patient verbalized understanding and agreement and elects to take medications as prescribed  Time was taken to answer any questions the patient/caretaker may have had          Chief Complaint   Patient presents with   • Urinary Tract Infection     Pt said he has an UTI and yeast infection, and also said no medication is helping       Subjective     Here today telling me that "my psych meds are not working "  He has an appt with Tiffany Jung for counseling  He is looking forward to this  He is not actively with si/hi but has been in the recent past   He has no urge to do much  Is taking his meds but not checking his BG  Feels he has specialists that do not help him, much  Recently est with us from Dr Audi Mejia who had him on Polypharmacy - we have been working very hard to get his meds adjusted  Saw Neurology recently as well who adjusted some things  He is sleeping, often  He is back to driving  Lives in the [facility] [place]  Does not have a whole lot of social support, otherwise  Does have friends  Dr Audi Mejia, Neurology/Pain was managing his mood  DM - sees Endo in Nov     Lab Results   Component Value Date    HGBA1C 7 8 (H) 07/30/2022     He is c/o burning in his penis  "this is embarrassing "  No urinary burning per say -- was on keflex for UTI at the end of September  There is redness under his pannus and in his folds as well  He has been using abx creams and ointments on this with no improvement  No f/c/s  Review of Systems   All other systems reviewed and are negative  Current Outpatient Medications on File Prior to Visit   Medication Sig   • acetaminophen (TYLENOL) 650 mg CR tablet Take 1 tablet (650 mg total) by mouth every 8 (eight) hours as needed for mild pain or moderate pain   • albuterol (PROVENTIL HFA,VENTOLIN HFA) 90 mcg/act inhaler Inhale 2 puffs every 6 (six) hours as needed for wheezing   • atorvastatin (LIPITOR) 40 mg tablet TAKE 1 TABLET BY MOUTH ONCE DAILY     • cetirizine (ZyrTEC) 10 mg tablet Take 1 tablet (10 mg total) by mouth daily   • citalopram (CeleXA) 20 mg tablet Take 1 tablet (20 mg total) by mouth daily   • clonazePAM (KlonoPIN) 0 5 mg tablet Take 0 5 mg by mouth every 12 (twelve) hours as needed   • docusate sodium (COLACE) 100 mg capsule Take 100 mg by mouth 2 (two) times a day   • Easy Touch Pen Needles 31G X 8 MM MISC Use 1 each 3 (three) times a day   • Eliquis 5 MG TAKE 1 TABLET BY MOUTH TWO TIMES A DAY  • ferrous sulfate 325 (65 Fe) mg tablet Take 325 mg by mouth 2 (two) times a day with meals   • furosemide (LASIX) 40 mg tablet Take 40 mg by mouth daily   • gabapentin (NEURONTIN) 600 MG tablet Take 600 mg by mouth 2 (two) times a day   • glucose blood (OneTouch Verio) test strip Check blood sugars four times daily  Please substitute with appropriate alternative as covered by patient's insurance  Dx: E11 65   • hydrocortisone 2 5 % cream Apply 1 application topically 3 (three) times a day as needed   • insulin aspart (NovoLOG FlexPen) 100 UNIT/ML injection pen Inject 26 Units under the skin 3 (three) times a day with meals Per sliding scale - managed by Dr Anson Esposito + scheduled   • Insulin Glargine Solostar (Lantus SoloStar) 100 UNIT/ML SOPN Inject 0 56 mL (56 Units total) under the skin daily   • lamoTRIgine (LaMICtal) 25 mg tablet Take 1 tablet (25 mg total) by mouth in the morning and 1 tablet (25 mg total) in the evening  • levETIRAcetam (KEPPRA) 250 mg tablet Take 250 mg by mouth 2 (two) times a day   • levothyroxine (Euthyrox) 50 mcg tablet Take 1 tablet (50 mcg total) by mouth daily in the early morning Take with 300 mcg tablet   • levothyroxine 300 MCG tablet Take 1 tablet (300 mcg total) by mouth daily Take with 50 mcg tablet   • Linzess 290 MCG CAPS Take 1 capsule by mouth daily   • lisinopril (ZESTRIL) 2 5 mg tablet TAKE 1 TABLET BY MOUTH ONCE DAILY  • magnesium Oxide (MAG-OX) 400 mg TABS TAKE 1 TABLET BY MOUTH TWO TIMES A DAY  • metoprolol tartrate (LOPRESSOR) 25 mg tablet TAKE 1 TABLET BY MOUTH TWO TIMES A DAY  • OneTouch Delica Lancets 49J MISC Check blood sugars four times daily  Please substitute with appropriate alternative as covered by patient's insurance   Dx: E11 65   • pantoprazole (PROTONIX) 40 mg tablet TAKE 1 TABLET BY MOUTH ONCE DAILY  • potassium chloride (K-DUR,KLOR-CON) 20 mEq tablet Take 1 tablet (20 mEq total) by mouth in the morning   • rOPINIRole (REQUIP) 4 mg tablet Take 1 tablet (4 mg total) by mouth in the morning and 1 tablet (4 mg total) at noon and 1 tablet (4 mg total) in the evening and 1 tablet (4 mg total) before bedtime  • Semaglutide, 1 MG/DOSE, (Ozempic, 1 MG/DOSE,) 2 MG/1 5ML SOPN Inject 1 mg under the skin once a week   • senna (SENOKOT) 8 6 MG tablet Take 1 tablet by mouth 2 (two) times a day   • tamsulosin (FLOMAX) 0 4 mg TAKE 1 CAP DAILY WITH DINNER   • timolol (TIMOPTIC) 0 5 % ophthalmic solution Administer 1 drop to the right eye 2 (two) times a day   • ketoconazole (NIZORAL) 2 % shampoo Apply 1 application topically 2 (two) times a week for 8 doses       Objective     /74 (BP Location: Left arm, Patient Position: Sitting, Cuff Size: Large)   Pulse 74   Temp (!) 97 2 °F (36 2 °C) (Temporal)   Resp 18   Ht 5' 10" (1 778 m)   Wt (!) 170 kg (375 lb 12 8 oz)   SpO2 98%   BMI 53 92 kg/m²     Physical Exam  Vitals and nursing note reviewed  Constitutional:       Appearance: He is obese  Comments: Flat Affect, poor eye contact, looking down at the floor   HENT:      Head: Normocephalic and atraumatic  Eyes:      Conjunctiva/sclera: Conjunctivae normal       Pupils: Pupils are equal, round, and reactive to light  Cardiovascular:      Rate and Rhythm: Normal rate and regular rhythm  Pulmonary:      Effort: Pulmonary effort is normal       Breath sounds: Normal breath sounds  Genitourinary:     Comments: I had difficulty retracting his foreskin, therefore, I asked the patient to do such  There is excess moisture with redness around the glans, tip of penis with white film  This is "sore" to exam   There is an odor  There is also redness in his pannus/groin with satellite lesions and clumpy topical ointment as well      Musculoskeletal: Cervical back: Neck supple  Lymphadenopathy:      Cervical: No cervical adenopathy  Neurological:      General: No focal deficit present  Mental Status: He is alert and oriented to person, place, and time  Psychiatric:      Comments: Flat affect, congruent with mood      Total time I spent caring for this patient on the day of the encounter - 40 minutes      5 minutes spent before the visit  40 minutes spent during the visit  0 minutes spent after the visit day of       [address], DO

## 2022-10-19 ENCOUNTER — DOCUMENTATION (OUTPATIENT)
Dept: BEHAVIORAL/MENTAL HEALTH CLINIC | Facility: CLINIC | Age: 57
End: 2022-10-19

## 2022-10-19 ENCOUNTER — SOCIAL WORK (OUTPATIENT)
Dept: BEHAVIORAL/MENTAL HEALTH CLINIC | Facility: CLINIC | Age: 57
End: 2022-10-19
Payer: COMMERCIAL

## 2022-10-19 DIAGNOSIS — F25.0 SCHIZOAFFECTIVE DISORDER, BIPOLAR TYPE (HCC): Primary | ICD-10-CM

## 2022-10-19 PROCEDURE — 90837 PSYTX W PT 60 MINUTES: CPT | Performed by: SOCIAL WORKER

## 2022-10-19 NOTE — PROGRESS NOTES
10/19/2022 Client was referred to [facility] due to [address]  No HI he will be contacted daily around 6:00 Pm till 10/26/2022

## 2022-10-19 NOTE — PSYCH
Psychotherapy Provided: Individual Psychotherapy 60  minutes 09:15 to 10:30 Am          Goals addressed in session: (Long Term Goal Number One/Creation of Crisis Plan) The Client reported increased fleeting SI due to medication issues  He presently is working with his PCP to address his medication issue that are effecting his MH  During the session we explored and increased his supports as well as created his crisis plan  It is hoped that by  Addressing his weaknesses this will act as a preventive measure against the possible need for higher level of care and services  Interventions: Supportive Therapy, Strengths Therapy,  and Cognitive Behavioral Therapy where the treatment modalities utilized during the session  Assessment and Plan: The Client presented a depressed flat mood that was not congruent in effect  He was alert, goal directed and participated with prompts during the session  The Client was oriented to person, place, time, situation, and reported no suicidal/homicidal ideation, plan, or intent  (asked 3 x past fleeing SI last week) As team we explored and processed the increased depression, with the Client being able to verbalize increase conflicts within his peer relationships  " I was fighting with my friends" During the session we increased his supports at the community level as well as created the [address] brown template  Grounding was re-taught during the session with the Client reporting decrease in his depression  As his home commitment the Client was asked to utilize this coping skills when presented with increased depression or SI  Next appointment was set for 1 week       Pain:      PSYCH MENTAL STATUS PAIN :6 as reported by the Client after grounding was re-taught he went to a 1     PHYSICAL PAIN SCALE NUMBERS:3 as reported by the Client     Current suicide risk : medium / Phone number for [facility] was given  to the Client [phone]  Phone number for [facility] Suicide Prevention Life Line was given to the Client/ [phone]  Contact information worksheet of phone numbers was given to the Client  [facility] will contact the Client daily at 6:00 PM     [address]: Diagnosis and Treatment Plan explained to Ezequiel Cadet, Ezequiel Cadet  relates understanding diagnosis and is agreeable to Treatment Plan  Yes

## 2022-10-19 NOTE — PROGRESS NOTES
10/19/22 Client's Supports coordinator's SREEKANTH Supervisor was notified (due to regular Coordinator being at training) of the Client's Fleeting SI and contact with Alireza Zamora  She was also notified that he they will contact daily, at 6 pm, and list of local, state and National Suicide prevention phone numbers was given the Client The Supervisors was also notified that grounding was reviewed and re-taught as well as the [facility]  Was created

## 2022-10-21 ENCOUNTER — DOCTOR'S OFFICE (OUTPATIENT)
Dept: URBAN - NONMETROPOLITAN AREA CLINIC 1 | Facility: CLINIC | Age: 57
Setting detail: OPHTHALMOLOGY
End: 2022-10-21
Payer: COMMERCIAL

## 2022-10-21 DIAGNOSIS — H40.1132: ICD-10-CM

## 2022-10-21 PROBLEM — H53.2 DIPLOPIA: Status: ACTIVE | Noted: 2022-10-21

## 2022-10-21 PROCEDURE — 99212 OFFICE O/P EST SF 10 MIN: CPT | Performed by: OPHTHALMOLOGY

## 2022-10-21 ASSESSMENT — KERATOMETRY
OD_K2POWER_DIOPTERS: 45.25
OD_K1POWER_DIOPTERS: 46.75
OS_K2POWER_DIOPTERS: 45.50
OD_AXISANGLE_DEGREES: 013
OS_K1POWER_DIOPTERS: 45.00
OS_AXISANGLE_DEGREES: 012

## 2022-10-21 ASSESSMENT — REFRACTION_CURRENTRX
OS_CYLINDER: SPH
OD_VPRISM_DIRECTION: BF
OD_OVR_VA: 20/
OD_ADD: +2.50
OS_VPRISM_DIRECTION: BF
OS_ADD: +2.50
OD_CYLINDER: -0.50
OD_AXIS: 179
OD_SPHERE: +0.50
OS_SPHERE: +0.75
OS_OVR_VA: 20/

## 2022-10-21 ASSESSMENT — SPHEQUIV_DERIVED
OS_SPHEQUIV: 0.25
OD_SPHEQUIV: -0.875
OS_SPHEQUIV: 0.625
OD_SPHEQUIV: 0

## 2022-10-21 ASSESSMENT — REFRACTION_MANIFEST
OD_SPHERE: +0.25
OD_AXIS: 020
OS_SPHERE: +0.75
OS_VA1: 20/40+
OS_VA2: 20/25-2
OD_VA1: 20/40+
OD_CYLINDER: -0.50
OS_AXIS: 025
OS_ADD: +2.50
OS_CYLINDER: -0.25
OD_ADD: +2.50

## 2022-10-21 ASSESSMENT — REFRACTION_AUTOREFRACTION
OD_SPHERE: -0.25
OD_CYLINDER: -1.25
OS_CYLINDER: -0.50
OD_AXIS: 008
OS_SPHERE: +0.50
OS_AXIS: 33

## 2022-10-21 ASSESSMENT — AXIALLENGTH_DERIVED
OD_AL: 22.708
OS_AL: 22.8742
OD_AL: 23.0283
OS_AL: 22.7377

## 2022-10-21 ASSESSMENT — VISUAL ACUITY
OD_BCVA: 20/30-1
OS_BCVA: 20/40

## 2022-10-25 DIAGNOSIS — I10 ESSENTIAL HYPERTENSION: ICD-10-CM

## 2022-10-27 ENCOUNTER — SOCIAL WORK (OUTPATIENT)
Dept: BEHAVIORAL/MENTAL HEALTH CLINIC | Facility: CLINIC | Age: 57
End: 2022-10-27

## 2022-10-27 DIAGNOSIS — F25.0 SCHIZOAFFECTIVE DISORDER, BIPOLAR TYPE (HCC): Primary | ICD-10-CM

## 2022-10-27 NOTE — PSYCH
Psychotherapy Provided: Individual Psychotherapy 60  minutes 08:50 Am to 09:45 Am          Goals addressed in session: (Long Term Goal Number 1) The client reported that he is doing " much better" with additional supports from his Jimmy and support staff  During the session we explored the connection with his environment and personal relationship and the effects on his mental health  It is hoped that by addressing his weaknesses this will act as a preventive measure against the possible need for higher level of care and services  Interventions: Supportive Therapy, Strengths Therapy,  and Cognitive Behavioral Therapy where the treatment modalities utilized during the session  Assessment and Plan: The Client presented an anxious mood that was congruent in effect  He was alert, goal directed and participated with prompts during the session  The Client was oriented to person, place, time, situation, and reported no suicidal/homicidal ideation, plan, or intent  As team we explored and processed the Client relationship and environmental issues  He reported continued increased relationship growth and use of healthy communication patterns  Next appointment was set for 2 weeks  Pain:      PSYCH MENTAL STATUS PAIN :3 as reported by the Client     PHYSICAL PAIN SCALE NUMBERS:3 as reported by the Client     Current suicide risk : Low/Phone number for [facility] was given  to the Client [phone]  The Client was given phone number for the [facility] [phone]  Phone number for [address] [place] [address] was given to the Client/ [address]: Diagnosis and Treatment Plan explained to Herson Carty, Herson Carty  relates understanding diagnosis and is agreeable to Treatment Plan  Yes

## 2022-11-01 ENCOUNTER — OFFICE VISIT (OUTPATIENT)
Dept: FAMILY MEDICINE CLINIC | Facility: CLINIC | Age: 57
End: 2022-11-01

## 2022-11-01 VITALS
OXYGEN SATURATION: 92 % | RESPIRATION RATE: 18 BRPM | SYSTOLIC BLOOD PRESSURE: 120 MMHG | WEIGHT: 315 LBS | BODY MASS INDEX: 45.1 KG/M2 | HEIGHT: 70 IN | DIASTOLIC BLOOD PRESSURE: 60 MMHG | HEART RATE: 69 BPM | TEMPERATURE: 97.8 F

## 2022-11-01 DIAGNOSIS — F32.2 SEVERE DEPRESSION (HCC): Primary | ICD-10-CM

## 2022-11-01 DIAGNOSIS — Z79.4 TYPE 2 DIABETES MELLITUS WITH HYPERGLYCEMIA, WITH LONG-TERM CURRENT USE OF INSULIN (HCC): ICD-10-CM

## 2022-11-01 DIAGNOSIS — E11.65 TYPE 2 DIABETES MELLITUS WITH HYPERGLYCEMIA, WITH LONG-TERM CURRENT USE OF INSULIN (HCC): ICD-10-CM

## 2022-11-01 RX ORDER — LATANOPROST 50 UG/ML
0.01 SOLUTION/ DROPS OPHTHALMIC
COMMUNITY
Start: 2022-10-21

## 2022-11-01 RX ORDER — LIDOCAINE 50 MG/G
OINTMENT TOPICAL
COMMUNITY
Start: 2022-10-24

## 2022-11-01 NOTE — PROGRESS NOTES
Name: Scott Lion      : 1965      MRN: [id number]  Encounter Provider: Korey Sands DO  Encounter Date: 2022   Encounter department: [address] [place] [address]  Severe depression ([facility] [address] )  - doing better with resources and seeing [employer]  No active si/hi  His Celexa was increased at prior visit  For acute si/hi --> ED  He acknowledges this  - he got a cat and feels this has also been helpful for him  This was his friends cat  He would like a note to have this as this has helped him, significantly  2  Candida rash of groin/3  Penile irritation  - balanitis --> improved per patient  Type 2 diabetes mellitus with hyperglycemia, with long-term current use of insulin ([facility] [address] )  Lab Results   Component Value Date    HGBA1C 7 8 (H) 2022   - has upcoming Endo referral       RTC as otherwise scheduled in December  Patient/Caretaker verbalized understanding and were in agreement with today's assessment and plan  Time was taken to address any questions patient/caretaker had  Indication/Risks/Benefits of medication(s) as prescribed were discussed with the patient/caretaker  The patient verbalized understanding and agreement and elects to take medications as prescribed  Time was taken to answer any questions the patient/caretaker may have had  Chief Complaint   Patient presents with   • Follow-up     2 week f/u pt said he needs paperwork for his cat  Subjective     Severe Depression -- on board with [employer]  He feels "like I got run over by a bus "  He had his Covid shot yesterday  Overall, his mood is stable compared to when he was here last   He does have some nasal congestion and fatigue but no sob/wiley, CP, f/c/s  Walked a few blocks from [employer]  Does have a care but it broke down per his report  No si/hi  Lives in the [facility]    Does not have a whole lot of social support, otherwise  Does have friends  DM - sees Endo in Nov     Lab Results   Component Value Date    HGBA1C 7 8 (H) 07/30/2022     Balanitis/Candida - resolved       Review of Systems   All other systems reviewed and are negative  Current Outpatient Medications on File Prior to Visit   Medication Sig   • acetaminophen (TYLENOL) 650 mg CR tablet Take 1 tablet (650 mg total) by mouth every 8 (eight) hours as needed for mild pain or moderate pain   • albuterol (PROVENTIL HFA,VENTOLIN HFA) 90 mcg/act inhaler Inhale 2 puffs every 6 (six) hours as needed for wheezing   • atorvastatin (LIPITOR) 40 mg tablet TAKE 1 TABLET BY MOUTH ONCE DAILY  • cetirizine (ZyrTEC) 10 mg tablet Take 1 tablet (10 mg total) by mouth daily   • citalopram (CeleXA) 40 mg tablet Take 1 tablet (40 mg total) by mouth daily   • clonazePAM (KlonoPIN) 0 5 mg tablet Take 0 5 mg by mouth every 12 (twelve) hours as needed   • docusate sodium (COLACE) 100 mg capsule Take 100 mg by mouth 2 (two) times a day   • Easy Touch Pen Needles 31G X 8 MM MISC Use 1 each 3 (three) times a day   • Eliquis 5 MG TAKE 1 TABLET BY MOUTH TWO TIMES A DAY  • ferrous sulfate 325 (65 Fe) mg tablet Take 325 mg by mouth 2 (two) times a day with meals   • furosemide (LASIX) 40 mg tablet Take 40 mg by mouth daily   • gabapentin (NEURONTIN) 600 MG tablet Take 600 mg by mouth 2 (two) times a day   • glucose blood (OneTouch Verio) test strip Check blood sugars four times daily  Please substitute with appropriate alternative as covered by patient's insurance   Dx: E11 65   • hydrocortisone 2 5 % cream Apply 1 application topically 3 (three) times a day as needed   • insulin aspart (NovoLOG FlexPen) 100 UNIT/ML injection pen Inject 26 Units under the skin 3 (three) times a day with meals Per sliding scale - managed by Dr Hancock Head + scheduled   • Insulin Glargine Solostar (Lantus SoloStar) 100 UNIT/ML SOPN Inject 0 56 mL (56 Units total) under the skin daily   • lamoTRIgine (LaMICtal) 25 mg tablet Take 1 tablet (25 mg total) by mouth in the morning and 1 tablet (25 mg total) in the evening  • latanoprost (XALATAN) 0 005 % ophthalmic solution Administer 0 005 drops to both eyes   • levETIRAcetam (KEPPRA) 250 mg tablet Take 250 mg by mouth 2 (two) times a day   • levothyroxine (Euthyrox) 50 mcg tablet Take 1 tablet (50 mcg total) by mouth daily in the early morning Take with 300 mcg tablet   • levothyroxine 300 MCG tablet Take 1 tablet (300 mcg total) by mouth daily Take with 50 mcg tablet   • lidocaine (XYLOCAINE) 5 % ointment    • Linzess 290 MCG CAPS Take 1 capsule by mouth daily   • lisinopril (ZESTRIL) 2 5 mg tablet TAKE 1 TABLET BY MOUTH ONCE DAILY  • magnesium Oxide (MAG-OX) 400 mg TABS TAKE 1 TABLET BY MOUTH TWO TIMES A DAY  • metoprolol tartrate (LOPRESSOR) 25 mg tablet TAKE 1 TABLET BY MOUTH TWO TIMES A DAY  • nystatin (MYCOSTATIN) cream Apply topically 2 (two) times a day for 14 days   • nystatin (MYCOSTATIN) powder Apply topically 2 (two) times a day To skin folds/groin   • OneTouch Delica Lancets 42Z MISC Check blood sugars four times daily  Please substitute with appropriate alternative as covered by patient's insurance  Dx: E11 65   • pantoprazole (PROTONIX) 40 mg tablet TAKE 1 TABLET BY MOUTH ONCE DAILY  • potassium chloride (K-DUR,KLOR-CON) 20 mEq tablet Take 1 tablet (20 mEq total) by mouth in the morning   • rOPINIRole (REQUIP) 4 mg tablet Take 1 tablet (4 mg total) by mouth in the morning and 1 tablet (4 mg total) at noon and 1 tablet (4 mg total) in the evening and 1 tablet (4 mg total) before bedtime     • Semaglutide, 1 MG/DOSE, (Ozempic, 1 MG/DOSE,) 2 MG/1 5ML SOPN Inject 1 mg under the skin once a week   • senna (SENOKOT) 8 6 MG tablet Take 1 tablet by mouth 2 (two) times a day   • tamsulosin (FLOMAX) 0 4 mg TAKE 1 CAP DAILY WITH DINNER   • timolol (TIMOPTIC) 0 5 % ophthalmic solution Administer 1 drop to the right eye 2 (two) times a day   • ketoconazole (NIZORAL) 2 % shampoo Apply 1 application topically 2 (two) times a week for 8 doses       Objective     /60 (BP Location: Left arm, Patient Position: Sitting, Cuff Size: Large)   Pulse 69   Temp 97 8 °F (36 6 °C) (Probe)   Resp 18   Ht 5' 10" (1 778 m)   Wt (!) 170 kg (375 lb)   SpO2 92%   BMI 53 81 kg/m²     Physical Exam  Vitals and nursing note reviewed  Constitutional:       General: He is not in acute distress  Appearance: He is obese  Comments: Tired appearing today     HENT:      Head: Normocephalic and atraumatic  Eyes:      Conjunctiva/sclera: Conjunctivae normal       Pupils: Pupils are equal, round, and reactive to light  Cardiovascular:      Rate and Rhythm: Normal rate and regular rhythm  Pulmonary:      Effort: Pulmonary effort is normal       Breath sounds: Normal breath sounds  Musculoskeletal:      Cervical back: Neck supple  Comments: Using his rollator     Lymphadenopathy:      Cervical: No cervical adenopathy  Skin:     General: Skin is warm and dry  Neurological:      General: No focal deficit present  Mental Status: He is alert and oriented to person, place, and time     Psychiatric:      Comments: Mood improved today         Saba Martin DO

## 2022-11-01 NOTE — LETTER
November 1, 2022     Re:  Patient: Cristino Menchaca  YOB: 1965  Date of Visit: 11/1/2022      To Whom it May Concern:    Dary Harpreetreynaldo is under my professional care  Seng Conley was seen in my office on 11/1/2022  Seng Conley will benefit from having a Cat for his well-being  If you have any questions or concerns, please don't hesitate to call           Sincerely,          Asad Alfaro, DO

## 2022-11-03 ENCOUNTER — CLINICAL SUPPORT (OUTPATIENT)
Dept: FAMILY MEDICINE CLINIC | Facility: CLINIC | Age: 57
End: 2022-11-03

## 2022-11-03 DIAGNOSIS — Z79.899 POLYPHARMACY: Primary | ICD-10-CM

## 2022-11-03 NOTE — PROGRESS NOTES
[address]  Chaim Kent, PharmD, BCACP    Lorenzo Goodpasture is a 59 y o  male who was referred to the pharmacist for medication reconciliation and education, referred by Dr Driss Solis  Telemedicine consent  The patient was identified by name and date of birth  Lorenzo Goodpasture was informed that this is a telemedicine visit and that the visit is being conducted through Telephone  My office door was closed  No one else was in the room  He acknowledged consent and understanding of privacy and security of the video platform  The patient has agreed to participate and understands they can discontinue the visit at any time  Assessment/ Plan     Medication Reconciliation:  Reviewed medication list and outside records to reconcile medication list  Also reviewed pharmacy fill data to confirm med pack have proper dosages  Plan:  · Continue to improve compliance with insulin doses and blood sugar checks  Follow-Up:   · Has several upcoming appts with specialists outside of [facility]  Will continue to follow and update med list with changes   · appt with PCP 12/21/22    Subjective   Medication list reviewed with patient, reports the following discrepancies/problems:    Diabetes  • insulin aspart 26 units three times daily with meals (121- 140 +1 unit; 141- 160 +2; 161-180 3 units; 181-200 4 units; 201-220 5 units (1 units per 20 mg/dL over 120 mg/dL)   • insulin glargine 56 units daily at dinner  • Ozempic 1 mg once weekly (Mondays)   • OneTouch Delica Lancets 87V Misc  • OneTouch Verio StrpBlood sugar readings    • Continues to forget insulin doses  Mostly when he leaves the house and he doesn't take his insulin with him  Discussed that insulin pen can be at room temperature for 28 days  He will start taking his insulin and glucometer with him when he leaves the house for the day      Hypothyroidism   · Levothyroxine 300 mcg + 50 mcg daily    COPD  · Albuterol Allergy   · Cetirizine 10 mg    HTN / CHF:  · Furosemide 40 mg daily  · Metoprolol tartrate 25 mg BID  · Potassium 20 mEq once daily   · Lisinopril 2 5 mg     Atrial Fibrillation  · Eliquis 5 mg BID    HLD  · Atorvastatin 40 mg daily      Neuropathy pain (managed by neurologist Dr Jose Le)  · Gabapentin 600 mg BID    Depression  · Citalopram 40 mg daily- recently increased from 20 mg      Seizure disorder/ Restless legs / signs of parkinsonism (managed by neurologist Dr Jose Le)  · Ropinerole 4 mg QID  · Clonazepam 0 5 mg BID   · Lamotrigine 25 mg BID  · Levetiracetam 250 mg BID     GI- IBS-C & Gastritis (managed by GI):  · Linzess 290 mcg  · Pantoprazole 40 mg daily   · Senna 8 6 BID   · Docusate 100 mg BID     BPH:  · tamsulosin 0 4 mg daily      Other/PRN:  · Magnesium oxide 400 mg BID  · Zyprobi (probiotic)   · Eye drops    Misses doses:  no  # of missed doses in the past week: estimates 4- 6  Misses meal time insulin doses     # of times per day patient takes meds: 4    Use of supportive adherence tools:Yes, describe: pill packing at pharmacy    Med cost concerns: no    Objective     BP Readings from Last 3 Encounters:   11/01/22 120/60   10/18/22 124/74   10/07/22 136/60       Pulse Readings from Last 3 Encounters:   11/01/22 69   10/18/22 74   10/07/22 76       Lab Results   Component Value Date    SODIUM 138 07/23/2016    K 3 8 07/23/2016     07/23/2016    CO2 30 07/23/2016    BUN 21 07/23/2016    CREATININE 1 33 (H) 07/23/2016    GLUC 236 (H) 07/23/2016    CALCIUM 8 6 07/23/2016       Blood Glucose Readings  The patient is currently checking blood glucose 1-3 times per day  Patient reports with SMBG logs  Date AM Lunch Dinner   10/29 179 65 156   11/1 173  174   11/2 215     11/3 120     Avg        Hypoglycemia: The patient had 1 episodes/symptoms of hypoglycemia       Education Provided     Pharmacist Tracking Tool  Reason For Outreach: Embedded Pharmacist  Demographics:  Intervention Method: Phone  Type of Intervention: Follow-Up  Topics Addressed: Polypharmacy  Pharmacologic Interventions: N/A  Non-Pharmacologic Interventions: Adherence addressed, Disease state education, Medication Monitoring and Medication/Device education  Time:  Direct Patient Care: 20 mins  Care Coordination: 10 mins  Recommendation Recipient: N/A  Outcome: N/A

## 2022-11-04 ENCOUNTER — TELEPHONE (OUTPATIENT)
Dept: FAMILY MEDICINE CLINIC | Facility: CLINIC | Age: 57
End: 2022-11-04

## 2022-11-04 NOTE — TELEPHONE ENCOUNTER
Shaun Maria from [facility] home health calling stating that pt fell on 10/25  States his knees buckled and he went on his back his walker went on top of him  No bruises but his right shoulder brusitis is hurting more  Also notes that when he was driving home from [employer] yesterday his hands went cold  Left hand went numb  He was shaking and felt like he had the chills inside out   But didn't pass out or anything

## 2022-11-07 NOTE — TELEPHONE ENCOUNTER
Pt said he doing ok, but having an upset stomach after he went for the colonoscopy  He did say he's a little sore from the fall last week  Pt said that when he was driving 73/0/25 coming back his hands was freezing cold and his feet wasn't

## 2022-11-08 DIAGNOSIS — E11.621 DIABETIC ULCER OF FOOT ASSOCIATED WITH TYPE 2 DIABETES MELLITUS, WITH OTHER ULCER SEVERITY, UNSPECIFIED LATERALITY, UNSPECIFIED PART OF FOOT (HCC): ICD-10-CM

## 2022-11-08 DIAGNOSIS — Z86.79 HISTORY OF HEART FAILURE: ICD-10-CM

## 2022-11-08 DIAGNOSIS — R39.11 BENIGN PROSTATIC HYPERPLASIA WITH URINARY HESITANCY: ICD-10-CM

## 2022-11-08 DIAGNOSIS — I87.332 CHRONIC VENOUS HYPERTENSION (IDIOPATHIC) WITH ULCER AND INFLAMMATION OF LEFT LOWER EXTREMITY (HCC): ICD-10-CM

## 2022-11-08 DIAGNOSIS — L97.929 CHRONIC VENOUS HYPERTENSION (IDIOPATHIC) WITH ULCER AND INFLAMMATION OF LEFT LOWER EXTREMITY (HCC): ICD-10-CM

## 2022-11-08 DIAGNOSIS — E66.01 MORBID OBESITY (HCC): ICD-10-CM

## 2022-11-08 DIAGNOSIS — J44.1 COPD WITH ACUTE EXACERBATION (HCC): ICD-10-CM

## 2022-11-08 DIAGNOSIS — I50.9 CONGESTIVE HEART FAILURE, UNSPECIFIED HF CHRONICITY, UNSPECIFIED HEART FAILURE TYPE (HCC): ICD-10-CM

## 2022-11-08 DIAGNOSIS — L97.508 DIABETIC ULCER OF FOOT ASSOCIATED WITH TYPE 2 DIABETES MELLITUS, WITH OTHER ULCER SEVERITY, UNSPECIFIED LATERALITY, UNSPECIFIED PART OF FOOT (HCC): ICD-10-CM

## 2022-11-08 DIAGNOSIS — N40.1 BENIGN PROSTATIC HYPERPLASIA WITH URINARY HESITANCY: ICD-10-CM

## 2022-11-08 DIAGNOSIS — M86.9 OSTEOMYELITIS, UNSPECIFIED SITE, UNSPECIFIED TYPE (HCC): ICD-10-CM

## 2022-11-08 DIAGNOSIS — I48.91 ATRIAL FIBRILLATION, UNSPECIFIED TYPE (HCC): ICD-10-CM

## 2022-11-08 RX ORDER — ATORVASTATIN CALCIUM 40 MG/1
TABLET, FILM COATED ORAL
Qty: 90 TABLET | Refills: 0 | Status: SHIPPED | OUTPATIENT
Start: 2022-11-08

## 2022-11-08 RX ORDER — TAMSULOSIN HYDROCHLORIDE 0.4 MG/1
CAPSULE ORAL
Qty: 90 CAPSULE | Refills: 0 | Status: SHIPPED | OUTPATIENT
Start: 2022-11-08

## 2022-11-09 ENCOUNTER — SOCIAL WORK (OUTPATIENT)
Dept: BEHAVIORAL/MENTAL HEALTH CLINIC | Facility: CLINIC | Age: 57
End: 2022-11-09

## 2022-11-09 DIAGNOSIS — F25.0 SCHIZOAFFECTIVE DISORDER, BIPOLAR TYPE (HCC): Primary | ICD-10-CM

## 2022-11-09 NOTE — PSYCH
Psychotherapy Provided: Individual Psychotherapy 60  minutes 11:15 Am to 12:09 Pm          Goals addressed in session:(Long Term Goal Number one) The Client reported that his friend gave him her cat  He reported that he is enjoying this new "pet parent" experience  During the session we explored changes in his life and the effects on his mental health  It is hoped that by addressing his weaknesses this will act as a preventive measure against the possible need for higher level of care and services  Interventions:Supportive Therapy, Strengths Therapy,  and Cognitive Behavioral Therapy where the treatment modalities utilized during the session  Assessment and Plan: The Client presented a depressed anxious mood that was congruent in effect  He was alert, goal directed and participated with prompts during the session  The Client was oriented to person, place, time, situation, and reported no suicidal/homicidal ideation, plan, or intent  As team we explored and processed the client's personal relationships and the effects on his mental health  He was able to verbalize  His weaknesses being lack of peer support  The Client turned down referral to the club house  During the session we reviewed his coping skills  Next appointment was set for 2 weeks  Pain:      PSYCH MENTAL STATUS PAIN :7 as reported by the Client     PHYSICAL PAIN SCALE NUMBERS:7 as reported by the client due to back issues  Current suicide risk : Low/Phone number for National Suicide Prevention Life Line was given to the Client/ [phone]: Diagnosis and Treatment Plan explained to Herson Carty, Herson Carty  relates understanding diagnosis and is agreeable to Treatment Plan  Yes

## 2022-11-11 ENCOUNTER — TELEPHONE (OUTPATIENT)
Dept: FAMILY MEDICINE CLINIC | Facility: CLINIC | Age: 57
End: 2022-11-11

## 2022-11-11 DIAGNOSIS — I10 ESSENTIAL HYPERTENSION: ICD-10-CM

## 2022-11-11 RX ORDER — LISINOPRIL 2.5 MG/1
TABLET ORAL
Qty: 90 TABLET | Refills: 0 | Status: SHIPPED | OUTPATIENT
Start: 2022-11-11

## 2022-11-11 NOTE — TELEPHONE ENCOUNTER
Agnes Sherman calling from Dr Sami Engle office and stated she is cutting his ferrous sulfate to every other day (Monday, Wednesday, Friday, Sunday) and taking him off the Senna   She is hoping this will help with his liquid diarrhea

## 2022-11-15 DIAGNOSIS — E11.65 TYPE 2 DIABETES MELLITUS WITH HYPERGLYCEMIA, WITH LONG-TERM CURRENT USE OF INSULIN (HCC): ICD-10-CM

## 2022-11-15 DIAGNOSIS — Z79.4 TYPE 2 DIABETES MELLITUS WITH HYPERGLYCEMIA, WITH LONG-TERM CURRENT USE OF INSULIN (HCC): ICD-10-CM

## 2022-11-15 RX ORDER — INSULIN GLARGINE 100 [IU]/ML
56 INJECTION, SOLUTION SUBCUTANEOUS DAILY
Qty: 60 ML | Refills: 0 | Status: SHIPPED | OUTPATIENT
Start: 2022-11-15

## 2022-11-17 ENCOUNTER — TELEPHONE (OUTPATIENT)
Dept: FAMILY MEDICINE CLINIC | Facility: CLINIC | Age: 57
End: 2022-11-17

## 2022-11-17 DIAGNOSIS — L03.119 CELLULITIS OF LOWER EXTREMITY, UNSPECIFIED LATERALITY: Primary | ICD-10-CM

## 2022-11-17 RX ORDER — CEPHALEXIN 500 MG/1
500 CAPSULE ORAL EVERY 6 HOURS SCHEDULED
Qty: 28 CAPSULE | Refills: 0 | Status: SHIPPED | OUTPATIENT
Start: 2022-11-17 | End: 2022-11-24

## 2022-11-18 DIAGNOSIS — I48.91 ATRIAL FIBRILLATION, UNSPECIFIED TYPE (HCC): ICD-10-CM

## 2022-11-18 DIAGNOSIS — E61.2 MAGNESIUM DEFICIENCY: ICD-10-CM

## 2022-11-18 RX ORDER — APIXABAN 5 MG/1
TABLET, FILM COATED ORAL
Qty: 60 TABLET | Refills: 0 | Status: SHIPPED | OUTPATIENT
Start: 2022-11-18

## 2022-11-18 RX ORDER — LANOLIN ALCOHOL/MO/W.PET/CERES
CREAM (GRAM) TOPICAL
Qty: 60 TABLET | Refills: 0 | Status: SHIPPED | OUTPATIENT
Start: 2022-11-18

## 2022-11-21 ENCOUNTER — DOCTOR'S OFFICE (OUTPATIENT)
Dept: URBAN - NONMETROPOLITAN AREA CLINIC 1 | Facility: CLINIC | Age: 57
Setting detail: OPHTHALMOLOGY
End: 2022-11-21
Payer: COMMERCIAL

## 2022-11-21 ENCOUNTER — RX ONLY (RX ONLY)
Age: 57
End: 2022-11-21

## 2022-11-21 DIAGNOSIS — H43.393: ICD-10-CM

## 2022-11-21 DIAGNOSIS — E11.3293: ICD-10-CM

## 2022-11-21 PROCEDURE — 99213 OFFICE O/P EST LOW 20 MIN: CPT | Performed by: OPHTHALMOLOGY

## 2022-11-21 PROCEDURE — 92250 FUNDUS PHOTOGRAPHY W/I&R: CPT | Performed by: OPHTHALMOLOGY

## 2022-11-21 PROCEDURE — 92235 FLUORESCEIN ANGRPH MLTIFRAME: CPT | Performed by: OPHTHALMOLOGY

## 2022-11-21 ASSESSMENT — SPHEQUIV_DERIVED
OS_SPHEQUIV: 0.625
OS_SPHEQUIV: 0.25
OD_SPHEQUIV: 0
OD_SPHEQUIV: -0.875

## 2022-11-21 ASSESSMENT — REFRACTION_CURRENTRX
OS_SPHERE: +0.75
OS_VPRISM_DIRECTION: BF
OD_AXIS: 179
OD_VPRISM_DIRECTION: BF
OS_ADD: +2.50
OD_SPHERE: +0.50
OD_ADD: +2.50
OD_OVR_VA: 20/
OS_OVR_VA: 20/
OD_CYLINDER: -0.50
OS_CYLINDER: SPH

## 2022-11-21 ASSESSMENT — REFRACTION_MANIFEST
OD_SPHERE: +0.25
OD_CYLINDER: -0.50
OS_CYLINDER: -0.25
OS_VA2: 20/25-2
OD_AXIS: 020
OD_VA1: 20/40+
OS_VA1: 20/40+
OD_ADD: +2.50
OS_AXIS: 025
OS_SPHERE: +0.75
OS_ADD: +2.50

## 2022-11-21 ASSESSMENT — REFRACTION_AUTOREFRACTION
OS_AXIS: 33
OS_SPHERE: +0.50
OD_CYLINDER: -1.25
OD_SPHERE: -0.25
OD_AXIS: 008
OS_CYLINDER: -0.50

## 2022-11-21 ASSESSMENT — CONFRONTATIONAL VISUAL FIELD TEST (CVF)
OD_FINDINGS: FULL
OS_FINDINGS: FULL

## 2022-11-21 ASSESSMENT — KERATOMETRY
OS_K1POWER_DIOPTERS: 45.00
OD_K1POWER_DIOPTERS: 46.75
OD_AXISANGLE_DEGREES: 013
OS_K2POWER_DIOPTERS: 45.50
OD_K2POWER_DIOPTERS: 45.25
OS_AXISANGLE_DEGREES: 012

## 2022-11-21 ASSESSMENT — AXIALLENGTH_DERIVED
OS_AL: 22.8742
OD_AL: 22.708
OS_AL: 22.7377
OD_AL: 23.0283

## 2022-11-21 ASSESSMENT — VISUAL ACUITY
OS_BCVA: 20/30-1
OD_BCVA: 20/30+2

## 2022-11-22 ENCOUNTER — OFFICE VISIT (OUTPATIENT)
Dept: FAMILY MEDICINE CLINIC | Facility: CLINIC | Age: 57
End: 2022-11-22

## 2022-11-22 VITALS
WEIGHT: 315 LBS | OXYGEN SATURATION: 96 % | DIASTOLIC BLOOD PRESSURE: 55 MMHG | BODY MASS INDEX: 42.66 KG/M2 | RESPIRATION RATE: 18 BRPM | HEART RATE: 62 BPM | TEMPERATURE: 97.4 F | HEIGHT: 72 IN | SYSTOLIC BLOOD PRESSURE: 109 MMHG

## 2022-11-22 DIAGNOSIS — E66.01 MORBID OBESITY (HCC): ICD-10-CM

## 2022-11-22 DIAGNOSIS — L03.115 CELLULITIS OF RIGHT LOWER EXTREMITY: ICD-10-CM

## 2022-11-22 DIAGNOSIS — L03.116 CELLULITIS OF LEFT LOWER EXTREMITY: ICD-10-CM

## 2022-11-22 DIAGNOSIS — L20.9 ATOPIC DERMATITIS OF SCALP: Primary | ICD-10-CM

## 2022-11-22 RX ORDER — CLOBETASOL PROPIONATE 0.05 G/100ML
1 SHAMPOO TOPICAL 3 TIMES WEEKLY
Qty: 118 ML | Refills: 3 | Status: SHIPPED | OUTPATIENT
Start: 2022-11-22 | End: 2022-12-22

## 2022-11-22 NOTE — PROGRESS NOTES
Name: Segundo Leong      : 1965      MRN: [id number]  Encounter Provider: Effie Simms DO  Encounter Date: 2022   Encounter department: [address] [place] [address]     1  Atopic dermatitis of scalp  - still present despite Nizoral   We will trial clobex  He can use three times/week, leave on for 15 minutes  We will re-eval in one month  - clobetasol propionate (Clobex) 0 05 % shampoo; Apply 1 application topically 3 (three) times a week  Dispense: 118 mL; Refill: 3    2  Cellulitis of left lower extremity  - cont keflex  They are wrapped today  Saw Nobie Gear yesterday and got a good report  3  Morbid obesity ([address] )  Discussed the importance of maintaining a healthy lifestyle through heart healthy diet and exercise  4  Cellulitis of right lower extremity  -see above  RTC as scheduled in December  Patient/Caretaker verbalized understanding and were in agreement with today's assessment and plan  Time was taken to address any questions patient/caretaker had  Indication/Risks/Benefits of medication(s) as prescribed were discussed with the patient/caretaker  The patient verbalized understanding and agreement and elects to take medications as prescribed  Time was taken to answer any questions the patient/caretaker may have had  BMI Counseling: Body mass index is 50 99 kg/m²  The BMI is above normal  Nutrition recommendations include decreasing portion sizes, encouraging healthy choices of fruits and vegetables, decreasing fast food intake, consuming healthier snacks, limiting drinks that contain sugar, reducing intake of saturated and trans fat and reducing intake of cholesterol  Exercise recommendations include exercising 3-5 times per week and strength training exercises  Rationale for BMI follow-up plan is due to patient being overweight or obese         Chief Complaint   Patient presents with   • Skin Irritation     Scalp   • Fatigue          • Nausea     After eating or drinking       Subjective      Seb Derm -- tx'd in the past   There is still an area there on his scalp that is itchy and bothering him  Had nizoral and "I lost the bottle "  Did not feel it helped much  Has oily skin  No f/c/s in this regards  He is also falling asleep "again "  This is a chronic issue  He did see Neurology and had MRI and EEG  He is waiting to see them in f/u to hear about the results  B/l LE cellulitis - comes and goes  Sees Dr Felicity Galdamez for foot care and saw him yesterday and he shares that he feels his legs looked "OK "  Leydi Ip admits they are better on keflex  No oozing, f/c/s  Review of Systems   All other systems reviewed and are negative  Current Outpatient Medications on File Prior to Visit   Medication Sig   • acetaminophen (TYLENOL) 650 mg CR tablet Take 1 tablet (650 mg total) by mouth every 8 (eight) hours as needed for mild pain or moderate pain   • albuterol (PROVENTIL HFA,VENTOLIN HFA) 90 mcg/act inhaler Inhale 2 puffs every 6 (six) hours as needed for wheezing   • atorvastatin (LIPITOR) 40 mg tablet TAKE 1 TABLET BY MOUTH ONCE DAILY  • cephalexin (KEFLEX) 500 mg capsule Take 1 capsule (500 mg total) by mouth every 6 (six) hours for 7 days   • cetirizine (ZyrTEC) 10 mg tablet Take 1 tablet (10 mg total) by mouth daily   • citalopram (CeleXA) 40 mg tablet Take 1 tablet (40 mg total) by mouth daily   • clonazePAM (KlonoPIN) 0 5 mg tablet Take 0 5 mg by mouth every 12 (twelve) hours as needed   • docusate sodium (COLACE) 100 mg capsule Take 100 mg by mouth 2 (two) times a day   • Easy Touch Pen Needles 31G X 8 MM MISC Use 1 each 3 (three) times a day   • Eliquis 5 MG TAKE 1 TABLET BY MOUTH TWO TIMES A DAY     • ferrous sulfate 325 (65 Fe) mg tablet Take 325 mg by mouth 2 (two) times a day with meals   • furosemide (LASIX) 40 mg tablet Take 40 mg by mouth daily   • gabapentin (NEURONTIN) 600 MG tablet Take 600 mg by mouth 2 (two) times a day   • glucose blood (OneTouch Verio) test strip Check blood sugars four times daily  Please substitute with appropriate alternative as covered by patient's insurance  Dx: E11 65   • hydrocortisone 2 5 % cream Apply 1 application topically 3 (three) times a day as needed   • insulin aspart (NovoLOG FlexPen) 100 UNIT/ML injection pen Inject 26 Units under the skin 3 (three) times a day with meals Per sliding scale - managed by Dr Sonia Wood + scheduled   • Insulin Glargine Solostar (Lantus SoloStar) 100 UNIT/ML SOPN Inject 0 56 mL (56 Units total) under the skin daily   • lamoTRIgine (LaMICtal) 25 mg tablet Take 1 tablet (25 mg total) by mouth in the morning and 1 tablet (25 mg total) in the evening  • latanoprost (XALATAN) 0 005 % ophthalmic solution Administer 0 005 drops to both eyes   • levETIRAcetam (KEPPRA) 250 mg tablet Take 250 mg by mouth 2 (two) times a day   • levothyroxine (Euthyrox) 50 mcg tablet Take 1 tablet (50 mcg total) by mouth daily in the early morning Take with 300 mcg tablet   • levothyroxine 300 MCG tablet Take 1 tablet (300 mcg total) by mouth daily Take with 50 mcg tablet   • lidocaine (XYLOCAINE) 5 % ointment    • Linzess 290 MCG CAPS Take 1 capsule by mouth daily   • lisinopril (ZESTRIL) 2 5 mg tablet TAKE 1 TABLET BY MOUTH ONCE DAILY  • magnesium Oxide (MAG-OX) 400 mg TABS TAKE 1 TABLET BY MOUTH TWO TIMES A DAY  • metoprolol tartrate (LOPRESSOR) 25 mg tablet TAKE 1 TABLET BY MOUTH TWO TIMES A DAY  • nystatin (MYCOSTATIN) powder Apply topically 2 (two) times a day To skin folds/groin   • OneTouch Delica Lancets 62K MISC Check blood sugars four times daily  Please substitute with appropriate alternative as covered by patient's insurance  Dx: E11 65   • pantoprazole (PROTONIX) 40 mg tablet TAKE 1 TABLET BY MOUTH ONCE DAILY     • potassium chloride (K-DUR,KLOR-CON) 20 mEq tablet Take 1 tablet (20 mEq total) by mouth in the morning   • rOPINIRole (REQUIP) 4 mg tablet Take 1 tablet (4 mg total) by mouth in the morning and 1 tablet (4 mg total) at noon and 1 tablet (4 mg total) in the evening and 1 tablet (4 mg total) before bedtime  • Semaglutide, 1 MG/DOSE, (Ozempic, 1 MG/DOSE,) 2 MG/1 5ML SOPN Inject 1 mg under the skin once a week   • senna (SENOKOT) 8 6 MG tablet Take 1 tablet by mouth 2 (two) times a day   • tamsulosin (FLOMAX) 0 4 mg TAKE 1 CAP DAILY WITH DINNER   • timolol (TIMOPTIC) 0 5 % ophthalmic solution Administer 1 drop to the right eye 2 (two) times a day   • ketoconazole (NIZORAL) 2 % shampoo Apply 1 application topically 2 (two) times a week for 8 doses       Objective     /55 (BP Location: Left arm, Patient Position: Sitting, Cuff Size: Large)   Pulse 62   Temp (!) 97 4 °F (36 3 °C) (Temporal)   Resp 18   Ht 6' (1 829 m)   Wt (!) 171 kg (376 lb)   SpO2 96%   BMI 50 99 kg/m²     Physical Exam  Vitals and nursing note reviewed  Constitutional:       Appearance: Normal appearance  He is obese  HENT:      Head: Normocephalic and atraumatic  Eyes:      Conjunctiva/sclera: Conjunctivae normal       Pupils: Pupils are equal, round, and reactive to light  Cardiovascular:      Rate and Rhythm: Normal rate and regular rhythm  Pulmonary:      Effort: Pulmonary effort is normal       Breath sounds: Normal breath sounds  No wheezing, rhonchi or rales  Skin:     General: Skin is warm and dry  Comments: Round, patchy papular rash on the posterior forehead with mild erythema  No oozing  It is scaly on exam      Neurological:      General: No focal deficit present  Mental Status: He is alert and oriented to person, place, and time  Psychiatric:         Mood and Affect: Mood normal          Behavior: Behavior normal          Thought Content:  Thought content normal          Judgment: Judgment normal        Saba Sampson DO

## 2022-11-23 LAB — HBA1C MFR BLD HPLC: 8.6 %

## 2022-11-30 ENCOUNTER — TELEPHONE (OUTPATIENT)
Dept: OTHER | Facility: OTHER | Age: 57
End: 2022-11-30

## 2022-11-30 NOTE — TELEPHONE ENCOUNTER
Pt  stated is having issue with 7 of his current medications Patient didn't have the sonam of the medications at time off the call  Patient stated that they are making his extremely tired and wants an alternative medications  Patient wanted to speak to someone in office to discus

## 2022-12-01 ENCOUNTER — TELEPHONE (OUTPATIENT)
Dept: FAMILY MEDICINE CLINIC | Facility: CLINIC | Age: 57
End: 2022-12-01

## 2022-12-01 NOTE — TELEPHONE ENCOUNTER
He is on too many medicines to abruptly stop them  That is not at all rational   If he chooses to do this, that will be at his own risk  He will need an appt        Haley Cheng, DO

## 2022-12-01 NOTE — TELEPHONE ENCOUNTER
Pt called stating he would like to stop all the medications that he is on that cause drowsiness  He would like alternatives  He stated that he can not wait until the pharmacy calls him, because he thinks this is an urgent matter  He said he is sleeping most of the day, and is afraid he is going to fall asleep while driving

## 2022-12-01 NOTE — TELEPHONE ENCOUNTER
Our PharmD has been working with him  We will have her phone him when she returns from vacation  Please let pt know        Vanessa Ventura, DO

## 2022-12-13 DIAGNOSIS — E11.65 TYPE 2 DIABETES MELLITUS WITH HYPERGLYCEMIA, WITH LONG-TERM CURRENT USE OF INSULIN (HCC): ICD-10-CM

## 2022-12-13 DIAGNOSIS — Z79.4 TYPE 2 DIABETES MELLITUS WITH HYPERGLYCEMIA, WITH LONG-TERM CURRENT USE OF INSULIN (HCC): ICD-10-CM

## 2022-12-13 RX ORDER — INSULIN GLARGINE 100 [IU]/ML
INJECTION, SOLUTION SUBCUTANEOUS
Qty: 15 ML | Refills: 0 | Status: SHIPPED | OUTPATIENT
Start: 2022-12-13

## 2022-12-14 ENCOUNTER — TELEPHONE (OUTPATIENT)
Dept: FAMILY MEDICINE CLINIC | Facility: CLINIC | Age: 57
End: 2022-12-14

## 2022-12-14 RX ORDER — EMPAGLIFLOZIN 10 MG/1
TABLET, FILM COATED ORAL
COMMUNITY
Start: 2022-11-23 | End: 2022-12-21 | Stop reason: ALTCHOICE

## 2022-12-14 NOTE — TELEPHONE ENCOUNTER
Spoke with patient regarding his medication that are potentially contributing to excessive sedation and somnolence  Per review of medication list it is likely multifactorial and possibly due to several medications including: clonazepam, gabapentin, lamotrigine, levetiracetam, and ropinirole  Several other medications were already discontinued or dose decreased in the past few months in an attempt to decrease sedation including stopping amitriptyline, Belsomra, and montelukast and decreasing the dose of clonazepam and levetiracetam      Clonazepam, gabapentin, lamotrigine, levetiracetam, and ropinirole are all now being managed by his neurologist  I recommended that he follows up with them regarding his concerns  He was agreeable to this plan   Next appt scheduled with neurology is for 12/28/22 at 9:30 AM      Pharmacist Tracking Tool  Reason For Outreach: Embedded Pharmacist  Demographics:  Intervention Method: Phone  Type of Intervention: Follow-Up  Topics Addressed: Polypharmacy  Pharmacologic Interventions: Prevent or Manage AUGUST  Non-Pharmacologic Interventions: Medication/Device education  Time:  Direct Patient Care: 20 mins  Care Coordination: 15 mins  Recommendation Recipient: Patient/Caregiver and Provider  Outcome: Accepted

## 2022-12-21 ENCOUNTER — OFFICE VISIT (OUTPATIENT)
Dept: FAMILY MEDICINE CLINIC | Facility: CLINIC | Age: 57
End: 2022-12-21

## 2022-12-21 ENCOUNTER — TELEPHONE (OUTPATIENT)
Dept: FAMILY MEDICINE CLINIC | Facility: CLINIC | Age: 57
End: 2022-12-21

## 2022-12-21 VITALS
SYSTOLIC BLOOD PRESSURE: 124 MMHG | RESPIRATION RATE: 20 BRPM | HEART RATE: 67 BPM | OXYGEN SATURATION: 97 % | TEMPERATURE: 97.4 F | BODY MASS INDEX: 50.8 KG/M2 | WEIGHT: 315 LBS | DIASTOLIC BLOOD PRESSURE: 58 MMHG

## 2022-12-21 DIAGNOSIS — E11.65 TYPE 2 DIABETES MELLITUS WITH HYPERGLYCEMIA, WITH LONG-TERM CURRENT USE OF INSULIN (HCC): ICD-10-CM

## 2022-12-21 DIAGNOSIS — J40 BRONCHITIS: ICD-10-CM

## 2022-12-21 DIAGNOSIS — G40.89 OTHER SEIZURES (HCC): ICD-10-CM

## 2022-12-21 DIAGNOSIS — E78.2 MIXED HYPERLIPIDEMIA: ICD-10-CM

## 2022-12-21 DIAGNOSIS — E03.9 ACQUIRED HYPOTHYROIDISM: ICD-10-CM

## 2022-12-21 DIAGNOSIS — Z11.4 SCREENING FOR HIV (HUMAN IMMUNODEFICIENCY VIRUS): ICD-10-CM

## 2022-12-21 DIAGNOSIS — Z79.4 TYPE 2 DIABETES MELLITUS WITH HYPERGLYCEMIA, WITH LONG-TERM CURRENT USE OF INSULIN (HCC): ICD-10-CM

## 2022-12-21 DIAGNOSIS — Z23 ENCOUNTER FOR IMMUNIZATION: ICD-10-CM

## 2022-12-21 DIAGNOSIS — E66.01 MORBID OBESITY (HCC): ICD-10-CM

## 2022-12-21 DIAGNOSIS — I10 ESSENTIAL HYPERTENSION: ICD-10-CM

## 2022-12-21 DIAGNOSIS — J01.00 ACUTE NON-RECURRENT MAXILLARY SINUSITIS: Primary | ICD-10-CM

## 2022-12-21 RX ORDER — CEFDINIR 300 MG/1
300 CAPSULE ORAL EVERY 12 HOURS SCHEDULED
Qty: 14 CAPSULE | Refills: 0 | Status: SHIPPED | OUTPATIENT
Start: 2022-12-21 | End: 2022-12-28

## 2022-12-21 RX ORDER — BENZONATATE 200 MG/1
200 CAPSULE ORAL 3 TIMES DAILY PRN
Qty: 20 CAPSULE | Refills: 0 | Status: SHIPPED | OUTPATIENT
Start: 2022-12-21

## 2022-12-21 NOTE — PROGRESS NOTES
Name: Davey Adamson      : 1965      MRN: [id number]  Encounter Provider: Johanne Sierra DO  Encounter Date: 2022   Encounter department: [address] [place] [address]     1  Screening for HIV (human immunodeficiency virus)  Rx ordered  2  Type 2 diabetes mellitus with hyperglycemia, with long-term current use of insulin (HCC)  Just had A1C with Dr Jes Bruce - not at goal   They are tweaking his regimen  His eye and foot exams are utd  ACR when he has labs in the near future  On statin and on ACEi  Discussed the importance of maintaining a healthy lifestyle through heart healthy diet and exercise  - Basic metabolic panel; Future    3  Acute non-recurrent maxillary sinusitis  - will tx with Omnicef and tessalon for cough as well  He is no bard  - cefdinir (OMNICEF) 300 mg capsule; Take 1 capsule (300 mg total) by mouth every 12 (twelve) hours for 7 days  Dispense: 14 capsule; Refill: 0  - benzonatate (TESSALON) 200 MG capsule; Take 1 capsule (200 mg total) by mouth 3 (three) times a day as needed for cough  Dispense: 20 capsule; Refill: 0    4  Other seizures [facility])  Seeing Neurology  They are tweaking his meds/adjusting things  5  Morbid obesity ([address] )  Discussed the importance of maintaining a healthy lifestyle through heart healthy diet and exercise  6  Essential hypertension  Stable  7  Acquired hypothyroidism  - stable  Will recheck in about on month  On 350 mcg/day  - TSH, 3rd generation with Free T4 reflex; Future    8  Mixed hyperlipidemia  On statin  Risk factor modifications  - Lipid Panel with Direct LDL reflex; Future    9  Encounter for immunization  Cannot do Pneumonia shots  Allergy  10  Bronchitis  Omnicef and tessalon     - benzonatate (TESSALON) 200 MG capsule; Take 1 capsule (200 mg total) by mouth 3 (three) times a day as needed for cough  Dispense: 20 capsule;  Refill: 0    Return for in April when due for AMW and CDM   Patient/Caretaker verbalized understanding and were in agreement with today's assessment and plan  Time was taken to address any questions patient/caretaker had  Indication/Risks/Benefits of medication(s) as prescribed were discussed with the patient/caretaker  The patient verbalized understanding and agreement and elects to take medications as prescribed  Time was taken to answer any questions the patient/caretaker may have had  Chief Complaint   Patient presents with   • Follow-up       Subjective      Patient with cough and lung congestion since last Friday  There is runny nose and sinus pain and pressure  No fevers but there are chills  He is not with sob other than with his coughing stints, he is with sob  There is some sore throat as well  His sister, Wendy Chiu, also a pt of mine was admitted to [facility] with likely Ca - he is worried about this  Endo -- 11/23 -- has appt with Dr Breonna Francis  8 6 -- A1C at this visit   jardiance added to his regimen  ACR - 4/29/22 done  He is willing to have repeat when he does labs  On ACEi  He cont to be sleepy but better since of Requip   meds cont to be tweaked a bit  He has NEAL and is not currently wearing his PAP  MRI and EEG ordered - by Neurology  ? Seizure disorder  pharmD is on board and has been quite helpful given polypharmacy with multiple specialists        Hypothyroidism   · Levothyroxine 300 mcg + 50 mcg daily     COPD  · Albuterol      Allergy   · Cetirizine 10 mg     HTN / CHF:  · Furosemide 40 mg - changed from BID to daily due to hypotension during 10/1/22 hospitalization  · Metoprolol tartrate 25 mg BID  · Potassium 20 mEq daily  · Lisinopril 2 5 mg - changed to nightly due to hypotension during 10/1/22 hospitalization     HLD  · Atorvastatin 40 mg daily      Neuropathy pain (managed by neurologist Dr Edd Merlin)  · Amitriptyline 25 mg daily - discontinued by neuro  · Gabapentin 600 mg daily - increased to 600 mg BID      Depression  · Citalopram 20 mg daily     Sleep (managed previously by neurologist Dr Melly Da Silva who retired)   · Belsomra 20 mg daily (discontinued on 9/21/22)     Seizure disorder/ Restless legs / signs of parkinsonism (managed by neurologist Dr Edd Merlin)  · Clonazepam 1 mg BID decreased to 0 5 mg BID   · Lamotrigine 25 mg BID  · Levetiracetam 500 mg BID- decreased to 250 mg BID     GI- IBS-C & Gastritis (managed by GI):  · Linzess 290 mcg  · Pantoprazole 40 mg daily   · Senna 8 6 BID   · Docusate 100 mg      BPH:  · tamsulosin 0 4 mg daily      Other/PRN:  · Magnesium oxide 400 mg BID  · Zyprobi (probiotic)   · Eye drops           Review of Systems   All other systems reviewed and are negative  Current Outpatient Medications on File Prior to Visit   Medication Sig   • albuterol (PROVENTIL HFA,VENTOLIN HFA) 90 mcg/act inhaler Inhale 2 puffs every 6 (six) hours as needed for wheezing   • atorvastatin (LIPITOR) 40 mg tablet TAKE 1 TABLET BY MOUTH ONCE DAILY  • cetirizine (ZyrTEC) 10 mg tablet Take 1 tablet (10 mg total) by mouth daily   • citalopram (CeleXA) 40 mg tablet Take 1 tablet (40 mg total) by mouth daily   • clobetasol propionate (Clobex) 0 05 % shampoo Apply 1 application topically 3 (three) times a week   • clonazePAM (KlonoPIN) 0 5 mg tablet Take 0 5 mg by mouth every 12 (twelve) hours as needed   • Easy Touch Pen Needles 31G X 8 MM MISC Use 1 each 3 (three) times a day   • Eliquis 5 MG TAKE 1 TABLET BY MOUTH TWO TIMES A DAY  • ferrous sulfate 325 (65 Fe) mg tablet Take 325 mg by mouth 2 (two) times a day with meals   • furosemide (LASIX) 40 mg tablet Take 40 mg by mouth daily   • gabapentin (NEURONTIN) 600 MG tablet Take 600 mg by mouth 2 (two) times a day   • glucose blood (OneTouch Verio) test strip Check blood sugars four times daily  Please substitute with appropriate alternative as covered by patient's insurance   Dx: E11 65   • insulin aspart (NovoLOG FlexPen) 100 UNIT/ML injection pen Inject 26 Units under the skin 3 (three) times a day with meals Per sliding scale - managed by Dr Britt Pro + scheduled   • lamoTRIgine (LaMICtal) 25 mg tablet Take 1 tablet (25 mg total) by mouth in the morning and 1 tablet (25 mg total) in the evening  • Lantus SoloStar 100 units/mL SOPN INJECT 56 UNITS TOTAL UNDER THE SKIN DAILY   • latanoprost (XALATAN) 0 005 % ophthalmic solution Administer 0 005 drops to both eyes   • levETIRAcetam (KEPPRA) 250 mg tablet Take 250 mg by mouth 2 (two) times a day   • levothyroxine (Euthyrox) 50 mcg tablet Take 1 tablet (50 mcg total) by mouth daily in the early morning Take with 300 mcg tablet   • levothyroxine 300 MCG tablet Take 1 tablet (300 mcg total) by mouth daily Take with 50 mcg tablet   • Linzess 290 MCG CAPS Take 1 capsule by mouth daily   • lisinopril (ZESTRIL) 2 5 mg tablet TAKE 1 TABLET BY MOUTH ONCE DAILY  • magnesium Oxide (MAG-OX) 400 mg TABS TAKE 1 TABLET BY MOUTH TWO TIMES A DAY  • metoprolol tartrate (LOPRESSOR) 25 mg tablet TAKE 1 TABLET BY MOUTH TWO TIMES A DAY  • nystatin (MYCOSTATIN) powder Apply topically 2 (two) times a day To skin folds/groin   • OneTouch Delica Lancets 90O MISC Check blood sugars four times daily  Please substitute with appropriate alternative as covered by patient's insurance  Dx: E11 65   • pantoprazole (PROTONIX) 40 mg tablet TAKE 1 TABLET BY MOUTH ONCE DAILY     • potassium chloride (K-DUR,KLOR-CON) 20 mEq tablet Take 1 tablet (20 mEq total) by mouth in the morning   • Semaglutide, 1 MG/DOSE, (Ozempic, 1 MG/DOSE,) 2 MG/1 5ML SOPN Inject 1 mg under the skin once a week   • senna (SENOKOT) 8 6 MG tablet Take 1 tablet by mouth 2 (two) times a day   • tamsulosin (FLOMAX) 0 4 mg TAKE 1 CAP DAILY WITH DINNER   • timolol (TIMOPTIC) 0 5 % ophthalmic solution Administer 1 drop to the right eye 2 (two) times a day   • acetaminophen (TYLENOL) 650 mg CR tablet Take 1 tablet (650 mg total) by mouth every 8 (eight) hours as needed for mild pain or moderate pain (Patient not taking: Reported on 12/21/2022)   • docusate sodium (COLACE) 100 mg capsule Take 100 mg by mouth 2 (two) times a day (Patient not taking: Reported on 12/21/2022)   • hydrocortisone 2 5 % cream Apply 1 application topically 3 (three) times a day as needed (Patient not taking: Reported on 12/21/2022)   • ketoconazole (NIZORAL) 2 % shampoo Apply 1 application topically 2 (two) times a week for 8 doses   • lidocaine (XYLOCAINE) 5 % ointment  (Patient not taking: Reported on 12/21/2022)   • [DISCONTINUED] Jardiance 10 MG TABS tablet  (Patient not taking: Reported on 12/21/2022)   • [DISCONTINUED] rOPINIRole (REQUIP) 4 mg tablet Take 1 tablet (4 mg total) by mouth in the morning and 1 tablet (4 mg total) at noon and 1 tablet (4 mg total) in the evening and 1 tablet (4 mg total) before bedtime  (Patient not taking: Reported on 12/21/2022)       Objective     /58 (BP Location: Left arm, Patient Position: Sitting, Cuff Size: Large)   Pulse 67   Temp (!) 97 4 °F (36 3 °C) (Tympanic)   Resp 20   Wt (!) 170 kg (374 lb 9 6 oz)   SpO2 97%   BMI 50 80 kg/m²     Physical Exam  Vitals and nursing note reviewed  Constitutional:       Appearance: Normal appearance  He is obese  Comments: More alert today     HENT:      Head: Normocephalic and atraumatic  Nose:      Comments: Boggy, erythematous turbinates b/l        Mouth/Throat:      Comments: Mucus streaking at the posterior oropharynx with mild erythema     Eyes:      Conjunctiva/sclera: Conjunctivae normal    Neck:      Comments: Short, thick neck    Cardiovascular:      Rate and Rhythm: Normal rate and regular rhythm  Pulmonary:      Effort: Pulmonary effort is normal       Breath sounds: Normal breath sounds  No wheezing, rhonchi or rales  Musculoskeletal:      Cervical back: Neck supple        Comments: He is without his rollator today  Slow, antalgic, guarded gait   Rounded shoulders Skin:     General: Skin is warm and dry  Comments: Legs are wrapped - distal LEs    Neurological:      General: No focal deficit present  Mental Status: He is alert and oriented to person, place, and time  Psychiatric:         Mood and Affect: Mood normal          Behavior: Behavior normal          Thought Content:  Thought content normal          Judgment: Judgment normal        Saba Boogie DO

## 2022-12-22 ENCOUNTER — TELEPHONE (OUTPATIENT)
Dept: ADMINISTRATIVE | Facility: OTHER | Age: 57
End: 2022-12-22

## 2022-12-22 NOTE — TELEPHONE ENCOUNTER
----- Message from Jaycee Nielsen sent at 12/21/2022 10:43 AM EST -----  Regarding: Care Gap Request  12/21/22 10:43 AM    Hello, our patient attached above has had Hemoglobin A1c completed/performed  Please assist in updating the patient chart by pulling the Care Everywhere (CE) document  The date of service is 11/23/22       Thank you,  Jaycee Nielsen  [facility]

## 2022-12-29 ENCOUNTER — TELEPHONE (OUTPATIENT)
Dept: FAMILY MEDICINE CLINIC | Facility: CLINIC | Age: 57
End: 2022-12-29

## 2022-12-29 NOTE — TELEPHONE ENCOUNTER
Slava Coyne from [facility] callled in regards to paperwork she faxed over if you can please call her back at [phone]

## 2022-12-29 NOTE — TELEPHONE ENCOUNTER
Upon review of the In Basket request we were able to locate, review, and update the patient chart as requested for Hemoglobin A1c  Any additional questions or concerns should be emailed to the Practice Liaisons via the appropriate education email address, please do not reply via In Basket      Thank you  Charity Franco

## 2022-12-30 DIAGNOSIS — T78.40XS ALLERGY, SEQUELA: ICD-10-CM

## 2022-12-30 RX ORDER — CETIRIZINE HYDROCHLORIDE 10 MG/1
TABLET ORAL
Qty: 90 TABLET | Refills: 0 | Status: SHIPPED | OUTPATIENT
Start: 2022-12-30

## 2023-01-09 DIAGNOSIS — I48.91 ATRIAL FIBRILLATION, UNSPECIFIED TYPE (HCC): ICD-10-CM

## 2023-01-09 DIAGNOSIS — K58.9 IRRITABLE BOWEL SYNDROME, UNSPECIFIED TYPE: ICD-10-CM

## 2023-01-09 RX ORDER — LINACLOTIDE 290 UG/1
CAPSULE, GELATIN COATED ORAL
Qty: 90 CAPSULE | Refills: 0 | Status: SHIPPED | OUTPATIENT
Start: 2023-01-09

## 2023-01-09 RX ORDER — APIXABAN 5 MG/1
TABLET, FILM COATED ORAL
Qty: 180 TABLET | Refills: 0 | Status: SHIPPED | OUTPATIENT
Start: 2023-01-09

## 2023-01-16 ENCOUNTER — SOCIAL WORK (OUTPATIENT)
Dept: BEHAVIORAL/MENTAL HEALTH CLINIC | Facility: CLINIC | Age: 58
End: 2023-01-16

## 2023-01-16 ENCOUNTER — DOCUMENTATION (OUTPATIENT)
Dept: BEHAVIORAL/MENTAL HEALTH CLINIC | Facility: CLINIC | Age: 58
End: 2023-01-16

## 2023-01-16 DIAGNOSIS — F25.0 SCHIZOAFFECTIVE DISORDER, BIPOLAR TYPE (HCC): Primary | ICD-10-CM

## 2023-01-16 NOTE — PSYCH
Psychotherapy Provided: Individual Psychotherapy 60  minutes 10:40 Am to 11:40 Am          Goals addressed in session:(creation of 2nd treatment Plan) The Client reported that his sister was Dx with stage 3 cancer, which has caused an increase in his anxiety and depression  " My sister's health is driving me crazy she is not doing well" During the session we explored his  personal strengths and weakness and created his recovery treatment plan  It is hoped that by addressing his weaknesses this will act as ca preventive measure against the possible need for higher level of care and services  Interventions: Supportive Therapy, Strengths Therapy,  and Cognitive Behavioral Therapy where the treatment modalities utilized during the session  Assessment and Plan: The client presented a depressed mood that was congruent in effect  He was alert, goal directed and particpated with prompts during the session  The Client was oriented to person, place, time, situation, and reported no suicidal/homicidal ideation, plan, or intent  As team we created his recovery treatment plan, and conducted screenings on depression using the depression screening PHQ-9 with the Client's score increasing over his original screening into severe depression severity range  On the anxiety  Screening CESAR-7 the Client's score increased by 6 points over his original screening  During the session we explored his strengths and personal weaknesses  Coping skills were reviewed during the session to address his increased family of origin issues  Next appointment was set for 2 weeks  The Client will do emotional journaling as his home commitment       Pain:      PSYCH MENTAL STATUS PAIN :6 as reported by the Client " my sister's health is a major issue"   PHYSICAL PAIN SCALE NUMBERS:8 as reported by the Client     Current suicide risk : Low/Phone number for [facility] was given to the Client/ [phone]    Behavioral Health Treatment Plan [facility]: Diagnosis and Treatment Plan explained to Melissa Picking, Melissa Picking  relates understanding diagnosis and is agreeable to Treatment Plan  Yes

## 2023-01-16 NOTE — PSYCH
Ericka Augusteu  1965         Date of Initial Treatment Plan: 09/22/22  Date of Current Treatment Plan: 01/16/23     Treatment Plan Number: 2nd Treatment Plan     Strengths/Personal Resources for Self Care: The Client has a supportive family and peers relationships  He is receiving his physical health care with [facility] family practice with Dr Pacheco Layne, and his mental health therapy with the therapist  Amol REARDON LCSW at [facility]     Diagnosis:   1  Schizoaffective disorder, bipolar type ([facility] [address] )            Area of Needs: The Client was able to verbalize that he would like to address his depression, and anxiety that is effecting his different relationships and environments  It is hoped that by addressing his weaknesses the Client will achieve or maintain maximum functional capacity in performing daily activizes, taking into account both the functional capacity of the individual and those functional capacities appropriate for the individuals of the same age  Reduce or ameliorate the physical mental, behavioral, or developmental effects of an illness, condition, injury or disability  Present treatment plan will cover 4 months or 12 visits which ever comes first                Long Term Goal 1: The Client's depression score on his PHQ-9 will decrease from 17 to 8 or less( 01/16/23 score increased by 1 point over original screening/Client identified recent increase due family member being Dx with cancer)     Target Date: 09/22/2023  Completion Date:          Short Term Objectives for Goal 1: The Client will identify 4 triggers to his depression  (emerging-reviewed with the Client on 01/16/23)     Long Term Goal 2:  The Client's anxiety score on his CESAR-7 will decrease from 12 to 6 or less   (score increased by 6 points over original screening 01/16/2023 due to family health issues)      Target Date: 09/22/2023  Completion Date:      Short Term Objectives for Goal 2: The Client will learn 4 coping skills to his anxiety   (Emerging-Reviewed with the Client on 01/16/23)          Long Term Goal # 3: The Client will attend all of his appointments 100% of time     Target Date: 09/22/2023  Completion Date:      GOAL 1: Modality: The person(s) responsible for carrying out the plan is  The Client his support team, and this therapist      GOAL 2: Modality: The person(s) responsible for carrying out the plan is  The Client his support team, and this therapist       GOAL 3: Modality: The person(s) responsible for carrying out the plan is  The Client his and support team        [address]: Diagnosis and Treatment Plan explained to Aleena borges understanding diagnosis and is agreeable to Treatment Plan          Client Comments : Please share your thoughts, feelings, need and/or experiences regarding your treatment plan: " Sounds good"   The Client's short term goals will be reviewed and or completed on or before 05/16/2023)  The Client was identified when he will be discharged from [place] [address] therapy " I don't know"  __________________________________________________________________     __________________________________________________________________     __________________________________________________________________     __________________________________________________________________     _______________________________________                 Patient signature, Date Time: __________________________________________        Physician cosigner signature, Date, Time: ________________________________

## 2023-01-23 ENCOUNTER — TELEPHONE (OUTPATIENT)
Dept: FAMILY MEDICINE CLINIC | Facility: CLINIC | Age: 58
End: 2023-01-23

## 2023-01-23 DIAGNOSIS — N30.01 ACUTE CYSTITIS WITH HEMATURIA: ICD-10-CM

## 2023-01-23 RX ORDER — CEPHALEXIN 500 MG/1
500 CAPSULE ORAL EVERY 6 HOURS SCHEDULED
Qty: 28 CAPSULE | Refills: 0 | Status: SHIPPED | OUTPATIENT
Start: 2023-01-23 | End: 2023-01-30

## 2023-01-31 ENCOUNTER — TELEPHONE (OUTPATIENT)
Dept: OTHER | Facility: OTHER | Age: 58
End: 2023-01-31

## 2023-02-01 NOTE — TELEPHONE ENCOUNTER
Pt called to cancel their apt for tomorrow  pt is in the hospital with an infected toe  Pt will call back to reschedule

## 2023-02-09 ENCOUNTER — TELEPHONE (OUTPATIENT)
Dept: FAMILY MEDICINE CLINIC | Facility: CLINIC | Age: 58
End: 2023-02-09

## 2023-02-09 ENCOUNTER — TRANSITIONAL CARE MANAGEMENT (OUTPATIENT)
Dept: FAMILY MEDICINE CLINIC | Facility: CLINIC | Age: 58
End: 2023-02-09

## 2023-02-09 RX ORDER — ERGOCALCIFEROL 1.25 MG/1
1 CAPSULE ORAL WEEKLY
COMMUNITY
Start: 2023-02-14 | End: 2023-04-05

## 2023-02-09 RX ORDER — FOLIC ACID 1 MG/1
1 TABLET ORAL DAILY
COMMUNITY
Start: 2023-02-09 | End: 2024-02-09

## 2023-02-09 RX ORDER — ZINC OXIDE 13 %
CREAM (GRAM) TOPICAL
COMMUNITY

## 2023-02-09 NOTE — TELEPHONE ENCOUNTER
Home health calling to inform you of new meds form hospital changes  Folic Acid 1mg daily  Vitamin D 33074 capsule once a week  Increased celexa to 40mg daily  Stopped Metoporal due to HR and BP being low while in hospital    As of today 118/64 and 62 bpm    Discharge instructions are not correct with medications

## 2023-02-10 ENCOUNTER — RA CDI HCC (OUTPATIENT)
Dept: OTHER | Facility: HOSPITAL | Age: 58
End: 2023-02-10

## 2023-02-10 NOTE — PROGRESS NOTES
[facility]  coding opportunities          Chart Reviewed number of suggestions sent to Provider: 3  [id number]  [id number]  E11 [id number]  I11 0     Patients Insurance     Medicare Insurance: [facility] [employer]

## 2023-02-10 NOTE — TELEPHONE ENCOUNTER
Case work from [facility] called concerned that pt is falling asleep   She was out to see him today and states he was falling asleep talking to her

## 2023-02-13 DIAGNOSIS — E61.2 MAGNESIUM DEFICIENCY: ICD-10-CM

## 2023-02-13 RX ORDER — LANOLIN ALCOHOL/MO/W.PET/CERES
CREAM (GRAM) TOPICAL
Qty: 60 TABLET | Refills: 0 | Status: SHIPPED | OUTPATIENT
Start: 2023-02-13

## 2023-02-13 NOTE — TELEPHONE ENCOUNTER
I see he is scheduled to see me  Will have to evaluate at that time  This is not a new problem, we have been adjusting meds, fine tuning things with him  Will inquire about where he is with NEAL tx as well        Erick Mccann, DO

## 2023-02-16 ENCOUNTER — OFFICE VISIT (OUTPATIENT)
Dept: FAMILY MEDICINE CLINIC | Facility: CLINIC | Age: 58
End: 2023-02-16

## 2023-02-16 VITALS
BODY MASS INDEX: 49.07 KG/M2 | TEMPERATURE: 97.8 F | OXYGEN SATURATION: 96 % | HEART RATE: 60 BPM | DIASTOLIC BLOOD PRESSURE: 54 MMHG | SYSTOLIC BLOOD PRESSURE: 111 MMHG | RESPIRATION RATE: 20 BRPM | WEIGHT: 315 LBS

## 2023-02-16 DIAGNOSIS — E66.01 MORBID OBESITY (HCC): ICD-10-CM

## 2023-02-16 DIAGNOSIS — N18.31 CHRONIC KIDNEY DISEASE, STAGE 3A (HCC): ICD-10-CM

## 2023-02-16 DIAGNOSIS — R20.0 NUMBNESS AND TINGLING OF LEFT UPPER EXTREMITY: ICD-10-CM

## 2023-02-16 DIAGNOSIS — I87.332 CHRONIC VENOUS HYPERTENSION (IDIOPATHIC) WITH ULCER AND INFLAMMATION OF LEFT LOWER EXTREMITY (HCC): ICD-10-CM

## 2023-02-16 DIAGNOSIS — E11.65 TYPE 2 DIABETES MELLITUS WITH HYPERGLYCEMIA, WITH LONG-TERM CURRENT USE OF INSULIN (HCC): ICD-10-CM

## 2023-02-16 DIAGNOSIS — L97.929 CHRONIC VENOUS HYPERTENSION (IDIOPATHIC) WITH ULCER AND INFLAMMATION OF LEFT LOWER EXTREMITY (HCC): ICD-10-CM

## 2023-02-16 DIAGNOSIS — I48.91 ATRIAL FIBRILLATION, UNSPECIFIED TYPE (HCC): ICD-10-CM

## 2023-02-16 DIAGNOSIS — Z79.4 TYPE 2 DIABETES MELLITUS WITH HYPERGLYCEMIA, WITH LONG-TERM CURRENT USE OF INSULIN (HCC): ICD-10-CM

## 2023-02-16 DIAGNOSIS — R29.898 LEFT HAND WEAKNESS: ICD-10-CM

## 2023-02-16 DIAGNOSIS — E78.2 MIXED HYPERLIPIDEMIA: ICD-10-CM

## 2023-02-16 DIAGNOSIS — J44.1 COPD WITH ACUTE EXACERBATION (HCC): ICD-10-CM

## 2023-02-16 DIAGNOSIS — Z11.4 SCREENING FOR HIV (HUMAN IMMUNODEFICIENCY VIRUS): ICD-10-CM

## 2023-02-16 DIAGNOSIS — G47.33 OSA (OBSTRUCTIVE SLEEP APNEA): ICD-10-CM

## 2023-02-16 DIAGNOSIS — E03.9 ACQUIRED HYPOTHYROIDISM: ICD-10-CM

## 2023-02-16 DIAGNOSIS — R20.2 NUMBNESS AND TINGLING OF LEFT UPPER EXTREMITY: ICD-10-CM

## 2023-02-16 DIAGNOSIS — D69.6 THROMBOCYTOPENIA (HCC): ICD-10-CM

## 2023-02-16 DIAGNOSIS — Z09 HOSPITAL DISCHARGE FOLLOW-UP: ICD-10-CM

## 2023-02-16 DIAGNOSIS — I50.9 CONGESTIVE HEART FAILURE, UNSPECIFIED HF CHRONICITY, UNSPECIFIED HEART FAILURE TYPE (HCC): ICD-10-CM

## 2023-02-16 DIAGNOSIS — F32.2 SEVERE DEPRESSION (HCC): ICD-10-CM

## 2023-02-16 DIAGNOSIS — G56.03 BILATERAL CARPAL TUNNEL SYNDROME: ICD-10-CM

## 2023-02-16 DIAGNOSIS — G62.89 OTHER POLYNEUROPATHY: Primary | ICD-10-CM

## 2023-02-16 NOTE — PROGRESS NOTES
]  Assessment & Plan     1  Screening for HIV (human immunodeficiency virus)    2  Type 2 diabetes mellitus with hyperglycemia, with long-term current use of insulin ([facility] )  - follows with endo  3  Other polyneuropathy  - this is worsening since d/c from hospital   Has significant paresthesia and weakness  Will get EMG  Has known neuropathy and CTS but per pt, much worse  Has Neurology appt but in 3m  We will see if we can get EMG in the meantime  - EMG 1 Limb; Future    4  Left hand weakness  - EMG 1 Limb; Future    5  Numbness and tingling of left upper extremity  - EMG 1 Limb; Future    6  Bilateral carpal tunnel syndrome  - EMG 1 Limb; Future    7  Chronic venous hypertension (idiopathic) with ulcer and inflammation of left lower extremity (HCC)  Stable  Has adequate f/u       8  COPD with acute exacerbation ([facility] )  Stable  9  Congestive heart failure, unspecified HF chronicity, unspecified heart failure type ([facility] )  Stable  Weight is down  10  Severe depression ([facility] )  - stable  11  Atrial fibrillation, unspecified type ([facility] )  Stable  12  Morbid obesity ([facility] )  Lost weight  13  Thrombocytopenia ([facility] )  Seeing hematology  14  Chronic kidney disease, stage 3a ([facility] )  Stable  [address] discharge follow-up  D/c summary reviewed  Has adequate f/u       16  NEAL (obstructive sleep apnea)  NOT wearing PAP and he and I discussed that this is likely why he is so sleepy  Did much better in hospital and he wore PAP  He verb understanding  16  Acquired hypothyroidism  Will need a recheck     - TSH, 3rd generation with Free T4 reflex; Future    18  Mixed hyperlipidemia  Discussed the importance of maintaining a healthy lifestyle through heart healthy diet and exercise  - Lipid Panel with Direct LDL reflex; Future    Return for keep April appt   Patient/Caretaker verbalized understanding and were in agreement with today's assessment and plan    Time was taken to address any questions patient/caretaker had  Indication/Risks/Benefits of medication(s) as prescribed were discussed with the patient/caretaker  The patient verbalized understanding and agreement and elects to take medications as prescribed  Time was taken to answer any questions the patient/caretaker may have had  Chief Complaint   Patient presents with   • Transition of Care Management       Subjective     Transitional Care Management Review:   Ponce Stanton is a 59 y o  male here for TCM follow up  Sees Dr Berto Nguyen - neurology  Has adjusted meds  Stopped Ropinirole, Keppra and dec Klonopin  He has f/u but not for 3m  GI -- has an appt with Dr Claudia Franz today  10/2022 - had colonoscopy  BB discontinued due to low HR - he tells me he did discontinue this  Is seeing [facility] for his low Plt  Will have BW tomorrow for this  He is with acute weakness in his LUE -- SEE PLAN above  Patient is on board with cardiology  He has pAfib, uncontrolled NEAL, HTN, Chronic Diastolic HF, h/o PE  He is on eliquis and is compliant with regimen  He has a high CHADS2 score  He is on statin, low dose ACEi, bid lasix  He will cont to see them, intervally  BB stopped due to low HR      Seizure - stopped Keppra  Now seeing Dr Berto Nguyen and meds are being adjusted  CKD -- on board with nephrology -- [address] [facility] - this is secondary to diabetic nephropathy and benign hypertensive nephrosclerosis, and currently is at stage IIIA, with as baseline sCr of 1 2 to 1 4 mg/dL  He is on ACEi - low dose  Avoid NSAIDs and other nephrotoxic agents        Anemia in chronic kidney disease (Chronic) - iron deficient as well  Per nephrology note, Does not require [place] at this time although since transferrin saturation is low at 16% will require IV Venofer  This is facilitated through Nephrology  Completed these weekly X 4 weeks        DM - Sees Dr Get Hidalgo for this   A1C is not at goal     Lab Results   Component Value Date    HGBA1C 8 6 11/23/2022     Hypothyroid - now at goal   Taking 350 mg/day     NEAL - he is not wearing his PAP, has trouble with this at home and is always falling asleep  We discussed this is likely playing into his fatigue        Discharge Summaries  - documented in this encounter  Yissel Hunter MD - 02/08/2023 11:02 AM EST  Formatting of this note is different from the original   Images from the original note were not included  [address] SUMMARY    Patient Name: Marguerite Acevedo  Patient MRN: [id number]  Admitting Provider: No admitting provider for patient encounter  Discharging Provider: Yissel Hunter MD  Primary Care Physician at Discharge: Ze Figueroa, [facility]   Admission Date: 1/31/2023   Discharge Date: 2/8/2023    Admission Diagnoses   Cellulitis of left foot [L03 116]  Cellulitis of left lower extremity [L03 116]  Diabetic ulcer of left foot associated with diabetes mellitus due to underlying condition, limited to breakdown of skin, unspecified part of foot ([address] ) [I19 465, L97 521]    Discharge Diagnoses  Principal Problem:  Cellulitis of left foot  Active Problems:  Hypothyroidism (acquired)  Thrombocytopenia ([address] )  Hyponatremia  Anemia of chronic disease  Essential hypertension  Hyperlipidemia associated with type 2 diabetes mellitus ([address] )  Morbid obesity ([address] )  Obstructive sleep apnea syndrome  Obesity hypoventilation syndrome ([address] )  COPD (chronic obstructive pulmonary disease) ([address] )  Ambulatory dysfunction  History of pulmonary embolism  Chronic venous insufficiency  Diabetic peripheral neuropathy ([address] )  Folic acid deficiency      Discharge Instructions and Follow-Ups  Discharge Instructions:   Recommended follow ups: The patient should follow up with PCP within 1 week of discharge     Future Appointments   Date Time Provider Marysol Duarte   5/31/2023 10:00 AM Thalia Coley MD [facility]  [address] [facility]  [phone]    Call    Nalini Avilaed [address] [place] [id number] [phone]    Follow up in 2 day(s)    Medications    Medication List       START taking these medications     ergocalciferol 50,000 unit (1 25 mg) capsule  Commonly known as: DRISDOL (VITAMIN D-2)  Take 1 capsule (50,000 Units total) by mouth once a week for 8 doses  Start taking on: February 14, 9290    folic acid 1 MG tablet  Commonly known as: FOLVITE  Take 1 tablet (1 mg total) by mouth daily  Start taking on: February 9, 2023          CHANGE how you take these medications     citalopram 40 MG tablet  Commonly known as: CeleXA  Take 1 tablet (40 mg total) by mouth every evening Indications: bipolar depression  What changed:   medication strength  how much to take  when to take this    insulin glargine 100 Units/mL pen  Commonly known as: LANTUS  Inject 30 Units under the skin 2 (two) times a day at lunch and at bedtime Indications: type 2 diabetes mellitus  What changed:   how much to take  when to take this          CONTINUE taking these medications     acetaminophen 500 mg coapsule    albuterol 90 mcg/actuation inhaler  Commonly known as: PROVENTIL HFA;VENTOLIN HFA;PROAIR HFA    apixaban 5 mg Tab  Commonly known as: ELIQUIS    atorvastatin 40 MG tablet  Commonly known as: LIPITOR    benzonatate 200 MG capsule  Commonly known as: TESSALON    blood glucose meter (generic)  Testing 4x daily   E11 65 Provide meter that is covered by insurance    cetirizine 10 MG tablet  Commonly known as: ZyrTEC    clobetasoL 0 05 % shampoo    clonazePAM 0 5 MG tablet  Commonly known as: KlonoPIN    docusate sodium 100 MG capsule  Commonly known as: COLACE    EASY TOUCH 31 gauge x 5/16" Ndle  Generic drug: pen needle, diabetic  USE THREE TIMES A DAY WITH NOVOLOG FLEXPEN    EASY TOUCH INSULIN SYRINGE 1 mL 31 gauge x 5/16 Syrg  Generic drug: insulin syringe-needle U-100  USE AS DIRECTED    empagliflozin 10 mg Tab tablet  Commonly known as: JARDIANCE  Take 1 tablet (10 mg total) by mouth daily  ferrous sulfate 325 mg tablet    furosemide 40 MG tablet  Commonly known as: LASIX  Take 1 tablet (40 mg total) by mouth daily  gabapentin 600 MG tablet  Commonly known as: NEURONTIN    lactulose 20 gram/30 mL Soln  Commonly known as: CHRONULAC    lamoTRIgine 25 MG tablet  Commonly known as: LaMICtal    lancets misc (generic)  by miscellaneous route 4 times daily  latanoprost 0 005 % ophthalmic solution  Commonly known as: XALATAN    levETIRAcetam 250 MG tablet  Commonly known as: KEPPRA    * levothyroxine 300 MCG tablet  Commonly known as: SYNTHROID, LEVOTHROID  TAKE 1 TABLET BY MOUTH ONCE DAILY  * levothyroxine 50 MCG tablet  Commonly known as: SYNTHROID, LEVOTHROID    lidocaine 5 % ointment  Commonly known as: XYLOCAINE    LINZESS 290 mcg Cap  Generic drug: linaCLOtide    lisinopriL 2 5 MG tablet  Commonly known as: PRINIVIL,ZESTRIL  Take 1 tablet (2 5 mg total) by mouth nightly     magnesium oxide 400 mg magnesium Tab    nystatin powder  Commonly known as: MYCOSTATIN    OZEMPIC 1 mg/dose (4 mg/3 mL) Pnij  Generic drug: semaglutide  Inject 1 mg under the skin every 7 days  pantoprazole 40 MG tablet  Commonly known as: PROTONIX    potassium chloride 20 mEq Tber    PROBIOTIC 10 billion cell Cap  Generic drug: Lactobacillus acidophilus    senna 8 6 mg tablet  Commonly known as: SENOKOT    tamsulosin 0 4 mg Cap  Commonly known as: FLOMAX    timolol 0 5 % ophthalmic solution  Commonly known as: TIMOPTIC    TRUE METRIX GLUCOSE TEST STRIP strips (generic)  Generic drug: blood sugar diagnostic  USE TO TEST FOUR TIMES A DAY        * This list has 2 medication(s) that are the same as other medications prescribed for you  Read the directions carefully, and ask your doctor or other care provider to review them with you               STOP taking these medications     cephalexin 500 MG capsule  Commonly known as: KEFLEX    metoprolol 25 MG tablet  Commonly known as: LOPRESSOR          ASK your doctor about these medications     insulin aspart U-100 100 unit/mL (3 mL) injection  Commonly known as: NovoLOG FlexPen U-100 Insulin  Inject 26 Units under the skin 3 (three) times a day with meals  26 units with each meal plus ISF 30 scale as needed  Where to Get Your Medications       These medications were sent to [address] [place] [address], [address] [place] [address] [address], [address] [address]     Phone: [phone]   citalopram 40 MG tablet  ergocalciferol 50,000 unit (2 76 mg) capsule  folic acid 1 MG tablet      Information about where to get these medications is not yet available   Ask your nurse or doctor about these medications  insulin glargine 100 Units/mL pen      Hospital Course  This is a brief description of the patient's hospital stay; please refer to medical chart for further details  Davey Adamson is 59 y o  male with past medical history as mentioned above   He presented to [facility] on 1/31/[facility]23   Davey Adamson is 59 y o  male with past medical history as mentioned below  He presented to [facility] with complaint of left foot wound  At baseline, he lives alone and reports that his sister lives next door  He reports being independent for ADL/IADL and walks with the help of walker  He has left foot diabetic ulcer under great toe for about 3 years now and he has been following Dr Duke Agarwal as an outpatient  He also has a wound care nurse coming to his home for dressing change  Recently, his wound was getting worse and he was prescribed cephalexin for left foot cellulitis by his PCP which he just started taking couple of days ago  Reportedly he has poor mobility and has difficulty making it to the pharmacy for prescription refill   He also reports that wound care nurse noticed a foul odor and puslike drainage from the wound so she referred her to the ER and subsequently patient was brought to the ER for further evaluation and management  He reports that he does not have any sensation in bilateral feet  He denies fever/chills, nausea/vomiting, chest pain/palpitations, shortness of breath, abdominal pain, diarrhea or constipation  In the ER, he was noted to have mild hyponatremia, thrombocytopenia, and cellulitis of left foot with a concern of possible osteomyelitis  Because of that, he is being admitted to the hospital for further evaluation and management  Hospital course   Cellulitis of left foot that is resolving, MRI did not show any acute findings concerning for osteomyelitis  Ultrasound was done of the lower extremity that shows moderate to stenosis of larger vessels- local wound care   Diabetic foot infection status postdebridement  Venous status ulcer of left lower extremity as patient is being followed by podiatry  Diabetic ulcer of first interspace-antibiotics has been discontinued by infectious disease  Heart failure with preserved ejection fraction-continue Lasix  Electrolyte abnormalities hypokalemia and hypomagnesemia replaced  Diabetes mellitus type 2-continue Lantus as well as ISF insulin, no further hypoglycemia after decreasing the dose of insulin  History of embolism continue Eliquis 5 mg by mouth twice daily  Anxiety and depression continue Celexa Klonopin was started by psych  Hypertension blood pressure is controlled continue lisinopril as well as metoprolol, Metoprolol has been discontinued secondary to bradycardia , please monitor HR op closely and if needed betablocker can be started   Hypothyroidism continue Synthroid  Vitamin Deficiencies -patient is deficient in multiple vitamins continue D24 folic acid as well as vitamin D    Abdominal distention-patient has mostly gaseous distention patient was seen by general surgery as well as GI  GI is recommending barium enema I will wait for them to order  History of obstructive sleep apnea continue CPAP  morbid obesity medical care  Constipation-continue all current medication  Discharge planning discharge to home today Patient is already set up with home health      Consults   [address]  IP CONSULT TO INFECTIOUS DISEASES  IP CONSULT TO HEMATOLOGY AND ONCOLOGY  IP CONSULT TO GENERAL SURGERY  IP CONSULT TO PSYCHIATRY LIAISON  IP CONSULT TO GASTROENTEROLOGY    Labs, Procedures, and Studies   Important Lab findings:  Recent Results (from the past 24 hour(s))   GLUCOSE POC   Collection Time: 02/07/23 11:53 AM   Result Value Ref Range   Glucose,  (H) 65 - 99 mg/dL   GLUCOSE POC   Collection Time: 02/07/23 4:54 PM   Result Value Ref Range   Glucose,  (H) 65 - 99 mg/dL   GLUCOSE POC   Collection Time: 02/07/23 9:59 PM   Result Value Ref Range   Glucose,  (H) 65 - 99 mg/dL   BASIC METABOLIC PANEL   Collection Time: 02/08/23 5:23 AM   Result Value Ref Range   Glucose 79 70 - 110 mg/dL   BUN 14 7 - 18 mg/dL   Creatinine 0 70 0 60 - 1 30 mg/dL   Sodium 138 136 - 145 mmol/L   Potassium 3 8 3 5 - 5 1 mmol/L   Chloride 102 98 - 107 mmol/L   Carbon Dioxide 29 (H) 22 - 28 mmol/L   Calcium 9 2 8 4 - 10 2 mg/dL   Anion Gap 7 3 - 11   eGFRcr 106 >59   eGFRcr Comment Interpretive information: calculated GFR   CBC NO DIFF   Collection Time: 02/08/23 5:23 AM   Result Value Ref Range   Hemoglobin 11 0 (L) 12 5 - 17 0 g/dL   Hematocrit 33 4 (L) 37 0 - 48 0 %   WBC 4 8 4 0 - 10 5 thou/cmm   RBC 4 00 4 00 - 5 40 mill/cmm   Platelet Count 416 (L) 140 - 350 thou/cmm   MPV 6 8 (L) 7 5 - 11 3 fL   MCV 84 80 - 100 fL   MCH 27 5 27 0 - 36 0 pg   MCHC 32 9 32 0 - 37 0 g/dL   RDW 13 7 12 0 - 16 0 %   GLUCOSE POC   Collection Time: 02/08/23 9:10 AM   Result Value Ref Range   Glucose,  (H) 65 - 99 mg/dL     Please see Hospital Course   Studies:  CT ABDOMEN PELVIS WO ANY CONTRAST (IV OR ORAL)    Result Date: 2/5/2023  IMPRESSION: 1   There is significant gaseous distension of the colon up to the proximal sigmoid colon, with no obstructing lesion identified  No associated bowel wall thickening is seen  A stricture within the narrowing in the sigmoid colon cannot be entirely excluded  [place] syndrome is also in the differential  Air-fluid levels are visualized within the cecum and ascending colon as well as within the splenic flexure, compatible with diarrhea  There is no small bowel obstruction  2  No significant change in the prominent retroperitoneal,, pelvic, and bilateral inguinal lymph nodes  A [place] Act 112 notification letter has been recommended, informing the patient to follow-up with the physician who ordered this exam     XR CHEST 1 VW PORTABLE    Result Date: 1/31/2023  IMPRESSION: 7 cm area of rounded opacity at the LEFT lung base  This is indeterminate radiographically, particularly given the study limitations, with differential considerations including pneumonia, mass lesion, artifact/concatenation of shadows  Chest CT is recommended for further evaluation  Workstation:[id number]    XR FOOT 3+ VW LEFT    Result Date: 1/31/2023  IMPRESSION: Diffuse osteopenia is present with mild degenerative changes at interphalangeal joints  No acute fracture or dislocation or destructive bony lesion  Mild pes cavus is suggested  [id number]    CT CHEST WO CONTRAST    Result Date: 2/1/2023  IMPRESSION: CT chest  Yesterday's x-ray showed subtle 7 7 cm opacification behind the heart adjacent to the left hemidiaphragm  On this follow-up CT, this appears to correspond to small area of dependent atelectasis, at the posterior base  This finding is present bilaterally, involving approximate 6 0 x 1 5 cm regions  This did not appear to be leanne infiltrate  Follow-up x-ray if indicated  No adenopathy  (11 mm low right pretracheal lymph node, which appears within normal limits ) The heart shows mild-to-moderate cardiomegaly and moderate coronary artery calcification   The thoracic aorta shows mild ectasia at the ascending aorta, 3 8 cm  Workstation:[id number]    MRI FOOT LEFT WO CONTRAST    Result Date: 2/2/2023  Impression: Soft tissue ulceration and skin thickening plantar to the first metatarsophalangeal joint  No evidence of osteomyelitis  Moderate dorsal soft tissue swelling  Degenerative changes  Workstation:[id number]    VAS ARTERIAL DUPLEX LOWER EXTREMITY LEFT    Result Date: 2/2/2023  IMPRESSION: Moderate stenosis in the common femoral and superficial femoral artery  No vascular occlusion  VAS VENOUS DUPLEX LOWER EXTREMITY LEFT    Result Date: 1/31/2023  IMPRESSION: Negative examination for DVT  Procedures:   Please see Hospital Course     Discharge Disposition  To be determined     Code Status at Discharge  Full Code    Subjective   I am feeling better today , voiced no concerns     Physical Exam at Discharge  Condition of Patient on Discharge: stable  /50  Pulse (!) 51  Temp 96 6 °F (35 9 °C) (Temporal)  Resp 18  Ht 1 829 m (6')  Wt (!) 171 kg (378 lb)  SpO2 95%  BMI 51 27 kg/m²   O2 Device: Room air  Physical Exam  General awake not in acute distress morbidly obese  HEENT atraumatic normocephalic  Neck lungs clear to auscultation  Heart regular  Abdomen soft obese is not tenderness there is no rebound or guarding bowel sounds are present, distended abdomen  Extremity chronic venous dermatitis changes with a bandaged wound of the left foot    Time  The above discharge including evaluating the patient took greater than 30 minutes, more than 50% of it was spent counseling the patient and coordinating care by discussing treatment plans and follow ups status post discharge  COPY FORWARD DOCUMENTATION  Epic copy/forward function was not utilized      Skyler Rasmussen MD         During the TCM phone call patient stated:  [facility] Call     Date and time call was made  2/9/2023 10:47 AM    Patient was hospitialized at  [facility] (comment)    [address] [place] [address]    Date of Admission  01/31/23    Date of discharge  02/06/23    Diagnosis  Cellulitis and infection of Left foot    Disposition  Home    Were the patients medications reviewed and updated  Yes    Current Symptoms  None      TCM Call     Post hospital issues  None    Should patient be enrolled in anticoag monitoring? No    Scheduled for follow up? Yes    Patients specialists  Other (comment)    Other specialists names  Wound care/Podiatry    Did you obtain your prescribed medications  Yes    Do you need help managing your prescriptions or medications  No    Is transportation to your appointment needed  No    I have advised the patient to call PCP with any new or worsening symptoms  [place]  Family members    Support System  Family    Are you recieving any outpatient services  No    Are you recieving home care services  Yes    Types of home care services  Nurse visit    Are you using any community resources  No    Current waiver services  No    Have you fallen in the last 12 months  No    Interperter language line needed  No        HPI  Review of Systems   All other systems reviewed and are negative  Objective     /54 (BP Location: Left arm, Patient Position: Sitting, Cuff Size: Large)   Pulse 60   Temp 97 8 °F (36 6 °C) (Tympanic)   Resp 20   Wt (!) 164 kg (361 lb 12 8 oz)   SpO2 96%   BMI 49 07 kg/m²      Physical Exam  Vitals and nursing note reviewed  Constitutional:       Appearance: Normal appearance  He is obese  HENT:      Head: Normocephalic and atraumatic  Eyes:      Conjunctiva/sclera: Conjunctivae normal    Neck:      Comments: Short, thick neck    Cardiovascular:      Rate and Rhythm: Normal rate and regular rhythm  Comments: Legs wrapped  Is on board with Dr Filipe Eaton   Pulmonary:      Effort: Pulmonary effort is normal       Breath sounds: Normal breath sounds  No wheezing, rhonchi or rales  Musculoskeletal:         General: Swelling present  Cervical back: Neck supple  Lymphadenopathy:      Cervical: No cervical adenopathy  Skin:     General: Skin is warm and dry  Neurological:      General: No focal deficit present  Mental Status: He is alert and oriented to person, place, and time  Psychiatric:         Mood and Affect: Mood normal          Behavior: Behavior normal          Thought Content:  Thought content normal          Judgment: Judgment normal        Medications have been reviewed by provider in current encounter    Diaz  [address], DO

## 2023-02-16 NOTE — PROGRESS NOTES
TCM Call     Date and time call was made  2/9/2023 10:47 AM    Patient was hospitialized at  Other (comment)    [address] [place] [address]    Date of Admission  01/31/23    Date of discharge  02/06/23    Diagnosis  Cellulitis and infection of Left foot    Disposition  Home    Were the patients medications reviewed and updated  Yes    Current Symptoms  None      TCM Call     Post hospital issues  None    Should patient be enrolled in anticoag monitoring? No    Scheduled for follow up?   Yes    Patients specialists  Other (comment)    Other specialists names  Wound care/Podiatry    Did you obtain your prescribed medications  Yes    Do you need help managing your prescriptions or medications  No    Is transportation to your appointment needed  No    I have advised the patient to call PCP with any new or worsening symptoms  [place]  Family members    Support System  Family    Are you recieving any outpatient services  No    Are you recieving home care services  Yes    Types of home care services  Nurse visit    Are you using any community resources  No    Current waiver services  No    Have you fallen in the last 12 months  No    Interperter language line needed  No

## 2023-02-21 DIAGNOSIS — E03.9 ACQUIRED HYPOTHYROIDISM: ICD-10-CM

## 2023-02-21 DIAGNOSIS — R79.89 LOW TSH LEVEL: ICD-10-CM

## 2023-02-21 RX ORDER — LEVOTHYROXINE SODIUM 0.05 MG/1
TABLET ORAL
Qty: 90 TABLET | Refills: 0 | Status: SHIPPED | OUTPATIENT
Start: 2023-02-21

## 2023-02-23 ENCOUNTER — OFFICE VISIT (OUTPATIENT)
Dept: CARDIOLOGY CLINIC | Facility: CLINIC | Age: 58
End: 2023-02-23

## 2023-02-23 VITALS
HEART RATE: 68 BPM | BODY MASS INDEX: 42.66 KG/M2 | DIASTOLIC BLOOD PRESSURE: 60 MMHG | HEIGHT: 72 IN | WEIGHT: 315 LBS | SYSTOLIC BLOOD PRESSURE: 120 MMHG

## 2023-02-23 DIAGNOSIS — E11.69 DYSLIPIDEMIA ASSOCIATED WITH TYPE 2 DIABETES MELLITUS (HCC): ICD-10-CM

## 2023-02-23 DIAGNOSIS — I25.10 ARTERIOSCLEROTIC CARDIOVASCULAR DISEASE (ASCVD): ICD-10-CM

## 2023-02-23 DIAGNOSIS — I10 ESSENTIAL HYPERTENSION: ICD-10-CM

## 2023-02-23 DIAGNOSIS — I48.0 PAROXYSMAL ATRIAL FIBRILLATION (HCC): Primary | ICD-10-CM

## 2023-02-23 DIAGNOSIS — E78.5 DYSLIPIDEMIA ASSOCIATED WITH TYPE 2 DIABETES MELLITUS (HCC): ICD-10-CM

## 2023-02-23 NOTE — PATIENT INSTRUCTIONS
A-fib (Atrial Fibrillation)   AMBULATORY CARE:   Atrial fibrillation (A-fib)  is an irregular heartbeat  It reduces your heart's ability to pump blood through your body  A-fib may come and go, or it may be a long-term condition  A-fib can cause life-threatening blood clots, stroke, or heart failure  It is important to treat and manage A-fib to help prevent these problems  Common signs and symptoms include the following:   A heartbeat that races, pounds, or flutters    Weakness, severe tiredness, or confusion    Feeling lightheaded, sweaty, dizzy, or faint    Shortness of breath or anxiety    Chest pain or pressure    Call your local emergency number (911 in the [address] [facility]) or have someone call if:   You have any of the following signs of a heart attack:      Squeezing, pressure, or pain in your chest    You may  also have any of the following:     Discomfort or pain in your back, neck, jaw, stomach, or arm    Shortness of breath    Nausea or vomiting    Lightheadedness or a sudden cold sweat    You have any of the following signs of a stroke:      Numbness or drooping on one side of your face     Weakness in an arm or leg    Confusion or difficulty speaking    Dizziness, a severe headache, or vision loss    Seek immediate care if:   Your arm or leg feels warm, tender, and painful  It may look swollen and red  Your heart rate is more than 110 beats per minute  You are short of breath, even at rest     Call your doctor or cardiologist if:   You have new or worsening swelling in your legs, feet, ankles, or abdomen  You have questions or concerns about your condition or care  Treatment for A-fib:  Conditions that cause A-fib, such as thyroid disease, will be treated  You may also need any of the following:  Heart medicines  help control your heart rate or rhythm  You may need more than one medicine to treat your symptoms      Antiplatelet or blood thinner medicines  help prevent blood clots and stroke  Cardioversion  is a procedure to return your heart rate and rhythm to normal  It can be done using medicines or electric shock  A-fib ablation  is a procedure that uses energy to burn a small area of heart tissue  This creates scar tissue and prevents electrical signals that cause A-fib  You may need this procedure more than once  Ask for more information on A-fib ablation  A pacemaker  may be inserted into your heart  A pacemaker is a device that controls your heartbeat  A pacemaker may be inserted during an ablation procedure or surgery  Ask your healthcare provider for more information on pacemakers  Surgery  may be needed if other procedures do not work  During surgery your healthcare provider will make cuts in the upper part of your heart  The provider will stitch the cuts together to create scar tissue  The scar tissue will prevent electrical signals that cause A-fib  Manage or prevent A-fib:   Get screening for A-fib, if recommended  Screening means you are checked for A-fib, even if you do not have signs or symptoms  Screening can find problems early so treatment can begin  Early treatment can save your life  Your healthcare provider will talk to you about the benefits and risks of screening  Screening may be recommended starting at age 48  Your provider may recommend regular screenings if you stay at high risk for A-fib  Know your target heart rate  Learn how to check your pulse and monitor your heart rate  Know the risks if you choose to drink alcohol  Alcohol can increase your risk for A-fib or make A-fib harder to manage  Ask your healthcare provider if it is okay for you to drink any alcohol  He or she can help you set limits for the number of drinks you have in 24 hours and in a week  A drink of alcohol is 12 ounces of beer, 5 ounces of wine, or 1½ ounces of liquor  Do not smoke    Nicotine can cause heart damage and make it more difficult to manage your A-fib  Do not use e-cigarettes or smokeless tobacco in place of cigarettes or to help you quit  They still contain nicotine  Ask your healthcare provider for information if you currently smoke and need help quitting  Eat heart-healthy foods  Heart healthy foods will help keep your cholesterol low  These include fruits, vegetables, whole-grain breads, low-fat dairy products, beans, lean meats, and fish  Replace butter and margarine with heart-healthy oils such as olive oil and canola oil  Maintain a healthy weight  Ask your healthcare provider what a healthy weight is for you  Ask him or her to help you create a safe weight loss plan if needed  Even a small goal of a 10% weight loss can improve your heart health  Get regular physical activity  Physical activity helps improve your heart health  Get at least 150 minutes of moderate aerobic physical activity each week  Your healthcare provider can help you create an activity plan  Manage other health conditions  This includes high blood pressure or cholesterol, sleep apnea, diabetes, and other heart conditions  Take medicine as directed and follow your treatment plan  Your healthcare provider may need to change a medicine you are taking if it is causing your A-fib  Do not  stop taking any medicine unless directed by your provider  Follow up with your doctor or cardiologist as directed: You will need regular blood tests and monitoring  Write down your questions so you remember to ask them during your visits  © Copyright [employer] 2022 Information is for End User's use only and may not be sold, redistributed or otherwise used for commercial purposes  The above information is an  only  It is not intended as medical advice for individual conditions or treatments  Talk to your doctor, nurse or pharmacist before following any medical regimen to see if it is safe and effective for you

## 2023-02-23 NOTE — PROGRESS NOTES
Subjective:        Patient ID: Juanpablo Han is a 59 y o  male  Chief Complaint:  Janet Ayala is here for routine follow-up for atrial fibrillation which is paroxysmal with a high CHADS2 Vasc score being maintained on Eliquis therapy, also has a history of pulmonary embolism  He is rather immobile, using a walker, recently in [facility] for lower extremity wound infection, he is following up with wound care he tells me  No fevers or rigors  Any chest pains or alarming shortness of breath  Denies any palpitations presyncope or syncope  Bilateral legs are wrapped below the knees, he feels the edema has been well controlled  No major surgeries planned he tells me  The following portions of the patient's history were reviewed and updated as appropriate: allergies, current medications, past family history, past medical history, past social history, past surgical history and problem list   Review of Systems   Constitutional: Negative for chills, diaphoresis, malaise/fatigue and weight gain  HENT: Negative for nosebleeds and stridor  Eyes: Negative for double vision, vision loss in left eye, vision loss in right eye and visual disturbance  Cardiovascular: Negative for chest pain, claudication, cyanosis, dyspnea on exertion, irregular heartbeat, leg swelling, near-syncope, orthopnea, palpitations, paroxysmal nocturnal dyspnea and syncope  Respiratory: Negative for cough, shortness of breath, snoring and wheezing  Endocrine: Negative for polydipsia, polyphagia and polyuria  Hematologic/Lymphatic: Negative for bleeding problem  Does not bruise/bleed easily  Skin: Negative for flushing and rash  Musculoskeletal: Negative for falls and myalgias  Gastrointestinal: Negative for abdominal pain, heartburn, hematemesis, hematochezia, melena and nausea  Genitourinary: Negative for hematuria     Neurological: Negative for brief paralysis, dizziness, focal weakness, headaches, light-headedness, loss of balance and vertigo  Psychiatric/Behavioral: Negative for altered mental status and substance abuse  Allergic/Immunologic: Negative for hives  Objective:      /60 (BP Location: Left arm, Patient Position: Sitting)   Pulse 68   Ht 6' (1 829 m)   Wt (!) 168 kg (371 lb)   BMI 50 32 kg/m²   Physical Exam  Constitutional:       General: He is not in acute distress  Appearance: He is well-developed  He is not diaphoretic  HENT:      Head: Normocephalic and atraumatic  Eyes:      General: No scleral icterus  Pupils: Pupils are equal, round, and reactive to light  Neck:      Thyroid: No thyromegaly  Vascular: No carotid bruit or JVD  Cardiovascular:      Rate and Rhythm: Normal rate and regular rhythm  Heart sounds: Normal heart sounds  No murmur heard  No friction rub  No gallop  Pulmonary:      Effort: Pulmonary effort is normal  No respiratory distress  Breath sounds: Normal breath sounds  No stridor  No wheezing or rales  Abdominal:      General: Bowel sounds are normal  There is no distension  Palpations: Abdomen is soft  There is no mass  Tenderness: There is no abdominal tenderness  Musculoskeletal:      Cervical back: Normal range of motion and neck supple  Right lower leg: No edema  Left lower leg: No edema  Skin:     General: Skin is warm and dry  Coloration: Skin is not pale  Findings: No erythema  Neurological:      Mental Status: He is alert and oriented to person, place, and time  Coordination: Coordination normal    Psychiatric:         Mood and Affect: Mood normal          Behavior: Behavior normal      Both legs wrapped below the knees, no edema above wraps noted    Lab Review:   No visits with results within 2 Month(s) from this visit  Latest known visit with results is:   Telephone on 12/22/2022   Component Date Value   • Hemoglobin A1C 11/23/2022 8 6      No results found  Assessment:       1  Paroxysmal atrial fibrillation (HCC)        2  Essential hypertension        3  Dyslipidemia associated with type 2 diabetes mellitus ([address] )        4  Arteriosclerotic cardiovascular disease (ASCVD)             Plan:       Sebastián Sewell examines in sinus rhythm, with high CHADS2 Vasc score I advised that he continue full anticoagulation with Eliquis 5 mg p o  twice daily, GFR is preserved, hemoglobin stable  Rosa Uriarte He is on proper statin therapy, LDL is at goal     Urged him to diligently control his diabetes at your direction  Mild coronary plaque noted on CT scan recently discussed with patient  Low threshold to consider stress testing  Not simple due to his weight and immobility  Should any intermediate risk or higher surgery planned stress testing should be undertaken  The after mentioned discussed with him  We will hold off on aspirin therapy empirically since he is on Eliquis  Volume status seems quite ideal, no recommended diuretic changes today made  I will see him back in 6 months, asked him to call me sooner with any concerning potential cardiac symptoms or need for any major elective surgery prior

## 2023-03-13 ENCOUNTER — DOCTOR'S OFFICE (OUTPATIENT)
Dept: URBAN - NONMETROPOLITAN AREA CLINIC 1 | Facility: CLINIC | Age: 58
Setting detail: OPHTHALMOLOGY
End: 2023-03-13
Payer: MEDICARE

## 2023-03-13 DIAGNOSIS — E11.3293: ICD-10-CM

## 2023-03-13 DIAGNOSIS — H52.203: ICD-10-CM

## 2023-03-13 DIAGNOSIS — H53.16: ICD-10-CM

## 2023-03-13 DIAGNOSIS — H50.10: ICD-10-CM

## 2023-03-13 DIAGNOSIS — H53.2: ICD-10-CM

## 2023-03-13 PROCEDURE — 92014 COMPRE OPH EXAM EST PT 1/>: CPT | Performed by: OPHTHALMOLOGY

## 2023-03-13 PROCEDURE — 92060 SENSORIMOTOR EXAMINATION: CPT | Performed by: OPHTHALMOLOGY

## 2023-03-13 PROCEDURE — DEFER/DELA DEFER/DELAY: Performed by: OPHTHALMOLOGY

## 2023-03-13 ASSESSMENT — REFRACTION_CURRENTRX
OD_AXIS: 020
OS_ADD: +2.50
OD_SPHERE: +0.25
OS_AXIS: 026
OS_VPRISM_DIRECTION: BF
OD_VPRISM_DIRECTION: BF
OS_CYLINDER: -0.25
OS_SPHERE: +0.75
OD_ADD: +2.50
OS_VPRISM_DIRECTION: BF
OD_OVR_VA: 20/
OD_SPHERE: +0.50
OD_OVR_VA: 20/
OS_CYLINDER: SPH
OD_CYLINDER: -0.50
OS_OVR_VA: 20/
OD_CYLINDER: -0.50
OS_ADD: +2.50
OS_OVR_VA: 20/
OS_SPHERE: +0.75
OD_VPRISM_DIRECTION: BF
OD_ADD: +2.50
OD_AXIS: 179

## 2023-03-13 ASSESSMENT — KERATOMETRY
OD_AXISANGLE_DEGREES: 013
OD_K2POWER_DIOPTERS: 45.25
OD_K1POWER_DIOPTERS: 46.75
OS_AXISANGLE_DEGREES: 012
OS_K1POWER_DIOPTERS: 45.00
OS_K2POWER_DIOPTERS: 45.50

## 2023-03-13 ASSESSMENT — AXIALLENGTH_DERIVED
OD_AL: 22.708
OS_AL: 22.7377
OS_AL: 22.7377
OD_AL: 22.4847

## 2023-03-13 ASSESSMENT — REFRACTION_MANIFEST
OS_VA2: 20/25-2
OS_SPHERE: +0.75
OD_SPHERE: +0.25
OD_CYLINDER: -0.50
OS_CYLINDER: -0.25
OS_VA1: 20/40+
OD_ADD: +2.50
OD_VA1: 20/40+
OD_AXIS: 020
OS_ADD: +2.50
OS_AXIS: 025

## 2023-03-13 ASSESSMENT — SPHEQUIV_DERIVED
OD_SPHEQUIV: 0.625
OD_SPHEQUIV: 0
OS_SPHEQUIV: 0.625
OS_SPHEQUIV: 0.625

## 2023-03-13 ASSESSMENT — VISUAL ACUITY
OS_BCVA: 20/30+2
OD_BCVA: 20/25-2

## 2023-03-13 ASSESSMENT — REFRACTION_AUTOREFRACTION
OS_AXIS: 010
OD_CYLINDER: -0.75
OD_AXIS: 018
OD_SPHERE: +1.00
OS_SPHERE: +1.00
OS_CYLINDER: -0.75

## 2023-03-13 ASSESSMENT — CONFRONTATIONAL VISUAL FIELD TEST (CVF)
OD_FINDINGS: FULL
OS_FINDINGS: FULL

## 2023-03-27 ENCOUNTER — SOCIAL WORK (OUTPATIENT)
Dept: BEHAVIORAL/MENTAL HEALTH CLINIC | Facility: CLINIC | Age: 58
End: 2023-03-27

## 2023-03-27 DIAGNOSIS — F25.0 SCHIZOAFFECTIVE DISORDER, BIPOLAR TYPE (HCC): Primary | ICD-10-CM

## 2023-03-27 NOTE — PSYCH
"Client Name: Roni Hopson       Client YOB: 1965  : 1965    Treatment Team (include name and contact information):     Psychotherapist:  Tim Leigh MSW LCSW at [id number]    Psychiatrist: N/A   Release of information completed: N/A    \" N/A   Release of information completed: N/A    Other (Specify Role): Dr Helga Mccullough  [phone]    Release of information completed: yes    Other (Specify Role): N/A   Release of information completed: N/A    Healthcare Provider  Emily  [address] [place] [id number] [phone]    Type of Plan   * Child plans (children 15 yo and younger) must be completed and signed by the child's legal guardian   * Plans for all individuals 15 yo and above must be signed by the client  Plan Type: adolescent/adult (15 and over) Initial      My Personal Strengths are (in the client's own words):  \" good brother, kind, smart, and survivor\"     The stressors and triggers that may put me at risk are:  being physically tired, boredom, loneliness, being treated unfairly and places (describe) \" the high rise\"     Coping skills I can use to keep myself calm and safe:  Call a friend or family member and Increased contact with professional supports    Coping skills/supports I can use to maintain abstinence from substance use:   N/A    The people that provide me with help and support: (Include name, contact, and how they can help)   Support person #1: Esther Channel     * Phone number: In phone     * How can they help me? \" supportive\"    Support person #2: \" heidy    * Phone number: In phone     * How can they help me? \"Listen to me\"     Support person #3: Forrest Mullins     * Phone number: In phone     * How can they help me?  Talk with txt messages    In the past, the following has helped me in times of crisis:    Being in a quiet space, Talking to a professional on " the telephone, Calling a friend, Calling a family member, Breathing exercises (or other mindfulness-based activities) and Watching television or a movie      If it is an emergency and you need immediate help, call     If there is a possibility of danger to yourself or others, call the following crisis hotline resources:     Adult Crisis Numbers  Suicide Prevention Hotline - Dial   [facility]: [address]: [address]: [address]: [address] [place] [address] Avenue: [address] [place] [address] Street: [address] [place] [address] Avenue: [address] [place] [address] St: [phone] (daytime)  [phone] (after hours, weekends, holidays)     Child/Adolescent Crisis Numbers   [facility]: [address]: [phone]   Gopal [phone]   [facility]: [phone]    Please note: Some [facility] do not have a separate number for Child/Adolescent specific crisis  If your county is not listed under Child/Adolescent, please call the adult number for your county     National Talk to Text Line   All Ages - [phone]    In the event your feelings become unmanageable, and you cannot reach your support system, you will call 911 immediately or go to the nearest hospital emergency room

## 2023-03-27 NOTE — PSYCH
"Behavioral Health Psychotherapy Progress Note    Psychotherapy Provided: Individual Psychotherapy     No diagnosis found  Goals addressed in session: Goal 1/crisis plan     DATA: the client reported he has been experiencing problems with his peers in his senior living center  \" people say we are talking about them\" During the session we created his recovery crisis plan and explored setting healthy boundaries in his relationships  As well ad explored the client's   During this session, this clinician used the following therapeutic modalities: Cognitive Behavioral Therapy, Mindfulness-based Strategies and Supportive Psychotherapy    Substance Abuse was not addressed during this session  If the client is diagnosed with a co-occurring substance use disorder, please indicate any changes in the frequency or amount of use: N/A  Stage of change for addressing substance use diagnoses: No substance use/Not applicable    ASSESSMENT:  Cain Hughes presents with a Depressed mood  his affect is Normal range and intensity, which is congruent, with his mood and the content of the session  The client has made progress on their goals  Cain Hughes presents with a none risk of suicide, none risk of self-harm, and none risk of harm to others  For any risk assessment that surpasses a \"low\" rating, a safety plan must be developed  A safety plan was indicated: No  If yes, describe in detail N/A    PLAN: Between sessions, Cain Hughes will utilize coping skills when presented with anxiety and or depressiom At the next session, the therapist will use Cognitive Behavioral Therapy, Mindfulness-based Strategies and Supportive Psychotherapy to address his anxiety and or her depression  Behavioral Health Treatment Plan and Discharge Planning: Cain Hughes is aware of and agrees to continue to work on their treatment plan  They have identified and are working toward their discharge goals   yes    Visit " start and stop times:11:30 Am to 12:15 Pm     03/27/23

## 2023-03-28 DIAGNOSIS — F32.2 SEVERE DEPRESSION (HCC): ICD-10-CM

## 2023-03-28 RX ORDER — CITALOPRAM 40 MG/1
TABLET ORAL
Qty: 90 TABLET | Refills: 0 | Status: SHIPPED | OUTPATIENT
Start: 2023-03-28

## 2023-03-31 ENCOUNTER — DOCTOR'S OFFICE (OUTPATIENT)
Dept: URBAN - NONMETROPOLITAN AREA CLINIC 1 | Facility: CLINIC | Age: 58
Setting detail: OPHTHALMOLOGY
End: 2023-03-31
Payer: MEDICARE

## 2023-03-31 DIAGNOSIS — E11.3293: ICD-10-CM

## 2023-03-31 DIAGNOSIS — H53.16: ICD-10-CM

## 2023-03-31 DIAGNOSIS — H43.393: ICD-10-CM

## 2023-03-31 LAB
LEFT EYE DIABETIC RETINOPATHY: POSITIVE
RIGHT EYE DIABETIC RETINOPATHY: POSITIVE

## 2023-03-31 PROCEDURE — 92134 CPTRZ OPH DX IMG PST SGM RTA: CPT | Performed by: OPHTHALMOLOGY

## 2023-03-31 PROCEDURE — 99213 OFFICE O/P EST LOW 20 MIN: CPT | Performed by: OPHTHALMOLOGY

## 2023-03-31 ASSESSMENT — CONFRONTATIONAL VISUAL FIELD TEST (CVF)
OS_FINDINGS: FULL
OD_FINDINGS: FULL

## 2023-04-03 ASSESSMENT — REFRACTION_CURRENTRX
OS_SPHERE: +0.75
OS_ADD: +2.50
OD_AXIS: 179
OS_AXIS: 026
OS_CYLINDER: -0.25
OS_CYLINDER: SPH
OD_CYLINDER: -0.50
OD_VPRISM_DIRECTION: BF
OS_VPRISM_DIRECTION: BF
OS_OVR_VA: 20/
OD_SPHERE: +0.25
OD_ADD: +2.50
OD_SPHERE: +0.50
OD_AXIS: 020
OD_VPRISM_DIRECTION: BF
OD_OVR_VA: 20/
OD_OVR_VA: 20/
OD_ADD: +2.50
OS_OVR_VA: 20/
OS_VPRISM_DIRECTION: BF
OD_CYLINDER: -0.50
OS_ADD: +2.50
OS_SPHERE: +0.75

## 2023-04-03 ASSESSMENT — SPHEQUIV_DERIVED
OD_SPHEQUIV: 0
OS_SPHEQUIV: 0.625
OS_SPHEQUIV: 0.625
OD_SPHEQUIV: 0.625

## 2023-04-03 ASSESSMENT — AXIALLENGTH_DERIVED
OS_AL: 22.7377
OS_AL: 22.7377
OD_AL: 22.4847
OD_AL: 22.708

## 2023-04-03 ASSESSMENT — REFRACTION_AUTOREFRACTION
OD_SPHERE: +1.00
OS_AXIS: 010
OD_CYLINDER: -0.75
OS_CYLINDER: -0.75
OS_SPHERE: +1.00
OD_AXIS: 018

## 2023-04-03 ASSESSMENT — REFRACTION_MANIFEST
OS_AXIS: 025
OD_AXIS: 020
OS_VA1: 20/40+
OD_CYLINDER: -0.50
OD_ADD: +2.50
OS_SPHERE: +0.75
OS_ADD: +2.50
OS_VA2: 20/25-2
OD_VA1: 20/40+
OD_SPHERE: +0.25
OS_CYLINDER: -0.25

## 2023-04-03 ASSESSMENT — VISUAL ACUITY
OD_BCVA: 20/20-2
OS_BCVA: 20/25-2

## 2023-04-03 ASSESSMENT — KERATOMETRY
OD_K2POWER_DIOPTERS: 45.25
OD_AXISANGLE_DEGREES: 013
OD_K1POWER_DIOPTERS: 46.75
OS_K1POWER_DIOPTERS: 45.00
OS_AXISANGLE_DEGREES: 012
OS_K2POWER_DIOPTERS: 45.50

## 2023-04-04 ENCOUNTER — OPTICAL OFFICE (OUTPATIENT)
Dept: URBAN - NONMETROPOLITAN AREA CLINIC 4 | Facility: CLINIC | Age: 58
Setting detail: OPHTHALMOLOGY
End: 2023-04-04
Payer: COMMERCIAL

## 2023-04-04 ENCOUNTER — DOCTOR'S OFFICE (OUTPATIENT)
Dept: URBAN - NONMETROPOLITAN AREA CLINIC 1 | Facility: CLINIC | Age: 58
Setting detail: OPHTHALMOLOGY
End: 2023-04-04
Payer: COMMERCIAL

## 2023-04-04 DIAGNOSIS — Z86.79 HISTORY OF HEART FAILURE: ICD-10-CM

## 2023-04-04 DIAGNOSIS — I50.9 CONGESTIVE HEART FAILURE, UNSPECIFIED HF CHRONICITY, UNSPECIFIED HEART FAILURE TYPE (HCC): ICD-10-CM

## 2023-04-04 DIAGNOSIS — E03.9 HYPOTHYROIDISM, UNSPECIFIED TYPE: ICD-10-CM

## 2023-04-04 DIAGNOSIS — L97.508 DIABETIC ULCER OF FOOT ASSOCIATED WITH TYPE 2 DIABETES MELLITUS, WITH OTHER ULCER SEVERITY, UNSPECIFIED LATERALITY, UNSPECIFIED PART OF FOOT (HCC): ICD-10-CM

## 2023-04-04 DIAGNOSIS — Z79.4 TYPE 2 DIABETES MELLITUS WITH HYPERGLYCEMIA, WITH LONG-TERM CURRENT USE OF INSULIN (HCC): ICD-10-CM

## 2023-04-04 DIAGNOSIS — E11.621 DIABETIC ULCER OF FOOT ASSOCIATED WITH TYPE 2 DIABETES MELLITUS, WITH OTHER ULCER SEVERITY, UNSPECIFIED LATERALITY, UNSPECIFIED PART OF FOOT (HCC): ICD-10-CM

## 2023-04-04 DIAGNOSIS — H52.203: ICD-10-CM

## 2023-04-04 DIAGNOSIS — L97.929 CHRONIC VENOUS HYPERTENSION (IDIOPATHIC) WITH ULCER AND INFLAMMATION OF LEFT LOWER EXTREMITY (HCC): ICD-10-CM

## 2023-04-04 DIAGNOSIS — I48.91 ATRIAL FIBRILLATION, UNSPECIFIED TYPE (HCC): ICD-10-CM

## 2023-04-04 DIAGNOSIS — E66.01 MORBID OBESITY (HCC): ICD-10-CM

## 2023-04-04 DIAGNOSIS — I10 ESSENTIAL HYPERTENSION: ICD-10-CM

## 2023-04-04 DIAGNOSIS — E11.65 TYPE 2 DIABETES MELLITUS WITH HYPERGLYCEMIA, WITH LONG-TERM CURRENT USE OF INSULIN (HCC): ICD-10-CM

## 2023-04-04 DIAGNOSIS — I87.332 CHRONIC VENOUS HYPERTENSION (IDIOPATHIC) WITH ULCER AND INFLAMMATION OF LEFT LOWER EXTREMITY (HCC): ICD-10-CM

## 2023-04-04 DIAGNOSIS — H52.4: ICD-10-CM

## 2023-04-04 PROBLEM — H50.10 EXOTROPIA, UNSPECIFIED: Status: ACTIVE | Noted: 2023-03-13

## 2023-04-04 PROBLEM — H53.16: Status: ACTIVE | Noted: 2023-03-13

## 2023-04-04 PROCEDURE — V2203 LENS SPHCYL BIFOCAL 4.00D/.1: HCPCS | Performed by: OPTOMETRIST

## 2023-04-04 PROCEDURE — 92012 INTRM OPH EXAM EST PATIENT: CPT | Performed by: OPTOMETRIST

## 2023-04-04 PROCEDURE — V2784 LENS POLYCARB OR EQUAL: HCPCS | Performed by: OPTOMETRIST

## 2023-04-04 PROCEDURE — V2020 VISION SVCS FRAMES PURCHASES: HCPCS | Performed by: OPTOMETRIST

## 2023-04-04 PROCEDURE — V2750 ANTI-REFLECTIVE COATING: HCPCS | Performed by: OPTOMETRIST

## 2023-04-04 PROCEDURE — V2744 TINT PHOTOCHROMATIC LENS/ES: HCPCS | Performed by: OPTOMETRIST

## 2023-04-04 RX ORDER — POTASSIUM CHLORIDE 20 MEQ/1
TABLET, EXTENDED RELEASE ORAL
Qty: 90 TABLET | Refills: 0 | Status: SHIPPED | OUTPATIENT
Start: 2023-04-04

## 2023-04-04 ASSESSMENT — REFRACTION_MANIFEST
OS_SPHERE: +0.75
OD_VA2: 20/30-2
OS_VA1: 20/40+2
OD_SPHERE: +0.50
OS_VA2: 20/30-2
OD_VA1: 20/40+2
OS_CYLINDER: -0.25
OD_CYLINDER: -0.50
OS_ADD: +2.50
OS_AXIS: 025
OD_AXIS: 020
OD_ADD: +2.50

## 2023-04-04 ASSESSMENT — AXIALLENGTH_DERIVED
OD_AL: 22.6182
OS_AL: 22.8742
OD_AL: 22.708
OS_AL: 22.7377

## 2023-04-04 ASSESSMENT — REFRACTION_AUTOREFRACTION
OS_AXIS: 177
OD_AXIS: 12
OD_CYLINDER: -1.00
OS_CYLINDER: -0.50
OD_SPHERE: +0.50
OS_SPHERE: +0.50

## 2023-04-04 ASSESSMENT — VISUAL ACUITY
OS_BCVA: 20/30+1
OD_BCVA: 20/25

## 2023-04-04 ASSESSMENT — KERATOMETRY
OS_K2POWER_DIOPTERS: 45.50
OD_K2POWER_DIOPTERS: 45.25
OD_AXISANGLE_DEGREES: 013
OS_K1POWER_DIOPTERS: 45.00
OD_K1POWER_DIOPTERS: 46.75
OS_AXISANGLE_DEGREES: 012

## 2023-04-04 ASSESSMENT — CONFRONTATIONAL VISUAL FIELD TEST (CVF)
OD_FINDINGS: FULL
OS_FINDINGS: FULL

## 2023-04-04 ASSESSMENT — REFRACTION_CURRENTRX
OD_AXIS: 020
OD_OVR_VA: 20/
OS_CYLINDER: -0.25
OD_CYLINDER: -0.50
OD_SPHERE: +0.25
OD_VPRISM_DIRECTION: BF
OS_CYLINDER: -0.25
OD_AXIS: 020
OS_SPHERE: +0.75
OS_SPHERE: +0.75
OS_VPRISM_DIRECTION: BF
OS_ADD: +2.50
OD_CYLINDER: -0.50
OD_ADD: +2.50
OS_AXIS: 025
OS_VPRISM_DIRECTION: BF
OS_AXIS: 026
OS_OVR_VA: 20/
OD_VPRISM_DIRECTION: BF
OD_OVR_VA: 20/
OD_ADD: +2.50
OD_SPHERE: +0.25
OS_ADD: +2.50
OS_OVR_VA: 20/

## 2023-04-04 ASSESSMENT — SPHEQUIV_DERIVED
OD_SPHEQUIV: 0.25
OD_SPHEQUIV: 0
OS_SPHEQUIV: 0.25
OS_SPHEQUIV: 0.625

## 2023-04-24 DIAGNOSIS — E61.2 MAGNESIUM DEFICIENCY: ICD-10-CM

## 2023-04-24 RX ORDER — LANOLIN ALCOHOL/MO/W.PET/CERES
CREAM (GRAM) TOPICAL
Qty: 180 TABLET | Refills: 0 | Status: SHIPPED | OUTPATIENT
Start: 2023-04-24

## 2023-04-25 ENCOUNTER — TELEPHONE (OUTPATIENT)
Dept: ADMINISTRATIVE | Facility: OTHER | Age: 58
End: 2023-04-25

## 2023-04-25 ENCOUNTER — OFFICE VISIT (OUTPATIENT)
Dept: FAMILY MEDICINE CLINIC | Facility: CLINIC | Age: 58
End: 2023-04-25

## 2023-04-25 VITALS
OXYGEN SATURATION: 93 % | TEMPERATURE: 98 F | WEIGHT: 315 LBS | RESPIRATION RATE: 18 BRPM | BODY MASS INDEX: 50.78 KG/M2 | SYSTOLIC BLOOD PRESSURE: 110 MMHG | DIASTOLIC BLOOD PRESSURE: 54 MMHG | HEART RATE: 71 BPM

## 2023-04-25 DIAGNOSIS — E66.01 MORBID OBESITY (HCC): ICD-10-CM

## 2023-04-25 DIAGNOSIS — E78.5 DYSLIPIDEMIA ASSOCIATED WITH TYPE 2 DIABETES MELLITUS (HCC): ICD-10-CM

## 2023-04-25 DIAGNOSIS — Z79.4 TYPE 2 DIABETES MELLITUS WITH HYPERGLYCEMIA, WITH LONG-TERM CURRENT USE OF INSULIN (HCC): ICD-10-CM

## 2023-04-25 DIAGNOSIS — E11.65 TYPE 2 DIABETES MELLITUS WITH HYPERGLYCEMIA, WITH LONG-TERM CURRENT USE OF INSULIN (HCC): ICD-10-CM

## 2023-04-25 DIAGNOSIS — I10 ESSENTIAL HYPERTENSION: ICD-10-CM

## 2023-04-25 DIAGNOSIS — N18.31 CHRONIC KIDNEY DISEASE, STAGE 3A (HCC): ICD-10-CM

## 2023-04-25 DIAGNOSIS — D69.6 THROMBOCYTOPENIA (HCC): ICD-10-CM

## 2023-04-25 DIAGNOSIS — L97.508 DIABETIC ULCER OF FOOT ASSOCIATED WITH TYPE 2 DIABETES MELLITUS, WITH OTHER ULCER SEVERITY, UNSPECIFIED LATERALITY, UNSPECIFIED PART OF FOOT (HCC): ICD-10-CM

## 2023-04-25 DIAGNOSIS — E11.69 DYSLIPIDEMIA ASSOCIATED WITH TYPE 2 DIABETES MELLITUS (HCC): ICD-10-CM

## 2023-04-25 DIAGNOSIS — G40.89 OTHER SEIZURES (HCC): ICD-10-CM

## 2023-04-25 DIAGNOSIS — F32.2 SEVERE DEPRESSION (HCC): ICD-10-CM

## 2023-04-25 DIAGNOSIS — Z59.9 FINANCIAL DIFFICULTIES: ICD-10-CM

## 2023-04-25 DIAGNOSIS — E03.9 ACQUIRED HYPOTHYROIDISM: ICD-10-CM

## 2023-04-25 DIAGNOSIS — Z00.00 MEDICARE ANNUAL WELLNESS VISIT, SUBSEQUENT: Primary | ICD-10-CM

## 2023-04-25 DIAGNOSIS — Z12.5 SCREENING FOR PROSTATE CANCER: ICD-10-CM

## 2023-04-25 DIAGNOSIS — E11.621 DIABETIC ULCER OF FOOT ASSOCIATED WITH TYPE 2 DIABETES MELLITUS, WITH OTHER ULCER SEVERITY, UNSPECIFIED LATERALITY, UNSPECIFIED PART OF FOOT (HCC): ICD-10-CM

## 2023-04-25 DIAGNOSIS — Z59.82 TRANSPORTATION INSECURITY: ICD-10-CM

## 2023-04-25 LAB — SL AMB POCT HEMOGLOBIN AIC: 7.7 (ref ?–6.5)

## 2023-04-25 SDOH — ECONOMIC STABILITY - INCOME SECURITY: PROBLEM RELATED TO HOUSING AND ECONOMIC CIRCUMSTANCES, UNSPECIFIED: Z59.9

## 2023-04-25 SDOH — ECONOMIC STABILITY - TRANSPORTATION SECURITY: TRANSPORTATION INSECURITY: Z59.82

## 2023-04-25 NOTE — LETTER
Diabetic Foot Exam Form    Date Requested: 23  Patient: Snehal Lee  Patient : 1965   Referring Provider: Arely Matson DO    Diabetic Foot Exam Performed with shoes and socks removed        Yes         No     Date of Diabetic Foot Exam ______________________________  Risk Score ____________________________________________    Left Foot       Visual Inspection         Monofilament Testing Sensory Exam        Pedal Pulses         Additional Comments         Right Foot      Visual Inspection         Monofilament Testing Sensory Exam       Pedal Pulses         Additional Comments         Comments __________________________________________________________    Practice Providing Exam ______________________________________________    Exam Performed By (print name) _______________________________________      Provider Signature ___________________________________________________      These reports are needed for  compliance  Please fax this completed form and a copy of the Diabetic Foot Exam report to our office located at [place] [address] as soon as possible via Fax [phone] magy Haskins: Phone [phone]    We thank you for your assistance in treating our mutual patient

## 2023-04-25 NOTE — TELEPHONE ENCOUNTER
----- Message from Mason Hancock sent at 4/25/2023 11:56 AM EDT -----  04/25/23 11:56 AM    Hello, our patient No patient name on file  has had Diabetic Foot Exam completed/performed  Please assist in updating the patient chart by making an External outreach to Dr Atkinson Both facility located in [address] [facility]  The date of service is n/a      Thank you,  Mason [facility]

## 2023-04-25 NOTE — PROGRESS NOTES
Assessment and Plan:     AMW visit - brought up to date based on shared decision making  Has ACP documents  He wants intervention unless/until there is no meaningful quality of life  Agrees to PSA testing  SW referral placed/food and transportation insecurity  With Covid relief had more assistance with food stamps, not that this has been taken away, does not get much  Does not go to grocery store, picks things up at [employer] when in [facility] pharmacy  Has to take public transportation -- this is a struggle for him at times, his insurance did not show to pick him up today and he had a GI procedure  Therefore, he missed this  BMI Counseling: Body mass index is 50 78 kg/m²  The BMI is above normal  Nutrition recommendations include decreasing portion sizes, encouraging healthy choices of fruits and vegetables, decreasing fast food intake, consuming healthier snacks, limiting drinks that contain sugar, reducing intake of saturated and trans fat and reducing intake of cholesterol  Exercise recommendations include exercising 3-5 times per week and strength training exercises  Rationale for BMI follow-up plan is due to patient being overweight or obese  Preventive health issues were discussed with patient, and age appropriate screening tests were ordered as noted in patient's After Visit Summary  Personalized health advice and appropriate referrals for health education or preventive services given if needed, as noted in patient's After Visit Summary      Chief Complaint   Patient presents with   • Medicare Wellness Visit        History of Present Illness:     Patient presents for a Medicare Wellness Visit      Patient Care Team:  Ajay Dias DO as PCP - General (Family Medicine)  Vincenzo Jameson DPM  [facility] (Ophthalmology)  CARLOS EDUARDO Schmidt (Behavioral Health)  Seble Dena, Pharmacist as Pharmacist (Pharmacy)     Review of Systems:     Review of Systems   All other systems reviewed and are negative         Problem List:     Patient Active Problem List   Diagnosis   • Cellulitis of right lower extremity   • Diabetic foot ulcer ([place] [address] )   • Type 2 diabetes mellitus with hyperglycemia, with long-term current use of insulin (HCC)   • Morbid obesity (HCC)   • Hypothyroid   • Anxiety   • Neuropathy   • Essential hypertension   • H/O osteomyelitis   • Hx pulmonary embolism   • Encounter for medical examination to establish care   • Diabetic foot ulcer associated with type 2 diabetes mellitus ([place] [address] )   • Osteomyelitis ([place] [address] )   • Chronic venous hypertension (idiopathic) with ulcer and inflammation of left lower extremity (HCC)   • COPD with acute exacerbation (HCC)   • Congestive heart failure (HCC)   • Atrial fibrillation (HCC)   • Major depressive disorder   • Obstructive sleep apnea syndrome   • Chronic kidney disease, stage 3a (HCC)   • Other emphysema (HCC)   • Thrombocytopenia (HCC)   • Ambulatory dysfunction   • Anemia in chronic kidney disease   • Restless leg syndrome   • Polypharmacy   • Diabetic peripheral neuropathy (HCC)   • Iron deficiency anemia   • Depression, recurrent (HCC)   • Suicidal ideation   • Atopic dermatitis of scalp   • Other seizures (HCC)   • Severe depression (HCC)   • Financial difficulties   • Transportation insecurity      Past Medical and Surgical History:     Past Medical History:   Diagnosis Date   • Ambulatory dysfunction    • Anemia    • Anxiety    • Arthritis    • Atrial fibrillation ([place] [address] )    • CHF (congestive heart failure) (HCC)    • Chronic kidney disease, stage 3 (HCC)    • Chronic ulcer of left foot (HCC)    • COPD with acute exacerbation (HCC)    • Encephalopathy    • History of depression    • History of osteomyelitis    • Hyperkalemia    • Hyperlipidemia    • Hypertension    • Hypoglycemia    • Hypomagnesemia    • Hyponatremia    • Hypothyroidism    • Morbid obesity ([place] [address] )    • Pneumonia    • Pulmonary embolism (HCC)    • RLS (restless legs syndrome)    • Sepsis (HCC)    • Sleep apnea    • Thrombocytopenia (HCC)    • Type 2 diabetes mellitus ([facility] [address] )    • Urinary retention    • Vitamin D deficiency      Past Surgical History:   Procedure Laterality Date   • GALLBLADDER SURGERY     • TONSILLECTOMY AND ADENOIDECTOMY        Family History:     Family History   Problem Relation Age of Onset   • Cancer Mother         lymphoma   • Hypertension Mother    • Heart disease Father    • Hypertension Father    • Diabetes Sister    • Cancer Maternal Grandmother       Social History:     Social History     Socioeconomic History   • Marital status: Single     Spouse name: None   • Number of children: None   • Years of education: None   • Highest education level: None   Occupational History   • None   Tobacco Use   • Smoking status: Never   • Smokeless tobacco: Never   Vaping Use   • Vaping Use: Never used   Substance and Sexual Activity   • Alcohol use: No   • Drug use: No   • Sexual activity: Not Currently     Partners: Female   Other Topics Concern   • None   Social History Narrative   • None     Social Determinants of Health     Financial Resource Strain: Not on file   Food Insecurity: Not on file   Transportation Needs: Not on file   Physical Activity: Not on file   Stress: Not on file   Social Connections: Not on file   Intimate Partner Violence: Not on file   Housing Stability: Not on file      Medications and Allergies:     Current Outpatient Medications   Medication Sig Dispense Refill   • acetaminophen (TYLENOL) 650 mg CR tablet Take 1 tablet (650 mg total) by mouth every 8 (eight) hours as needed for mild pain or moderate pain 90 tablet 0   • albuterol (PROVENTIL HFA,VENTOLIN HFA) 90 mcg/act inhaler Inhale 2 puffs every 6 (six) hours as needed for wheezing 3 Inhaler 3   • atorvastatin (LIPITOR) 40 mg tablet TAKE 1 TABLET BY MOUTH ONCE DAILY  90 tablet 0   • cetirizine (ZyrTEC) 10 mg tablet TAKE 1 TABLET BY MOUTH ONCE DAILY   90 tablet 0   • citalopram (CeleXA) 40 mg tablet TAKE 1 TABLET BY MOUTH ONCE DAILY  90 tablet 0   • clonazePAM (KlonoPIN) 0 5 mg tablet Take 0 5 mg by mouth every 12 (twelve) hours as needed     • docusate sodium (COLACE) 100 mg capsule Take 100 mg by mouth 2 (two) times a day     • Easy Touch Pen Needles 31G X 8 MM MISC Use 1 each 3 (three) times a day     • Eliquis 5 MG TAKE 1 TABLET BY MOUTH TWO TIMES A DAY  180 tablet 0   • ergocalciferol (ERGOCALCIFEROL) 1 25 MG (65363 UT) capsule Take 1 capsule (50,000 Units total) by mouth once a week 12 capsule 1   • ferrous sulfate 325 (65 Fe) mg tablet Take 325 mg by mouth 2 (two) times a day with meals     • folic acid (FOLVITE) 1 mg tablet Take 1 mg by mouth daily     • furosemide (LASIX) 40 mg tablet Take 40 mg by mouth daily     • gabapentin (NEURONTIN) 600 MG tablet Take 600 mg by mouth 2 (two) times a day     • glucose blood (OneTouch Verio) test strip Check blood sugars four times daily  Please substitute with appropriate alternative as covered by patient's insurance  Dx: E11 65 400 each 3   • insulin aspart (NovoLOG FlexPen) 100 UNIT/ML injection pen Inject 26 Units under the skin 3 (three) times a day with meals Per sliding scale - managed by Dr Lani Boykin + scheduled 15 mL 0   • lamoTRIgine (LaMICtal) 25 mg tablet Take 1 tablet (25 mg total) by mouth in the morning and 1 tablet (25 mg total) in the evening  180 tablet 1   • Lantus SoloStar 100 units/mL SOPN INJECT 56 UNITS TOTAL UNDER THE SKIN DAILY 15 mL 0   • latanoprost (XALATAN) 0 005 % ophthalmic solution Administer 0 005 drops to both eyes     • levothyroxine 300 MCG tablet Take 1 tablet (300 mcg total) by mouth daily Take with 50 mcg tablet 90 tablet 0   • levothyroxine 50 mcg tablet TAKE 1 TAB DAILY IN EARLY MORNING ALONG WITH 300MCG TAB 90 tablet 0   • Linzess 290 MCG CAPS TAKE 1 CAPSULE BY MOUTH ONCE DAILY  90 capsule 0   • magnesium Oxide (MAG-OX) 400 mg TABS TAKE 1 TABLET BY MOUTH TWO TIMES A DAY   180 tablet 0   • "OneTouch Delica Lancets 81K MISC Check blood sugars four times daily  Please substitute with appropriate alternative as covered by patient's insurance  Dx: E11 65 400 each 3   • pantoprazole (PROTONIX) 40 mg tablet TAKE 1 TABLET BY MOUTH ONCE DAILY  90 tablet 0   • potassium chloride (K-DUR,KLOR-CON) 20 mEq tablet TAKE 1 TABLET BY MOUTH EACH MORNING  90 tablet 0   • Probiotic Product (Probiotic Daily) CAPS Take by mouth     • Semaglutide, 1 MG/DOSE, (Ozempic, 1 MG/DOSE,) 2 MG/1 5ML SOPN Inject 1 mg under the skin once a week     • senna (SENOKOT) 8 6 MG tablet Take 1 tablet by mouth 2 (two) times a day     • tamsulosin (FLOMAX) 0 4 mg TAKE 1 CAP DAILY WITH DINNER 90 capsule 0   • timolol (TIMOPTIC) 0 5 % ophthalmic solution Administer 1 drop to the right eye 2 (two) times a day     • clobetasol propionate (Clobex) 0 05 % shampoo Apply 1 application topically 3 (three) times a week 118 mL 3   • ketoconazole (NIZORAL) 2 % shampoo Apply 1 application topically 2 (two) times a week for 8 doses 120 mL 3   • lisinopril (ZESTRIL) 2 5 mg tablet TAKE 1 TABLET BY MOUTH ONCE DAILY  90 tablet 0     No current facility-administered medications for this visit  Allergies   Allergen Reactions   • Carbamazepine Other (See Comments)     Elevated liver functions -\"Enlarges liver\"   • Clindamycin Rash   • Phenytoin Itching and Rash   • Pneumococcal Vaccine Hives, Itching and Rash      Immunizations:     Immunization History   Administered Date(s) Administered   • COVID-19 MODERNA VACC 0 5 ML IM 03/16/2021, 04/13/2021, 10/31/2022   • INFLUENZA 02/07/2020, 10/21/2020   • Influenza, recombinant, quadrivalent,injectable, preservative free 10/07/2022   • Tdap 11/08/2019, 06/10/2020, 07/16/2022      Health Maintenance:         Topic Date Due   • Colorectal Cancer Screening  10/24/2023   • HIV Screening  Completed   • Hepatitis C Screening  Completed     There are no preventive care reminders to display for this patient     Medicare " Screening Tests and Risk Assessments:     Last Medicare Wellness visit information reviewed, patient interviewed and updates made to the record today  Health Risk Assessment:   Patient rates overall health as poor  Patient feels that their physical health rating is much worse  Patient is very dissatisfied with their life  Eyesight was rated as slightly worse  Hearing was rated as slightly worse  Patient feels that their emotional and mental health rating is same  Patients states they are sometimes angry  Patient states they are sometimes unusually tired/fatigued  Pain experienced in the last 7 days has been some  Patient's pain rating has been 7/10  Patient states that he has experienced weight loss or gain in last 6 months  Has pain and is frustrated here today regarding transportation issues, etc       Depression Screening:   PHQ-9 Score: 0      Fall Risk Screening: In the past year, patient has experienced: history of falling in past year    Number of falls: 2 or more  Injured during fall?: No    Feels unsteady when standing or walking?: Yes    Worried about falling?: Yes      Home Safety:  Patient has trouble with stairs inside or outside of their home  Patient has working smoke alarms and has working carbon monoxide detector  Home safety hazards include: none  Nutrition:   Current diet is Regular and Limited junk food  Medications:   Patient is not currently taking any over-the-counter supplements  Patient is not able to manage medications  Activities of Daily Living (ADLs)/Instrumental Activities of Daily Living (IADLs):   Walk and transfer into and out of bed and chair?: Yes  Dress and groom yourself?: Yes    Bathe or shower yourself?: Yes    Feed yourself?  Yes  Do your laundry/housekeeping?: Yes  Manage your money, pay your bills and track your expenses?: Yes  Make your own meals?: Yes    Do your own shopping?: Yes    Previous Hospitalizations:   Any hospitalizations or ED visits within the last 12 months?: No      Advance Care Planning:   Living will: Yes    Durable POA for healthcare: Yes    Advanced directive: Yes    Five wishes given: Yes      Cognitive Screening:   Provider or family/friend/caregiver concerned regarding cognition?: No    PREVENTIVE SCREENINGS      Cardiovascular Screening:    General: History Lipid Disorder, Risks and Benefits Discussed and Screening Current      Diabetes Screening:     General: History Diabetes, Risks and Benefits Discussed and Screening Current      Colorectal Cancer Screening:     General: Screening Current      Prostate Cancer Screening:    General: Risks and Benefits Discussed and Screening Current    Due for: PSA      Osteoporosis Screening:    General: Screening Not Indicated      Abdominal Aortic Aneurysm (AAA) Screening:        General: Screening Not Indicated      Lung Cancer Screening:     General: Screening Not Indicated      Hepatitis C Screening:    General: Screening Current    Screening, Brief Intervention, and Referral to Treatment (SBIRT)    Screening  Typical number of drinks in a day: 0  Typical number of drinks in a week: 0  Interpretation: Low risk drinking behavior  Single Item Drug Screening:  How often have you used an illegal drug (including marijuana) or a prescription medication for non-medical reasons in the past year? never    Single Item Drug Screen Score: 0  Interpretation: Negative screen for possible drug use disorder    Other Counseling Topics:   Car/seat belt/driving safety, skin self-exam, sunscreen and calcium and vitamin D intake and regular weightbearing exercise  No results found  Physical Exam:     /54 (BP Location: Left arm, Patient Position: Sitting, Cuff Size: Large)   Pulse 71   Temp 98 °F (36 7 °C) (Tympanic)   Resp 18   Wt (!) 170 kg (374 lb 6 4 oz)   SpO2 93%   BMI 50 78 kg/m²     Physical Exam  Vitals and nursing note reviewed  Constitutional:       Appearance: He is obese     HENT: Head: Normocephalic and atraumatic  Eyes:      Conjunctiva/sclera: Conjunctivae normal       Pupils: Pupils are equal, round, and reactive to light  Cardiovascular:      Rate and Rhythm: Normal rate and regular rhythm  Pulmonary:      Effort: Pulmonary effort is normal       Breath sounds: Normal breath sounds  No wheezing, rhonchi or rales  Abdominal:      General: Bowel sounds are normal       Palpations: Abdomen is soft  Musculoskeletal:      Cervical back: Neck supple  Comments: Walks with rollator  Has herbert boots on b/l    Lymphadenopathy:      Cervical: No cervical adenopathy  Skin:     General: Skin is warm and dry  Neurological:      General: No focal deficit present  Mental Status: He is alert and oriented to person, place, and time  Psychiatric:         Mood and Affect: Mood normal          Behavior: Behavior normal          Thought Content:  Thought content normal          Judgment: Judgment normal           Saba Hall DO

## 2023-04-25 NOTE — TELEPHONE ENCOUNTER
Upon review of the In Basket request and the patient's chart, initial outreach has been made via fax to facility  Please see Contacts section for details       Thank you  Vannesa Laughlin MA

## 2023-04-25 NOTE — PATIENT INSTRUCTIONS
Type 2 Diabetes Management for Adults   AMBULATORY CARE:   Type 2 diabetes  is a disease that affects how your body uses glucose (sugar)  Either your body cannot make enough insulin, or it cannot use the insulin correctly  It is important to keep diabetes controlled to prevent damage to your heart, blood vessels, and other organs  Management will help you feel well and enjoy your daily activities  Your diabetes care team providers can help you make a plan to fit diabetes care into your schedule  Your plan can change over time to fit your needs and your family's needs  Have someone call your local emergency number (911 in the [address]) if:   • You cannot be woken  • You have signs of diabetic ketoacidosis:     ? confusion, fatigue    ? vomiting    ? rapid heartbeat    ? fruity smelling breath    ? extreme thirst    ? dry mouth and skin    • You have any of the following signs of a heart attack:      ? Squeezing, pressure, or pain in your chest    ? You may  also have any of the following:     - Discomfort or pain in your back, neck, jaw, stomach, or arm    - Shortness of breath    - Nausea or vomiting    - Lightheadedness or a sudden cold sweat    • You have any of the following signs of a stroke:      ? Numbness or drooping on one side of your face     ? Weakness in an arm or leg    ? Confusion or difficulty speaking    ? Dizziness, a severe headache, or vision loss    Call your doctor or diabetes care team provider if:   • You have a sore or wound that will not heal     • You have a change in the amount you urinate  • Your blood sugar levels are higher than your target goals  • You often have lower blood sugar levels than your target goals  • Your skin is red, dry, warm, or swollen  • You have trouble coping with diabetes, or you feel anxious or depressed  • You have questions or concerns about your condition or care      What you need to know about high blood sugar levels:  High blood sugar levels may not cause any symptoms  You may feel more thirsty or urinate more often than usual  Over time, high blood sugar levels can damage your nerves, blood vessels, tissues, and organs  The following can increase your blood sugar levels:  • Large meals or large amounts of carbohydrates at one time    • Less physical activity    • Stress    • Illness    • A lower dose of diabetes medicine or insulin, or a late dose    What you need to know about low blood sugar levels:  Symptoms include feeling shaky, dizzy, irritable, or confused  You can prevent symptoms by keeping your blood sugar levels from going too low  • Treat a low blood sugar level right away:      ? Drink 4 ounces of juice or have 1 tube of glucose gel  ? Check your blood sugar level again 10 to 15 minutes later  ? When the level goes back to normal, eat a meal or snack to prevent another decrease  • Keep glucose gel, raisins, or hard candy with you at all times to treat a low blood sugar level  • Your blood sugar level can get too low if you take diabetes medicine or insulin and do not eat enough food  • If you use insulin, check your blood sugar level before you exercise  ? If your blood sugar level is below 100 mg/dL, eat 4 crackers or 2 ounces of raisins, or drink 4 ounces of juice  ? Check your level every 30 minutes if you exercise longer than 1 hour  ? You may need a snack during or after exercise  What you can do to manage your blood sugar levels:   • Check your blood sugar levels as directed and as needed  Several items are available to use to check your levels  You may need to check by testing a drop of blood in a glucose monitor  You may instead be given a continuous glucose monitoring (CGM) device  The device is worn at all times  The CGM checks your blood sugar level every 5 minutes  It sends results to an electronic device such as a smart phone  A CGM can be used with or without an insulin pump   You and your diabetes care team providers will decide on the best method for you  The goal for blood sugar levels before meals  is between 80 and 130 mg/dL and 2 hours after eating  is lower than 180 mg/dL  • Make healthy food choices  Work with a dietitian to develop a meal plan that works for you and your schedule  A dietitian can help you learn how to eat the right amount of carbohydrates during your meals and snacks  Carbohydrates can raise your blood sugar level if you eat too many at one time  Examples of foods that contain carbohydrates are breads, cereals, rice, pasta, and sweets  • Eat high-fiber foods as directed  Fiber helps improve blood sugar levels  Fiber also lowers your risk for heart disease and other problems diabetes can cause  Examples of high-fiber foods include vegetables, whole-grain bread, and beans such as bradford beans  Your dietitian can tell you how much fiber to have each day  • Get regular physical activity  Physical activity can help you get to your target blood sugar level goal and manage your weight  Get at least 150 minutes of moderate to vigorous aerobic physical activity each week  Do not miss more than 2 days in a row  Do not sit longer than 30 minutes at a time  Your healthcare provider can help you create an activity plan  The plan can include the best activities for you and can help you build your strength and endurance  • Maintain a healthy weight  Ask your team what a healthy weight is for you  A healthy weight can help you control diabetes and prevent heart disease  Ask your team to help you create a weight loss plan, if needed  Weight loss can help make a difference in managing diabetes  Your team will help you set a weight-loss goal, such as 10 to 15 pounds, or 5% of your extra weight  Together you and your team can set manageable weight loss goals  • Take your diabetes medicine or insulin as directed    You may need diabetes medicine, insulin, or both to help control your blood sugar levels  Your healthcare provider will teach you how and when to take your diabetes medicine or insulin  You will also be taught about side effects oral diabetes medicine can cause  Insulin may be injected or given through a pump or pen  You and your providers will decide on the best method for you:    ? An insulin pump  is an implanted device that gives your insulin 24 hours a day  An insulin pump prevents the need for multiple insulin injections in a day  ? An insulin pen  is a device prefilled with the right amount of insulin  ? You and your family members will be taught how to draw up and give insulin  if this is the best method for you  Your providers will also teach you how to dispose of needles and syringes  ? You will learn how much insulin you need  and when to give it  You will be taught when not to give insulin  You will also be taught what to do if your blood sugar level drops too low  This may happen if you take insulin and do not eat the right amount of carbohydrates  More ways to manage type 2 diabetes:   • Wear medical alert identification  Wear medical alert jewelry or carry a card that says you have diabetes  Ask your provider where to get these items  • Do not smoke  Nicotine and other chemicals in cigarettes and cigars can cause lung and blood vessel damage  It also makes it more difficult to manage your diabetes  Ask your provider for information if you currently smoke and need help to quit  Do not use e-cigarettes or smokeless tobacco in place of cigarettes or to help you quit  They still contain nicotine  • Check your feet each day for cuts, scratches, calluses, or other wounds  Look for redness and swelling, and feel for warmth  Wear shoes that fit well  Check your shoes for rocks or other objects that can hurt your feet  Do not walk barefoot or wear shoes without socks   Wear cotton socks to help keep your feet dry  • Ask about vaccines you may need  You have a higher risk for serious illness if you get the flu, pneumonia, COVID-19, or hepatitis  Ask your provider if you should get vaccines to prevent these or other diseases, and when to get the vaccines  • Talk to your provider if you become stressed about diabetes care  Sometimes being able to fit diabetes care into your life can cause increased stress  The stress can cause you not to take care of yourself properly  Your care team providers can help by offering tips about self-care  Your providers may suggest you talk to a mental health provider who can listen and offer help with self-care issues  • Have your A1c checked as directed  Your provider may check your A1c every 3 months, or 2 times each year if your diabetes is controlled  An A1c test shows the average amount of sugar in your blood over the past 2 to 3 months  Your provider will tell you what your A1c level should be  • Have screening tests as directed  Your provider may recommend screening for complications of diabetes and other conditions that may develop  Some screenings may begin right away and some may happen within the first 5 years of diagnosis:    ? Examples of diabetes complications  include kidney problems, high cholesterol, high blood pressure, blood vessel problems, eye problems, and sleep apnea  ? You may be screened for a low vitamin B level  if you take oral diabetes medicine for a long time  ? Women of childbearing years may be screened  for polycystic ovarian syndrome (PCOS)  Follow up with your doctor or diabetes care team providers as directed: You may need to have blood tests done before your follow-up visit  The test results will show if changes need to be made in your treatment or self-care  Talk to your provider if you cannot afford your medicine  Write down your questions so you remember to ask them during your visits    © Copyright [employer] 2022 Information is for End User's use only and may not be sold, redistributed or otherwise used for commercial purposes  The above information is an  only  It is not intended as medical advice for individual conditions or treatments  Talk to your doctor, nurse or pharmacist before following any medical regimen to see if it is safe and effective for you

## 2023-04-25 NOTE — PROGRESS NOTES
Name: Aisha Martinez      : 1965      MRN: [id number]  Encounter Provider: Oneyda Conteh DO  Encounter Date: 2023   Encounter department: [facility]     1  Dyslipidemia associated with type 2 diabetes mellitus ([place] [address] )  Lab Results   Component Value Date    HGBA1C 7 7 (A) 2023   follows with Dr Mingo Shane - Endo  No adjustments made, he does have low BG - CMP is with BG in the 50s  He knows how to tx these  He is on GLP-1/Insulin/on ACEi and Statin  Will have Diab Foot exam with Podiatry - Dr Sanjiv Mallory  Is utd on ACR     - POCT hemoglobin A1c    2  Morbid obesity ([place] [address] )  Discussed the importance of maintaining a healthy lifestyle through heart healthy diet and exercise  3  Acquired hypothyroidism  Will recheck  Has been OK on 350 mcg/day  Taking     - TSH, 3rd generation with Free T4 reflex; Future    4  Type 2 diabetes mellitus with hyperglycemia, with long-term current use of insulin ([facility])    5  Severe depression ([place] [address] )  Stable, does feel down regarding his health  Sees [employer] and feels benefit with this  He enjoys seeing the animals when he is there  No si/hi  Has been hospitalized in the past for this  6  Diabetic ulcer of foot associated with type 2 diabetes mellitus, with other ulcer severity, unspecified laterality, unspecified part of foot ([place] [address] )  Sees Podiatry  7  Essential hypertension  Stable  Risk factor modifications  8  Other seizures [facility])  Seeing Neurology  9  Thrombocytopenia ([place] [address] )  On board with hem/onc  Has Bone Marrow Bx on Thursday  Is also having a bx by interventional Radiology for adenopathy noted on scan  He is worried about these things  Mother had lymphoma  Sister is battling endometrial Ca  10  Chronic kidney disease, stage 3a ([place] [address] )  On board with nephrology  Stable  15  Screening for prostate cancer  Agrees to screen    The natural history of prostate cancer and ongoing controversy regarding screening and potential treatment outcomes of prostate cancer has been discussed with the patient  The meaning of a false positive PSA and a false negative PSA has been discussed  He indicates understanding of the limitations of this screening test and wishes to proceed with screening PSA testing   - PSA, total and free; Future         Chief Complaint   Patient presents with   • Medicare Wellness Visit       Subjective     He is frustrated by living here  He came from [place] and prefers to be there  He and his sister lived together in a mobile home part in [place] [facility] [facility] [place] and they were about to get evicted due to having a dog  He has been in [place] for ~ 4 years  He is sad today about his potential for having cancer - mother diagnosed with lymphoma and passed away of such  His sister is also with Ca -- endometrial   He has 2 friends that are also battling Ca  He is having a Bone Marrow bx on Thursday  He is using public transportation -- his car is no longer in service  He does have an aid for 17 hours/week  She helps him with food  He is able to do his own bathing, etc   Has Edna boot on for compression so is not able to fully shower  Sees Dr Teri Tejeda - neurology  Has adjusted meds  Stopped Ropinirole, Keppra and dec Klonopin  He does have f/u with him  He is following with hematology and is having a bone marrow bx on Thursday with [facility]  He was also noted to have adenopathy on CT scan and he will have a bx of these as well  Dr Yarely Oconnor will do this  He needs to get this scheduled  He is also with enlarged liver and spleen        GI -- had colonoscopy  had further w/u with Dr Puja Garrison today, missed this due to his transportation not showing this AM   He is very frustrated by this  He is with acute weakness in his LUE -- SEE PLAN above  EMG ordered  This did not get scheduled  He is interested in this  Cont to have issues with this arm    He feels week, etc        Patient is on board with cardiology  Lamberto Alamo has pAfib, uncontrolled NEAL, HTN, Chronic Diastolic HF, h/o PE   He is on eliquis and is compliant with regimen  Lamberto Alamo has a high Bob Rang is on statin, low dose ACEi, bid lasix  Lamberto Alamo will cont to see them, intervally   BB stopped due to low HR      CKD -- on board with nephrology -- [address] Nephrology - this is secondary to diabetic nephropathy and benign hypertensive nephrosclerosis, and currently is at stage IIIA, with as baseline sCr of 1 2 to 1 4 mg/dL  He is on ACEi - low dose   Avoid NSAIDs and other nephrotoxic agents        Anemia in chronic kidney disease (Chronic) - iron deficient as well   Per nephrology note, Does not require [place] at this time although since transferrin saturation is low at 16% will require IV Venofer   This is facilitated through Nephrology   Completed these weekly X 4 weeks        DM - Sees Dr Armand Sotelo for this  A1C is better today -- 7 7 today  He does have lows on occasion and he eats sweets and it does improve  He is not having lows, often  He is on mealtime insulin and basal        Hypothyroid - now at goal   Taking 350 mg/day - he is due for repeat        NEAL - he is not wearing his PAP, has trouble with this at home and is always falling asleep  We discussed this is likely playing into his fatigue  Review of Systems   All other systems reviewed and are negative  Current Outpatient Medications on File Prior to Visit   Medication Sig   • acetaminophen (TYLENOL) 650 mg CR tablet Take 1 tablet (650 mg total) by mouth every 8 (eight) hours as needed for mild pain or moderate pain   • albuterol (PROVENTIL HFA,VENTOLIN HFA) 90 mcg/act inhaler Inhale 2 puffs every 6 (six) hours as needed for wheezing   • atorvastatin (LIPITOR) 40 mg tablet TAKE 1 TABLET BY MOUTH ONCE DAILY  • cetirizine (ZyrTEC) 10 mg tablet TAKE 1 TABLET BY MOUTH ONCE DAILY     • citalopram (CeleXA) 40 mg tablet TAKE 1 TABLET BY MOUTH ONCE DAILY  • clonazePAM (KlonoPIN) 0 5 mg tablet Take 0 5 mg by mouth every 12 (twelve) hours as needed   • docusate sodium (COLACE) 100 mg capsule Take 100 mg by mouth 2 (two) times a day   • Easy Touch Pen Needles 31G X 8 MM MISC Use 1 each 3 (three) times a day   • Eliquis 5 MG TAKE 1 TABLET BY MOUTH TWO TIMES A DAY  • ergocalciferol (ERGOCALCIFEROL) 1 25 MG (78341 UT) capsule Take 1 capsule (50,000 Units total) by mouth once a week   • ferrous sulfate 325 (65 Fe) mg tablet Take 325 mg by mouth 2 (two) times a day with meals   • folic acid (FOLVITE) 1 mg tablet Take 1 mg by mouth daily   • furosemide (LASIX) 40 mg tablet Take 40 mg by mouth daily   • gabapentin (NEURONTIN) 600 MG tablet Take 600 mg by mouth 2 (two) times a day   • glucose blood (OneTouch Verio) test strip Check blood sugars four times daily  Please substitute with appropriate alternative as covered by patient's insurance  Dx: E11 65   • insulin aspart (NovoLOG FlexPen) 100 UNIT/ML injection pen Inject 26 Units under the skin 3 (three) times a day with meals Per sliding scale - managed by Dr Avery Cuevas + scheduled   • lamoTRIgine (LaMICtal) 25 mg tablet Take 1 tablet (25 mg total) by mouth in the morning and 1 tablet (25 mg total) in the evening  • Lantus SoloStar 100 units/mL SOPN INJECT 56 UNITS TOTAL UNDER THE SKIN DAILY   • latanoprost (XALATAN) 0 005 % ophthalmic solution Administer 0 005 drops to both eyes   • levothyroxine 300 MCG tablet Take 1 tablet (300 mcg total) by mouth daily Take with 50 mcg tablet   • levothyroxine 50 mcg tablet TAKE 1 TAB DAILY IN EARLY MORNING ALONG WITH 300MCG TAB   • Linzess 290 MCG CAPS TAKE 1 CAPSULE BY MOUTH ONCE DAILY  • magnesium Oxide (MAG-OX) 400 mg TABS TAKE 1 TABLET BY MOUTH TWO TIMES A DAY  • OneTouch Delica Lancets 11Y MISC Check blood sugars four times daily  Please substitute with appropriate alternative as covered by patient's insurance   Dx: E11 65   • pantoprazole (PROTONIX) 40 mg tablet TAKE 1 TABLET BY MOUTH ONCE DAILY  • potassium chloride (K-DUR,KLOR-CON) 20 mEq tablet TAKE 1 TABLET BY MOUTH EACH MORNING  • Probiotic Product (Probiotic Daily) CAPS Take by mouth   • Semaglutide, 1 MG/DOSE, (Ozempic, 1 MG/DOSE,) 2 MG/1 5ML SOPN Inject 1 mg under the skin once a week   • senna (SENOKOT) 8 6 MG tablet Take 1 tablet by mouth 2 (two) times a day   • tamsulosin (FLOMAX) 0 4 mg TAKE 1 CAP DAILY WITH DINNER   • timolol (TIMOPTIC) 0 5 % ophthalmic solution Administer 1 drop to the right eye 2 (two) times a day   • clobetasol propionate (Clobex) 0 05 % shampoo Apply 1 application topically 3 (three) times a week   • ketoconazole (NIZORAL) 2 % shampoo Apply 1 application topically 2 (two) times a week for 8 doses   • lisinopril (ZESTRIL) 2 5 mg tablet TAKE 1 TABLET BY MOUTH ONCE DAILY  • [DISCONTINUED] benzonatate (TESSALON) 200 MG capsule Take 1 capsule (200 mg total) by mouth 3 (three) times a day as needed for cough (Patient not taking: Reported on 2/9/2023)   • [DISCONTINUED] hydrocortisone 2 5 % cream Apply 1 application topically 3 (three) times a day as needed (Patient not taking: Reported on 12/21/2022)       Objective     /54 (BP Location: Left arm, Patient Position: Sitting, Cuff Size: Large)   Pulse 71   Temp 98 °F (36 7 °C) (Tympanic)   Resp 18   Wt (!) 170 kg (374 lb 6 4 oz)   SpO2 93%   BMI 50 78 kg/m²     Physical Exam  Vitals and nursing note reviewed  Constitutional:       General: He is not in acute distress  Appearance: He is obese  HENT:      Head: Normocephalic and atraumatic  Mouth/Throat:      Comments: Few teeth    Eyes:      Conjunctiva/sclera: Conjunctivae normal       Pupils: Pupils are equal, round, and reactive to light  Neck:      Comments: Short, thick neck    Cardiovascular:      Rate and Rhythm: Normal rate and regular rhythm  Pulmonary:      Effort: Pulmonary effort is normal       Breath sounds: Normal breath sounds   No wheezing, rhonchi or rales  Abdominal:      General: Bowel sounds are normal       Palpations: Abdomen is soft  Comments: Obese     Musculoskeletal:      Cervical back: Neck supple  Comments: Uses a rollator     Lymphadenopathy:      Cervical: No cervical adenopathy  Skin:     General: Skin is warm and dry  Comments: [facility]na Francis b/l -- followed closely by Dr Mak Thorpe    Neurological:      General: No focal deficit present  Mental Status: He is alert and oriented to person, place, and time  Psychiatric:         Mood and Affect: Mood normal          Behavior: Behavior normal          Thought Content:  Thought content normal          Judgment: Judgment normal        Saba Cruz, DO

## 2023-04-27 ENCOUNTER — PATIENT OUTREACH (OUTPATIENT)
Dept: FAMILY MEDICINE CLINIC | Facility: CLINIC | Age: 58
End: 2023-04-27

## 2023-04-27 NOTE — PROGRESS NOTES
CARLOS EDUARDO MCKEE reviewed chart  Pt was referred by PCP d/t financial difficulties and need for food resources  Pt has two medicare plans listed in chart dashboard, but has cards for [employer] and [employer] as most recent cards scanned into his chart  CARLOS EDUARDO MCKEE outreached pt to introduce self and offer assistance  Pt clarified that he has [employer] and [place]  Pt stated that his SNAP benefits reduced from $274/month to $23/month  Pt receives SSDI $1,600/month  Pt pays $150 for Medicare and pays $150 for an overpayment from 2018  Pt is on housing and rents the home he lives in  Pt utilizes his local food pantry  He stated that their services are being cut back d/t not receiving as many donations as they need  Pt has an aide 17 hours a week through the waiver program  His  is Kerrie Mcardle with [employer] (phone: [phone])  He has visiting nurse to provide wound care  He is not sure what agency his nurse is from  Pt stated that he only qualifies for medical transport  He can take the [facility] bus to [place] or [facility] and have three bags  CARLOS EDUARDO MCKEE explained that his aide may be able to help him with grocery shopping  Pt stated that this is not possible for various reasons  Aide is with him on MWF  Pt said maybe he can ask his aide to go shopping for him  Pt has a  with [address] [place] [address]  He stated Alexandru Vergara is helpful  Pt stated it would be helpful if CARLOS EDUARDO MCKEE called Kerrie Mcardle to discuss his situation  CARLOS EDUARDO MCKEE will do that and then f/u with pt next week

## 2023-05-01 NOTE — TELEPHONE ENCOUNTER
As a follow-up, a second attempt has been made for outreach via fax to facility  Please see Contacts section for details      Thank you  Angie Cardona MA

## 2023-05-04 ENCOUNTER — OFFICE VISIT (OUTPATIENT)
Dept: FAMILY MEDICINE CLINIC | Facility: CLINIC | Age: 58
End: 2023-05-04

## 2023-05-04 VITALS
RESPIRATION RATE: 16 BRPM | HEART RATE: 67 BPM | OXYGEN SATURATION: 96 % | TEMPERATURE: 98.2 F | DIASTOLIC BLOOD PRESSURE: 62 MMHG | SYSTOLIC BLOOD PRESSURE: 131 MMHG

## 2023-05-04 DIAGNOSIS — H40.9 GLAUCOMA OF BOTH EYES, UNSPECIFIED GLAUCOMA TYPE: Primary | ICD-10-CM

## 2023-05-04 DIAGNOSIS — H01.025 SQUAMOUS BLEPHARITIS OF LEFT LOWER EYELID: ICD-10-CM

## 2023-05-04 RX ORDER — POLYMYXIN B SULFATE AND TRIMETHOPRIM 1; 10000 MG/ML; [USP'U]/ML
1 SOLUTION OPHTHALMIC EVERY 6 HOURS
Qty: 10 ML | Refills: 0 | Status: SHIPPED | OUTPATIENT
Start: 2023-05-04 | End: 2023-05-09

## 2023-05-04 NOTE — PROGRESS NOTES
Name: Leigh Bailey      : 1965      MRN: [id number]  Encounter Provider: Paloma Pham DO  Encounter Date: 2023   Encounter department: [address] [place] [address]     1  Glaucoma of both eyes, unspecified glaucoma type      Sight MD - has gtt       2  Squamous blepharitis of left lower eyelid  polymyxin b-trimethoprim (POLYTRIM) ophthalmic solution        Warm compresses and gtt  He cont to follow with ophthalmology  He can see me as scheduled or sooner prn  Patient/Caretaker verbalized understanding and were in agreement with today's assessment and plan  Time was taken to address any questions patient/caretaker had  Indication/Risks/Benefits of medication(s) as prescribed were discussed with the patient/caretaker  The patient verbalized understanding and agreement and elects to take medications as prescribed  Time was taken to answer any questions the patient/caretaker may have had  Chief Complaint   Patient presents with    Conjunctivitis       Subjective      L eye pain and itchiness with swelling on the lower lid since Tuesday  There is some matting and sticking together as well  There was some drainage as well  No major changes in his vision with this  There is some twitching with this as well  He is followed in [address] [place] [address] for glaucoma  No acute changes in his vision to report with this  He is on gtt  Does not miss doses  Review of Systems   All other systems reviewed and are negative  Current Outpatient Medications on File Prior to Visit   Medication Sig    acetaminophen (TYLENOL) 650 mg CR tablet Take 1 tablet (650 mg total) by mouth every 8 (eight) hours as needed for mild pain or moderate pain    albuterol (PROVENTIL HFA,VENTOLIN HFA) 90 mcg/act inhaler Inhale 2 puffs every 6 (six) hours as needed for wheezing    atorvastatin (LIPITOR) 40 mg tablet TAKE 1 TABLET BY MOUTH ONCE DAILY      cetirizine (ZyrTEC) 10 mg tablet TAKE 1 TABLET BY MOUTH ONCE DAILY   citalopram (CeleXA) 40 mg tablet TAKE 1 TABLET BY MOUTH ONCE DAILY   clonazePAM (KlonoPIN) 0 5 mg tablet Take 0 5 mg by mouth every 12 (twelve) hours as needed    docusate sodium (COLACE) 100 mg capsule Take 100 mg by mouth 2 (two) times a day    Easy Touch Pen Needles 31G X 8 MM MISC Use 1 each 3 (three) times a day    Eliquis 5 MG TAKE 1 TABLET BY MOUTH TWO TIMES A DAY   ergocalciferol (ERGOCALCIFEROL) 1 25 MG (34560 UT) capsule Take 1 capsule (50,000 Units total) by mouth once a week    ferrous sulfate 325 (65 Fe) mg tablet Take 325 mg by mouth 2 (two) times a day with meals    folic acid (FOLVITE) 1 mg tablet Take 1 mg by mouth daily    furosemide (LASIX) 40 mg tablet Take 40 mg by mouth daily    gabapentin (NEURONTIN) 600 MG tablet Take 600 mg by mouth 2 (two) times a day    glucose blood (OneTouch Verio) test strip Check blood sugars four times daily  Please substitute with appropriate alternative as covered by patient's insurance  Dx: E11 65    insulin aspart (NovoLOG FlexPen) 100 UNIT/ML injection pen Inject 26 Units under the skin 3 (three) times a day with meals Per sliding scale - managed by Dr Claudene Maris + scheduled    lamoTRIgine (LaMICtal) 25 mg tablet Take 1 tablet (25 mg total) by mouth in the morning and 1 tablet (25 mg total) in the evening   Lantus SoloStar 100 units/mL SOPN INJECT 56 UNITS TOTAL UNDER THE SKIN DAILY    latanoprost (XALATAN) 0 005 % ophthalmic solution Administer 0 005 drops to both eyes    levothyroxine 300 MCG tablet Take 1 tablet (300 mcg total) by mouth daily Take with 50 mcg tablet    levothyroxine 50 mcg tablet TAKE 1 TAB DAILY IN EARLY MORNING ALONG WITH 300MCG TAB    Linzess 290 MCG CAPS TAKE 1 CAPSULE BY MOUTH ONCE DAILY   lisinopril (ZESTRIL) 2 5 mg tablet TAKE 1 TABLET BY MOUTH ONCE DAILY   magnesium Oxide (MAG-OX) 400 mg TABS TAKE 1 TABLET BY MOUTH TWO TIMES A DAY     [address] Lancets 33G MISC Check blood sugars four times daily  Please substitute with appropriate alternative as covered by patient's insurance  Dx: E11 65    pantoprazole (PROTONIX) 40 mg tablet TAKE 1 TABLET BY MOUTH ONCE DAILY   potassium chloride (K-DUR,KLOR-CON) 20 mEq tablet TAKE 1 TABLET BY MOUTH EACH MORNING   Probiotic Product (Probiotic Daily) CAPS Take by mouth    Semaglutide, 1 MG/DOSE, (Ozempic, 1 MG/DOSE,) 2 MG/1 5ML SOPN Inject 1 mg under the skin once a week    senna (SENOKOT) 8 6 MG tablet Take 1 tablet by mouth 2 (two) times a day    tamsulosin (FLOMAX) 0 4 mg TAKE 1 CAP DAILY WITH DINNER    timolol (TIMOPTIC) 0 5 % ophthalmic solution Administer 1 drop to the right eye 2 (two) times a day    clobetasol propionate (Clobex) 0 05 % shampoo Apply 1 application topically 3 (three) times a week    ketoconazole (NIZORAL) 2 % shampoo Apply 1 application topically 2 (two) times a week for 8 doses       Objective     /62 (BP Location: Right arm, Patient Position: Sitting, Cuff Size: Large)   Pulse 67   Temp 98 2 °F (36 8 °C) (Tympanic)   Resp 16   SpO2 96%     Physical Exam  Vitals and nursing note reviewed  Constitutional:       Appearance: Normal appearance  He is obese  HENT:      Head: Normocephalic and atraumatic  Mouth/Throat:      Mouth: Mucous membranes are moist    Eyes:      Comments: There is redness and drainage of the L lower eyelid  There is some pain with exam   EOMI  No vision changes  Mild edema to lower lid  Scleral wnl, no redness/injection  Cardiovascular:      Rate and Rhythm: Normal rate and regular rhythm  Pulmonary:      Effort: Pulmonary effort is normal       Breath sounds: No wheezing, rhonchi or rales  Musculoskeletal:      Cervical back: Neck supple  Skin:     General: Skin is warm  Comments: Compression wraps on  Neurological:      General: No focal deficit present        Mental Status: He is alert and oriented to person, place, and time    Psychiatric:         Mood and Affect: Mood normal          Behavior: Behavior normal          Thought Content:  Thought content normal          Judgment: Judgment normal        Saba Dove Click, DO

## 2023-05-05 NOTE — TELEPHONE ENCOUNTER
As a final attempt, a third outreach has been made via fax to facility  Please see Contacts section for details  This encounter will be closed and completed by end of day  Should we receive the requested information because of previous outreach attempts, the requested patient's chart will be updated appropriately       Thank you  Yaron Sands MA

## 2023-05-08 ENCOUNTER — DOCUMENTATION (OUTPATIENT)
Dept: BEHAVIORAL/MENTAL HEALTH CLINIC | Facility: CLINIC | Age: 58
End: 2023-05-08

## 2023-05-08 NOTE — PROGRESS NOTES
Client cancelled appointment due to doctors appointment at Nephrology  Future appointment will be rescheduled

## 2023-05-09 NOTE — TELEPHONE ENCOUNTER
Upon review of the In Basket request we have found that the patient has not yet had the requested item completed or has not established care  Due to protocols, we are unable to hold requests for resulting/linking of a future items and are unable to proceed  Patients who have not established care within the Network do not have consents on file, record requests, etc     Any additional questions or concerns should be emailed to the Practice Liaisons via the appropriate education email address, please do not reply via In Basket      Thank you  Garth Rodríguez MA

## 2023-05-30 DIAGNOSIS — K58.9 IRRITABLE BOWEL SYNDROME, UNSPECIFIED TYPE: ICD-10-CM

## 2023-05-31 RX ORDER — LINACLOTIDE 290 UG/1
CAPSULE, GELATIN COATED ORAL
Qty: 90 CAPSULE | Refills: 0 | Status: SHIPPED | OUTPATIENT
Start: 2023-05-31

## 2023-06-05 DIAGNOSIS — E53.8 FOLATE DEFICIENCY: Primary | ICD-10-CM

## 2023-06-05 DIAGNOSIS — I10 ESSENTIAL HYPERTENSION: ICD-10-CM

## 2023-06-05 RX ORDER — FOLIC ACID 1 MG/1
1 TABLET ORAL DAILY
Qty: 90 TABLET | Refills: 1 | Status: SHIPPED | OUTPATIENT
Start: 2023-06-05 | End: 2024-06-04

## 2023-06-05 RX ORDER — FUROSEMIDE 40 MG/1
40 TABLET ORAL DAILY
Qty: 90 TABLET | Refills: 0 | Status: SHIPPED | OUTPATIENT
Start: 2023-06-05

## 2023-06-06 ENCOUNTER — TELEPHONE (OUTPATIENT)
Dept: FAMILY MEDICINE CLINIC | Facility: CLINIC | Age: 58
End: 2023-06-06

## 2023-06-06 DIAGNOSIS — E11.65 TYPE 2 DIABETES MELLITUS WITH HYPERGLYCEMIA, WITH LONG-TERM CURRENT USE OF INSULIN (HCC): ICD-10-CM

## 2023-06-06 DIAGNOSIS — Z79.4 TYPE 2 DIABETES MELLITUS WITH HYPERGLYCEMIA, WITH LONG-TERM CURRENT USE OF INSULIN (HCC): ICD-10-CM

## 2023-06-06 RX ORDER — BLOOD-GLUCOSE METER
KIT MISCELLANEOUS
Qty: 1 KIT | Refills: 0 | Status: SHIPPED | OUTPATIENT
Start: 2023-06-06

## 2023-06-06 RX ORDER — LANCETS 33 GAUGE
EACH MISCELLANEOUS
Qty: 400 EACH | Refills: 3 | Status: SHIPPED | OUTPATIENT
Start: 2023-06-06

## 2023-06-06 RX ORDER — BLOOD SUGAR DIAGNOSTIC
STRIP MISCELLANEOUS
Qty: 400 EACH | Refills: 3 | Status: SHIPPED | OUTPATIENT
Start: 2023-06-06

## 2023-06-06 NOTE — TELEPHONE ENCOUNTER
My name is Robbi Hughes  My phone number is [phone]  I'm calling because I need a prescription for refills for my new blood meter that I had that I have  I called [facility] [address] office but I haven't got been able to get ahold of him and I need these new test strips from [facility] drugs they'll no longer cover  The insurance will no longer pay for in the old meter to be recently filed for those test strips  I have to have refills for this new meter that I have  It's a [place], the one [facility] [employer]  Please call me back to talk about this  Thank you

## 2023-06-06 NOTE — TELEPHONE ENCOUNTER
I sent the One Touch Verio Reflect device and all supplies  Please verify this is correct because his message did not make sense  TY    Claude Barreto, DO

## 2023-06-08 ENCOUNTER — DOCTOR'S OFFICE (OUTPATIENT)
Dept: URBAN - NONMETROPOLITAN AREA CLINIC 1 | Facility: CLINIC | Age: 58
Setting detail: OPHTHALMOLOGY
End: 2023-06-08
Payer: MEDICARE

## 2023-06-08 ENCOUNTER — RX ONLY (RX ONLY)
Age: 58
End: 2023-06-08

## 2023-06-08 DIAGNOSIS — E11.3293: ICD-10-CM

## 2023-06-08 DIAGNOSIS — H43.813: ICD-10-CM

## 2023-06-08 DIAGNOSIS — H40.1132: ICD-10-CM

## 2023-06-08 DIAGNOSIS — H53.16: ICD-10-CM

## 2023-06-08 DIAGNOSIS — H43.393: ICD-10-CM

## 2023-06-08 PROBLEM — H10.503 BLEPHAROCONJUNCTIVITS NOS; BOTH EYES: Status: ACTIVE | Noted: 2023-06-08

## 2023-06-08 PROCEDURE — 92134 CPTRZ OPH DX IMG PST SGM RTA: CPT | Performed by: OPHTHALMOLOGY

## 2023-06-08 PROCEDURE — 99214 OFFICE O/P EST MOD 30 MIN: CPT | Performed by: OPHTHALMOLOGY

## 2023-06-08 PROCEDURE — 92202 OPSCPY EXTND ON/MAC DRAW: CPT | Performed by: OPHTHALMOLOGY

## 2023-06-08 ASSESSMENT — REFRACTION_CURRENTRX
OD_AXIS: 020
OD_VPRISM_DIRECTION: BF
OD_ADD: +2.50
OS_VPRISM_DIRECTION: BF
OD_SPHERE: +0.25
OS_SPHERE: +0.75
OS_OVR_VA: 20/
OS_CYLINDER: -0.25
OS_ADD: +2.50
OS_OVR_VA: 20/
OS_SPHERE: +0.75
OS_VPRISM_DIRECTION: BF
OD_OVR_VA: 20/
OD_VPRISM_DIRECTION: BF
OD_CYLINDER: -0.50
OD_ADD: +2.50
OD_OVR_VA: 20/
OD_SPHERE: +0.25
OD_CYLINDER: -0.50
OS_AXIS: 025
OS_AXIS: 026
OS_ADD: +2.50
OD_AXIS: 020
OS_CYLINDER: -0.25

## 2023-06-08 ASSESSMENT — CONFRONTATIONAL VISUAL FIELD TEST (CVF)
OS_FINDINGS: FULL
OD_FINDINGS: FULL

## 2023-06-08 ASSESSMENT — REFRACTION_MANIFEST
OS_AXIS: 025
OS_VA1: 20/40+2
OD_CYLINDER: -0.50
OS_CYLINDER: -0.25
OS_VA2: 20/30-2
OS_ADD: +2.50
OD_SPHERE: +0.50
OD_ADD: +2.50
OD_VA1: 20/40+2
OD_VA2: 20/30-2
OD_AXIS: 020
OS_SPHERE: +0.75

## 2023-06-08 ASSESSMENT — KERATOMETRY
OD_K2POWER_DIOPTERS: 45.25
OS_AXISANGLE_DEGREES: 012
OD_AXISANGLE_DEGREES: 013
OS_K1POWER_DIOPTERS: 45.00
OD_K1POWER_DIOPTERS: 46.75
OS_K2POWER_DIOPTERS: 45.50

## 2023-06-08 ASSESSMENT — REFRACTION_AUTOREFRACTION
OS_SPHERE: +0.50
OD_CYLINDER: -1.00
OD_AXIS: 12
OS_CYLINDER: -0.50
OS_AXIS: 177
OD_SPHERE: +0.50

## 2023-06-08 ASSESSMENT — VISUAL ACUITY
OD_BCVA: 20/30-1
OS_BCVA: 20/25-2

## 2023-06-08 ASSESSMENT — LID EXAM ASSESSMENTS
OS_BLEPHARITIS: 1+
OD_BLEPHARITIS: 1+

## 2023-06-08 ASSESSMENT — SPHEQUIV_DERIVED
OD_SPHEQUIV: 0
OS_SPHEQUIV: 0.25
OD_SPHEQUIV: 0.25
OS_SPHEQUIV: 0.625

## 2023-06-08 ASSESSMENT — AXIALLENGTH_DERIVED
OD_AL: 22.6182
OS_AL: 22.8742
OS_AL: 22.7377
OD_AL: 22.708

## 2023-06-14 NOTE — PSYCH
"Behavioral Health Psychotherapy Progress Note    Psychotherapy Provided: Individual Psychotherapy     1  Schizoaffective disorder, bipolar type ([address] )            Goals addressed in session: Goal 1     DATA: The client reported continued health issues which has caused increased in his anxiety and depression  \" my health issue is causing problems  \" During the session we created the client's recovery treatment plan conducting  screenings on his  depression and anxiety using the screening PHQ-9 and CESAR-7  Coping skills were reviewed during the session  During this session, this clinician used the following therapeutic modalities: Cognitive Behavioral Therapy and Supportive Psychotherapy    Substance Abuse was not addressed during this session  If the client is diagnosed with a co-occurring substance use disorder, please indicate any changes in the frequency or amount of use: n/a  Stage of change for addressing substance use diagnoses: No substance use/Not applicable    ASSESSMENT:  Karenann Nyhan presents with a Anxious and Depressed mood  his affect is Normal range and intensity, which is congruent, with his mood and the content of the session  The client has made progress on their goals  The client  Karenann Nyhan presents with a none risk of suicide, none risk of self-harm, and none risk of harm to others  For any risk assessment that surpasses a \"low\" rating, a safety plan must be developed  A safety plan was indicated: no  If yes, describe in detail n/a    PLAN: Between sessions, Karenann Nyhan will practice his coping skills when presented with anxiety and or depression  At the next session, the therapist will use Cognitive Behavioral Therapy, Mindfulness-based Strategies and Supportive Psychotherapy to address to address the client's mh weaknesses that is effecting his different relationships and environments       Behavioral Health Treatment Plan and Discharge Planning: Karenann Nyhan " is aware of and agrees to continue to work on their treatment plan  They have identified and are working toward their discharge goals   yes    Visit start and stop times:    06/15/23  Start Time: 0935  Stop Time: 1019  Total Visit Time: 44 minutes

## 2023-06-14 NOTE — PSYCH
"[address]  1965     Date of Initial Psychotherapy Assessment:  09/22/22  Date of Current Treatment Plan: 06/15/23  Treatment Plan Target Date: 06/15/24  Treatment Plan Expiration Date: 12/15/23    Diagnosis:   1  Schizoaffective disorder, bipolar type ([facility] [address] )            Area(s) of Need: The Client was able to verbalize that he would like to address his depression, and anxiety that is effecting his different relationships and environments  It is hoped that by addressing his weaknesses the Client will achieve or maintain maximum functional capacity in performing daily activizes, taking into account both the functional capacity of the individual and those functional capacities appropriate for the individuals of the same age  Reduce or ameliorate the physical mental, behavioral, or developmental effects of an illness, condition, injury or disability  Present treatment plan will cover 6 months or completion of recovery treatment plan goals whichever comes first      Long Term Goal 1 (in the client's own words): \" So I don't get depressed no more\"     Stage of Change: Action    Target Date for completion:  06/15/24       Anticipated therapeutic modalities: Supportive Therapy, Strengths Therapy,  and Cognitive Behavioral  Therapy will be  the treatment modalities utilized during the session  People identified to complete this goal: The Client his support team and this therapist      Objective 1: (identify the means of measuring success in meeting the objective): The Client's depression score on his PHQ-9 will decrease from 17 to 8 or less ( score decreased  to 12 on 6/15/23)         Objective 2: (identify the means of measuring success in meeting the objective):  The Client will identify 4 triggers to his depression  (emerging-reviewed with the Client on 06/15/23)         Long Term Goal 2 (in the client's own words): \" My anxiety doesn't get bad\" " "    Stage of Change: Action    Target Date for completion:  06/15/24       Anticipated therapeutic modalities: Supportive Therapy, Strengths Therapy,  and Cognitive Behavioral  Therapy will be  the treatment modalities utilized during the session  People identified to complete this goal: The Client his support team and this therapist        Objective 1: (identify the means of measuring success in meeting the objective): The Client's anxiety score on his CESAR-7 will decrease from 12 to 6 or less  (score increased by 3 points over original screening 06/15/2023 due to family health issues)          Objective 2: (identify the means of measuring success in meeting the objective): The Client will learn 4 coping skills to his anxiety   (Emerging-Reviewed with the Client on 06/15/23)        Long Term Goal 3 (in the client's own words): \" working on my attending medical appointments  Stage of Change: Action    Target Date for completion: 06/15/24     Anticipated therapeutic modalities: Supportive Therapy, Strengths Therapy,  and Cognitive Behavioral  Therapy will be  the treatment modalities utilized during the session  People identified to complete this goal: The Client his support team and this therapist       Objective 1: (identify the means of measuring success in meeting the objective): The Client will attend all of his appointments 100% of time          I am currently under the care of a [address] psychiatric provider: yes [address] Therapist  Sorin Valenzuela MSANI LCSW at [place] [address];  For his psychiatric medication the client receives his medication through [facility]     My [facility]'s psychiatric provider is: for Yumiko Cohen MSANI LCSW at [place] [address]: [facility]     I am currently taking psychiatric medications: Yes, as prescribed    I feel that I will be ready for discharge from mental health care when I reach " "the following (measurable goal/objective): \" I don't know\"     For children and adults who have a legal guardian:   Has there been any change to custody orders and/or guardianship status? NA  If yes, attach updated documentation  I have created my Crisis Plan and have been offered a copy of this plan    [address]: Diagnosis and Treatment Plan explained to Dandy Jeffries acknowledges an understanding of their diagnosis  Stevie Farooq agrees to this treatment plan      I have been offered a copy of this Treatment Plan  yes        "

## 2023-06-15 ENCOUNTER — SOCIAL WORK (OUTPATIENT)
Dept: BEHAVIORAL/MENTAL HEALTH CLINIC | Facility: CLINIC | Age: 58
End: 2023-06-15
Payer: COMMERCIAL

## 2023-06-15 DIAGNOSIS — F25.0 SCHIZOAFFECTIVE DISORDER, BIPOLAR TYPE (HCC): Primary | ICD-10-CM

## 2023-06-15 PROCEDURE — 90834 PSYTX W PT 45 MINUTES: CPT | Performed by: SOCIAL WORKER

## 2023-06-15 NOTE — PSYCH
Treatment Plan Tracking    3rd Treatment Plan not completed within required time limits due to: client cancelled and no showed for appointments

## 2023-06-27 DIAGNOSIS — I48.91 ATRIAL FIBRILLATION, UNSPECIFIED TYPE (HCC): ICD-10-CM

## 2023-06-27 RX ORDER — APIXABAN 5 MG/1
TABLET, FILM COATED ORAL
Qty: 180 TABLET | Refills: 0 | Status: SHIPPED | OUTPATIENT
Start: 2023-06-27

## 2023-06-30 DIAGNOSIS — I87.332 CHRONIC VENOUS HYPERTENSION (IDIOPATHIC) WITH ULCER AND INFLAMMATION OF LEFT LOWER EXTREMITY (HCC): ICD-10-CM

## 2023-06-30 DIAGNOSIS — L97.508 DIABETIC ULCER OF FOOT ASSOCIATED WITH TYPE 2 DIABETES MELLITUS, WITH OTHER ULCER SEVERITY, UNSPECIFIED LATERALITY, UNSPECIFIED PART OF FOOT (HCC): ICD-10-CM

## 2023-06-30 DIAGNOSIS — N40.1 BENIGN PROSTATIC HYPERPLASIA WITH URINARY HESITANCY: ICD-10-CM

## 2023-06-30 DIAGNOSIS — I48.91 ATRIAL FIBRILLATION, UNSPECIFIED TYPE (HCC): ICD-10-CM

## 2023-06-30 DIAGNOSIS — M86.9 OSTEOMYELITIS, UNSPECIFIED SITE, UNSPECIFIED TYPE (HCC): ICD-10-CM

## 2023-06-30 DIAGNOSIS — J44.1 COPD WITH ACUTE EXACERBATION (HCC): ICD-10-CM

## 2023-06-30 DIAGNOSIS — I10 ESSENTIAL HYPERTENSION: ICD-10-CM

## 2023-06-30 DIAGNOSIS — R39.11 BENIGN PROSTATIC HYPERPLASIA WITH URINARY HESITANCY: ICD-10-CM

## 2023-06-30 DIAGNOSIS — L97.929 CHRONIC VENOUS HYPERTENSION (IDIOPATHIC) WITH ULCER AND INFLAMMATION OF LEFT LOWER EXTREMITY (HCC): ICD-10-CM

## 2023-06-30 DIAGNOSIS — E66.01 MORBID OBESITY (HCC): ICD-10-CM

## 2023-06-30 DIAGNOSIS — I50.9 CONGESTIVE HEART FAILURE, UNSPECIFIED HF CHRONICITY, UNSPECIFIED HEART FAILURE TYPE (HCC): ICD-10-CM

## 2023-06-30 DIAGNOSIS — E11.621 DIABETIC ULCER OF FOOT ASSOCIATED WITH TYPE 2 DIABETES MELLITUS, WITH OTHER ULCER SEVERITY, UNSPECIFIED LATERALITY, UNSPECIFIED PART OF FOOT (HCC): ICD-10-CM

## 2023-06-30 DIAGNOSIS — Z86.79 HISTORY OF HEART FAILURE: ICD-10-CM

## 2023-06-30 RX ORDER — TAMSULOSIN HYDROCHLORIDE 0.4 MG/1
CAPSULE ORAL
Qty: 90 CAPSULE | Refills: 0 | Status: SHIPPED | OUTPATIENT
Start: 2023-06-30

## 2023-06-30 RX ORDER — ATORVASTATIN CALCIUM 40 MG/1
TABLET, FILM COATED ORAL
Qty: 90 TABLET | Refills: 0 | Status: SHIPPED | OUTPATIENT
Start: 2023-06-30

## 2023-06-30 RX ORDER — LISINOPRIL 2.5 MG/1
TABLET ORAL
Qty: 90 TABLET | Refills: 0 | Status: SHIPPED | OUTPATIENT
Start: 2023-06-30

## 2023-07-12 DIAGNOSIS — Z79.4 TYPE 2 DIABETES MELLITUS WITH HYPERGLYCEMIA, WITH LONG-TERM CURRENT USE OF INSULIN (HCC): ICD-10-CM

## 2023-07-12 DIAGNOSIS — E11.65 TYPE 2 DIABETES MELLITUS WITH HYPERGLYCEMIA, WITH LONG-TERM CURRENT USE OF INSULIN (HCC): ICD-10-CM

## 2023-07-12 RX ORDER — INSULIN GLARGINE 100 [IU]/ML
INJECTION, SOLUTION SUBCUTANEOUS
Qty: 15 ML | Refills: 0 | Status: SHIPPED | OUTPATIENT
Start: 2023-07-12

## 2023-07-14 DIAGNOSIS — T78.40XS ALLERGY, SEQUELA: ICD-10-CM

## 2023-07-14 RX ORDER — CETIRIZINE HYDROCHLORIDE 10 MG/1
TABLET ORAL
Qty: 90 TABLET | Refills: 0 | Status: SHIPPED | OUTPATIENT
Start: 2023-07-14

## 2023-07-17 ENCOUNTER — TELEPHONE (OUTPATIENT)
Dept: FAMILY MEDICINE CLINIC | Facility: CLINIC | Age: 58
End: 2023-07-17

## 2023-07-17 DIAGNOSIS — L03.119 CELLULITIS OF LOWER EXTREMITY, UNSPECIFIED LATERALITY: Primary | ICD-10-CM

## 2023-07-17 RX ORDER — DOXYCYCLINE HYCLATE 100 MG/1
100 CAPSULE ORAL EVERY 12 HOURS SCHEDULED
Qty: 14 CAPSULE | Refills: 0 | Status: SHIPPED | OUTPATIENT
Start: 2023-07-17 | End: 2023-07-24

## 2023-07-25 ENCOUNTER — OFFICE VISIT (OUTPATIENT)
Dept: FAMILY MEDICINE CLINIC | Facility: CLINIC | Age: 58
End: 2023-07-25
Payer: COMMERCIAL

## 2023-07-25 ENCOUNTER — TELEPHONE (OUTPATIENT)
Dept: ADMINISTRATIVE | Facility: OTHER | Age: 58
End: 2023-07-25

## 2023-07-25 VITALS
OXYGEN SATURATION: 96 % | TEMPERATURE: 97.4 F | HEART RATE: 70 BPM | RESPIRATION RATE: 18 BRPM | WEIGHT: 315 LBS | SYSTOLIC BLOOD PRESSURE: 126 MMHG | DIASTOLIC BLOOD PRESSURE: 60 MMHG | BODY MASS INDEX: 50.24 KG/M2

## 2023-07-25 DIAGNOSIS — E11.65 TYPE 2 DIABETES MELLITUS WITH HYPERGLYCEMIA, WITH LONG-TERM CURRENT USE OF INSULIN (HCC): ICD-10-CM

## 2023-07-25 DIAGNOSIS — N18.31 CHRONIC KIDNEY DISEASE, STAGE 3A (HCC): ICD-10-CM

## 2023-07-25 DIAGNOSIS — Z79.4 TYPE 2 DIABETES MELLITUS WITH HYPERGLYCEMIA, WITH LONG-TERM CURRENT USE OF INSULIN (HCC): ICD-10-CM

## 2023-07-25 DIAGNOSIS — J31.0 CHRONIC RHINITIS: ICD-10-CM

## 2023-07-25 DIAGNOSIS — N18.31 ANEMIA IN STAGE 3A CHRONIC KIDNEY DISEASE (HCC): ICD-10-CM

## 2023-07-25 DIAGNOSIS — M86.9 OSTEOMYELITIS, UNSPECIFIED SITE, UNSPECIFIED TYPE (HCC): ICD-10-CM

## 2023-07-25 DIAGNOSIS — G47.33 OBSTRUCTIVE SLEEP APNEA SYNDROME: ICD-10-CM

## 2023-07-25 DIAGNOSIS — H91.93 BILATERAL HEARING LOSS, UNSPECIFIED HEARING LOSS TYPE: ICD-10-CM

## 2023-07-25 DIAGNOSIS — E11.69 DYSLIPIDEMIA ASSOCIATED WITH TYPE 2 DIABETES MELLITUS (HCC): Primary | ICD-10-CM

## 2023-07-25 DIAGNOSIS — D63.1 ANEMIA IN STAGE 3A CHRONIC KIDNEY DISEASE (HCC): ICD-10-CM

## 2023-07-25 DIAGNOSIS — H40.9 GLAUCOMA OF BOTH EYES, UNSPECIFIED GLAUCOMA TYPE: ICD-10-CM

## 2023-07-25 DIAGNOSIS — E11.42 DIABETIC PERIPHERAL NEUROPATHY (HCC): ICD-10-CM

## 2023-07-25 DIAGNOSIS — E78.5 DYSLIPIDEMIA ASSOCIATED WITH TYPE 2 DIABETES MELLITUS (HCC): Primary | ICD-10-CM

## 2023-07-25 DIAGNOSIS — I10 ESSENTIAL HYPERTENSION: ICD-10-CM

## 2023-07-25 LAB — SL AMB POCT HEMOGLOBIN AIC: 7.6 (ref ?–6.5)

## 2023-07-25 PROCEDURE — 99214 OFFICE O/P EST MOD 30 MIN: CPT | Performed by: FAMILY MEDICINE

## 2023-07-25 PROCEDURE — 83036 HEMOGLOBIN GLYCOSYLATED A1C: CPT | Performed by: FAMILY MEDICINE

## 2023-07-25 NOTE — LETTER
Diabetic Foot Exam Form    Date Requested: 23  Patient: Maritza Franco  Patient : 1965   Referring Provider: Deon Hong DO    Diabetic Foot Exam Performed with shoes and socks removed        Yes         No     Date of Diabetic Foot Exam ______________________________  Risk Score ____________________________________________    Left Foot       Visual Inspection         Monofilament Testing Sensory Exam        Pedal Pulses         Additional Comments         Right Foot      Visual Inspection         Monofilament Testing Sensory Exam       Pedal Pulses         Additional Comments         Comments __________________________________________________________    Practice Providing Exam ______________________________________________    Exam Performed By (print name) _______________________________________      Provider Signature ___________________________________________________      These reports are needed for  compliance. Please fax this completed form and a copy of the Diabetic Foot Exam report to our office located at [address] [place] [address] as soon as possible via Fax [phone] magy Mcneil: Phone [phone]    We thank you for your assistance in treating our mutual patient.

## 2023-07-25 NOTE — TELEPHONE ENCOUNTER
----- Message from Burke Turner sent at 7/25/2023 11:26 AM EDT -----  Regarding: Care Gap Request  07/25/23 11:26 AM    Hello, our patient attached above has had Diabetic Foot Exam completed/performed. Please assist in updating the patient chart by making an External outreach to [facility] (Dr. Liborio Rosenberg) facility located in [address]. The date of service is unknown.     Thank you,  Burke [facility]

## 2023-07-25 NOTE — TELEPHONE ENCOUNTER
Upon review of the In Basket request we were able to locate, review, and update the patient chart as requested for CRC: Colonoscopy. Any additional questions or concerns should be emailed to the Practice Liaisons via the appropriate education email address, please do not reply via In Basket.     Thank you  Jaswinder Negrete MA

## 2023-07-25 NOTE — PROGRESS NOTES
Name: William Arce      : 1965      MRN: [id number]  Encounter Provider: Reji Young DO  Encounter Date: 2023   Encounter department: [address]     1. Dyslipidemia associated with type 2 diabetes mellitus ([facility])  Lab Results   Component Value Date    HGBA1C 7.6 (A) 2023   follows with Dr. Oneyda Grayson - Art. A1C is stable. On insulin therapy and GLP-1. He knows how to tx Hypoglycemic episodes. He is on ACEi and Statin. Sees Dr Sharri Mena for foot exams -- feet are wrapped and he prefers for me not to examine since he follows closely with him. Is utd on ACR.    - POCT hemoglobin A1c    2. Morbid obesity ([facility])  Discussed the importance of maintaining a healthy lifestyle through heart healthy diet and exercise. 3. Acquired hypothyroidism  TSH has been stable. On 350 mcg/day. Dr. Oneyda Grayson follows this as well. Was on the lower end of normal on last check. 4. Type 2 diabetes mellitus with hyperglycemia, with long-term current use of insulin ([facility])    5. Severe depression (HCC)  Stable. Did have hospital stay I the past.  His f/u with psychiatry was in [place]. He is to phone his insurance and see if there is local providers that take his insurance. No si/hi. He takes benzo. CSA needs to be updated in f/u. PDPMP is c/w Rx. This helps him. Also on Celexa. No si/hi  Sees [employer] and feels benefit with this. He enjoys seeing the animals when he is there. No si/hi. 6. Diabetic ulcer of foot associated with type 2 diabetes mellitus, with other ulcer severity, unspecified laterality, unspecified part of foot ([facility])  Sees Podiatry. 7. Essential hypertension  Stable. Risk factor modifications. 8. Other seizures [facility])  Seeing Neurology. 9. Thrombocytopenia ([facility])  On board with hem/onc. They did do bone marrow bx and are monitoring his adenopathy. Will have a rescan in a few months.   Has upcoming appt in August.  Is followed closely. 10. Chronic kidney disease, stage 3a ([address])  On board with nephrology. Stable. Obstructive sleep apnea syndrome  - not complaint with his CPAP and acknowledges his risks. Anemia in stage 3a chronic kidney disease ([address])  - closely monitored by hem/onc. Glaucoma of both eyes, unspecified glaucoma type  - sees an eye specialist.      Bilateral hearing loss, unspecified hearing loss type  Will get plugged in with Audiology. - Ambulatory Referral to Audiology; Future    Osteomyelitis, unspecified site, unspecified type ([address])  Followed by Dr. Dalton Kelly in [facility]. Stable. On Doxy for Cellulitis of RLE. 11. Chronic rhinitis  Referral placed for ENT. He has failed antihistamines/nasal sprays and continues to have chronic sinus pain and pressure with pnd. No f/c/s.    - Ambulatory Referral to Otolaryngology; Future    RTC in 3 months. Patient/Caretaker verbalized understanding and were in agreement with today's assessment and plan. Time was taken to address any questions patient/caretaker had. Indication/Risks/Benefits of medication(s) as prescribed were discussed with the patient/caretaker. The patient verbalized understanding and agreement and elects to take medications as prescribed. Time was taken to answer any questions the patient/caretaker may have had. Chief Complaint   Patient presents with   • Follow-up       Subjective     Lives in [facility] here in [place]. His sister also lives there. Came from [place]. He has a dog. Is following with Hem/Onc at [facility]. Was seen in June and has f/u in 3 months for repeat scans. Noted adenopathy on scans when he was hospitalized. His mother had lymphoma and passed away of such. His sister is also with Ca -- endometrial.  He has 2 friends that are also battling Ca. He is using public transportation -- his car is no longer in service.   He does have an aid for 17 hours/week. She helps him with food. He is able to do his own bathing, etc.  Has Edna boot on for compression so is not able to fully shower. Sees Dr. Juanjo Cameron - neurology. GI -- repeat due in August.  This is scheduled in [place]      He is having a w/u for LUE weakness  EMG to be done -- seeing Dr. Chuy Seymour in [facility]. Patient is on board with cardiology. Jomar Rose has pAfib, uncontrolled NEAL, HTN, Chronic Diastolic HF, h/o PE.  He is on eliquis and is compliant with regimen. Jomar Rose has a high Orpha Baron is on statin, low dose ACEi, bid lasix. Jomar Rose will cont to see them, intervally.  BB stopped due to low HR      CKD -- on board with nephrology -- [facility] - this is secondary to diabetic nephropathy and benign hypertensive nephrosclerosis, and currently is at stage IIIA, with as baseline sCr of 1.2 to 1.4 mg/dL. He is on ACEi - low dose.  Avoid NSAIDs and other nephrotoxic agents.       Anemia in chronic kidney disease (Chronic) - iron deficient as well.  Per nephrology note, Does not require A[place]IN at this time although since transferrin saturation is low at 16% will require IV Venofer.  This is facilitated through Nephrology.  Completed these weekly X 4 weeks.  has f/u in August with them.       DM - Sees Dr. Abram Nickerson for this. A1C is better today --   Lab Results   Component Value Date    HGBA1C 7.6 (A) 07/25/2023   He does have lows on occasion and he eats sweets and it does improve. He is not having lows, often. He is on mealtime insulin and basal.  On GLP-1. On ACE and statin     Hypothyroid - now at goal.  Taking 350 mg/day - he is due for repeat. Dr. Abram Nickerson follows this as well.       NEAL -Jomar Rose is not wearing his PAP, has trouble with this at home and is always falling asleep. We discussed this is likely playing into his fatigue. Review of Systems   All other systems reviewed and are negative.       Current Outpatient Medications on File Prior to Visit   Medication Sig   • acetaminophen (TYLENOL) 650 mg CR tablet Take 1 tablet (650 mg total) by mouth every 8 (eight) hours as needed for mild pain or moderate pain   • albuterol (PROVENTIL HFA,VENTOLIN HFA) 90 mcg/act inhaler Inhale 2 puffs every 6 (six) hours as needed for wheezing   • atorvastatin (LIPITOR) 40 mg tablet TAKE 1 TABLET BY MOUTH ONCE DAILY. • Blood Glucose Monitoring Suppl (OneTouch Verio Reflect) w/Device KIT Check blood sugars four times daily. Please substitute with appropriate alternative as covered by patient's insurance. Dx: E11.65   • cetirizine (ZyrTEC) 10 mg tablet TAKE 1 TABLET BY MOUTH ONCE DAILY. • citalopram (CeleXA) 40 mg tablet TAKE 1 TABLET BY MOUTH ONCE DAILY. • clonazePAM (KlonoPIN) 0.5 mg tablet Take 0.5 mg by mouth every 12 (twelve) hours as needed   • docusate sodium (COLACE) 100 mg capsule Take 100 mg by mouth 2 (two) times a day   • Easy Touch Pen Needles 31G X 8 MM MISC Use 1 each 3 (three) times a day   • Eliquis 5 MG TAKE 1 TABLET BY MOUTH TWO TIMES A DAY. • ergocalciferol (ERGOCALCIFEROL) 1.25 MG (03867 UT) capsule Take 1 capsule (50,000 Units total) by mouth once a week   • ferrous sulfate 325 (65 Fe) mg tablet Take 325 mg by mouth 2 (two) times a day with meals   • folic acid (FOLVITE) 1 mg tablet Take 1 tablet (1 mg total) by mouth daily   • furosemide (LASIX) 40 mg tablet Take 1 tablet (40 mg total) by mouth daily   • gabapentin (NEURONTIN) 600 MG tablet Take 600 mg by mouth 2 (two) times a day   • glucose blood (OneTouch Verio) test strip Check blood sugars four times daily. Please substitute with appropriate alternative as covered by patient's insurance. Dx: E11.65   • insulin aspart (NovoLOG FlexPen) 100 UNIT/ML injection pen Inject 26 Units under the skin 3 (three) times a day with meals Per sliding scale - managed by Dr. Tracy Beavers + scheduled   • lamoTRIgine (LaMICtal) 25 mg tablet Take 1 tablet (25 mg total) by mouth in the morning and 1 tablet (25 mg total) in the evening.    • Lantus SoloStar 100 units/mL SOPN INJECT 56 UNITS TOTAL UNDER THE SKIN DAILY   • latanoprost (XALATAN) 0.005 % ophthalmic solution Administer 0.005 drops to both eyes   • levothyroxine 300 MCG tablet Take 1 tablet (300 mcg total) by mouth daily Take with 50 mcg tablet   • levothyroxine 50 mcg tablet TAKE 1 TAB DAILY IN EARLY MORNING ALONG WITH 300MCG TAB   • Linzess 290 MCG CAPS TAKE 1 CAPSULE BY MOUTH ONCE DAILY. • lisinopril (ZESTRIL) 2.5 mg tablet TAKE 1 TABLET BY MOUTH ONCE DAILY. • magnesium Oxide (MAG-OX) 400 mg TABS TAKE 1 TABLET BY MOUTH TWO TIMES A DAY. • OneTouch Delica Lancets 86L MISC Check blood sugars four times daily. Please substitute with appropriate alternative as covered by patient's insurance. Dx: E11.65   • pantoprazole (PROTONIX) 40 mg tablet TAKE 1 TABLET BY MOUTH ONCE DAILY. • potassium chloride (K-DUR,KLOR-CON) 20 mEq tablet TAKE 1 TABLET BY MOUTH EACH MORNING. • Probiotic Product (Probiotic Daily) CAPS Take by mouth   • Semaglutide, 1 MG/DOSE, (Ozempic, 1 MG/DOSE,) 2 MG/1.5ML SOPN Inject 1 mg under the skin once a week   • senna (SENOKOT) 8.6 MG tablet Take 1 tablet by mouth 2 (two) times a day   • tamsulosin (FLOMAX) 0.4 mg TAKE 1 CAP DAILY WITH DINNER   • timolol (TIMOPTIC) 0.5 % ophthalmic solution Administer 1 drop to the right eye 2 (two) times a day   • clobetasol propionate (Clobex) 0.05 % shampoo Apply 1 application topically 3 (three) times a week   • [] doxycycline hyclate (VIBRAMYCIN) 100 mg capsule Take 1 capsule (100 mg total) by mouth every 12 (twelve) hours for 7 days   • ketoconazole (NIZORAL) 2 % shampoo Apply 1 application topically 2 (two) times a week for 8 doses       Objective     /60   Pulse 70   Temp (!) 97.4 °F (36.3 °C) (Temporal)   Resp 18   Wt (!) 168 kg (370 lb 6.4 oz)   SpO2 96%   BMI 50.24 kg/m²     Physical Exam  Vitals and nursing note reviewed. Constitutional:       General: He is not in acute distress.      Appearance: He is obese.   HENT:      Head: Normocephalic and atraumatic. Mouth/Throat:      Comments: Few teeth    Eyes:      Conjunctiva/sclera: Conjunctivae normal.      Pupils: Pupils are equal, round, and reactive to light. Neck:      Comments: Short, thick neck    Cardiovascular:      Rate and Rhythm: Normal rate and regular rhythm. Pulmonary:      Effort: Pulmonary effort is normal.      Breath sounds: Normal breath sounds. No wheezing, rhonchi or rales. Abdominal:      General: Bowel sounds are normal.      Palpations: Abdomen is soft. Comments: Obese     Musculoskeletal:      Cervical back: Neck supple. Comments: Uses a rollator     Lymphadenopathy:      Cervical: No cervical adenopathy. Skin:     General: Skin is warm and dry. Comments: Edna Boots b/l -- followed closely by Dr. Padma Lion    Neurological:      General: No focal deficit present. Mental Status: He is alert and oriented to person, place, and time. Psychiatric:         Mood and Affect: Mood normal.         Behavior: Behavior normal.         Thought Content:  Thought content normal.         Judgment: Judgment normal.       Saba Diamond,

## 2023-07-25 NOTE — TELEPHONE ENCOUNTER
Upon review of the In Basket request and the patient's chart, initial outreach has been made via fax to facility. Please see Contacts section for details.      Thank you  Lyn Mcdonald MA

## 2023-08-08 ENCOUNTER — TELEPHONE (OUTPATIENT)
Dept: FAMILY MEDICINE CLINIC | Facility: CLINIC | Age: 58
End: 2023-08-08

## 2023-08-08 ENCOUNTER — OFFICE VISIT (OUTPATIENT)
Dept: AUDIOLOGY | Facility: CLINIC | Age: 58
End: 2023-08-08
Payer: COMMERCIAL

## 2023-08-08 DIAGNOSIS — H90.3 SENSORY HEARING LOSS, BILATERAL: Primary | ICD-10-CM

## 2023-08-08 DIAGNOSIS — H91.93 BILATERAL HEARING LOSS, UNSPECIFIED HEARING LOSS TYPE: ICD-10-CM

## 2023-08-08 PROCEDURE — 92567 TYMPANOMETRY: CPT | Performed by: AUDIOLOGIST-HEARING AID FITTER

## 2023-08-08 PROCEDURE — 92557 COMPREHENSIVE HEARING TEST: CPT | Performed by: AUDIOLOGIST-HEARING AID FITTER

## 2023-08-08 NOTE — PROGRESS NOTES
HEARING EVALUATION    Name:  Maritza Franco  :  1965  Age:  59 y.o. Date of Evaluation: 23     History: Difficulty Understanding  Reason for visit: Maritza Franco is being seen today at the request of Dr. Anshu Howell for an evaluation of hearing. Patient reports difficulty hearing. Bilateral tinnitus reported. No recent colds or ear infections. EVALUATION:    Otoscopic Evaluation:   Right Ear: Clear and healthy ear canal and tympanic membrane   Left Ear: Clear and healthy ear canal and tympanic membrane    Tympanometry:   Right: Type A - normal middle ear pressure and compliance   Left: Type A - normal middle ear pressure and compliance    Audiogram Results:  Pure tone testing revealed a mild sloping to moderately severe sensorineural hearing loss bilaterally. SRT and PTA are in agreement indicating good test reliability. Word recognition scores were excellent bilaterally. *see attached audiogram      RECOMMENDATIONS:  Return to [facility]. for F/U and Hearing Aid Evaluation    PATIENT EDUCATION:   Discussed results and recommendations with Denita Giron. Hearing aid coverage through [employer], recommended contacting insurance company. Questions were addressed and the patient was encouraged to contact our department should concerns arise.       Lakisha Hayward, BELINDA-A  Clinical Audiologist

## 2023-08-08 NOTE — TELEPHONE ENCOUNTER
Patient called back.  States he will contact his insurance to see if hearing aids are covered by his insurance

## 2023-08-15 ENCOUNTER — OFFICE VISIT (OUTPATIENT)
Dept: FAMILY MEDICINE CLINIC | Facility: CLINIC | Age: 58
End: 2023-08-15
Payer: COMMERCIAL

## 2023-08-15 VITALS
TEMPERATURE: 97.9 F | HEART RATE: 72 BPM | BODY MASS INDEX: 50.4 KG/M2 | SYSTOLIC BLOOD PRESSURE: 118 MMHG | RESPIRATION RATE: 16 BRPM | OXYGEN SATURATION: 93 % | DIASTOLIC BLOOD PRESSURE: 60 MMHG | WEIGHT: 315 LBS

## 2023-08-15 DIAGNOSIS — E11.65 TYPE 2 DIABETES MELLITUS WITH HYPERGLYCEMIA, WITH LONG-TERM CURRENT USE OF INSULIN (HCC): ICD-10-CM

## 2023-08-15 DIAGNOSIS — Z79.4 TYPE 2 DIABETES MELLITUS WITH HYPERGLYCEMIA, WITH LONG-TERM CURRENT USE OF INSULIN (HCC): ICD-10-CM

## 2023-08-15 DIAGNOSIS — R17 ELEVATED BILIRUBIN: ICD-10-CM

## 2023-08-15 DIAGNOSIS — E11.621 DIABETIC ULCER OF LEFT HEEL ASSOCIATED WITH TYPE 2 DIABETES MELLITUS, LIMITED TO BREAKDOWN OF SKIN (HCC): Primary | ICD-10-CM

## 2023-08-15 DIAGNOSIS — N18.31 CHRONIC KIDNEY DISEASE, STAGE 3A (HCC): ICD-10-CM

## 2023-08-15 DIAGNOSIS — D69.6 THROMBOCYTOPENIA (HCC): ICD-10-CM

## 2023-08-15 DIAGNOSIS — Z87.81 HISTORY OF VERTEBRAL COMPRESSION FRACTURE: ICD-10-CM

## 2023-08-15 DIAGNOSIS — M54.6 THORACIC SPINE PAIN: ICD-10-CM

## 2023-08-15 DIAGNOSIS — I10 ESSENTIAL HYPERTENSION: ICD-10-CM

## 2023-08-15 DIAGNOSIS — L97.421 DIABETIC ULCER OF LEFT HEEL ASSOCIATED WITH TYPE 2 DIABETES MELLITUS, LIMITED TO BREAKDOWN OF SKIN (HCC): Primary | ICD-10-CM

## 2023-08-15 PROCEDURE — 99214 OFFICE O/P EST MOD 30 MIN: CPT | Performed by: FAMILY MEDICINE

## 2023-08-15 RX ORDER — DOXYCYCLINE HYCLATE 100 MG/1
CAPSULE ORAL
COMMUNITY
Start: 2023-08-14

## 2023-08-15 RX ORDER — CEPHALEXIN 500 MG/1
CAPSULE ORAL
COMMUNITY
Start: 2023-08-07

## 2023-08-15 NOTE — PROGRESS NOTES
Name: Lee Ann Washington      : 1965      MRN: [id number]  Encounter Provider: Nafisa Mcnair DO  Encounter Date: 8/15/2023   Encounter department: [address]     1. Diabetic ulcer of left heel associated with type 2 diabetes mellitus, limited to breakdown of skin ([address])  - he has adequate f/u with Dr. Teodora Baires tomorrow. He is on abx therapy for this. 2. Type 2 diabetes mellitus with hyperglycemia, with long-term current use of insulin (HCC)  Lab Results   Component Value Date    HGBA1C 7.6 (A) 2023   - follows with Dr. Marlen Alvarez. No changes to report. 3. Essential hypertension  Stable. Cont regimen. Discussed the importance of maintaining a healthy lifestyle through heart healthy diet and exercise. 4. Thoracic spine pain  He is c/o this today. Have not heard him c/o this. He tells me many years ago he had a compression fx. Will get XR. He is on board. No red flag si/sx's. - XR spine thoracic 3 vw; Future    5. History of vertebral compression fracture  - XR spine thoracic 3 vw; Future    6. Thrombocytopenia ([address])  Following with Dr. Nilesh Zarate Hematology. Has CT scan on Thursday. Had bone marrow bx as well. Levels stable. No bleeding to report. 7. Elevated bilirubin  - noted on labs in hospital.  Will recheck. Will get a look at his liver via CT this week. Is s/p cholecystectomy. No GI issues to report. - Hepatic function panel; Future  - Gamma GT; Future    8. Chronic kidney disease, stage 3a (HCC)  Stable. Sees nephrology. Return for keep appt scheduled for Oct .    BMI Counseling: Body mass index is 50.4 kg/m².  The BMI is above normal. Nutrition recommendations include decreasing portion sizes, encouraging healthy choices of fruits and vegetables, decreasing fast food intake, consuming healthier snacks, limiting drinks that contain sugar, reducing intake of saturated and trans fat and reducing intake of cholesterol. Exercise recommendations include exercising 3-5 times per week and strength training exercises. Rationale for BMI follow-up plan is due to patient being overweight or obese. Chief Complaint   Patient presents with   • Follow-up       Subjective      L diabetic foot infection -- was in ED. Dr. Padma Lion was consulted per note. He was d/c on oral abx and asked to f/u with him - he saw him after the visit and also sees him tomorrow. Lore Amezquita is on keflex and doxy and Dr. Padma Lion prolonged these. He was with [facility] foot care but no longer has this. No f/c/s here today. He is a bit stressed here today due to his sister, Anirudh Patel, being in the hospital.  He is having to clean her apartment as it is due for inspection and if it is not cleaned, she will be put out. He is worried about her. Has Colonoscopy scheduled for September. Thrombocytopenia - repeat CT scan this Thursday. Sees Dr. Hand Client for this. Cont to see Nevin Rahman for his mood. Sees Dr. Libby Escobar - Neurology      Review of Systems   All other systems reviewed and are negative. Current Outpatient Medications on File Prior to Visit   Medication Sig   • acetaminophen (TYLENOL) 650 mg CR tablet Take 1 tablet (650 mg total) by mouth every 8 (eight) hours as needed for mild pain or moderate pain   • albuterol (PROVENTIL HFA,VENTOLIN HFA) 90 mcg/act inhaler Inhale 2 puffs every 6 (six) hours as needed for wheezing   • atorvastatin (LIPITOR) 40 mg tablet TAKE 1 TABLET BY MOUTH ONCE DAILY. • Blood Glucose Monitoring Suppl (OneTouch Verio Reflect) w/Device KIT Check blood sugars four times daily. Please substitute with appropriate alternative as covered by patient's insurance. Dx: E11.65   • cephalexin (KEFLEX) 500 mg capsule    • cetirizine (ZyrTEC) 10 mg tablet TAKE 1 TABLET BY MOUTH ONCE DAILY. • citalopram (CeleXA) 40 mg tablet TAKE 1 TABLET BY MOUTH ONCE DAILY.    • clobetasol propionate (Clobex) 0.05 % shampoo Apply 1 application topically 3 (three) times a week   • clonazePAM (KlonoPIN) 0.5 mg tablet Take 0.5 mg by mouth every 12 (twelve) hours as needed   • docusate sodium (COLACE) 100 mg capsule Take 100 mg by mouth 2 (two) times a day   • doxycycline hyclate (VIBRAMYCIN) 100 mg capsule    • Easy Touch Pen Needles 31G X 8 MM MISC Use 1 each 3 (three) times a day   • Eliquis 5 MG TAKE 1 TABLET BY MOUTH TWO TIMES A DAY. • ergocalciferol (ERGOCALCIFEROL) 1.25 MG (51680 UT) capsule Take 1 capsule (50,000 Units total) by mouth once a week   • ferrous sulfate 325 (65 Fe) mg tablet Take 325 mg by mouth 2 (two) times a day with meals   • folic acid (FOLVITE) 1 mg tablet Take 1 tablet (1 mg total) by mouth daily   • furosemide (LASIX) 40 mg tablet Take 1 tablet (40 mg total) by mouth daily   • gabapentin (NEURONTIN) 600 MG tablet Take 600 mg by mouth 2 (two) times a day   • glucose blood (OneTouch Verio) test strip Check blood sugars four times daily. Please substitute with appropriate alternative as covered by patient's insurance. Dx: E11.65   • insulin aspart (NovoLOG FlexPen) 100 UNIT/ML injection pen Inject 26 Units under the skin 3 (three) times a day with meals Per sliding scale - managed by Dr. Nieves Agent + scheduled   • ketoconazole (NIZORAL) 2 % shampoo Apply 1 application topically 2 (two) times a week for 8 doses   • lamoTRIgine (LaMICtal) 25 mg tablet Take 1 tablet (25 mg total) by mouth in the morning and 1 tablet (25 mg total) in the evening. • Lantus SoloStar 100 units/mL SOPN INJECT 56 UNITS TOTAL UNDER THE SKIN DAILY   • latanoprost (XALATAN) 0.005 % ophthalmic solution Administer 0.005 drops to both eyes   • levothyroxine 300 MCG tablet Take 1 tablet (300 mcg total) by mouth daily Take with 50 mcg tablet   • levothyroxine 50 mcg tablet TAKE 1 TAB DAILY IN EARLY MORNING ALONG WITH 300MCG TAB   • Linzess 290 MCG CAPS TAKE 1 CAPSULE BY MOUTH ONCE DAILY.    • lisinopril (ZESTRIL) 2.5 mg tablet TAKE 1 TABLET BY MOUTH ONCE DAILY. • magnesium Oxide (MAG-OX) 400 mg TABS TAKE 1 TABLET BY MOUTH TWO TIMES A DAY. • OneTouch Delica Lancets 43M MISC Check blood sugars four times daily. Please substitute with appropriate alternative as covered by patient's insurance. Dx: E11.65   • pantoprazole (PROTONIX) 40 mg tablet TAKE 1 TABLET BY MOUTH ONCE DAILY. • potassium chloride (K-DUR,KLOR-CON) 20 mEq tablet TAKE 1 TABLET BY MOUTH EACH MORNING. • Probiotic Product (Probiotic Daily) CAPS Take by mouth   • Semaglutide, 1 MG/DOSE, (Ozempic, 1 MG/DOSE,) 2 MG/1.5ML SOPN Inject 1 mg under the skin once a week   • senna (SENOKOT) 8.6 MG tablet Take 1 tablet by mouth 2 (two) times a day   • tamsulosin (FLOMAX) 0.4 mg TAKE 1 CAP DAILY WITH DINNER   • timolol (TIMOPTIC) 0.5 % ophthalmic solution Administer 1 drop to the right eye 2 (two) times a day       Objective     /60   Pulse 72   Temp 97.9 °F (36.6 °C) (Temporal)   Resp 16   Wt (!) 169 kg (371 lb 9.6 oz)   SpO2 93%   BMI 50.40 kg/m²     Physical Exam  Vitals and nursing note reviewed. Constitutional:       Appearance: He is obese. HENT:      Head: Normocephalic and atraumatic. Eyes:      Conjunctiva/sclera: Conjunctivae normal.   Neck:      Comments: Short, thick neck    Cardiovascular:      Rate and Rhythm: Normal rate and regular rhythm. Pulmonary:      Effort: Pulmonary effort is normal.      Breath sounds: Normal breath sounds. No wheezing or rales. Abdominal:      General: Bowel sounds are normal.      Palpations: Abdomen is soft. Comments: Obese  No discrete ttp    Musculoskeletal:      Cervical back: Neck supple. Comments: Uses rollator  Legs wrapped b/l   Antalgic gait    Skin:     General: Skin is warm and dry. Neurological:      General: No focal deficit present. Mental Status: He is alert and oriented to person, place, and time.    Psychiatric:         Mood and Affect: Mood normal.         Behavior: Behavior normal.         Thought Content:  Thought content normal.         Judgment: Judgment normal.       Saba Moncada, DO

## 2023-08-16 DIAGNOSIS — G89.29 CHRONIC BILATERAL LOW BACK PAIN WITHOUT SCIATICA: Primary | ICD-10-CM

## 2023-08-16 DIAGNOSIS — M54.50 CHRONIC BILATERAL LOW BACK PAIN WITHOUT SCIATICA: Primary | ICD-10-CM

## 2023-08-24 ENCOUNTER — TELEPHONE (OUTPATIENT)
Dept: FAMILY MEDICINE CLINIC | Facility: CLINIC | Age: 58
End: 2023-08-24

## 2023-08-24 ENCOUNTER — OFFICE VISIT (OUTPATIENT)
Dept: CARDIOLOGY CLINIC | Facility: CLINIC | Age: 58
End: 2023-08-24
Payer: COMMERCIAL

## 2023-08-24 VITALS
HEIGHT: 72 IN | BODY MASS INDEX: 42.66 KG/M2 | DIASTOLIC BLOOD PRESSURE: 66 MMHG | OXYGEN SATURATION: 95 % | WEIGHT: 315 LBS | RESPIRATION RATE: 16 BRPM | HEART RATE: 58 BPM | SYSTOLIC BLOOD PRESSURE: 124 MMHG

## 2023-08-24 DIAGNOSIS — E78.5 DYSLIPIDEMIA ASSOCIATED WITH TYPE 2 DIABETES MELLITUS (HCC): ICD-10-CM

## 2023-08-24 DIAGNOSIS — E11.69 DYSLIPIDEMIA ASSOCIATED WITH TYPE 2 DIABETES MELLITUS (HCC): ICD-10-CM

## 2023-08-24 DIAGNOSIS — M47.816 LUMBAR SPONDYLOSIS: Primary | ICD-10-CM

## 2023-08-24 DIAGNOSIS — I25.10 ARTERIOSCLEROTIC CARDIOVASCULAR DISEASE (ASCVD): ICD-10-CM

## 2023-08-24 DIAGNOSIS — I48.0 PAROXYSMAL ATRIAL FIBRILLATION (HCC): Primary | ICD-10-CM

## 2023-08-24 DIAGNOSIS — G47.33 OBSTRUCTIVE SLEEP APNEA SYNDROME: ICD-10-CM

## 2023-08-24 DIAGNOSIS — M47.814 THORACIC SPONDYLOSIS: ICD-10-CM

## 2023-08-24 PROCEDURE — 99214 OFFICE O/P EST MOD 30 MIN: CPT | Performed by: INTERNAL MEDICINE

## 2023-08-24 PROCEDURE — 93000 ELECTROCARDIOGRAM COMPLETE: CPT | Performed by: INTERNAL MEDICINE

## 2023-08-24 RX ORDER — BENZONATATE 200 MG/1
200 CAPSULE ORAL 3 TIMES DAILY PRN
COMMUNITY

## 2023-08-24 RX ORDER — CIPROFLOXACIN 250 MG/1
TABLET, FILM COATED ORAL
COMMUNITY
Start: 2023-08-16

## 2023-08-24 RX ORDER — TOBRAMYCIN AND DEXAMETHASONE 3; 1 MG/ML; MG/ML
SUSPENSION/ DROPS OPHTHALMIC
COMMUNITY
Start: 2023-08-03

## 2023-08-24 RX ORDER — POLYETHYLENE GLYCOL-3350 AND ELECTROLYTES 236; 6.74; 5.86; 2.97; 22.74 G/274.31G; G/274.31G; G/274.31G; G/274.31G; G/274.31G
POWDER, FOR SOLUTION ORAL
COMMUNITY
Start: 2023-08-11

## 2023-08-24 NOTE — TELEPHONE ENCOUNTER
----- Message from Artie sent at 8/21/2023  8:11 AM EDT -----  Patient aware and would be okay with whatever you think is best. Anywhere in the [facility]

## 2023-08-24 NOTE — PROGRESS NOTES
Subjective:        Patient ID: Vickie Lora is a 59 y.o. male. Chief Complaint:  Jayleen Edmondson is here for routine follow-up for PAF. Offers no cardiac complaints on extensive questioning. Seeing wound care and GI lately he says. The following portions of the patient's history were reviewed and updated as appropriate: allergies, current medications, past family history, past medical history, past social history, past surgical history and problem list.  Review of Systems   Constitutional: Negative for chills, diaphoresis, malaise/fatigue and weight gain. HENT: Negative for nosebleeds and stridor. Eyes: Negative for double vision, vision loss in left eye, vision loss in right eye and visual disturbance. Cardiovascular: Negative for chest pain, claudication, cyanosis, dyspnea on exertion, irregular heartbeat, leg swelling, near-syncope, orthopnea, palpitations, paroxysmal nocturnal dyspnea and syncope. Respiratory: Negative for cough, shortness of breath, snoring and wheezing. Endocrine: Negative for polydipsia, polyphagia and polyuria. Hematologic/Lymphatic: Negative for bleeding problem. Does not bruise/bleed easily. Skin: Negative for flushing and rash. Musculoskeletal: Negative for falls and myalgias. Gastrointestinal: Negative for abdominal pain, heartburn, hematemesis, hematochezia, melena and nausea. Genitourinary: Negative for hematuria. Neurological: Negative for brief paralysis, dizziness, focal weakness, headaches, light-headedness, loss of balance and vertigo. Psychiatric/Behavioral: Negative for altered mental status and substance abuse. Allergic/Immunologic: Negative for hives. Objective:      /66 (BP Location: Left arm, Patient Position: Sitting, Cuff Size: Large)   Pulse 58   Resp 16   Ht 6' (1.829 m)   Wt (!) 168 kg (370 lb 3.2 oz)   SpO2 95%   BMI 50.21 kg/m²   Physical Exam  Constitutional:       General: He is not in acute distress. Appearance: He is well-developed. He is not diaphoretic. HENT:      Head: Normocephalic and atraumatic. Eyes:      General: No scleral icterus. Pupils: Pupils are equal, round, and reactive to light. Neck:      Thyroid: No thyromegaly. Vascular: No carotid bruit or JVD. Cardiovascular:      Rate and Rhythm: Normal rate and regular rhythm. Heart sounds: Normal heart sounds. No murmur heard. No friction rub. No gallop. Pulmonary:      Effort: Pulmonary effort is normal. No respiratory distress. Breath sounds: Normal breath sounds. No stridor. No wheezing or rales. Abdominal:      General: Bowel sounds are normal. There is no distension. Palpations: Abdomen is soft. There is no mass. Tenderness: There is no abdominal tenderness. Musculoskeletal:      Cervical back: Normal range of motion and neck supple. Right lower leg: Edema present. Left lower leg: Edema present. Skin:     General: Skin is warm and dry. Coloration: Skin is not pale. Findings: No erythema. Neurological:      Mental Status: He is alert and oriented to person, place, and time. Mental status is at baseline. Coordination: Coordination normal.   Psychiatric:         Mood and Affect: Mood normal.         Behavior: Behavior normal.         Lab Review:   Office Visit on 07/25/2023   Component Date Value   • Hemoglobin A1C 07/25/2023 7.6 (A)      CTA abdomen pelvis w wo contrast    Result Date: 8/17/2023  Narrative: CT of the abdomen and pelvis  --  with intravenous contrast. Indication:  Follow-up for enlarged lymph nodes. Technique:   With intravenous contrast. 2.5 mm axial slices. Sagittal and coronal reformats.   CT examination performed with dose lowering protocol in accordance with ALARA. Comparison: CT 3/30/2023 , 2/5/2023 and 1/13/2021. FINDINGS: No active abnormality or adverse change compared to prior studies.  Again noted are a few stable lymph nodes at the para-aortic and left common iliac level. This includes left para-aortic lymph node at the lower renal level currently measuring 2.4 x 1.6 cm; stable with prior dimensions the same.    On the right side behind the IVC at the lower renal level, 10 x 10 mm;  also stable.   Between the aorta and IVC at the lower renal level 10 x 10 mm; also stable.   Left common iliac 2.0 x 1.5 cm; also stable No new enlarged lymph node in the para-aortic, iliac, mesenteric or inguinal regions. No additional significant finding. Cholecystectomy clips present. Unremarkable 18 cm liver, without focal lesion or benign fatty infiltration. No ascites. Unremarkable 15 x 11 x 7 cm spleen.   Unremarkable pancreas, aorta and adrenals.  Unremarkable kidneys, showing no calculus, hydronephrosis, solid mass or cyst. Unremarkable gastrointestinal loops. The colon shows no appreciable diverticulum and no diverticulitis; also no colonic mass or colitis wall thickening. Unremarkable small bowel showing no dilatation or wall thickening.   Unremarkable appendix region. Unremarkable stomach. No hiatal, ventral or inguinal hernia. Unremarkable urinary bladder.  Unremarkable prostate, 4.1 cm transversely. Unremarkable lung bases and bones. Impression: IMPRESSION:   CT abdomen-pelvis.   No active abnormality or adverse change compared to prior studies. Again noted are a few stable lymph nodes at the para-aortic and left common iliac level.   No new enlarged lymph node. No additional significant finding. Cholecystectomy clips present. Unremarkable 18 cm liver, without focal lesion or benign fatty infiltration. No ascites. Unremarkable 15 x 11 x 7 cm spleen.   Workstation:[id number]    CT FOOT LEFT WO CONTRAST    Result Date: 8/7/2023  Narrative: CT of the left foot Clinical History: Left foot ulceration with drainage. History of diabetes mellitus. Computed tomography of the left foot was performed helical imaging performed in the axial plane.  The axial data set was utilized to reconstruct coronal and sagittal images. Automated exposure control was utilized for the examination. Comparison is made to a prior study of 9/12/2019. Diffuse circumferential skin thickening is now seen which appears to relate to generalized edema. This has progressed in the interval since the previous study. Multiple subcutaneous calcifications are seen which have progressed in the interval since the previous study. No discrete focal soft tissue fluid collections are seen to specifically indicate the presence of an abscess. No gross soft tissue masses are noted. No gross soft tissue air collections are seen. No evidence of acute left foot fracture is seen. No destructive bony lesions are noted. Specifically, no gross evidence of acute osteomyelitis is seen. Heterogeneous demineralization is seen throughout the visualized bony structures which could represent an element of disuse osteopenia. Impression: Impression: No acute osseous abnormalities are seen of the left foot, specifically without evidence of acute fracture and without gross CT evidence of acute osteomyelitis. Please see above comments. CT examination performed with dose lowering protocol in accordance with ALARA. Workstation:[id number]    XR CHEST PORTABLE    Result Date: 8/7/2023  Narrative: Portable chest: Clinical History: History of diabetes mellitus, hypertension, and hyperlipidemia. Draining foot ulcer. A portable radiograph of the chest was obtained. Comparison is made to a prior study of 1/31/2023. The heart is mildly enlarged. The thoracic aorta appears mildly tortuous. The pulmonary vasculature is within normal limits. No gross focal lobar pulmonary consolidation is seen. There is no evidence of pleural effusion or pneumothorax. Impression: IMPRESSION: No evidence of acute cardiopulmonary disease. Workstation:[id number]        Assessment:       1. Paroxysmal atrial fibrillation (HCC)  POCT ECG      2.  Arteriosclerotic cardiovascular disease (ASCVD)  POCT ECG      3. Obstructive sleep apnea syndrome  POCT ECG      4. Dyslipidemia associated with type 2 diabetes mellitus ([address])             Plan:       Kristine Pope has no signs or symptoms reminiscent of unstable angina, decompensated heart failure, nor electrical instability. Cardiac auscultation unremarkable. Maintaining sinus rhythm clinically and via EKG today. Blood pressure stable, lisinopril excellent choice with underlying diabetes as renal function permits. High stroke risk score, no recent bleeding events, advised he continue NOAC therapy and hold aspirin. Encouraged CPAP use, has been noncompliant with this, aware of risks. On proper statin, lipids reasonably well controlled, no changes here recommended. I made no changes today ordered no acute cardiac testing.   Should any intermediate risk or high risk surgery be planned consideration of preoperative or stratifying stress testing should be undertaken

## 2023-08-24 NOTE — TELEPHONE ENCOUNTER
I believe there is a group that comes to [place]. Not sure of name. Can fax there. If not, send to [facility].   [address], DO

## 2023-08-28 DIAGNOSIS — Z86.79 HISTORY OF HEART FAILURE: ICD-10-CM

## 2023-08-28 DIAGNOSIS — I50.9 CONGESTIVE HEART FAILURE, UNSPECIFIED HF CHRONICITY, UNSPECIFIED HEART FAILURE TYPE (HCC): ICD-10-CM

## 2023-08-28 DIAGNOSIS — E66.01 MORBID OBESITY (HCC): ICD-10-CM

## 2023-08-28 DIAGNOSIS — E11.621 DIABETIC ULCER OF FOOT ASSOCIATED WITH TYPE 2 DIABETES MELLITUS, WITH OTHER ULCER SEVERITY, UNSPECIFIED LATERALITY, UNSPECIFIED PART OF FOOT (HCC): ICD-10-CM

## 2023-08-28 DIAGNOSIS — L97.508 DIABETIC ULCER OF FOOT ASSOCIATED WITH TYPE 2 DIABETES MELLITUS, WITH OTHER ULCER SEVERITY, UNSPECIFIED LATERALITY, UNSPECIFIED PART OF FOOT (HCC): ICD-10-CM

## 2023-08-28 DIAGNOSIS — Z79.4 TYPE 2 DIABETES MELLITUS WITH HYPERGLYCEMIA, WITH LONG-TERM CURRENT USE OF INSULIN (HCC): ICD-10-CM

## 2023-08-28 DIAGNOSIS — E03.9 HYPOTHYROIDISM, UNSPECIFIED TYPE: ICD-10-CM

## 2023-08-28 DIAGNOSIS — E11.65 TYPE 2 DIABETES MELLITUS WITH HYPERGLYCEMIA, WITH LONG-TERM CURRENT USE OF INSULIN (HCC): ICD-10-CM

## 2023-08-28 DIAGNOSIS — I10 ESSENTIAL HYPERTENSION: ICD-10-CM

## 2023-08-28 DIAGNOSIS — I48.91 ATRIAL FIBRILLATION, UNSPECIFIED TYPE (HCC): ICD-10-CM

## 2023-08-28 DIAGNOSIS — I87.332 CHRONIC VENOUS HYPERTENSION (IDIOPATHIC) WITH ULCER AND INFLAMMATION OF LEFT LOWER EXTREMITY (HCC): ICD-10-CM

## 2023-08-29 RX ORDER — POTASSIUM CHLORIDE 20 MEQ/1
TABLET, EXTENDED RELEASE ORAL
Qty: 90 TABLET | Refills: 0 | Status: SHIPPED | OUTPATIENT
Start: 2023-08-29

## 2023-09-01 DIAGNOSIS — E03.9 ACQUIRED HYPOTHYROIDISM: ICD-10-CM

## 2023-09-01 DIAGNOSIS — R79.89 LOW TSH LEVEL: ICD-10-CM

## 2023-09-02 RX ORDER — LEVOTHYROXINE SODIUM 0.05 MG/1
TABLET ORAL
Qty: 90 TABLET | Refills: 0 | Status: SHIPPED | OUTPATIENT
Start: 2023-09-02

## 2023-09-05 ENCOUNTER — TELEPHONE (OUTPATIENT)
Dept: FAMILY MEDICINE CLINIC | Facility: CLINIC | Age: 58
End: 2023-09-05

## 2023-09-05 NOTE — TELEPHONE ENCOUNTER
Colon rectal calling asking if pt is allowed to do a 2 day hold prior to his colonoscopy on 9/19    [phone] ext [phone]

## 2023-09-07 ENCOUNTER — OFFICE VISIT (OUTPATIENT)
Dept: OTOLARYNGOLOGY | Facility: CLINIC | Age: 58
End: 2023-09-07
Payer: COMMERCIAL

## 2023-09-07 ENCOUNTER — TELEPHONE (OUTPATIENT)
Dept: FAMILY MEDICINE CLINIC | Facility: CLINIC | Age: 58
End: 2023-09-07

## 2023-09-07 VITALS
BODY MASS INDEX: 42.66 KG/M2 | HEART RATE: 62 BPM | SYSTOLIC BLOOD PRESSURE: 110 MMHG | WEIGHT: 315 LBS | HEIGHT: 72 IN | OXYGEN SATURATION: 95 % | DIASTOLIC BLOOD PRESSURE: 60 MMHG | TEMPERATURE: 97.6 F

## 2023-09-07 DIAGNOSIS — J31.0 CHRONIC RHINITIS: ICD-10-CM

## 2023-09-07 DIAGNOSIS — J32.0 CHRONIC MAXILLARY SINUSITIS: Primary | ICD-10-CM

## 2023-09-07 PROCEDURE — 31231 NASAL ENDOSCOPY DX: CPT | Performed by: PHYSICIAN ASSISTANT

## 2023-09-07 PROCEDURE — 87147 CULTURE TYPE IMMUNOLOGIC: CPT | Performed by: PHYSICIAN ASSISTANT

## 2023-09-07 PROCEDURE — 99204 OFFICE O/P NEW MOD 45 MIN: CPT | Performed by: PHYSICIAN ASSISTANT

## 2023-09-07 PROCEDURE — 87205 SMEAR GRAM STAIN: CPT | Performed by: PHYSICIAN ASSISTANT

## 2023-09-07 PROCEDURE — 87070 CULTURE OTHR SPECIMN AEROBIC: CPT | Performed by: PHYSICIAN ASSISTANT

## 2023-09-07 PROCEDURE — 87077 CULTURE AEROBIC IDENTIFY: CPT | Performed by: PHYSICIAN ASSISTANT

## 2023-09-07 RX ORDER — FLUTICASONE PROPIONATE 50 MCG
2 SPRAY, SUSPENSION (ML) NASAL DAILY
Qty: 16 G | Refills: 11 | Status: SHIPPED | OUTPATIENT
Start: 2023-09-07

## 2023-09-07 RX ORDER — AZELASTINE 1 MG/ML
2 SPRAY, METERED NASAL DAILY
Qty: 30 ML | Refills: 11 | Status: SHIPPED | OUTPATIENT
Start: 2023-09-07

## 2023-09-07 NOTE — TELEPHONE ENCOUNTER
Pt aware and would like just a Rolator walker and would like it sent to [facility] in [address] unless it goes through [facility]

## 2023-09-07 NOTE — TELEPHONE ENCOUNTER
Pt calling asking about a new walker but mentioned he would prefer a scooter if you could order one.  I advised that he needs to check with his insurance regarding the scooter

## 2023-09-07 NOTE — PROGRESS NOTES
Review of Systems   Constitutional: Negative. HENT: Positive for congestion, rhinorrhea, sinus pressure, sinus pain and sneezing. Eyes: Negative. Respiratory: Negative. Cardiovascular: Negative. Gastrointestinal: Negative. Endocrine: Negative. Genitourinary: Negative. Musculoskeletal: Negative. Skin: Negative. Allergic/Immunologic: Negative. Neurological: Positive for headaches. Hematological: Negative. Psychiatric/Behavioral: Negative.

## 2023-09-07 NOTE — PROGRESS NOTES
[facility] Otolaryngology New Patient visit      Taylor Parmar is a 59 y.o. male who presents with a chief complaint of chronic rhinitis     Independent historian: none      Pertinent elements of the history:  Chronic maxillary sinus pressure   Sneezing   Rhinorrhea   Nasal congestion   Phlegm     Sense of smell is "not too good"     Zyrtec  Flonase daily x years     Year-round allergies   No allergy testing     Diabetic -- ulcers  CKD  HTN  CHF  Afib     In August, treated with PO antibiotic for LE cellulitis   Doxy, Cipro, Keflex. Review of any relevant imaging: images from any scan reviewed personally  Scans: CT head 12/22/20: "Paranasal sinuses and mastoid air cells: Trace mucosal thickening in the right maxillary sinus. Small left maxillary sinus mucus retention cyst. Mild frothy secretions in the left sphenoid sinus."     CT head: 10/15/20: CT maxillofacial: No acute facial bone fracture is identified. The orbits and intraorbital structures appear intact. There is no proptosis or evidence of retrobulbar hemorrhage. There is mild mucosal thickening in the left maxillary sinus and bilateral sphenoid sinuses. There are dental caries.       Labs: HA1C 7.6 07/2023  Notes:     Review of Systems:  As above    PMHx:  Past Medical History:   Diagnosis Date   • Ambulatory dysfunction    • Anemia    • Anxiety    • Arthritis    • Atrial fibrillation ([address])    • CHF (congestive heart failure) ([facility])    • Chronic kidney disease, stage 3 ([facility])    • Chronic ulcer of left foot ([facility])    • COPD with acute exacerbation ([facility])    • Encephalopathy    • History of depression    • History of osteomyelitis    • Hyperkalemia    • Hyperlipidemia    • Hypertension    • Hypoglycemia    • Hypomagnesemia    • Hyponatremia    • Hypothyroidism    • Morbid obesity ([address])    • Pneumonia    • Pulmonary embolism ([facility])    • RLS (restless legs syndrome)    • Sepsis ([address])    • Sleep apnea    • Thrombocytopenia ([facility])    • Type 2 diabetes mellitus [facility])    • Urinary retention    • Vitamin D deficiency         FAMHx:  Family History   Problem Relation Age of Onset   • Cancer Mother         lymphoma   • Hypertension Mother    • Heart disease Father    • Hypertension Father    • Diabetes Sister    • Cancer Maternal Grandmother        SOCHx:  Social History     Socioeconomic History   • Marital status: Single     Spouse name: None   • Number of children: None   • Years of education: None   • Highest education level: None   Occupational History   • None   Tobacco Use   • Smoking status: Never   • Smokeless tobacco: Never   Vaping Use   • Vaping Use: Never used   Substance and Sexual Activity   • Alcohol use: No   • Drug use: No   • Sexual activity: Not Currently     Partners: Female   Other Topics Concern   • None   Social History Narrative   • None     Social Determinants of Health     Financial Resource Strain: Not on file   Food Insecurity: Not on file   Transportation Needs: Not on file   Physical Activity: Not on file   Stress: Not on file   Social Connections: Not on file   Intimate Partner Violence: Not on file   Housing Stability: Not on file       Allergies: Allergies   Allergen Reactions   • Carbamazepine Other (See Comments)     Elevated liver functions -"Enlarges liver"   • Clindamycin Rash   • Phenytoin Itching and Rash   • Pneumococcal Vaccine Hives, Itching and Rash        MEDS:  Reviewed      Physical exam: (abnormal findings appear in bold and supercede any conflicting normal findings listed below)    /60   Pulse 62   Temp 97.6 °F (36.4 °C)   Ht 6' (1.829 m)   Wt (!) 169 kg (372 lb)   SpO2 95%   BMI 50.45 kg/m²     Constitutional:  Well developed, well nourished and groomed, in no acute distress. Eyes:  Extra-ocular movements intact, pupils equally round and reactive to light and accommodation, the lids and conjunctivae are normal in appearance.     Head: Atraumatic, normocephalic, no visible scalp lesions, bony palpation unremarkable without stepoffs, parotid and submandibular salivary glands non-tender to palpation and without masses bilaterally. Ears:  Auricles normal in appearance bilaterally, mastoid prominence non-tender, external auditory canals clear bilaterally, tympanic membranes intact bilaterally without evidence of middle ear effusion or masses, normal appearing ossicles. Nose/Sinuses:  External appearance unremarkable, no maxillary or frontal sinus tenderness to palpation bilaterally. Anterior rhinoscopy demonstrates pink mucosa. No polyps or other masses identified. Turbinates are non-edematous. No evidence of purulent drainage. Oral Cavity:  Moist mucus membranes, gums and dentition unremarkable, no oral mucosal masses or lesions, floor of mouth soft, tongue mobile without masses or lesions. Partially edentulous- poor dentition. Upper maxillary teeth dental caries    Oropharynx:  Base of tongue soft and without masses, tonsils bilaterally unremarkable, soft palate mucosa unremarkable. Neck:  No visible or palpable cervical lesions or lymphadenopathy, thyroid gland is normal in size and symmetry and without masses, normal laryngeal elevation with swallowing. Cardiovascular:  Normal rate and rhythm, no palpable thrills, no jugulovenous distension observed. Respiratory:  Normal respiratory effort without evidence of retractions or use of accessory muscles. Integument:  Normal appearing without observed masses or lesions. Neurologic:  Cranial nerves II-XII intact bilaterally. Psychiatric:  Alert and oriented to time, place and person, normal affect. Procedure:    Nasal Endoscopy    The nasal cavities were decongested with lidocaine and oxymetazoline spray. Eval for chronic sinusitis. Bilateral nasal endoscopy was performed as follows:     Location: Bilateral nasal cavity  Endoscopy type: Flexible nasal endoscopy  Results: Normal inferior turbinate, middle turbinate, middle meatus, sphenoethmoid recess bilaterally except for scant discolored mucus within the natural ostia b/l as well as left sphenoid. Mucus was cultured. Middle meatus clear bilaterally. The patient tolerated the procedure well. Audiometry:  Audiogram ordered on 08/08/23 and reviewed. Mild sloping to severe HF SNHL dB. Word recognition left, 100% at 65 dB, right 92 % at 65 dB. Tympanograms type A bilaterally. Assessment:  1. Chronic maxillary sinusitis  Wound culture and Gram stain    CT sinus wo contrast    fluticasone (FLONASE) 50 mcg/act nasal spray    azelastine (ASTELIN) 0.1 % nasal spray      2. Chronic rhinitis  Ambulatory Referral to Otolaryngology    Wound culture and Gram stain    CT sinus wo contrast    fluticasone (FLONASE) 50 mcg/act nasal spray    azelastine (ASTELIN) 0.1 % nasal spray          Plan:  1. Avis Babb is a 59 y.o. male with acute and chronic problems as above who presents for evaluation of chronic sinus issues. Has been using Flonase for years as well as Zyrtec. Has been treated for lower extremity cellulitis last month with Doxy, Cipro, Keflex. Nasal endoscopy today demonstrates scant frothy mucus within the natural ostia b/l. Cultured from the left os. Middle meatus and nasopharynx is clear. Scant mucus within L sphenoid os. Continue Flonase. Add Astepro and saline irrigation. Will obtain CT sinus for further evaluation. Due to his extensive medical history and comorbidities, he may not be the best surgical candidate. Follow up after imaging to review results. Choice of antibiotic directed by culture results. ** Please Note: Portions of the record may have been created with voice recognition software. Occasional wrong word or "sound a like" substitutions may have occurred due to the inherent limitations of voice recognition software. There may also be notations and random deletions of words or characters from malfunctioning software.  Read the chart carefully and recognize, using context, where substitutions/deletions have occurred. **

## 2023-09-08 ENCOUNTER — TELEPHONE (OUTPATIENT)
Dept: FAMILY MEDICINE CLINIC | Facility: CLINIC | Age: 58
End: 2023-09-08

## 2023-09-08 NOTE — LETTER
September 8, 2023   Re:  Patient: Rich Sanabria  YOB: 1965  Date of Visit: 9/8/2023      To Whom it May Concern:    Raghav Ely is under my professional care. Indications/Risks/Benefits reviewed in regards to the patient holding his Eliquis for necessary Colonoscopy. He is OK to hold the eliquis X 2 days leading into his procedure however should restart this medication as soon as possible following the procedure. If you have any questions or concerns, please don't hesitate to call.          Sincerely,          Jacob Leon, DO

## 2023-09-10 LAB
BACTERIA WND AEROBE CULT: ABNORMAL
GRAM STN SPEC: ABNORMAL
GRAM STN SPEC: ABNORMAL

## 2023-09-12 LAB
DME PARACHUTE DELIVERY DATE REQUESTED: NORMAL
DME PARACHUTE ITEM DESCRIPTION: NORMAL
DME PARACHUTE ITEM DESCRIPTION: NORMAL
DME PARACHUTE ORDER STATUS: NORMAL
DME PARACHUTE SUPPLIER NAME: NORMAL
DME PARACHUTE SUPPLIER PHONE: NORMAL

## 2023-09-13 ENCOUNTER — HOSPITAL ENCOUNTER (OUTPATIENT)
Dept: CT IMAGING | Facility: HOSPITAL | Age: 58
Discharge: HOME | End: 2023-09-13
Payer: COMMERCIAL

## 2023-09-13 DIAGNOSIS — J32.0 CHRONIC MAXILLARY SINUSITIS: ICD-10-CM

## 2023-09-13 DIAGNOSIS — J31.0 CHRONIC RHINITIS: ICD-10-CM

## 2023-09-13 PROCEDURE — G1004 CDSM NDSC: HCPCS

## 2023-09-13 PROCEDURE — 70486 CT MAXILLOFACIAL W/O DYE: CPT

## 2023-09-14 ENCOUNTER — DOCTOR'S OFFICE (OUTPATIENT)
Dept: URBAN - NONMETROPOLITAN AREA CLINIC 1 | Facility: CLINIC | Age: 58
Setting detail: OPHTHALMOLOGY
End: 2023-09-14
Payer: MEDICARE

## 2023-09-14 DIAGNOSIS — H53.16: ICD-10-CM

## 2023-09-14 DIAGNOSIS — H50.331: ICD-10-CM

## 2023-09-14 DIAGNOSIS — H53.2: ICD-10-CM

## 2023-09-14 DIAGNOSIS — E11.3293: ICD-10-CM

## 2023-09-14 PROCEDURE — 99213 OFFICE O/P EST LOW 20 MIN: CPT | Performed by: OPHTHALMOLOGY

## 2023-09-14 ASSESSMENT — CONFRONTATIONAL VISUAL FIELD TEST (CVF)
OS_FINDINGS: FULL
OD_FINDINGS: FULL

## 2023-09-14 ASSESSMENT — REFRACTION_CURRENTRX
OD_CYLINDER: -0.50
OS_OVR_VA: 20/
OS_VPRISM_DIRECTION: BF
OS_CYLINDER: -0.25
OS_OVR_VA: 20/
OD_SPHERE: +0.50
OS_AXIS: 029
OD_AXIS: 018
OS_ADD: +2.75
OD_OVR_VA: 20/
OS_SPHERE: +0.75
OD_ADD: +2.50
OD_VPRISM_DIRECTION: BF
OD_OVR_VA: 20/

## 2023-09-14 ASSESSMENT — REFRACTION_MANIFEST
OD_AXIS: 020
OD_SPHERE: +0.50
OD_VA1: 20/40+2
OD_CYLINDER: -0.50
OS_SPHERE: +0.75
OD_ADD: +2.50
OS_ADD: +2.50
OS_CYLINDER: -0.25
OS_VA2: 20/30-2
OD_VA2: 20/30-2
OS_AXIS: 025
OS_VA1: 20/40+2

## 2023-09-14 ASSESSMENT — AXIALLENGTH_DERIVED
OS_AL: 22.5137
OD_AL: 22.3965
OS_AL: 22.7377
OD_AL: 22.6182

## 2023-09-14 ASSESSMENT — KERATOMETRY
OS_K1POWER_DIOPTERS: 45.00
OS_K2POWER_DIOPTERS: 45.50
OS_AXISANGLE_DEGREES: 012
OD_K2POWER_DIOPTERS: 45.25
OD_K1POWER_DIOPTERS: 46.75
OD_AXISANGLE_DEGREES: 013

## 2023-09-14 ASSESSMENT — REFRACTION_AUTOREFRACTION
OS_SPHERE: +1.50
OD_SPHERE: +1.50
OS_AXIS: 062
OS_CYLINDER: -0.50
OD_AXIS: 179
OD_CYLINDER: -1.25

## 2023-09-14 ASSESSMENT — SPHEQUIV_DERIVED
OD_SPHEQUIV: 0.25
OS_SPHEQUIV: 0.625
OD_SPHEQUIV: 0.875
OS_SPHEQUIV: 1.25

## 2023-09-14 ASSESSMENT — VISUAL ACUITY
OD_BCVA: 20/30-2
OS_BCVA: 20/30

## 2023-09-14 ASSESSMENT — LID EXAM ASSESSMENTS
OD_BLEPHARITIS: 1+
OS_BLEPHARITIS: 1+

## 2023-09-18 DIAGNOSIS — E11.65 TYPE 2 DIABETES MELLITUS WITH HYPERGLYCEMIA, WITH LONG-TERM CURRENT USE OF INSULIN (HCC): ICD-10-CM

## 2023-09-18 DIAGNOSIS — Z79.4 TYPE 2 DIABETES MELLITUS WITH HYPERGLYCEMIA, WITH LONG-TERM CURRENT USE OF INSULIN (HCC): ICD-10-CM

## 2023-09-18 LAB
DME PARACHUTE DELIVERY DATE ACTUAL: NORMAL
DME PARACHUTE DELIVERY DATE REQUESTED: NORMAL
DME PARACHUTE ITEM DESCRIPTION: NORMAL
DME PARACHUTE ITEM DESCRIPTION: NORMAL
DME PARACHUTE ORDER STATUS: NORMAL
DME PARACHUTE SUPPLIER NAME: NORMAL
DME PARACHUTE SUPPLIER PHONE: NORMAL

## 2023-09-18 RX ORDER — INSULIN GLARGINE 100 [IU]/ML
INJECTION, SOLUTION SUBCUTANEOUS
Qty: 15 ML | Refills: 0 | Status: SHIPPED | OUTPATIENT
Start: 2023-09-18

## 2023-09-27 ENCOUNTER — TELEPHONE (OUTPATIENT)
Dept: BEHAVIORAL/MENTAL HEALTH CLINIC | Facility: CLINIC | Age: 58
End: 2023-09-27

## 2023-09-27 NOTE — TELEPHONE ENCOUNTER
Attempted to contact to schedule an appointment for [address] [place] [address] therapy, message was left for the Client to contact this therapist at the [facility] [place] and [phone]

## 2023-10-10 ENCOUNTER — OFFICE VISIT (OUTPATIENT)
Dept: FAMILY MEDICINE CLINIC | Facility: CLINIC | Age: 58
End: 2023-10-10
Payer: COMMERCIAL

## 2023-10-10 VITALS
BODY MASS INDEX: 50.86 KG/M2 | HEART RATE: 64 BPM | DIASTOLIC BLOOD PRESSURE: 54 MMHG | WEIGHT: 315 LBS | SYSTOLIC BLOOD PRESSURE: 130 MMHG | OXYGEN SATURATION: 97 % | TEMPERATURE: 97.8 F

## 2023-10-10 DIAGNOSIS — R53.81 MALAISE AND FATIGUE: ICD-10-CM

## 2023-10-10 DIAGNOSIS — J01.00 ACUTE NON-RECURRENT MAXILLARY SINUSITIS: ICD-10-CM

## 2023-10-10 DIAGNOSIS — R09.81 CONGESTION OF NASAL SINUS: ICD-10-CM

## 2023-10-10 DIAGNOSIS — R05.1 ACUTE COUGH: Primary | ICD-10-CM

## 2023-10-10 DIAGNOSIS — R53.83 MALAISE AND FATIGUE: ICD-10-CM

## 2023-10-10 PROCEDURE — 87636 SARSCOV2 & INF A&B AMP PRB: CPT | Performed by: FAMILY MEDICINE

## 2023-10-10 PROCEDURE — 99214 OFFICE O/P EST MOD 30 MIN: CPT | Performed by: FAMILY MEDICINE

## 2023-10-10 RX ORDER — CEFDINIR 300 MG/1
300 CAPSULE ORAL EVERY 12 HOURS SCHEDULED
Qty: 14 CAPSULE | Refills: 0 | Status: SHIPPED | OUTPATIENT
Start: 2023-10-10 | End: 2023-10-17

## 2023-10-10 NOTE — PROGRESS NOTES
Name: Nick Lyles      : 1965      MRN: [id number]  Encounter Provider: Fidelina SpannDO  Encounter Date: 10/10/2023   Encounter department: [address]     1. Acute cough  Covid/Flu- Office Collect      2. Congestion of nasal sinus  Covid/Flu- Office Collect      3. Malaise and fatigue  Covid/Flu- Office Collect      4. Acute non-recurrent maxillary sinusitis  cefdinir (OMNICEF) 300 mg capsule        Flu/covid swab sent given high risk nature. Will tx in the meantime with cefdinir given symptoms and timing. Rest/hydration and supportive meds prn. He is on board. Patient/Caretaker verbalized understanding and were in agreement with today's assessment and plan. Time was taken to address any questions patient/caretaker had. Indication/Risks/Benefits of medication(s) as prescribed were discussed with the patient/caretaker. The patient verbalized understanding and agreement and elects to take medications as prescribed. Time was taken to answer any questions the patient/caretaker may have had. Return for keep f/u on 10/25 as scheduled . Chief Complaint   Patient presents with   • Cold Like Symptoms     Pt complaining of sore throat, congestion, runny nose, post nasal drip. Denies any fevers. Started Thursday. Has been taking OTC tylenol. Took a home covid test on Saturday and that was negative. • Blood Sugar Problem     Notes his blood sugar was 550 Tuesday night into Wednesday. He isn't sure if it is from being sick or if its from his iron infusion. Subjective      Patient here with sore throat and nasal congestion and sinus pressure. This has been worse than his baseline sinus pressure since he is not feeling well. He did have a negative Covid test but this was . He does have temperature dysregulation -- warm and then cold, no documented fevers as he did not check. He did have his Flu shot last Monday.   He had iron infusion last Monday as well and this caused his blood sugar to go very high. No n/v/d. No belly pain. Review of Systems   All other systems reviewed and are negative. Current Outpatient Medications on File Prior to Visit   Medication Sig   • acetaminophen (TYLENOL) 650 mg CR tablet Take 1 tablet (650 mg total) by mouth every 8 (eight) hours as needed for mild pain or moderate pain   • albuterol (PROVENTIL HFA,VENTOLIN HFA) 90 mcg/act inhaler Inhale 2 puffs every 6 (six) hours as needed for wheezing   • atorvastatin (LIPITOR) 40 mg tablet TAKE 1 TABLET BY MOUTH ONCE DAILY. • azelastine (ASTELIN) 0.1 % nasal spray 2 sprays into each nostril in the morning   • Blood Glucose Monitoring Suppl (OneTouch Verio Reflect) w/Device KIT Check blood sugars four times daily. Please substitute with appropriate alternative as covered by patient's insurance. Dx: E11.65   • cetirizine (ZyrTEC) 10 mg tablet TAKE 1 TABLET BY MOUTH ONCE DAILY. • citalopram (CeleXA) 40 mg tablet TAKE 1 TABLET BY MOUTH ONCE DAILY. • clonazePAM (KlonoPIN) 0.5 mg tablet Take 0.5 mg by mouth every 12 (twelve) hours as needed   • docusate sodium (COLACE) 100 mg capsule Take 100 mg by mouth 2 (two) times a day   • Easy Touch Pen Needles 31G X 8 MM MISC Use 1 each 3 (three) times a day   • Eliquis 5 MG TAKE 1 TABLET BY MOUTH TWO TIMES A DAY. • Empagliflozin (JARDIANCE) 10 MG TABS tablet Take 10 mg by mouth every morning   • ferrous sulfate 325 (65 Fe) mg tablet Take 325 mg by mouth 2 (two) times a day with meals   • fluticasone (FLONASE) 50 mcg/act nasal spray 2 sprays into each nostril daily   • folic acid (FOLVITE) 1 mg tablet Take 1 tablet (1 mg total) by mouth daily   • furosemide (LASIX) 40 mg tablet Take 1 tablet (40 mg total) by mouth daily   • gabapentin (NEURONTIN) 600 MG tablet Take 600 mg by mouth 2 (two) times a day   • glucose blood (OneTouch Verio) test strip Check blood sugars four times daily.  Please substitute with appropriate alternative as covered by patient's insurance. Dx: E11.65   • insulin aspart (NovoLOG FlexPen) 100 UNIT/ML injection pen Inject 26 Units under the skin 3 (three) times a day with meals Per sliding scale - managed by Dr. Mo Orf + scheduled   • lamoTRIgine (LaMICtal) 25 mg tablet Take 1 tablet (25 mg total) by mouth in the morning and 1 tablet (25 mg total) in the evening. • Lantus SoloStar 100 units/mL SOPN INJECT 56 UNITS TOTAL UNDER THE SKIN DAILY   • latanoprost (XALATAN) 0.005 % ophthalmic solution Administer 0.005 drops to both eyes   • levothyroxine 300 MCG tablet Take 1 tablet (300 mcg total) by mouth daily Take with 50 mcg tablet   • levothyroxine 50 mcg tablet TAKE 1 TAB DAILY IN EARLY MORNING ALONG WITH 300MCG TAB   • Linzess 290 MCG CAPS TAKE 1 CAPSULE BY MOUTH ONCE DAILY. • lisinopril (ZESTRIL) 2.5 mg tablet TAKE 1 TABLET BY MOUTH ONCE DAILY. • magnesium Oxide (MAG-OX) 400 mg TABS TAKE 1 TABLET BY MOUTH TWO TIMES A DAY. • OneTouch Delica Lancets 06F MISC Check blood sugars four times daily. Please substitute with appropriate alternative as covered by patient's insurance. Dx: E11.65   • pantoprazole (PROTONIX) 40 mg tablet TAKE 1 TABLET BY MOUTH ONCE DAILY. • potassium chloride (K-DUR,KLOR-CON) 20 mEq tablet TAKE 1 TABLET BY MOUTH EACH MORNING.    • Semaglutide, 1 MG/DOSE, (Ozempic, 1 MG/DOSE,) 2 MG/1.5ML SOPN Inject 1 mg under the skin once a week   • tamsulosin (FLOMAX) 0.4 mg TAKE 1 CAP DAILY WITH DINNER   • timolol (TIMOPTIC) 0.5 % ophthalmic solution Administer 1 drop to the right eye 2 (two) times a day   • tobramycin-dexamethasone (TOBRADEX) ophthalmic suspension    • clobetasol propionate (Clobex) 0.05 % shampoo Apply 1 application topically 3 (three) times a week   • ergocalciferol (ERGOCALCIFEROL) 1.25 MG (84549 UT) capsule Take 1 capsule (50,000 Units total) by mouth once a week   • ketoconazole (NIZORAL) 2 % shampoo Apply 1 application topically 2 (two) times a week for 8 doses   • [DISCONTINUED] benzonatate (TESSALON) 200 MG capsule Take 200 mg by mouth 3 (three) times a day as needed (Patient not taking: Reported on 9/7/2023)   • [DISCONTINUED] cephalexin (KEFLEX) 500 mg capsule  (Patient not taking: Reported on 10/10/2023)   • [DISCONTINUED] ciprofloxacin (CIPRO) 250 mg tablet  (Patient not taking: Reported on 10/10/2023)   • [DISCONTINUED] doxycycline hyclate (VIBRAMYCIN) 100 mg capsule  (Patient not taking: Reported on 10/10/2023)   • [DISCONTINUED] GaviLyte-G 236 g solution  (Patient not taking: Reported on 10/10/2023)   • [DISCONTINUED] Probiotic Product (Probiotic Daily) CAPS Take by mouth (Patient not taking: Reported on 10/10/2023)   • [DISCONTINUED] senna (SENOKOT) 8.6 MG tablet Take 1 tablet by mouth 2 (two) times a day (Patient not taking: Reported on 10/10/2023)       Objective     /54 (BP Location: Left arm, Patient Position: Sitting)   Pulse 64   Temp 97.8 °F (36.6 °C) (Tympanic)   Wt (!) 170 kg (375 lb)   SpO2 97%   BMI 50.86 kg/m²     Physical Exam  Vitals and nursing note reviewed. Constitutional:       Appearance: He is obese. HENT:      Head: Normocephalic and atraumatic. Right Ear: Tympanic membrane and ear canal normal.      Left Ear: Tympanic membrane and ear canal normal.      Nose:      Comments: Boggy, erythematous turbinates b/l        Mouth/Throat:      Comments: Mucus streaking at the posterior oropharynx with mild erythema     Eyes:      Conjunctiva/sclera: Conjunctivae normal.   Neck:      Comments: Short, thick neck    Cardiovascular:      Rate and Rhythm: Normal rate and regular rhythm. Pulmonary:      Effort: Pulmonary effort is normal.      Breath sounds: Normal breath sounds. No wheezing, rhonchi or rales. Abdominal:      General: Bowel sounds are normal.      Palpations: Abdomen is soft. Musculoskeletal:      Cervical back: Neck supple.       Comments: Uses a rollator     Lymphadenopathy:      Cervical: No cervical adenopathy. Skin:     General: Skin is warm and dry. Neurological:      General: No focal deficit present. Mental Status: He is alert and oriented to person, place, and time. Psychiatric:         Mood and Affect: Mood normal.         Behavior: Behavior normal.         Thought Content:  Thought content normal.         Judgment: Judgment normal.       Saba Tay Section, DO

## 2023-10-11 LAB
FLUAV RNA RESP QL NAA+PROBE: NEGATIVE
FLUBV RNA RESP QL NAA+PROBE: NEGATIVE
SARS-COV-2 RNA RESP QL NAA+PROBE: NEGATIVE

## 2023-10-16 DIAGNOSIS — F32.2 SEVERE DEPRESSION (HCC): ICD-10-CM

## 2023-10-16 RX ORDER — CITALOPRAM 40 MG/1
TABLET ORAL
Qty: 90 TABLET | Refills: 0 | Status: SHIPPED | OUTPATIENT
Start: 2023-10-16

## 2023-10-24 DIAGNOSIS — I48.91 ATRIAL FIBRILLATION, UNSPECIFIED TYPE (HCC): ICD-10-CM

## 2023-10-24 RX ORDER — APIXABAN 5 MG/1
TABLET, FILM COATED ORAL
Qty: 180 TABLET | Refills: 1 | Status: SHIPPED | OUTPATIENT
Start: 2023-10-24

## 2023-11-02 ENCOUNTER — TELEPHONE (OUTPATIENT)
Dept: BEHAVIORAL/MENTAL HEALTH CLINIC | Facility: CLINIC | Age: 58
End: 2023-11-02

## 2023-11-02 NOTE — TELEPHONE ENCOUNTER
Attempted to contact the Client to see if he is still intrusted in [facility] service prior phone calls have been made without response.  The Client was asked to contact this therapist at the [address] [place] [address] at [phone]

## 2023-11-14 DIAGNOSIS — E61.2 MAGNESIUM DEFICIENCY: ICD-10-CM

## 2023-11-14 RX ORDER — LANOLIN ALCOHOL/MO/W.PET/CERES
CREAM (GRAM) TOPICAL
Qty: 180 TABLET | Refills: 1 | Status: SHIPPED | OUTPATIENT
Start: 2023-11-14

## 2023-11-20 DIAGNOSIS — K58.9 IRRITABLE BOWEL SYNDROME, UNSPECIFIED TYPE: ICD-10-CM

## 2023-11-21 RX ORDER — LINACLOTIDE 290 UG/1
CAPSULE, GELATIN COATED ORAL
Qty: 90 CAPSULE | Refills: 1 | Status: SHIPPED | OUTPATIENT
Start: 2023-11-21

## 2023-11-27 ENCOUNTER — TELEPHONE (OUTPATIENT)
Dept: FAMILY MEDICINE CLINIC | Facility: CLINIC | Age: 58
End: 2023-11-27

## 2023-11-27 DIAGNOSIS — E11.65 TYPE 2 DIABETES MELLITUS WITH HYPERGLYCEMIA, WITH LONG-TERM CURRENT USE OF INSULIN (HCC): ICD-10-CM

## 2023-11-27 DIAGNOSIS — Z79.4 TYPE 2 DIABETES MELLITUS WITH HYPERGLYCEMIA, WITH LONG-TERM CURRENT USE OF INSULIN (HCC): ICD-10-CM

## 2023-11-28 ENCOUNTER — DOCUMENTATION (OUTPATIENT)
Dept: BEHAVIORAL/MENTAL HEALTH CLINIC | Facility: CLINIC | Age: 58
End: 2023-11-28

## 2023-11-28 DIAGNOSIS — L03.116 CELLULITIS OF LEFT LEG: Primary | ICD-10-CM

## 2023-11-28 RX ORDER — CEPHALEXIN 500 MG/1
500 CAPSULE ORAL EVERY 6 HOURS SCHEDULED
Qty: 28 CAPSULE | Refills: 0 | Status: SHIPPED | OUTPATIENT
Start: 2023-11-28 | End: 2023-12-05

## 2023-11-28 RX ORDER — INSULIN GLARGINE 100 [IU]/ML
INJECTION, SOLUTION SUBCUTANEOUS
Qty: 15 ML | Refills: 1 | Status: SHIPPED | OUTPATIENT
Start: 2023-11-28

## 2023-11-28 NOTE — PROGRESS NOTES
Psychotherapy Discharge Summary    Preferred Name: Libia Martinez  YOB: 1965    Admission date to psychotherapy: 08/29/22    Referred by: PCP    Presenting Problem: Anxiety and depression     Course of treatment included : individual therapy     Progress/Outcome of Treatment Goals (brief summary of course of treatment) to address the client's depression and anxiety as well as weaknesses in his different relationships and environments    Treatment Complications (if any): lack of attendance    Treatment Progress: poor    Current [facility] Psychiatric Provider: only for [address] [place] [address] therapy with the therapist  Brooke REARDON LCSW at [phone]     Discharge Medications include: as found in epic    Discharge Date: 11/28/23    Discharge Diagnosis: No diagnosis found. Criteria for Discharge:  Client missed a number of appointments attempt was made to reach out to the client to continue services    Aftercare recommendations include (include specific referral names and phone numbers, if appropriate): to return to [address] [place] [address] therapy if needed.  Client is aware and has been given Phone number for [address] [place], [place] [address] was given to the Client/ [phone]    Prognosis: poor

## 2023-11-28 NOTE — PROGRESS NOTES
Letter was sent to the client's residence on file notifying him that he will be discharged from [address] [place] [address].

## 2023-12-03 ENCOUNTER — VBI (OUTPATIENT)
Dept: ADMINISTRATIVE | Facility: OTHER | Age: 58
End: 2023-12-03

## 2023-12-05 DIAGNOSIS — R39.11 BENIGN PROSTATIC HYPERPLASIA WITH URINARY HESITANCY: ICD-10-CM

## 2023-12-05 DIAGNOSIS — E66.01 MORBID OBESITY (HCC): ICD-10-CM

## 2023-12-05 DIAGNOSIS — M86.9 OSTEOMYELITIS, UNSPECIFIED SITE, UNSPECIFIED TYPE (HCC): ICD-10-CM

## 2023-12-05 DIAGNOSIS — I48.91 ATRIAL FIBRILLATION, UNSPECIFIED TYPE (HCC): ICD-10-CM

## 2023-12-05 DIAGNOSIS — E11.621 DIABETIC ULCER OF FOOT ASSOCIATED WITH TYPE 2 DIABETES MELLITUS, WITH OTHER ULCER SEVERITY, UNSPECIFIED LATERALITY, UNSPECIFIED PART OF FOOT (HCC): ICD-10-CM

## 2023-12-05 DIAGNOSIS — J44.1 COPD WITH ACUTE EXACERBATION (HCC): ICD-10-CM

## 2023-12-05 DIAGNOSIS — L97.508 DIABETIC ULCER OF FOOT ASSOCIATED WITH TYPE 2 DIABETES MELLITUS, WITH OTHER ULCER SEVERITY, UNSPECIFIED LATERALITY, UNSPECIFIED PART OF FOOT (HCC): ICD-10-CM

## 2023-12-05 DIAGNOSIS — N40.1 BENIGN PROSTATIC HYPERPLASIA WITH URINARY HESITANCY: ICD-10-CM

## 2023-12-05 DIAGNOSIS — I87.332 CHRONIC VENOUS HYPERTENSION (IDIOPATHIC) WITH ULCER AND INFLAMMATION OF LEFT LOWER EXTREMITY (HCC): ICD-10-CM

## 2023-12-05 DIAGNOSIS — I50.9 CONGESTIVE HEART FAILURE, UNSPECIFIED HF CHRONICITY, UNSPECIFIED HEART FAILURE TYPE (HCC): ICD-10-CM

## 2023-12-05 RX ORDER — TAMSULOSIN HYDROCHLORIDE 0.4 MG/1
CAPSULE ORAL
Qty: 90 CAPSULE | Refills: 1 | Status: SHIPPED | OUTPATIENT
Start: 2023-12-05

## 2023-12-14 ENCOUNTER — DOCTOR'S OFFICE (OUTPATIENT)
Dept: URBAN - NONMETROPOLITAN AREA CLINIC 1 | Facility: CLINIC | Age: 58
Setting detail: OPHTHALMOLOGY
End: 2023-12-14
Payer: MEDICARE

## 2023-12-14 DIAGNOSIS — H40.1132: ICD-10-CM

## 2023-12-14 DIAGNOSIS — E11.3293: ICD-10-CM

## 2023-12-14 DIAGNOSIS — H43.393: ICD-10-CM

## 2023-12-14 LAB
LEFT EYE DIABETIC RETINOPATHY: POSITIVE
RIGHT EYE DIABETIC RETINOPATHY: POSITIVE

## 2023-12-14 PROCEDURE — 99214 OFFICE O/P EST MOD 30 MIN: CPT | Performed by: OPHTHALMOLOGY

## 2023-12-14 PROCEDURE — 92134 CPTRZ OPH DX IMG PST SGM RTA: CPT | Performed by: OPHTHALMOLOGY

## 2023-12-14 ASSESSMENT — REFRACTION_CURRENTRX
OD_AXIS: 018
OD_OVR_VA: 20/
OD_VPRISM_DIRECTION: BF
OD_CYLINDER: -0.50
OS_CYLINDER: -0.25
OS_SPHERE: +0.75
OD_ADD: +2.50
OS_OVR_VA: 20/
OS_AXIS: 029
OS_ADD: +2.75
OS_VPRISM_DIRECTION: BF
OD_SPHERE: +0.50

## 2023-12-14 ASSESSMENT — LID EXAM ASSESSMENTS
OD_BLEPHARITIS: 1+
OS_BLEPHARITIS: 1+

## 2023-12-14 ASSESSMENT — REFRACTION_AUTOREFRACTION
OS_AXIS: 062
OD_AXIS: 179
OD_CYLINDER: -1.25
OD_SPHERE: +1.50
OS_CYLINDER: -0.50
OS_SPHERE: +1.50

## 2023-12-14 ASSESSMENT — REFRACTION_MANIFEST
OD_VA1: 20/40+2
OD_VA2: 20/30-2
OD_AXIS: 020
OS_VA2: 20/30-2
OD_ADD: +2.50
OS_AXIS: 025
OD_CYLINDER: -0.50
OS_VA1: 20/40+2
OS_CYLINDER: -0.25
OD_SPHERE: +0.50
OS_SPHERE: +0.75
OS_ADD: +2.50

## 2023-12-14 ASSESSMENT — CONFRONTATIONAL VISUAL FIELD TEST (CVF)
OD_FINDINGS: FULL
OS_FINDINGS: FULL

## 2023-12-14 ASSESSMENT — SPHEQUIV_DERIVED
OD_SPHEQUIV: 0.875
OS_SPHEQUIV: 0.625
OS_SPHEQUIV: 1.25
OD_SPHEQUIV: 0.25

## 2023-12-27 ENCOUNTER — TELEPHONE (OUTPATIENT)
Dept: FAMILY MEDICINE CLINIC | Facility: CLINIC | Age: 58
End: 2023-12-27

## 2023-12-27 DIAGNOSIS — J06.9 UPPER RESPIRATORY TRACT INFECTION, UNSPECIFIED TYPE: Primary | ICD-10-CM

## 2023-12-27 RX ORDER — AMOXICILLIN AND CLAVULANATE POTASSIUM 875; 125 MG/1; MG/1
1 TABLET, FILM COATED ORAL EVERY 12 HOURS SCHEDULED
Qty: 20 TABLET | Refills: 0 | Status: SHIPPED | OUTPATIENT
Start: 2023-12-27 | End: 2024-01-06

## 2023-12-27 NOTE — TELEPHONE ENCOUNTER
Patient called asking if you can call in something for him because he has cold symptoms.  He has a cough & burning in his chest & throat since Sunday.  He has a short procedure scheduled for next week & if he is still sick, he will not be able to have it done.

## 2023-12-29 DIAGNOSIS — Z79.4 TYPE 2 DIABETES MELLITUS WITH HYPERGLYCEMIA, WITH LONG-TERM CURRENT USE OF INSULIN (HCC): ICD-10-CM

## 2023-12-29 DIAGNOSIS — E11.65 TYPE 2 DIABETES MELLITUS WITH HYPERGLYCEMIA, WITH LONG-TERM CURRENT USE OF INSULIN (HCC): ICD-10-CM

## 2024-01-02 RX ORDER — INSULIN GLARGINE 100 [IU]/ML
INJECTION, SOLUTION SUBCUTANEOUS
Qty: 15 ML | Refills: 1 | Status: SHIPPED | OUTPATIENT
Start: 2024-01-02

## 2024-01-11 ENCOUNTER — OFFICE VISIT (OUTPATIENT)
Dept: FAMILY MEDICINE CLINIC | Facility: CLINIC | Age: 59
End: 2024-01-11
Payer: COMMERCIAL

## 2024-01-11 ENCOUNTER — TELEPHONE (OUTPATIENT)
Age: 59
End: 2024-01-11

## 2024-01-11 VITALS
OXYGEN SATURATION: 96 % | WEIGHT: 315 LBS | SYSTOLIC BLOOD PRESSURE: 118 MMHG | BODY MASS INDEX: 42.66 KG/M2 | TEMPERATURE: 96.7 F | HEART RATE: 80 BPM | HEIGHT: 72 IN | DIASTOLIC BLOOD PRESSURE: 60 MMHG

## 2024-01-11 DIAGNOSIS — L81.9 DISCOLORATION OF SKIN OF LOWER LEG: ICD-10-CM

## 2024-01-11 DIAGNOSIS — U07.1 COVID-19: Primary | ICD-10-CM

## 2024-01-11 DIAGNOSIS — M79.89 SWELLING OF LOWER LEG: ICD-10-CM

## 2024-01-11 DIAGNOSIS — M79.604 BILATERAL LEG PAIN: ICD-10-CM

## 2024-01-11 DIAGNOSIS — M79.605 BILATERAL LEG PAIN: ICD-10-CM

## 2024-01-11 PROCEDURE — 99213 OFFICE O/P EST LOW 20 MIN: CPT

## 2024-01-11 RX ORDER — LACTOBACILLUS ACIDOPHILUS 500MM CELL
CAPSULE ORAL
COMMUNITY

## 2024-01-11 RX ORDER — NYSTATIN 100000 [USP'U]/G
POWDER TOPICAL 2 TIMES DAILY
COMMUNITY

## 2024-01-11 RX ORDER — PREDNISONE 20 MG/1
40 TABLET ORAL DAILY
Qty: 10 TABLET | Refills: 0 | Status: SHIPPED | OUTPATIENT
Start: 2024-01-11 | End: 2024-01-16

## 2024-01-11 RX ORDER — SEMAGLUTIDE 1.34 MG/ML
INJECTION, SOLUTION SUBCUTANEOUS
COMMUNITY
Start: 2023-11-14

## 2024-01-11 NOTE — TELEPHONE ENCOUNTER
Deniz from [facility] in [place], called to report that the patient refused to be seen by vascular surgery in [place]. Patient stated that he uses transportation services and can't leave the [facility]. Deniz feels that [facility] transportation would take the patient. Patient will [facility]. Deniz suggested perhaps [facility]. Please advise and call the patient.

## 2024-01-11 NOTE — PROGRESS NOTES
Name: Juan Antonio Guy      : 1965      MRN: [id number]  Encounter Provider: Amilcar Appiah PA-C  Encounter Date: 2024   Encounter department: [facility]    Assessment & Plan     1. COVID-19  -     predniSONE 20 mg tablet; Take 2 tablets (40 mg total) by mouth daily for 5 days    2. Bilateral leg pain  -      VAS VENOUS DUPLEX - LOWER LIMB BILATERAL; Future; Expected date: 2024  -     Ambulatory Referral to Vascular Surgery; Future    3. Discoloration of skin of lower leg  -      VAS VENOUS DUPLEX - LOWER LIMB BILATERAL; Future; Expected date: 2024  -     Ambulatory Referral to Vascular Surgery; Future    4. Swelling of lower leg  -      VAS VENOUS DUPLEX - LOWER LIMB BILATERAL; Future; Expected date: 2024  -     Ambulatory Referral to Vascular Surgery; Future    Patient sent for ultrasound to rule out possible clot in legs.  Also is sent to assess for venous function and bilateral legs.  Patient referred to vascular surgery to be assessed for venous stasis.       Subjective      Patient is a 58-year-old male that presents with COVID symptoms since 2023 and leg pain for few days.  Patient tested positive for COVID on 2023.    Leg Pain   There was no injury mechanism. The pain is present in the right leg and left leg. The quality of the pain is described as aching and cramping. The pain is moderate. The pain has been Fluctuating since onset. Pertinent negatives include no inability to bear weight, loss of motion, loss of sensation, muscle weakness, numbness or tingling. Nothing aggravates the symptoms. He has tried nothing for the symptoms.   URI   This is a new problem. The current episode started 1 to 4 weeks ago (Since 23). The problem has been unchanged. There has been no fever. Associated symptoms include congestion, coughing, rhinorrhea and sinus pain. Pertinent negatives include no abdominal pain, chest pain, diarrhea, dysuria, ear  pain, headaches, joint pain, joint swelling, nausea, neck pain, plugged ear sensation, rash, sneezing, sore throat, swollen glands, vomiting or wheezing. He has tried decongestant (Flonase and Azelastine.) for the symptoms. The treatment provided mild relief.     Review of Systems   Constitutional:  Negative for appetite change, chills, diaphoresis, fatigue and fever.   HENT:  Positive for congestion, postnasal drip, rhinorrhea, sinus pressure and sinus pain. Negative for ear discharge, ear pain, sneezing and sore throat.    Eyes:  Negative for pain, discharge, redness, itching and visual disturbance.   Respiratory:  Positive for cough. Negative for apnea, chest tightness, shortness of breath and wheezing.    Cardiovascular:  Positive for leg swelling. Negative for chest pain and palpitations.   Gastrointestinal:  Negative for abdominal pain, blood in stool, constipation, diarrhea, nausea and vomiting.   Endocrine: Negative for cold intolerance, heat intolerance, polydipsia and polyuria.   Genitourinary:  Negative for dysuria, flank pain, frequency, hematuria and urgency.   Musculoskeletal:  Negative for arthralgias, back pain, joint pain, myalgias, neck pain and neck stiffness.   Skin:  Positive for color change (Bilateral lower leg discoloration.). Negative for rash.   Neurological:  Negative for dizziness, tingling, tremors, seizures, syncope, speech difficulty, weakness, light-headedness, numbness and headaches.   Hematological:  Negative for adenopathy. Does not bruise/bleed easily.   Psychiatric/Behavioral:  Negative for agitation, behavioral problems, confusion, decreased concentration, dysphoric mood, hallucinations, self-injury, sleep disturbance and suicidal ideas. The patient is not nervous/anxious and is not hyperactive.        Current Outpatient Medications on File Prior to Visit   Medication Sig    acetaminophen (TYLENOL) 650 mg CR tablet Take 1 tablet (650 mg total) by mouth every 8 (eight) hours as  needed for mild pain or moderate pain (Patient taking differently: Take 1,000 mg by mouth every 8 (eight) hours as needed for mild pain or moderate pain)    Acidophilus Lactobacillus CAPS Take by mouth    albuterol (PROVENTIL HFA,VENTOLIN HFA) 90 mcg/act inhaler Inhale 2 puffs every 6 (six) hours as needed for wheezing    atorvastatin (LIPITOR) 40 mg tablet TAKE 1 TABLET BY MOUTH ONCE DAILY. (Patient taking differently: daily at bedtime)    azelastine (ASTELIN) 0.1 % nasal spray 2 sprays into each nostril in the morning (Patient taking differently: 2 sprays into each nostril 2 (two) times a day)    Blood Glucose Monitoring Suppl (OneTouch Verio Reflect) w/Device KIT Check blood sugars four times daily. Please substitute with appropriate alternative as covered by patient's insurance. Dx: E11.65    cetirizine (ZyrTEC) 10 mg tablet TAKE 1 TABLET BY MOUTH ONCE DAILY.    citalopram (CeleXA) 40 mg tablet TAKE 1 TABLET BY MOUTH ONCE DAILY. (Patient taking differently: daily at bedtime)    clonazePAM (KlonoPIN) 0.5 mg tablet Take 0.5 mg by mouth every 12 (twelve) hours as needed    docusate sodium (COLACE) 100 mg capsule Take 100 mg by mouth 2 (two) times a day    Easy Touch Pen Needles 31G X 8 MM MISC Use 1 each 3 (three) times a day    Eliquis 5 MG TAKE 1 TABLET BY MOUTH TWO TIMES A DAY.    Empagliflozin (JARDIANCE) 10 MG TABS tablet Take 10 mg by mouth every morning    ferrous sulfate 325 (65 Fe) mg tablet Take 325 mg by mouth daily with breakfast    fluticasone (FLONASE) 50 mcg/act nasal spray 2 sprays into each nostril daily    folic acid (FOLVITE) 1 mg tablet TAKE 1 TABLET BY MOUTH ONCE DAILY.    furosemide (LASIX) 40 mg tablet Take 1 tablet (40 mg total) by mouth daily    gabapentin (NEURONTIN) 600 MG tablet Take 600 mg by mouth 2 (two) times a day    glucose blood (OneTouch Verio) test strip Check blood sugars four times daily. Please substitute with appropriate alternative as covered by patient's insurance. Dx:  E11.65    insulin aspart (NovoLOG FlexPen) 100 UNIT/ML injection pen Inject 26 Units under the skin 3 (three) times a day with meals Per sliding scale - managed by Dr. Tabares + scheduled    lamoTRIgine (LaMICtal) 25 mg tablet Take 1 tablet (25 mg total) by mouth in the morning and 1 tablet (25 mg total) in the evening.    Lantus SoloStar 100 units/mL SOPN INJECT 56 UNITS TOTAL UNDER THE SKIN DAILY    latanoprost (XALATAN) 0.005 % ophthalmic solution Administer 0.005 drops to both eyes    levothyroxine 300 MCG tablet Take 1 tablet (300 mcg total) by mouth daily Take with 50 mcg tablet    levothyroxine 50 mcg tablet TAKE 1 TAB DAILY IN EARLY MORNING ALONG WITH 300MCG TAB    Linzess 290 MCG CAPS TAKE 1 CAPSULE BY MOUTH ONCE DAILY.    lisinopril (ZESTRIL) 2.5 mg tablet TAKE 1 TABLET BY MOUTH ONCE DAILY.    magnesium Oxide (MAG-OX) 400 mg TABS TAKE 1 TABLET BY MOUTH TWO TIMES A DAY.    nystatin (MYCOSTATIN) powder Apply topically 2 (two) times a day    OneTouch Delica Lancets 33G MISC Check blood sugars four times daily. Please substitute with appropriate alternative as covered by patient's insurance. Dx: E11.65    pantoprazole (PROTONIX) 40 mg tablet TAKE 1 TABLET BY MOUTH ONCE DAILY.    potassium chloride (K-DUR,KLOR-CON) 20 mEq tablet TAKE 1 TABLET BY MOUTH EACH MORNING.    Semaglutide, 1 MG/DOSE, (Ozempic, 1 MG/DOSE,) 2 MG/1.5ML SOPN Inject 1 mg under the skin once a week    tamsulosin (FLOMAX) 0.4 mg TAKE 1 CAPSULE DAILY WITH DINNER    timolol (TIMOPTIC) 0.5 % ophthalmic solution Administer 1 drop to the right eye 2 (two) times a day    tobramycin-dexamethasone (TOBRADEX) ophthalmic suspension     clobetasol propionate (Clobex) 0.05 % shampoo Apply 1 application topically 3 (three) times a week    ergocalciferol (ERGOCALCIFEROL) 1.25 MG (49683 UT) capsule Take 1 capsule (50,000 Units total) by mouth once a week    ketoconazole (NIZORAL) 2 % shampoo Apply 1 application topically 2 (two) times a week for 8 doses     Ozempic, 1 MG/DOSE, 4 MG/3ML injection pen  (Patient not taking: Reported on 1/11/2024)       Objective     /60 (BP Location: Left arm, Patient Position: Sitting)   Pulse 80   Temp (!) 96.7 °F (35.9 °C) (Tympanic)   Ht 6' (1.829 m)   Wt (!) 168 kg (369 lb 9.6 oz)   SpO2 96%   BMI 50.13 kg/m²     Physical Exam  Vitals and nursing note reviewed.   Constitutional:       General: He is not in acute distress.     Appearance: Normal appearance. He is obese. He is not ill-appearing or toxic-appearing.   HENT:      Head: Normocephalic and atraumatic.      Right Ear: Tympanic membrane normal.      Left Ear: Tympanic membrane normal.      Nose: Congestion and rhinorrhea present.      Mouth/Throat:      Mouth: Mucous membranes are moist.      Pharynx: Oropharynx is clear. No oropharyngeal exudate or posterior oropharyngeal erythema.   Eyes:      Extraocular Movements: Extraocular movements intact.      Conjunctiva/sclera: Conjunctivae normal.      Pupils: Pupils are equal, round, and reactive to light.   Cardiovascular:      Rate and Rhythm: Normal rate and regular rhythm.      Pulses: Normal pulses.      Heart sounds: Normal heart sounds. No murmur heard.  Pulmonary:      Effort: Pulmonary effort is normal. No respiratory distress.      Breath sounds: No stridor. Wheezing present. No rhonchi or rales.   Chest:      Chest wall: No tenderness.   Abdominal:      General: Bowel sounds are normal.      Palpations: Abdomen is soft.   Musculoskeletal:         General: Swelling present. No tenderness, deformity or signs of injury.      Cervical back: Normal range of motion and neck supple. No tenderness.      Right lower leg: Edema present.      Left lower leg: Edema present.   Lymphadenopathy:      Cervical: No cervical adenopathy.   Skin:     General: Skin is warm.      Capillary Refill: Capillary refill takes less than 2 seconds.      Findings: Rash (Color change that looks like a venous dermaitits.) present. No  erythema or lesion.      Comments: Color change in bilateral lower legs indicative of possible vascular problem.   Neurological:      General: No focal deficit present.      Mental Status: He is alert and oriented to person, place, and time. Mental status is at baseline.      Cranial Nerves: No cranial nerve deficit.      Motor: No weakness.      Coordination: Coordination normal.      Gait: Gait abnormal (Limping Due to back and leg pain.).   Psychiatric:         Mood and Affect: Mood normal.         Behavior: Behavior normal.         Thought Content: Thought content normal.         Judgment: Judgment normal.       Amilcar Appiah PA-C

## 2024-01-11 NOTE — ASSESSMENT & PLAN NOTE
Patient prescribed prednisone burst.  Patient also encouraged to continue use of nasal sprays and albuterol as needed.  Patient to follow-up within 1 week if symptoms worsen or do not improve.  Patient educated on signs and symptoms of pneumonia.  Patient is to go to ER if these present.

## 2024-01-12 NOTE — TELEPHONE ENCOUNTER
Spoke to patient and let him know that there is a Vascular Doctor at [facility] in [place].  I advised him to ask [facility] if they would be willing to take him there.  He agreed.  Faxed referral to [facility].

## 2024-01-15 DIAGNOSIS — I87.332 CHRONIC VENOUS HYPERTENSION (IDIOPATHIC) WITH ULCER AND INFLAMMATION OF LEFT LOWER EXTREMITY (HCC): ICD-10-CM

## 2024-01-15 DIAGNOSIS — Z79.4 TYPE 2 DIABETES MELLITUS WITH HYPERGLYCEMIA, WITH LONG-TERM CURRENT USE OF INSULIN (HCC): ICD-10-CM

## 2024-01-15 DIAGNOSIS — E11.65 TYPE 2 DIABETES MELLITUS WITH HYPERGLYCEMIA, WITH LONG-TERM CURRENT USE OF INSULIN (HCC): ICD-10-CM

## 2024-01-15 DIAGNOSIS — I48.91 ATRIAL FIBRILLATION, UNSPECIFIED TYPE (HCC): ICD-10-CM

## 2024-01-15 DIAGNOSIS — I10 ESSENTIAL HYPERTENSION: ICD-10-CM

## 2024-01-15 DIAGNOSIS — I50.9 CONGESTIVE HEART FAILURE, UNSPECIFIED HF CHRONICITY, UNSPECIFIED HEART FAILURE TYPE (HCC): ICD-10-CM

## 2024-01-15 DIAGNOSIS — L97.508 DIABETIC ULCER OF FOOT ASSOCIATED WITH TYPE 2 DIABETES MELLITUS, WITH OTHER ULCER SEVERITY, UNSPECIFIED LATERALITY, UNSPECIFIED PART OF FOOT (HCC): ICD-10-CM

## 2024-01-15 DIAGNOSIS — E66.01 MORBID OBESITY (HCC): ICD-10-CM

## 2024-01-15 DIAGNOSIS — E03.9 HYPOTHYROIDISM, UNSPECIFIED TYPE: ICD-10-CM

## 2024-01-15 DIAGNOSIS — E11.621 DIABETIC ULCER OF FOOT ASSOCIATED WITH TYPE 2 DIABETES MELLITUS, WITH OTHER ULCER SEVERITY, UNSPECIFIED LATERALITY, UNSPECIFIED PART OF FOOT (HCC): ICD-10-CM

## 2024-01-15 DIAGNOSIS — Z86.79 HISTORY OF HEART FAILURE: ICD-10-CM

## 2024-01-16 RX ORDER — POTASSIUM CHLORIDE 20 MEQ/1
TABLET, EXTENDED RELEASE ORAL
Qty: 90 TABLET | Refills: 1 | Status: SHIPPED | OUTPATIENT
Start: 2024-01-16

## 2024-01-17 ENCOUNTER — VBI (OUTPATIENT)
Dept: ADMINISTRATIVE | Facility: OTHER | Age: 59
End: 2024-01-17

## 2024-01-17 NOTE — TELEPHONE ENCOUNTER
Upon review of the In Basket request we were able to locate, review, and update the patient chart as requested for Diabetic Eye Exam.    Any additional questions or concerns should be emailed to the Practice Liaisons via the appropriate education email address, please do not reply via In Basket.    Thank you  Toshia Verdugo

## 2024-01-23 ENCOUNTER — TELEPHONE (OUTPATIENT)
Age: 59
End: 2024-01-23

## 2024-01-23 NOTE — TELEPHONE ENCOUNTER
Patient called inquiring about medication request he asked for. No knew information to give him. Please call patient and advise. [phone]

## 2024-01-23 NOTE — TELEPHONE ENCOUNTER
Last visit 01/11/2024    Patient stated that the prednisone helped him when he was positive for covid. After finishing the medication and feeling better, patient now he has a sore throat and is congested. Patient wants to know what can the doctor suggest/recommend. Please advise.

## 2024-01-24 ENCOUNTER — TELEPHONE (OUTPATIENT)
Age: 59
End: 2024-01-24

## 2024-01-24 ENCOUNTER — TELEPHONE (OUTPATIENT)
Dept: FAMILY MEDICINE CLINIC | Facility: CLINIC | Age: 59
End: 2024-01-24

## 2024-01-24 NOTE — TELEPHONE ENCOUNTER
Gina from [facility] called and would like patient order VAS VENOUS DUPLEX faxed over.     Fax:[phone]   Pt arrived in ED with c/o right hip pain s/p fall on either 7/30/18 or 7/31/18. Pt states he has had intermittent right hip pain since the fall. Bruising noted to right hip and right knee. Bruises look to be healing. Pt denies any pain when palpated. Pt was ambulatory upon squads arrival. Pt states he has been having dizzy spells over the past few weeks causing him to fall. 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 Pt is alert and oriented x4.       Lizbeth Francisco RN  08/04/18 [id number]

## 2024-01-24 NOTE — TELEPHONE ENCOUNTER
Contacted patient to notify of recommendation. Patient notified me that he has been having a hard time scheduling his vascular study. I did offer to the patient that I could contact central scheduling since it is a STAT order he should be seen sooner but [facility] and will call back and let us know how he would like to proceed.

## 2024-01-25 ENCOUNTER — DOCTOR'S OFFICE (OUTPATIENT)
Dept: URBAN - NONMETROPOLITAN AREA CLINIC 1 | Facility: CLINIC | Age: 59
Setting detail: OPHTHALMOLOGY
End: 2024-01-25
Payer: MEDICARE

## 2024-01-25 DIAGNOSIS — H35.81: ICD-10-CM

## 2024-01-25 DIAGNOSIS — E11.3293: ICD-10-CM

## 2024-01-25 DIAGNOSIS — H40.1132: ICD-10-CM

## 2024-01-25 DIAGNOSIS — H43.393: ICD-10-CM

## 2024-01-25 PROCEDURE — 92250 FUNDUS PHOTOGRAPHY W/I&R: CPT | Performed by: OPHTHALMOLOGY

## 2024-01-25 PROCEDURE — 92235 FLUORESCEIN ANGRPH MLTIFRAME: CPT | Performed by: OPHTHALMOLOGY

## 2024-01-25 PROCEDURE — 99213 OFFICE O/P EST LOW 20 MIN: CPT | Performed by: OPHTHALMOLOGY

## 2024-01-25 ASSESSMENT — REFRACTION_MANIFEST
OD_ADD: +2.50
OD_AXIS: 020
OD_VA1: 20/40+2
OD_VA2: 20/30-2
OS_AXIS: 025
OD_CYLINDER: -0.50
OS_SPHERE: +0.75
OS_VA1: 20/40+2
OD_SPHERE: +0.50
OS_ADD: +2.50
OS_VA2: 20/30-2
OS_CYLINDER: -0.25

## 2024-01-25 ASSESSMENT — REFRACTION_AUTOREFRACTION
OD_AXIS: 179
OD_CYLINDER: -1.25
OS_AXIS: 062
OS_SPHERE: +1.50
OD_SPHERE: +1.50
OS_CYLINDER: -0.50

## 2024-01-25 ASSESSMENT — REFRACTION_CURRENTRX
OS_VPRISM_DIRECTION: BF
OS_CYLINDER: -0.25
OD_OVR_VA: 20/
OS_SPHERE: +0.75
OD_VPRISM_DIRECTION: BF
OD_CYLINDER: -0.50
OS_ADD: +2.75
OD_ADD: +2.50
OS_AXIS: 029
OS_OVR_VA: 20/
OD_AXIS: 018
OD_SPHERE: +0.50

## 2024-01-25 ASSESSMENT — SPHEQUIV_DERIVED
OD_SPHEQUIV: 0.875
OS_SPHEQUIV: 0.625
OD_SPHEQUIV: 0.25
OS_SPHEQUIV: 1.25

## 2024-01-25 ASSESSMENT — LID EXAM ASSESSMENTS
OS_BLEPHARITIS: 1+
OD_BLEPHARITIS: 1+

## 2024-01-29 ENCOUNTER — TELEPHONE (OUTPATIENT)
Dept: FAMILY MEDICINE CLINIC | Facility: CLINIC | Age: 59
End: 2024-01-29

## 2024-01-29 ENCOUNTER — TELEPHONE (OUTPATIENT)
Age: 59
End: 2024-01-29

## 2024-01-29 DIAGNOSIS — M79.604 BILATERAL LEG PAIN: Primary | ICD-10-CM

## 2024-01-29 DIAGNOSIS — F32.2 SEVERE DEPRESSION (HCC): ICD-10-CM

## 2024-01-29 DIAGNOSIS — M79.605 BILATERAL LEG PAIN: Primary | ICD-10-CM

## 2024-01-29 DIAGNOSIS — M79.89 SWELLING OF LOWER LEG: ICD-10-CM

## 2024-01-29 DIAGNOSIS — L81.9 DISCOLORATION OF SKIN OF LOWER LEG: ICD-10-CM

## 2024-01-29 RX ORDER — CITALOPRAM 40 MG/1
40 TABLET ORAL DAILY
Qty: 90 TABLET | Refills: 1 | Status: SHIPPED | OUTPATIENT
Start: 2024-01-29

## 2024-01-29 NOTE — TELEPHONE ENCOUNTER
Received call from [facility] and they need an updated venous duplex order for routine due to not be completed in the time frame for STAT    Order updated and faxed to [phone]

## 2024-01-29 NOTE — TELEPHONE ENCOUNTER
Seble from [facility] called along with patient. She was attempting to schedule Venous Doppler appointment for patient but was told that script was  since it was a STAT; needed a new script from provider to be faxed to [phone]; warm transfer to office

## 2024-01-30 ENCOUNTER — TELEPHONE (OUTPATIENT)
Age: 59
End: 2024-01-30

## 2024-01-30 NOTE — TELEPHONE ENCOUNTER
Call from Radha, [facility] requesting the below:  -CPAP supplies - signed date is unclear; fix date and write initials of provider  - CPAP - current chart notes stating diagnosis of sleep apnia and documenation indicating patient using CPAP under history of present illness for assestment and plan  - continuos glucose monitor- current chart notes stating diagnosis and treatment plan  under history of present illness prior to signed precription 1/26/24    P - [phone]   - [phone]

## 2024-01-30 NOTE — TELEPHONE ENCOUNTER
Spoke to Kerry at [employer] and told her the patient is monitored by Endocrinology at [facility] and that all forms & prescriptions must come from them, not our office.  She changed the information in the system.

## 2024-02-05 DIAGNOSIS — I10 ESSENTIAL HYPERTENSION: ICD-10-CM

## 2024-02-06 RX ORDER — LISINOPRIL 2.5 MG/1
TABLET ORAL
Qty: 90 TABLET | Refills: 1 | Status: SHIPPED | OUTPATIENT
Start: 2024-02-06

## 2024-02-07 ENCOUNTER — VBI (OUTPATIENT)
Dept: ADMINISTRATIVE | Facility: OTHER | Age: 59
End: 2024-02-07

## 2024-02-09 DIAGNOSIS — Z86.79 HISTORY OF HEART FAILURE: ICD-10-CM

## 2024-02-09 RX ORDER — ATORVASTATIN CALCIUM 40 MG/1
TABLET, FILM COATED ORAL
Qty: 90 TABLET | Refills: 1 | Status: SHIPPED | OUTPATIENT
Start: 2024-02-09

## 2024-03-07 ENCOUNTER — OFFICE VISIT (OUTPATIENT)
Dept: FAMILY MEDICINE CLINIC | Facility: CLINIC | Age: 59
End: 2024-03-07
Payer: COMMERCIAL

## 2024-03-07 VITALS
TEMPERATURE: 96.4 F | SYSTOLIC BLOOD PRESSURE: 136 MMHG | DIASTOLIC BLOOD PRESSURE: 60 MMHG | WEIGHT: 315 LBS | HEIGHT: 72 IN | BODY MASS INDEX: 42.66 KG/M2 | HEART RATE: 74 BPM | OXYGEN SATURATION: 98 %

## 2024-03-07 DIAGNOSIS — E11.42 DIABETIC PERIPHERAL NEUROPATHY (HCC): ICD-10-CM

## 2024-03-07 DIAGNOSIS — R26.2 AMBULATORY DYSFUNCTION: ICD-10-CM

## 2024-03-07 DIAGNOSIS — L03.116 CELLULITIS OF LEFT FOOT: Primary | ICD-10-CM

## 2024-03-07 PROCEDURE — G2211 COMPLEX E/M VISIT ADD ON: HCPCS

## 2024-03-07 PROCEDURE — 99213 OFFICE O/P EST LOW 20 MIN: CPT

## 2024-03-07 NOTE — PROGRESS NOTES
Name: Juan Antonio Guy      : 1965      MRN: [id number]  Encounter Provider: Amilcar Appiah PA-C  Encounter Date: 3/7/2024   Encounter department: [facility]    Assessment & Plan     1. Cellulitis of left foot  -     CBC and differential; Future    2. Ambulatory dysfunction  -     Motorized Scooter    3. Diabetic peripheral neuropathy (HCC)  -     Motorized Scooter    Will get a CBC to monitor white count given he recently had cellulitis.  Patient is to contact office if there is any worsening of infection.  Patient is to go to the emergency department if he has worsening infection, syncope, shortness of breath, fevers, dizziness.  Patient has frequent follow-up with podiatry who is helping with wound care as well.  Patient is to continue his follow-up.       Subjective      Patient is a 58-year-old male presenting for follow-up of hospital visit for cellulitis.  Patient was on multiple IV antibiotics while in the hospital and was finished on IV cefazolin.  Patient was then discharged on Keflex for 4 days.  Patient states that he still has a bit of drainage from the legs, but cellulitis on the feet has resolved.  Patient denies fevers, pain, shortness of breath, syncope, dizziness.    Hospital course  Juan Antonio Guy is 58 y.o. male with a PMHx of hypothyroidism, hypertension, hyperlipidemia, DM2, morbid obesity, obstructive sleep apnea, COPD, peripheral neuropathy, history of PE on Eliquis who presented to the ED with worsening left foot redness and swelling. Patient had an appointment with his podiatrist today who noted worsening cellulitis to the left foot, thus referring him to the ED for further evaluation and IV antibiotics. Patient denies fever, chills, chest pain, shortness of breath, palpitations, GI or urinary complaints. He denies any pain symptoms to the left foot. In the ED, patient afebrile and hemodynamically stable. X-ray of the left foot unremarkable. Given his  clinical presentation, patient will be admitted to medicine for further evaluation and management.  Podiatry consultation, no immediate surgical intervention planned.   Continue dressing changes  Continue wound care  Strict diabetic control   2/26/23: Blood cultures negative. Wound culture no growth. Today is day #6 of antibiotics, currently cefazolin. Ok for discharge from ID perspective with high dose cephalexin: 750 mg po q6h for 4 more days.   Case discussed with ID  Outpt f/up with Podiatry in 1 week.         Review of Systems   Constitutional:  Negative for appetite change, chills, diaphoresis, fatigue and fever.   HENT:  Negative for congestion, ear discharge, ear pain, postnasal drip, rhinorrhea, sinus pressure, sinus pain, sneezing and sore throat.    Eyes:  Negative for pain, discharge, redness, itching and visual disturbance.   Respiratory:  Negative for apnea, cough, chest tightness, shortness of breath and wheezing.    Cardiovascular:  Negative for chest pain, palpitations and leg swelling.   Gastrointestinal:  Negative for abdominal pain, blood in stool, constipation, diarrhea, nausea and vomiting.   Endocrine: Negative for cold intolerance, heat intolerance, polydipsia and polyuria.   Genitourinary:  Negative for dysuria, flank pain, frequency, hematuria and urgency.   Musculoskeletal:  Positive for gait problem. Negative for arthralgias, back pain, myalgias, neck pain and neck stiffness.   Skin:  Positive for color change. Negative for rash.   Allergic/Immunologic: Negative.    Neurological:  Negative for dizziness, tremors, seizures, syncope, facial asymmetry, speech difficulty, weakness, light-headedness, numbness and headaches.   Hematological:  Negative for adenopathy. Does not bruise/bleed easily.   Psychiatric/Behavioral:  Negative for agitation, confusion, decreased concentration, dysphoric mood, hallucinations, self-injury, sleep disturbance and suicidal ideas. The patient is not  nervous/anxious and is not hyperactive.    All other systems reviewed and are negative.      Current Outpatient Medications on File Prior to Visit   Medication Sig    acetaminophen (TYLENOL) 650 mg CR tablet Take 1 tablet (650 mg total) by mouth every 8 (eight) hours as needed for mild pain or moderate pain (Patient taking differently: Take 1,000 mg by mouth every 8 (eight) hours as needed for mild pain or moderate pain)    Acidophilus Lactobacillus CAPS Take by mouth    albuterol (PROVENTIL HFA,VENTOLIN HFA) 90 mcg/act inhaler Inhale 2 puffs every 6 (six) hours as needed for wheezing    atorvastatin (LIPITOR) 40 mg tablet TAKE 1 TABLET BY MOUTH ONCE DAILY.    azelastine (ASTELIN) 0.1 % nasal spray 2 sprays into each nostril in the morning (Patient taking differently: 2 sprays into each nostril as needed)    Blood Glucose Monitoring Suppl (OneTouch Verio Reflect) w/Device KIT Check blood sugars four times daily. Please substitute with appropriate alternative as covered by patient's insurance. Dx: E11.65    cetirizine (ZyrTEC) 10 mg tablet TAKE 1 TABLET BY MOUTH ONCE DAILY.    citalopram (CeleXA) 40 mg tablet TAKE 1 TABLET ONCE DAILY    clobetasol propionate (Clobex) 0.05 % shampoo Apply 1 application topically 3 (three) times a week    clonazePAM (KlonoPIN) 0.5 mg tablet Take 0.5 mg by mouth every 12 (twelve) hours as needed    docusate sodium (COLACE) 100 mg capsule Take 100 mg by mouth 2 (two) times a day    Easy Touch Pen Needles 31G X 8 MM MISC Use 1 each 3 (three) times a day    Eliquis 5 MG TAKE 1 TABLET BY MOUTH TWO TIMES A DAY.    Empagliflozin (JARDIANCE) 10 MG TABS tablet Take 10 mg by mouth every morning    ergocalciferol (ERGOCALCIFEROL) 1.25 MG (05968 UT) capsule Take 1 capsule (50,000 Units total) by mouth once a week    ferrous sulfate 325 (65 Fe) mg tablet Take 325 mg by mouth daily with breakfast    fluticasone (FLONASE) 50 mcg/act nasal spray 2 sprays into each nostril daily (Patient taking  differently: 2 sprays into each nostril as needed)    folic acid (FOLVITE) 1 mg tablet TAKE 1 TABLET BY MOUTH ONCE DAILY.    furosemide (LASIX) 40 mg tablet Take 1 tablet (40 mg total) by mouth daily    gabapentin (NEURONTIN) 600 MG tablet Take 600 mg by mouth 2 (two) times a day    glucose blood (OneTouch Verio) test strip Check blood sugars four times daily. Please substitute with appropriate alternative as covered by patient's insurance. Dx: E11.65    insulin aspart (NovoLOG FlexPen) 100 UNIT/ML injection pen Inject 26 Units under the skin 3 (three) times a day with meals Per sliding scale - managed by Dr. Tabares + scheduled    lamoTRIgine (LaMICtal) 25 mg tablet Take 1 tablet (25 mg total) by mouth in the morning and 1 tablet (25 mg total) in the evening.    Lantus SoloStar 100 units/mL SOPN INJECT 56 UNITS TOTAL UNDER THE SKIN DAILY    latanoprost (XALATAN) 0.005 % ophthalmic solution Administer 0.005 drops to both eyes    levothyroxine 300 MCG tablet Take 1 tablet (300 mcg total) by mouth daily Take with 50 mcg tablet    levothyroxine 50 mcg tablet TAKE 1 TAB DAILY IN EARLY MORNING ALONG WITH 300MCG TAB    Linzess 290 MCG CAPS TAKE 1 CAPSULE BY MOUTH ONCE DAILY.    lisinopril (ZESTRIL) 2.5 mg tablet TAKE 1 TABLET BY MOUTH ONCE DAILY.    magnesium Oxide (MAG-OX) 400 mg TABS TAKE 1 TABLET BY MOUTH TWO TIMES A DAY.    OneTouch Delica Lancets 33G MISC Check blood sugars four times daily. Please substitute with appropriate alternative as covered by patient's insurance. Dx: E11.65    pantoprazole (PROTONIX) 40 mg tablet TAKE 1 TABLET BY MOUTH ONCE DAILY.    potassium chloride (K-DUR,KLOR-CON) 20 mEq tablet TAKE 1 TABLET BY MOUTH EACH MORNING.    Semaglutide, 1 MG/DOSE, (Ozempic, 1 MG/DOSE,) 2 MG/1.5ML SOPN Inject 1 mg under the skin once a week    tamsulosin (FLOMAX) 0.4 mg TAKE 1 CAPSULE DAILY WITH DINNER    timolol (TIMOPTIC) 0.5 % ophthalmic solution Administer 1 drop to the right eye 2 (two) times a day     tobramycin-dexamethasone (TOBRADEX) ophthalmic suspension     ketoconazole (NIZORAL) 2 % shampoo Apply 1 application topically 2 (two) times a week for 8 doses    nystatin (MYCOSTATIN) powder Apply topically 2 (two) times a day (Patient not taking: Reported on 3/7/2024)    Ozempic, 1 MG/DOSE, 4 MG/3ML injection pen  (Patient not taking: Reported on 1/11/2024)       Objective     /60 (BP Location: Right arm, Patient Position: Sitting)   Pulse 74   Temp (!) 96.4 °F (35.8 °C) (Tympanic)   Ht 6' (1.829 m)   Wt (!) 171 kg (376 lb 9.6 oz)   SpO2 98%   BMI 51.08 kg/m²     Physical Exam  Vitals and nursing note reviewed.   Constitutional:       General: He is not in acute distress.     Appearance: Normal appearance. He is normal weight. He is not ill-appearing, toxic-appearing or diaphoretic.   HENT:      Head: Normocephalic and atraumatic.      Right Ear: Tympanic membrane normal.      Left Ear: Tympanic membrane normal.      Nose: Nose normal.      Mouth/Throat:      Mouth: Mucous membranes are moist.      Pharynx: Oropharynx is clear.   Eyes:      Extraocular Movements: Extraocular movements intact.      Conjunctiva/sclera: Conjunctivae normal.      Pupils: Pupils are equal, round, and reactive to light.   Cardiovascular:      Rate and Rhythm: Normal rate and regular rhythm.      Pulses: Normal pulses.      Heart sounds: Normal heart sounds. No murmur heard.  Pulmonary:      Effort: Pulmonary effort is normal. No respiratory distress.      Breath sounds: Normal breath sounds. No wheezing.   Chest:      Chest wall: No tenderness.   Abdominal:      General: Bowel sounds are normal.      Palpations: Abdomen is soft. There is no mass.      Tenderness: There is no abdominal tenderness.   Musculoskeletal:         General: No tenderness. Normal range of motion.      Cervical back: Normal range of motion and neck supple. No tenderness.      Right lower leg: No edema.      Left lower leg: No edema.   Lymphadenopathy:       Cervical: No cervical adenopathy.   Skin:     General: Skin is warm.      Capillary Refill: Capillary refill takes less than 2 seconds.      Findings: Erythema (Mild erythema and drainage on legs bilaterally.  No erythema or drainage of bilateral feet.) present. No lesion or rash.   Neurological:      General: No focal deficit present.      Mental Status: He is alert and oriented to person, place, and time. Mental status is at baseline.      Motor: No weakness.      Coordination: Coordination normal.      Gait: Gait normal.   Psychiatric:         Mood and Affect: Mood normal.         Behavior: Behavior normal.         Thought Content: Thought content normal.         Judgment: Judgment normal.       Amilcar Appiah PA-C

## 2024-03-13 ENCOUNTER — TELEPHONE (OUTPATIENT)
Age: 59
End: 2024-03-13

## 2024-03-13 NOTE — TELEPHONE ENCOUNTER
Marlene from [facility] called to inform the patient has two new wounds on both shins. She has also informed his Wound Care physician, Dr. Fisher.

## 2024-03-19 DIAGNOSIS — T78.40XS ALLERGY, SEQUELA: ICD-10-CM

## 2024-03-19 RX ORDER — CETIRIZINE HYDROCHLORIDE 10 MG/1
TABLET ORAL
Qty: 90 TABLET | Refills: 1 | Status: SHIPPED | OUTPATIENT
Start: 2024-03-19

## 2024-03-22 DIAGNOSIS — E03.9 ACQUIRED HYPOTHYROIDISM: ICD-10-CM

## 2024-03-22 DIAGNOSIS — R79.89 LOW TSH LEVEL: ICD-10-CM

## 2024-03-22 RX ORDER — LEVOTHYROXINE SODIUM 0.05 MG/1
TABLET ORAL
Qty: 90 TABLET | Refills: 1 | Status: SHIPPED | OUTPATIENT
Start: 2024-03-22

## 2024-03-29 ENCOUNTER — DOCTOR'S OFFICE (OUTPATIENT)
Dept: URBAN - NONMETROPOLITAN AREA CLINIC 1 | Facility: CLINIC | Age: 59
Setting detail: OPHTHALMOLOGY
End: 2024-03-29
Payer: MEDICARE

## 2024-03-29 ENCOUNTER — RX ONLY (RX ONLY)
Age: 59
End: 2024-03-29

## 2024-03-29 DIAGNOSIS — H35.81: ICD-10-CM

## 2024-03-29 DIAGNOSIS — E11.3293: ICD-10-CM

## 2024-03-29 DIAGNOSIS — H43.393: ICD-10-CM

## 2024-03-29 DIAGNOSIS — H40.1132: ICD-10-CM

## 2024-03-29 LAB
LEFT EYE DIABETIC RETINOPATHY: POSITIVE
RIGHT EYE DIABETIC RETINOPATHY: POSITIVE

## 2024-03-29 PROCEDURE — 92134 CPTRZ OPH DX IMG PST SGM RTA: CPT | Performed by: OPHTHALMOLOGY

## 2024-03-29 PROCEDURE — 99213 OFFICE O/P EST LOW 20 MIN: CPT | Performed by: OPHTHALMOLOGY

## 2024-03-29 ASSESSMENT — LID EXAM ASSESSMENTS
OD_BLEPHARITIS: 1+
OS_BLEPHARITIS: 1+

## 2024-04-05 ENCOUNTER — DOCTOR'S OFFICE (OUTPATIENT)
Dept: URBAN - NONMETROPOLITAN AREA CLINIC 1 | Facility: CLINIC | Age: 59
Setting detail: OPHTHALMOLOGY
End: 2024-04-05
Payer: MEDICARE

## 2024-04-05 DIAGNOSIS — H40.1132: ICD-10-CM

## 2024-04-05 PROCEDURE — 92083 EXTENDED VISUAL FIELD XM: CPT | Performed by: OPHTHALMOLOGY

## 2024-04-05 PROCEDURE — 76514 ECHO EXAM OF EYE THICKNESS: CPT | Performed by: OPHTHALMOLOGY

## 2024-05-01 ENCOUNTER — TELEPHONE (OUTPATIENT)
Age: 59
End: 2024-05-01

## 2024-05-01 NOTE — TELEPHONE ENCOUNTER
Doron from [facility] called to make the office aware that patient is home from the hospital and they will resume care this Friday.

## 2024-05-02 ENCOUNTER — TELEPHONE (OUTPATIENT)
Dept: FAMILY MEDICINE CLINIC | Facility: CLINIC | Age: 59
End: 2024-05-02

## 2024-05-06 ENCOUNTER — OFFICE VISIT (OUTPATIENT)
Dept: CARDIOLOGY CLINIC | Facility: CLINIC | Age: 59
End: 2024-05-06
Payer: COMMERCIAL

## 2024-05-06 ENCOUNTER — TELEPHONE (OUTPATIENT)
Dept: CARDIOLOGY CLINIC | Facility: CLINIC | Age: 59
End: 2024-05-06

## 2024-05-06 VITALS
OXYGEN SATURATION: 96 % | WEIGHT: 315 LBS | RESPIRATION RATE: 18 BRPM | SYSTOLIC BLOOD PRESSURE: 128 MMHG | BODY MASS INDEX: 45.1 KG/M2 | HEART RATE: 64 BPM | DIASTOLIC BLOOD PRESSURE: 62 MMHG | HEIGHT: 70 IN

## 2024-05-06 DIAGNOSIS — I48.0 PAROXYSMAL ATRIAL FIBRILLATION (HCC): ICD-10-CM

## 2024-05-06 DIAGNOSIS — I50.9 CONGESTIVE HEART FAILURE, UNSPECIFIED HF CHRONICITY, UNSPECIFIED HEART FAILURE TYPE (HCC): ICD-10-CM

## 2024-05-06 DIAGNOSIS — I87.332 CHRONIC VENOUS HYPERTENSION (IDIOPATHIC) WITH ULCER AND INFLAMMATION OF LEFT LOWER EXTREMITY (HCC): ICD-10-CM

## 2024-05-06 DIAGNOSIS — Z01.810 PREOP CARDIOVASCULAR EXAM: Primary | ICD-10-CM

## 2024-05-06 DIAGNOSIS — E66.01 MORBID OBESITY (HCC): ICD-10-CM

## 2024-05-06 PROCEDURE — 93000 ELECTROCARDIOGRAM COMPLETE: CPT | Performed by: INTERNAL MEDICINE

## 2024-05-06 PROCEDURE — 99214 OFFICE O/P EST MOD 30 MIN: CPT | Performed by: INTERNAL MEDICINE

## 2024-05-06 RX ORDER — CEPHALEXIN 500 MG/1
500 CAPSULE ORAL EVERY 8 HOURS SCHEDULED
COMMUNITY

## 2024-05-06 RX ORDER — LIDOCAINE 50 MG/G
OINTMENT TOPICAL AS NEEDED
COMMUNITY

## 2024-05-06 RX ORDER — ROPINIROLE 1 MG/1
1 TABLET, FILM COATED ORAL 3 TIMES DAILY
COMMUNITY
Start: 2024-04-29

## 2024-05-06 NOTE — TELEPHONE ENCOUNTER
Transportation requires note stating why the test needs to be done in [place] and not in [facility]. Transport Company is [facility].     Fax number for transport : [phone]

## 2024-05-06 NOTE — PROGRESS NOTES
Cardiology Follow up    Juan Antonio Guy  [id number]  1965  [facility] [address] [id number]      1. Preop cardiovascular exam  POCT ECG      2. Chronic venous hypertension (idiopathic) with ulcer and inflammation of left lower extremity (HCC)        3. Congestive heart failure, unspecified HF chronicity, unspecified heart failure type (HCC)        4. Paroxysmal atrial fibrillation (HCC)        5. Morbid obesity (HCC)            Discussion/Summary:  1.  Perioperative risk stratification  2.  Paroxysmal atrial fibrillation on oral anticoagulation  3.  Hypertension  4.  Obesity  5.  Obstructive sleep apnea noncompliant with CPAP therapy  6.  Insulin-dependent diabetes mellitus  7.  Documented heart failure with preserved ejection fraction    -ECG performed in the office today shows sinus rhythm with first-degree AV block heart rate 70 bpm  -Transthoracic echocardiogram 1/13/2022 showing left ventricular systolic function normal cemented LVEF 60% with mildly dilated left atrium.  -Patient can continue atorvastatin 40 mg daily, Eliquis 5 mg twice daily, Jardiance 10 mg daily, furosemide 40 mg daily, lisinopril 2.5 mg daily, potassium 20 mEq daily  -Jardiance and insulin will be managed by primary care provider for management diabetes mellitus along with Ozempic  -Due to obesity and history on echocardiographic imaging of technically difficult studies along with patient's prior reaction and nuclear stress testing will recommend coronary CTA for evaluation of obstructive coronary artery disease and to assist with further restratification.  -Will defer once testing is completed to patient's primary cardiologist Dr. Hsu for final risk stratification prior to surgery  -Given patient's history of pulmonary embolism would also likely benefit from bridging therapy prior  to procedure once scheduled  -Patient counseled on dietary and lifestyle modifications including following a low-salt, low-fat, heart healthy diet with sodium restriction to less than 1800 mg of sodium daily fluid restriction less than 2000 mL of fluid daily along weight reduction goal BMI less than 29 and compliance with CPAP therapy  -Patient will see his primary cardiologist Dr. Hsu in June as scheduled or sooner if necessary  -Will have patient undergo BMP 1 week prior to coronary CTA  -Will give patient prescription for metoprolol to tartrate 25 mg tablet to be taken 1 hour prior to coronary CTA with hold parameters for heart rate less than 50 bpm  -Patient counseled if he were to have any warning or alarm type symptoms he is to seek emergency medical care immediately.        History of Present Illness:  -Patient is a 58-year-old male with paroxysmal atrial fibrillation on oral anticoagulation with Eliquis, documented heart failure with hypertension, COPD, obstructive sleep apnea, insulin-dependent diabetes mellitus and hypothyroidism, obesity, with documented history of pulmonary embolism in 2013,anemia of chronic disease and CKD who presents to the office today for perioperative risk stratification prior right colectomy tentatively scheduled for 5/23/2024.  Patient normally follows with Dr. Raad Hsu and has follow-up appointment scheduled for 6/27/2024  -Currently in the office today patient denies any chest pain, palpitations, lightness or dizziness, loss of consciousness, shortness of breath.  He notes he has had his stable chronic degree of lower extremity edema for several years with no significant worsening or change.  He notes that reason for undergoing surgery was mass found in right colon recently on testing and was recommended for removal.  -Patient notes in the past he had significant ill effect reaction during pharmacologic nuclear stress testing and was told to not have this repeated.   Patient does use a walker and notes limited activity due to chronic arthropathies and other issues.      Patient Active Problem List   Diagnosis    Cellulitis of right lower extremity    Diabetic foot ulcer (HCC)    Type 2 diabetes mellitus with hyperglycemia, with long-term current use of insulin (HCC)    Morbid obesity (HCC)    Hypothyroid    Anxiety    Neuropathy    Essential hypertension    H/O osteomyelitis    Hx pulmonary embolism    Encounter for medical examination to establish care    Diabetic foot ulcer associated with type 2 diabetes mellitus (HCC)    Osteomyelitis (HCC)    Chronic venous hypertension (idiopathic) with ulcer and inflammation of left lower extremity (HCC)    COPD with acute exacerbation (HCC)    Congestive heart failure (HCC)    Atrial fibrillation (HCC)    Major depressive disorder    Obstructive sleep apnea syndrome    Chronic kidney disease, stage 3a (HCC)    Other emphysema (HCC)    Thrombocytopenia (HCC)    Ambulatory dysfunction    Anemia in chronic kidney disease    Restless leg syndrome    Polypharmacy    Diabetic peripheral neuropathy (HCC)    Iron deficiency anemia    Depression, recurrent (HCC)    Suicidal ideation    Atopic dermatitis of scalp    Other seizures (HCC)    Severe depression (HCC)    Financial difficulties    Transportation insecurity    Glaucoma of both eyes    History of vertebral compression fracture    COVID-19     Past Medical History:   Diagnosis Date    Ambulatory dysfunction     Anemia     Anxiety     Arthritis     Atrial fibrillation (HCC)     CHF (congestive heart failure) (HCC)     Chronic kidney disease, stage 3 (HCC)     Chronic ulcer of left foot (HCC)     COPD with acute exacerbation (HCC)     Encephalopathy     History of depression     History of osteomyelitis     Hyperkalemia     Hyperlipidemia     Hypertension     Hypoglycemia     Hypomagnesemia     Hyponatremia     Hypothyroidism     Morbid obesity (HCC)     Pneumonia     Pulmonary embolism  (HCC)     RLS (restless legs syndrome)     Sepsis (HCC)     Sleep apnea     Thrombocytopenia (HCC)     Type 2 diabetes mellitus (HCC)     Urinary retention     Vitamin D deficiency      Social History     Socioeconomic History    Marital status: Single     Spouse name: Not on file    Number of children: Not on file    Years of education: Not on file    Highest education level: Not on file   Occupational History    Not on file   Tobacco Use    Smoking status: Never    Smokeless tobacco: Never   Vaping Use    Vaping status: Never Used   Substance and Sexual Activity    Alcohol use: No    Drug use: No    Sexual activity: Not Currently     Partners: Female   Other Topics Concern    Not on file   Social History Narrative    Not on file     Social Determinants of Health     Financial Resource Strain: High Risk (4/23/2024)    Received from [facility]    Overall Financial Resource Strain (CARDIA)     Difficulty of Paying Living Expenses: Very hard   Food Insecurity: No Food Insecurity (4/23/2024)    Received from [facility]    Hunger Vital Sign     Worried About Running Out of Food in the Last Year: Never true     Ran Out of Food in the Last Year: Never true   Transportation Needs: No Transportation Needs (4/23/2024)    Received from [facility]    PRAPARE - Transportation     Lack of Transportation (Medical): No     Lack of Transportation (Non-Medical): No   Physical Activity: Not on file   Stress: Not on file   Social Connections: Not on file   Intimate Partner Violence: Not At Risk (4/23/2024)    Received from [facility]    Humiliation, Afraid, Rape, and Kick questionnaire     Fear of Current or Ex-Partner: No     Emotionally Abused: No     Physically Abused: No     Sexually Abused: No   Housing Stability: Low Risk  (4/23/2024)    Received from [facility]    Housing Stability Vital Sign     In the last 12 months, was there a time  when you were not able to pay the mortgage or rent on time?: No     In the past 12 months, how many times have you moved where you were living?: 1     At any time in the past 12 months, were you homeless or living in a shelter (including now)?: No      Family History   Problem Relation Age of Onset    Cancer Mother         lymphoma    Hypertension Mother     Heart disease Father     Hypertension Father     Diabetes Sister     Cancer Maternal Grandmother      Past Surgical History:   Procedure Laterality Date    GALLBLADDER SURGERY      TONSILLECTOMY AND ADENOIDECTOMY         Current Outpatient Medications:     acetaminophen (TYLENOL) 650 mg CR tablet, Take 1 tablet (650 mg total) by mouth every 8 (eight) hours as needed for mild pain or moderate pain (Patient taking differently: Take 1,000 mg by mouth every 8 (eight) hours as needed for mild pain or moderate pain), Disp: 90 tablet, Rfl: 0    Acidophilus Lactobacillus CAPS, Take by mouth, Disp: , Rfl:     albuterol (PROVENTIL HFA,VENTOLIN HFA) 90 mcg/act inhaler, Inhale 2 puffs every 6 (six) hours as needed for wheezing, Disp: 3 Inhaler, Rfl: 3    atorvastatin (LIPITOR) 40 mg tablet, TAKE 1 TABLET BY MOUTH ONCE DAILY., Disp: 90 tablet, Rfl: 1    azelastine (ASTELIN) 0.1 % nasal spray, 2 sprays into each nostril in the morning (Patient taking differently: 2 sprays into each nostril as needed), Disp: 30 mL, Rfl: 11    cetirizine (ZyrTEC) 10 mg tablet, TAKE 1 TABLET BY MOUTH ONCE DAILY., Disp: 90 tablet, Rfl: 1    citalopram (CeleXA) 40 mg tablet, TAKE 1 TABLET ONCE DAILY, Disp: 90 tablet, Rfl: 1    clobetasol propionate (Clobex) 0.05 % shampoo, Apply 1 application topically 3 (three) times a week, Disp: 118 mL, Rfl: 3    clonazePAM (KlonoPIN) 0.5 mg tablet, Take 0.5 mg by mouth every 12 (twelve) hours as needed, Disp: , Rfl:     docusate sodium (COLACE) 100 mg capsule, Take 100 mg by mouth 2 (two) times a day, Disp: , Rfl:     Eliquis 5 MG, TAKE 1 TABLET BY MOUTH TWO  TIMES A DAY., Disp: 180 tablet, Rfl: 1    ferrous sulfate 325 (65 Fe) mg tablet, Take 325 mg by mouth daily with breakfast, Disp: , Rfl:     folic acid (FOLVITE) 1 mg tablet, TAKE 1 TABLET BY MOUTH ONCE DAILY., Disp: 90 tablet, Rfl: 1    furosemide (LASIX) 40 mg tablet, Take 1 tablet (40 mg total) by mouth daily, Disp: 90 tablet, Rfl: 0    gabapentin (NEURONTIN) 600 MG tablet, Take 600 mg by mouth 2 (two) times a day, Disp: , Rfl:     insulin aspart (NovoLOG FlexPen) 100 UNIT/ML injection pen, Inject 26 Units under the skin 3 (three) times a day with meals Per sliding scale - managed by Dr. Tabares + scheduled, Disp: 15 mL, Rfl: 0    Lantus SoloStar 100 units/mL SOPN, INJECT 56 UNITS TOTAL UNDER THE SKIN DAILY, Disp: 15 mL, Rfl: 1    latanoprost (XALATAN) 0.005 % ophthalmic solution, Administer 0.005 drops to both eyes, Disp: , Rfl:     levothyroxine 300 MCG tablet, Take 1 tablet (300 mcg total) by mouth daily Take with 50 mcg tablet, Disp: 90 tablet, Rfl: 0    levothyroxine 50 mcg tablet, TAKE 1 TABLET DAILY IN EARLY MORNING ALONG WITH 300MCG TABLET, Disp: 90 tablet, Rfl: 1    lidocaine (XYLOCAINE) 5 % ointment, Apply topically as needed for mild pain, Disp: , Rfl:     Linzess 290 MCG CAPS, TAKE 1 CAPSULE BY MOUTH ONCE DAILY., Disp: 90 capsule, Rfl: 1    lisinopril (ZESTRIL) 2.5 mg tablet, TAKE 1 TABLET BY MOUTH ONCE DAILY., Disp: 90 tablet, Rfl: 1    magnesium Oxide (MAG-OX) 400 mg TABS, TAKE 1 TABLET BY MOUTH TWO TIMES A DAY., Disp: 180 tablet, Rfl: 1    nystatin (MYCOSTATIN) powder, Apply topically 2 (two) times a day, Disp: , Rfl:     pantoprazole (PROTONIX) 40 mg tablet, TAKE 1 TABLET BY MOUTH ONCE DAILY., Disp: 90 tablet, Rfl: 0    potassium chloride (K-DUR,KLOR-CON) 20 mEq tablet, TAKE 1 TABLET BY MOUTH EACH MORNING., Disp: 90 tablet, Rfl: 1    tamsulosin (FLOMAX) 0.4 mg, TAKE 1 CAPSULE DAILY WITH DINNER, Disp: 90 capsule, Rfl: 1    timolol (TIMOPTIC) 0.5 % ophthalmic solution, Administer 1 drop to the  right eye 2 (two) times a day, Disp: , Rfl:     tirzepatide 2.5 MG/0.5ML, Inject 2.5 mg under the skin every 7 days, Disp: , Rfl:     tobramycin-dexamethasone (TOBRADEX) ophthalmic suspension, , Disp: , Rfl:     benzocaine (Anbesol Maximum Strength) 20 %, Apply 1 Application to the mouth or throat Three times daily as needed for mucositis (Patient not taking: Reported on 5/6/2024), Disp: , Rfl:     Blood Glucose Monitoring Suppl (OneTouch Verio Reflect) w/Device KIT, Check blood sugars four times daily. Please substitute with appropriate alternative as covered by patient's insurance. Dx: E11.65, Disp: 1 kit, Rfl: 0    cephalexin (KEFLEX) 500 mg capsule, Take 500 mg by mouth every 8 (eight) hours Take 500 mg by mouth in the morning and 500 mg at noon and 500 mg in the evening and 500 mg before bedtime. (Patient not taking: Reported on 5/6/2024), Disp: , Rfl:     Easy Touch Pen Needles 31G X 8 MM MISC, Use 1 each 3 (three) times a day, Disp: , Rfl:     Empagliflozin (JARDIANCE) 10 MG TABS tablet, Take 10 mg by mouth every morning, Disp: , Rfl:     ergocalciferol (ERGOCALCIFEROL) 1.25 MG (58791 UT) capsule, Take 1 capsule (50,000 Units total) by mouth once a week, Disp: 12 capsule, Rfl: 1    fluticasone (FLONASE) 50 mcg/act nasal spray, 2 sprays into each nostril daily (Patient not taking: Reported on 5/6/2024), Disp: 16 g, Rfl: 11    glucose blood (OneTouch Verio) test strip, Check blood sugars four times daily. Please substitute with appropriate alternative as covered by patient's insurance. Dx: E11.65, Disp: 400 each, Rfl: 3    ketoconazole (NIZORAL) 2 % shampoo, Apply 1 application topically 2 (two) times a week for 8 doses, Disp: 120 mL, Rfl: 3    lamoTRIgine (LaMICtal) 25 mg tablet, Take 1 tablet (25 mg total) by mouth in the morning and 1 tablet (25 mg total) in the evening. (Patient not taking: Reported on 5/6/2024), Disp: 180 tablet, Rfl: 1    OneTouch Delica Lancets 33G MISC, Check blood sugars four times  "daily. Please substitute with appropriate alternative as covered by patient's insurance. Dx: E11.65, Disp: 400 each, Rfl: 3    Ozempic, 1 MG/DOSE, 4 MG/3ML injection pen, , Disp: , Rfl:     phenol (CHLORASEPTIC) 1.4 % mucosal liquid, Apply 2 sprays to the mouth or throat every 2 (two) hours as needed (Patient not taking: Reported on 5/6/2024), Disp: , Rfl:     rOPINIRole (REQUIP) 1 mg tablet, Take 1 mg by mouth 3 (three) times a day (Patient not taking: Reported on 5/6/2024), Disp: , Rfl:     Semaglutide, 1 MG/DOSE, (Ozempic, 1 MG/DOSE,) 2 MG/1.5ML SOPN, Inject 1 mg under the skin once a week (Patient not taking: Reported on 5/6/2024), Disp: , Rfl:   Allergies   Allergen Reactions    Carbamazepine Other (See Comments)     Elevated liver functions -\"Enlarges liver\"    Clindamycin Rash    Phenytoin Itching and Rash    Pneumococcal Vaccine Hives, Itching and Rash         Labs:  Orders Only on 03/29/2024   Component Date Value    Right Eye Diabetic Retin* 03/29/2024 Positive     Left Eye Diabetic Retino* 03/29/2024 Positive         Imaging: GI IMAGE CAPTURE - COLONOSCOPY    Result Date: 4/16/2024  Narrative: Please See Opnote for Result    Review of Systems:  Review of Systems   Constitutional:  Negative for chills, diaphoresis, fatigue and fever.   HENT:  Negative for trouble swallowing and voice change.    Eyes:  Negative for pain and redness.   Respiratory:  Negative for shortness of breath and wheezing.    Cardiovascular:  Positive for leg swelling. Negative for chest pain and palpitations.   Gastrointestinal:  Negative for abdominal pain, blood in stool, constipation, diarrhea, nausea and vomiting.   Genitourinary:  Negative for dysuria.   Musculoskeletal:  Positive for arthralgias and back pain. Negative for neck pain and neck stiffness.   Skin:  Negative for rash.   Neurological:  Negative for dizziness, syncope, light-headedness and headaches.   All other systems reviewed and are negative.        Vitals:    " "05/06/24 0807   BP: 128/62   BP Location: Left arm   Patient Position: Sitting   Cuff Size: Large   Pulse: 64   Resp: 18   SpO2: 96%   Weight: (!) 170 kg (374 lb)   Height: 5' 9.69\" (1.77 m)     Vitals:    05/06/24 0807   Weight: (!) 170 kg (374 lb)     Height: 5' 9.69\" (177 cm)     Physical Exam:   Physical Exam  Vitals reviewed.   Constitutional:       General: He is not in acute distress.     Appearance: He is obese. He is not diaphoretic.   HENT:      Head: Normocephalic and atraumatic.   Eyes:      General:         Right eye: No discharge.         Left eye: No discharge.   Neck:      Comments: Trachea midline, neck obese, difficult to assess JVD  Cardiovascular:      Rate and Rhythm: Normal rate and regular rhythm.      Heart sounds:      No friction rub.   Pulmonary:      Effort: No respiratory distress.      Breath sounds: No wheezing.      Comments: Decreased breath sounds bilaterally  Chest:      Chest wall: No tenderness.   Abdominal:      General: Bowel sounds are normal.      Palpations: Abdomen is soft.      Tenderness: There is no abdominal tenderness. There is no rebound.   Musculoskeletal:      Right lower leg: Edema present.      Left lower leg: Edema present.   Skin:     General: Skin is warm and dry.   Neurological:      Mental Status: He is alert.      Comments: Awake, alert, able to answer questions appropriately.   Psychiatric:         Mood and Affect: Mood normal.         Behavior: Behavior normal.        "

## 2024-05-07 ENCOUNTER — TELEPHONE (OUTPATIENT)
Dept: CARDIOLOGY CLINIC | Facility: CLINIC | Age: 59
End: 2024-05-07

## 2024-05-07 NOTE — TELEPHONE ENCOUNTER
[facility] called requesting office note from yesterday and EKG. Fax to [phone]

## 2024-05-08 NOTE — TELEPHONE ENCOUNTER
Requested medication(s) are due for refill today: Yes  Patient has already received a courtesy refill: No  Other reason request has been forwarded to provider: [Negative] : Genitourinary

## 2024-05-14 ENCOUNTER — TELEPHONE (OUTPATIENT)
Dept: ADMINISTRATIVE | Facility: OTHER | Age: 59
End: 2024-05-14

## 2024-05-14 NOTE — TELEPHONE ENCOUNTER
----- Message from Jaqui Knowles sent at 5/14/2024  7:08 AM EDT -----  05/14/24 7:08 AM    Hello, our patient  has had CRC: Colonoscopy completed/performed. Please assist in updating the patient chart by pulling the Care Everywhere (CE) document. The date of service is 4/16/24.     Thank you,  Jaqui ROLDAN

## 2024-05-14 NOTE — TELEPHONE ENCOUNTER
Upon review of the In Basket request we were able to locate, review, and update the patient chart as requested for CRC: Colonoscopy.    Any additional questions or concerns should be emailed to the Practice Liaisons via the appropriate education email address, please do not reply via In Basket.    Thank you  Monique De La Paz

## 2024-05-15 ENCOUNTER — CONSULT (OUTPATIENT)
Dept: FAMILY MEDICINE CLINIC | Facility: CLINIC | Age: 59
End: 2024-05-15
Payer: COMMERCIAL

## 2024-05-15 VITALS
HEART RATE: 117 BPM | DIASTOLIC BLOOD PRESSURE: 76 MMHG | SYSTOLIC BLOOD PRESSURE: 140 MMHG | OXYGEN SATURATION: 97 % | WEIGHT: 315 LBS | BODY MASS INDEX: 51.25 KG/M2 | TEMPERATURE: 96.9 F

## 2024-05-15 DIAGNOSIS — Z86.010 PERSONAL HISTORY OF COLONIC POLYPS: ICD-10-CM

## 2024-05-15 DIAGNOSIS — D37.4 NEOPLASM OF UNCERTAIN BEHAVIOR OF COLON: Primary | ICD-10-CM

## 2024-05-15 PROCEDURE — 99213 OFFICE O/P EST LOW 20 MIN: CPT

## 2024-05-15 PROCEDURE — G2211 COMPLEX E/M VISIT ADD ON: HCPCS

## 2024-05-15 NOTE — PROGRESS NOTES
Presurgical Evaluation    Subjective:      Patient ID: Juan Antonio Guy is a 58 y.o. male.    Chief Complaint   Patient presents with    Pre-op Exam     Having surgery next Thursday 5/23. They are removing the right side of his colon       Patient is a 58-year-old male presenting for preop visit.  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  Patient has no concerns today.        The following portions of the patient's history were reviewed and updated as appropriate: allergies, current medications, past family history, past medical history, past social history, past surgical history, and problem list.    Procedure date: May 23, 2024    Surgeon:  Dr. Espinoza  Planned procedure:  Laparoscopic possible open right colectomy possible ostomy  Diagnosis for procedure:  Neoplasm of uncertain behavior of colon, personal history of colon polyps    Prior anesthesia: Yes   General; Complications:  None / Tolerated well    CAD History: CHF  Arrhythmia  A-Fib   NOTE: Patient should see Cardiology if time available before surgery, and if appropriate.    Pulmonary History: COPD  NEAL (Sleep Apnea)    Renal history: CKD stage 3 - GFR 30-59    Diabetes History:  Type 2  Uncontrolled     Neurological History: Seizure     On Immunosuppressant meds/biologics: No      Review of Systems   Constitutional:  Negative for appetite change, chills, diaphoresis, fatigue and fever.   HENT:  Negative for congestion, ear discharge, ear pain, postnasal drip, rhinorrhea, sinus pressure, sinus pain, sneezing and sore throat.    Eyes:  Negative for pain, discharge, redness, itching and visual disturbance.   Respiratory:  Negative for apnea, cough, chest tightness, shortness of breath and wheezing.    Cardiovascular:  Negative for chest pain, palpitations and leg swelling.   Gastrointestinal:  Negative for abdominal pain, blood in stool, constipation, diarrhea, nausea and vomiting.   Endocrine: Negative  for cold intolerance, heat intolerance, polydipsia and polyuria.   Genitourinary:  Negative for dysuria, flank pain, frequency, hematuria and urgency.   Musculoskeletal:  Positive for arthralgias, back pain and gait problem. Negative for myalgias, neck pain and neck stiffness.   Skin:  Positive for rash (Hx of venous stasis dermatitis.). Negative for color change.   Allergic/Immunologic: Negative.    Neurological:  Negative for dizziness, tremors, seizures, syncope, facial asymmetry, speech difficulty, weakness, light-headedness, numbness and headaches.   Hematological:  Negative for adenopathy. Does not bruise/bleed easily.   Psychiatric/Behavioral:  Negative for agitation, confusion, decreased concentration, dysphoric mood, hallucinations, self-injury, sleep disturbance and suicidal ideas. The patient is not nervous/anxious and is not hyperactive.    All other systems reviewed and are negative.        Current Outpatient Medications   Medication Sig Dispense Refill    acetaminophen (TYLENOL) 650 mg CR tablet Take 1 tablet (650 mg total) by mouth every 8 (eight) hours as needed for mild pain or moderate pain (Patient taking differently: Take 1,000 mg by mouth every 8 (eight) hours as needed for mild pain or moderate pain) 90 tablet 0    Acidophilus Lactobacillus CAPS Take by mouth      albuterol (PROVENTIL HFA,VENTOLIN HFA) 90 mcg/act inhaler Inhale 2 puffs every 6 (six) hours as needed for wheezing 3 Inhaler 3    atorvastatin (LIPITOR) 40 mg tablet TAKE 1 TABLET BY MOUTH ONCE DAILY. 90 tablet 1    azelastine (ASTELIN) 0.1 % nasal spray 2 sprays into each nostril in the morning (Patient taking differently: 2 sprays into each nostril as needed) 30 mL 11    benzocaine (Anbesol Maximum Strength) 20 % Apply 1 Application to the mouth or throat Three times daily as needed for mucositis      Blood Glucose Monitoring Suppl (OneTouch Verio Reflect) w/Device KIT Check blood sugars four times daily. Please substitute with  appropriate alternative as covered by patient's insurance. Dx: E11.65 1 kit 0    cetirizine (ZyrTEC) 10 mg tablet TAKE 1 TABLET BY MOUTH ONCE DAILY. 90 tablet 1    citalopram (CeleXA) 40 mg tablet TAKE 1 TABLET ONCE DAILY 90 tablet 1    clobetasol propionate (Clobex) 0.05 % shampoo Apply 1 application topically 3 (three) times a week 118 mL 3    clonazePAM (KlonoPIN) 0.5 mg tablet Take 0.5 mg by mouth every 12 (twelve) hours as needed      docusate sodium (COLACE) 100 mg capsule Take 100 mg by mouth 2 (two) times a day      Easy Touch Pen Needles 31G X 8 MM MISC Use 1 each 3 (three) times a day      Eliquis 5 MG TAKE 1 TABLET BY MOUTH TWO TIMES A DAY. 180 tablet 1    Empagliflozin (JARDIANCE) 10 MG TABS tablet Take 10 mg by mouth every morning      ferrous sulfate 325 (65 Fe) mg tablet Take 325 mg by mouth daily with breakfast      folic acid (FOLVITE) 1 mg tablet TAKE 1 TABLET BY MOUTH ONCE DAILY. 90 tablet 1    furosemide (LASIX) 40 mg tablet Take 1 tablet (40 mg total) by mouth daily 90 tablet 0    gabapentin (NEURONTIN) 600 MG tablet Take 600 mg by mouth 2 (two) times a day      glucose blood (OneTouch Verio) test strip Check blood sugars four times daily. Please substitute with appropriate alternative as covered by patient's insurance. Dx: E11.65 400 each 3    insulin aspart (NovoLOG FlexPen) 100 UNIT/ML injection pen Inject 26 Units under the skin 3 (three) times a day with meals Per sliding scale - managed by Dr. Tabares + scheduled 15 mL 0    Lantus SoloStar 100 units/mL SOPN INJECT 56 UNITS TOTAL UNDER THE SKIN DAILY 15 mL 1    latanoprost (XALATAN) 0.005 % ophthalmic solution Administer 0.005 drops to both eyes      levothyroxine 300 MCG tablet Take 1 tablet (300 mcg total) by mouth daily Take with 50 mcg tablet 90 tablet 0    levothyroxine 50 mcg tablet TAKE 1 TABLET DAILY IN EARLY MORNING ALONG WITH 300MCG TABLET 90 tablet 1    Linzess 290 MCG CAPS TAKE 1 CAPSULE BY MOUTH ONCE DAILY. 90 capsule 1     lisinopril (ZESTRIL) 2.5 mg tablet TAKE 1 TABLET BY MOUTH ONCE DAILY. 90 tablet 1    magnesium Oxide (MAG-OX) 400 mg TABS TAKE 1 TABLET BY MOUTH TWO TIMES A DAY. 180 tablet 1    metoprolol tartrate (LOPRESSOR) 25 mg tablet Take 1 tablet by mouth (25 mg) 1 hour prior to coronary CTA.  Hold for heart rate less than 50 bpm. 1 tablet 0    nystatin (MYCOSTATIN) powder Apply topically 2 (two) times a day      OneTouch Delica Lancets 33G MISC Check blood sugars four times daily. Please substitute with appropriate alternative as covered by patient's insurance. Dx: E11.65 400 each 3    pantoprazole (PROTONIX) 40 mg tablet TAKE 1 TABLET BY MOUTH ONCE DAILY. 90 tablet 0    potassium chloride (K-DUR,KLOR-CON) 20 mEq tablet TAKE 1 TABLET BY MOUTH EACH MORNING. 90 tablet 1    tamsulosin (FLOMAX) 0.4 mg TAKE 1 CAPSULE DAILY WITH DINNER 90 capsule 1    timolol (TIMOPTIC) 0.5 % ophthalmic solution Administer 1 drop to the right eye 2 (two) times a day      cephalexin (KEFLEX) 500 mg capsule Take 500 mg by mouth every 8 (eight) hours Take 500 mg by mouth in the morning and 500 mg at noon and 500 mg in the evening and 500 mg before bedtime. (Patient not taking: Reported on 5/6/2024)      ergocalciferol (ERGOCALCIFEROL) 1.25 MG (95766 UT) capsule Take 1 capsule (50,000 Units total) by mouth once a week 12 capsule 1    fluticasone (FLONASE) 50 mcg/act nasal spray 2 sprays into each nostril daily (Patient not taking: Reported on 5/6/2024) 16 g 11    ketoconazole (NIZORAL) 2 % shampoo Apply 1 application topically 2 (two) times a week for 8 doses 120 mL 3    lamoTRIgine (LaMICtal) 25 mg tablet Take 1 tablet (25 mg total) by mouth in the morning and 1 tablet (25 mg total) in the evening. (Patient not taking: Reported on 5/6/2024) 180 tablet 1    lidocaine (XYLOCAINE) 5 % ointment Apply topically as needed for mild pain (Patient not taking: Reported on 5/15/2024)      Ozempic, 1 MG/DOSE, 4 MG/3ML injection pen  (Patient not taking: Reported  on 1/11/2024)      phenol (CHLORASEPTIC) 1.4 % mucosal liquid Apply 2 sprays to the mouth or throat every 2 (two) hours as needed (Patient not taking: Reported on 5/6/2024)      rOPINIRole (REQUIP) 1 mg tablet Take 1 mg by mouth 3 (three) times a day (Patient not taking: Reported on 5/6/2024)      Semaglutide, 1 MG/DOSE, (Ozempic, 1 MG/DOSE,) 2 MG/1.5ML SOPN Inject 1 mg under the skin once a week (Patient not taking: Reported on 5/6/2024)      tirzepatide 2.5 MG/0.5ML Inject 2.5 mg under the skin every 7 days (Patient not taking: Reported on 5/15/2024)      tobramycin-dexamethasone (TOBRADEX) ophthalmic suspension  (Patient not taking: Reported on 5/15/2024)       No current facility-administered medications for this visit.       Allergies on file:   Carbamazepine, Clindamycin, Phenytoin, and Pneumococcal vaccine    Patient Active Problem List   Diagnosis    Cellulitis of right lower extremity    Diabetic foot ulcer (HCC)    Type 2 diabetes mellitus with hyperglycemia, with long-term current use of insulin (HCC)    Morbid obesity (HCC)    Hypothyroid    Anxiety    Neuropathy    Essential hypertension    H/O osteomyelitis    Hx pulmonary embolism    Encounter for medical examination to establish care    Diabetic foot ulcer associated with type 2 diabetes mellitus (HCC)    Osteomyelitis (HCC)    Chronic venous hypertension (idiopathic) with ulcer and inflammation of left lower extremity (HCC)    COPD with acute exacerbation (HCC)    Congestive heart failure (HCC)    Atrial fibrillation (HCC)    Major depressive disorder    Obstructive sleep apnea syndrome    Chronic kidney disease, stage 3a (HCC)    Other emphysema (HCC)    Thrombocytopenia (HCC)    Ambulatory dysfunction    Anemia in chronic kidney disease    Restless leg syndrome    Polypharmacy    Diabetic peripheral neuropathy (HCC)    Iron deficiency anemia    Depression, recurrent (HCC)    Suicidal ideation    Atopic dermatitis of scalp    Other seizures (HCC)     Severe depression (HCC)    Financial difficulties    Transportation insecurity    Glaucoma of both eyes    History of vertebral compression fracture    COVID-19        Past Medical History:   Diagnosis Date    Ambulatory dysfunction     Anemia     Anxiety     Arthritis     Atrial fibrillation (HCC)     CHF (congestive heart failure) (HCC)     Chronic kidney disease, stage 3 (HCC)     Chronic ulcer of left foot (HCC)     COPD with acute exacerbation (HCC)     Encephalopathy     History of depression     History of osteomyelitis     Hyperkalemia     Hyperlipidemia     Hypertension     Hypoglycemia     Hypomagnesemia     Hyponatremia     Hypothyroidism     Morbid obesity (HCC)     Pneumonia     Pulmonary embolism (HCC)     RLS (restless legs syndrome)     Sepsis (HCC)     Sleep apnea     Thrombocytopenia (HCC)     Type 2 diabetes mellitus (HCC)     Urinary retention     Vitamin D deficiency        Past Surgical History:   Procedure Laterality Date    GALLBLADDER SURGERY      TONSILLECTOMY AND ADENOIDECTOMY         Family History   Problem Relation Age of Onset    Cancer Mother         lymphoma    Hypertension Mother     Heart disease Father     Hypertension Father     Diabetes Sister     Cancer Maternal Grandmother        Social History     Tobacco Use    Smoking status: Never    Smokeless tobacco: Never   Vaping Use    Vaping status: Never Used   Substance Use Topics    Alcohol use: No    Drug use: No       Objective:    Vitals:    05/15/24 0730   BP: 140/76   BP Location: Left arm   Patient Position: Sitting   Pulse: (!) 117   Temp: (!) 96.9 °F (36.1 °C)   TempSrc: Tympanic   SpO2: 97%   Weight: (!) 161 kg (354 lb)        Physical Exam  Vitals and nursing note reviewed.   Constitutional:       General: He is not in acute distress.     Appearance: Normal appearance. He is obese. He is not ill-appearing, toxic-appearing or diaphoretic.   HENT:      Head: Normocephalic and atraumatic.      Right Ear: Tympanic  membrane normal.      Left Ear: Tympanic membrane normal.      Nose: Nose normal.      Mouth/Throat:      Mouth: Mucous membranes are moist.      Pharynx: Oropharynx is clear.   Eyes:      Extraocular Movements: Extraocular movements intact.      Conjunctiva/sclera: Conjunctivae normal.      Pupils: Pupils are equal, round, and reactive to light.   Cardiovascular:      Rate and Rhythm: Normal rate and regular rhythm.      Pulses: Normal pulses.      Heart sounds: Normal heart sounds. No murmur heard.  Pulmonary:      Effort: Pulmonary effort is normal. No respiratory distress.      Breath sounds: Normal breath sounds. No stridor. No wheezing, rhonchi or rales.   Chest:      Chest wall: No tenderness.   Abdominal:      General: Bowel sounds are normal.      Palpations: Abdomen is soft. There is no mass.      Tenderness: There is no abdominal tenderness.   Musculoskeletal:         General: No tenderness. Normal range of motion.      Cervical back: Normal range of motion and neck supple. No tenderness.      Right lower leg: No edema.      Left lower leg: No edema.   Lymphadenopathy:      Cervical: No cervical adenopathy.   Skin:     General: Skin is warm.      Capillary Refill: Capillary refill takes less than 2 seconds.      Findings: No erythema, lesion or rash.   Neurological:      General: No focal deficit present.      Mental Status: He is alert and oriented to person, place, and time. Mental status is at baseline.      Cranial Nerves: No cranial nerve deficit.      Sensory: No sensory deficit.      Motor: No weakness.      Coordination: Coordination normal.      Gait: Gait abnormal (Antalgic).      Deep Tendon Reflexes: Reflexes normal.   Psychiatric:         Mood and Affect: Mood normal.         Behavior: Behavior normal.         Thought Content: Thought content normal.         Judgment: Judgment normal.           Preop labs/testing available and reviewed: yes    eGFRcr   Date Value Ref Range Status   05/10/2024  89 >59 Final        INR   Date Value Ref Range Status   05/10/2024 1.3  Final     Comment:     Interpretation  Suggested INR ranges for various  clinical conditions:                                           INR  Ambulatory Surgery          <1.5  Coumadinized Patients             (DVT,PE,MI or A.Fib)     2.0-3.0  Mechanical Heart Valve   2.5-3.5  Cardiogenic Embolus      2.5-3.5       EKG yes    Echo no    Stress test/cath no    PFT/Jamel no    Functional capacity: Climb stairs                        4 Mets   Pick the highest level patient can comfortably perform   4 mets or greater for surgery    RCRI  High Risk surgery?         1 Point  CAD History:         1 Point   MI; Positive Stress Test; CP due to Mi;  Nitrate Usage to control Angina; Pathologic Q wave on EKG  CHF Active:         1 Point   Pulm Edema; Paroxysmal Nocturnal Dyspnea;  Bibasilar Rales (crackles);S3; CHF on CXR  Cerebrovascular Disease (TIA or CVA):     1 Point  DM on Insulin:        1 Point  Serum Creat >2.0 mg/dl:       1 Point          Total Points: 1     Scorin: Class I, Very Low Risk (0.4%)     1: Class II, Low risk (0.9%)     2: Class III Moderate (6.6%)     3: Class IV High (>11%)      KITTY Risk:  GFR:   eGFRcr   Date Value Ref Range Status   05/10/2024 89 >59 Final         For PCP:  If GFR>60, Hold ACE/ARB/Diuretic on the day of surgery, and NSAIDS 10 days before.    If GFR<45, Consider PRE and POST op Nephrology Consult.    If 46 <GFR> 59 : Has Patient had KITTY in last 6 Months? no   If YES: Preop Nephrology consult   If No:  Post Op Nephrology consult.           Assessment/Plan:    Patient is medically optimized (cleared) for the planned procedure.    Further testing/evaluation is not required.    Postop concerns: no    Problem List Items Addressed This Visit    None  Visit Diagnoses       Neoplasm of uncertain behavior of colon    -  Primary    Personal history of colonic polyps            Patient is medically optimized for surgery.   Although patient has multiple comorbid conditions, benefit outweighs risk with the surgery.  Patient educated on clotting risk due to stopping Eliquis for 2 days prior, and symptoms of CAD, CVA, DVT.  Patient EKG was sinus bradycardia.     Diagnoses and all orders for this visit:    Neoplasm of uncertain behavior of colon    Personal history of colonic polyps          Pre-Surgery Instructions:   Medication Instructions    acetaminophen (TYLENOL) 650 mg CR tablet per anesthesia guidelines     Acidophilus Lactobacillus CAPS per anesthesia guidelines     albuterol (PROVENTIL HFA,VENTOLIN HFA) 90 mcg/act inhaler per anesthesia guidelines     atorvastatin (LIPITOR) 40 mg tablet per anesthesia guidelines     azelastine (ASTELIN) 0.1 % nasal spray per anesthesia guidelines     benzocaine (Anbesol Maximum Strength) 20 % per anesthesia guidelines     Blood Glucose Monitoring Suppl (OneTouch Verio Reflect) w/Device KIT per anesthesia guidelines     cetirizine (ZyrTEC) 10 mg tablet per anesthesia guidelines     citalopram (CeleXA) 40 mg tablet per anesthesia guidelines     clobetasol propionate (Clobex) 0.05 % shampoo per anesthesia guidelines     clonazePAM (KlonoPIN) 0.5 mg tablet per anesthesia guidelines     docusate sodium (COLACE) 100 mg capsule per anesthesia guidelines     Easy Touch Pen Needles 31G X 8 MM MISC per anesthesia guidelines     Eliquis 5 MG Stop taking 2 days prior to surgery    Empagliflozin (JARDIANCE) 10 MG TABS tablet per anesthesia guidelines     ferrous sulfate 325 (65 Fe) mg tablet per anesthesia guidelines     folic acid (FOLVITE) 1 mg tablet per anesthesia guidelines     furosemide (LASIX) 40 mg tablet per anesthesia guidelines     gabapentin (NEURONTIN) 600 MG tablet per anesthesia guidelines     glucose blood (OneTouch Verio) test strip per anesthesia guidelines     insulin aspart (NovoLOG FlexPen) 100 UNIT/ML injection pen per anesthesia guidelines     Lantus SoloStar 100 units/mL SOPN per  "anesthesia guidelines     latanoprost (XALATAN) 0.005 % ophthalmic solution per anesthesia guidelines     levothyroxine 300 MCG tablet per anesthesia guidelines     levothyroxine 50 mcg tablet per anesthesia guidelines     Linzess 290 MCG CAPS per anesthesia guidelines     lisinopril (ZESTRIL) 2.5 mg tablet per anesthesia guidelines     magnesium Oxide (MAG-OX) 400 mg TABS per anesthesia guidelines     metoprolol tartrate (LOPRESSOR) 25 mg tablet per anesthesia guidelines     nystatin (MYCOSTATIN) powder per anesthesia guidelines     OneTouch Delica Lancets 33G MISC per anesthesia guidelines     pantoprazole (PROTONIX) 40 mg tablet per anesthesia guidelines     potassium chloride (K-DUR,KLOR-CON) 20 mEq tablet per anesthesia guidelines     tamsulosin (FLOMAX) 0.4 mg per anesthesia guidelines     timolol (TIMOPTIC) 0.5 % ophthalmic solution per anesthesia guidelines         NOTE: Please use the above to review important meds for your specialty, the remainder \"per anesthesia Guidelines.\"    NOTE: Please place an Inbasket message for \"SOC\" pool for complicated patients.     "

## 2024-05-16 ENCOUNTER — TELEPHONE (OUTPATIENT)
Age: 59
End: 2024-05-16

## 2024-05-16 ENCOUNTER — TELEPHONE (OUTPATIENT)
Dept: NON INVASIVE DIAGNOSTICS | Facility: HOSPITAL | Age: 59
End: 2024-05-16

## 2024-05-16 ENCOUNTER — HOSPITAL ENCOUNTER (OUTPATIENT)
Dept: CT IMAGING | Facility: HOSPITAL | Age: 59
Discharge: HOME | End: 2024-05-16
Attending: INTERNAL MEDICINE
Payer: COMMERCIAL

## 2024-05-16 VITALS — DIASTOLIC BLOOD PRESSURE: 59 MMHG | HEART RATE: 71 BPM | RESPIRATION RATE: 22 BRPM | SYSTOLIC BLOOD PRESSURE: 112 MMHG

## 2024-05-16 DIAGNOSIS — Z01.810 PREOP CARDIOVASCULAR EXAM: Primary | ICD-10-CM

## 2024-05-16 DIAGNOSIS — Z01.810 PREOP CARDIOVASCULAR EXAM: ICD-10-CM

## 2024-05-16 PROCEDURE — 75574 CT ANGIO HRT W/3D IMAGE: CPT

## 2024-05-16 RX ORDER — METOPROLOL TARTRATE 1 MG/ML
5 INJECTION, SOLUTION INTRAVENOUS
Status: DISCONTINUED | OUTPATIENT
Start: 2024-05-16 | End: 2024-05-17 | Stop reason: HOSPADM

## 2024-05-16 RX ORDER — NITROGLYCERIN 0.4 MG/1
0.4 TABLET SUBLINGUAL ONCE
Status: COMPLETED | OUTPATIENT
Start: 2024-05-16 | End: 2024-05-16

## 2024-05-16 RX ADMIN — NITROGLYCERIN 0.4 MG: 0.4 TABLET SUBLINGUAL at 08:07

## 2024-05-16 RX ADMIN — IOHEXOL 200 ML: 350 INJECTION, SOLUTION INTRAVENOUS at 08:17

## 2024-05-16 NOTE — TELEPHONE ENCOUNTER
-I was able to call and speak with patient about nondiagnostic coronary CTA with recommendations for alternative evaluation.  As he did not tolerate pharmacologic nuclear stress testing in the past I informed him that in terms of stress testing we could perform dobutamine stress echocardiogram.  I informed him of potential risks for atrial fibrillation with this.  Patient noted understanding was agreeable and wished to have this performed at [facility].  I will place order and have office staff assist in setting this up.

## 2024-05-16 NOTE — TELEPHONE ENCOUNTER
Caller: Juan Antonio Guy    Doctor: Zaid    Reason for call: Pt said  told him he has to get another CTA test as he did today (5/16/24) but there is no order for Central Scheduling to pull & schedule.  Please put in order for Scan.    Pt has surgery scheduled for 5/23/24 & is worried about time frame.  Pt will also need medication (pill) he had to take before the CTA scan which needs to be called into the [facility] pharmacy in [place], [place] before Noon 5/17/24 so he can pick it up on Monday.    Call back#: [phone]

## 2024-05-16 NOTE — NURSING NOTE
Patient arrived for coronary CT angiography. Test education reviewed with patient. Medications and allergies verified. Pt denies PDE5 inhibitor use. Patient took 25 mg metoprolol tartrate this am as instructed by cardiology. SL nitro given per protocol. See vitals flowsheet. Tolerated test well. Instructed to increase fluid intake remainder of day. Vitals stable, patient asymptomatic upon departure, waiting for transportation to arrive.

## 2024-05-20 ENCOUNTER — TELEPHONE (OUTPATIENT)
Age: 59
End: 2024-05-20

## 2024-05-20 DIAGNOSIS — I48.91 ATRIAL FIBRILLATION, UNSPECIFIED TYPE (HCC): ICD-10-CM

## 2024-05-20 NOTE — TELEPHONE ENCOUNTER
Caller: J[facility]/ [facility]    Doctor: Dr. Mar     Reason for call: patient saw Dr. Mar on 5/6 for a cardiac clearance for a colon rectal surgery on Thurs. 5/24. Can someone please fax clearance over to their facility? Fax: [phone].    Call back#: [phone] EXT: [phone]     abdomen

## 2024-05-20 NOTE — TELEPHONE ENCOUNTER
Spoke with Jasmine regarding pt's upcoming Stress test on 5/22 and f/u with Dr. Hsu in [place]

## 2024-05-21 RX ORDER — APIXABAN 5 MG/1
TABLET, FILM COATED ORAL
Qty: 180 TABLET | Refills: 1 | Status: SHIPPED | OUTPATIENT
Start: 2024-05-21

## 2024-05-22 ENCOUNTER — TELEPHONE (OUTPATIENT)
Dept: NON INVASIVE DIAGNOSTICS | Facility: HOSPITAL | Age: 59
End: 2024-05-22

## 2024-05-22 ENCOUNTER — HOSPITAL ENCOUNTER (OUTPATIENT)
Dept: NON INVASIVE DIAGNOSTICS | Facility: HOSPITAL | Age: 59
Discharge: HOME | End: 2024-05-22
Payer: COMMERCIAL

## 2024-05-22 VITALS — SYSTOLIC BLOOD PRESSURE: 111 MMHG | DIASTOLIC BLOOD PRESSURE: 62 MMHG | HEART RATE: 68 BPM

## 2024-05-22 DIAGNOSIS — Z01.810 PREOP CARDIOVASCULAR EXAM: ICD-10-CM

## 2024-05-22 LAB
CHEST PAIN STATEMENT: NORMAL
MAX DIASTOLIC BP: 74 MMHG
MAX HR PERCENT: 88 %
MAX HR: 141 BPM
MAX PREDICTED HEART RATE: 160 BPM
PROTOCOL NAME: NORMAL
RATE PRESSURE PRODUCT: NORMAL
REASON FOR TERMINATION: NORMAL
SL CV STRESS RECOVERY BP: NORMAL MMHG
SL CV STRESS RECOVERY HR: 93 BPM
SL CV STRESS RECOVERY O2 SAT: 98 %
STRESS ANGINA INDEX: 0
STRESS BASELINE BP: NORMAL MMHG
STRESS BASELINE HR: 68 BPM
STRESS O2 SAT REST: 98 %
STRESS PEAK HR: 141 BPM
STRESS POST EXERCISE DUR MIN: 18 MIN
STRESS POST EXERCISE DUR SEC: 53 SEC
STRESS POST O2 SAT PEAK: 98 %
STRESS POST PEAK BP: 175 MMHG
STRESS POST PEAK HR: 141 BPM
STRESS POST PEAK SYSTOLIC BP: 175 MMHG
TARGET HR FORMULA: NORMAL
TEST INDICATION: NORMAL

## 2024-05-22 PROCEDURE — 93350 STRESS TTE ONLY: CPT | Performed by: INTERNAL MEDICINE

## 2024-05-22 PROCEDURE — 93350 STRESS TTE ONLY: CPT

## 2024-05-22 RX ORDER — DOBUTAMINE HYDROCHLORIDE 200 MG/100ML
10-50 INJECTION INTRAVENOUS CONTINUOUS
Status: ACTIVE | OUTPATIENT
Start: 2024-05-22 | End: 2024-05-22

## 2024-05-22 RX ORDER — ATROPINE SULFATE 0.1 MG/ML
0.5 INJECTION INTRAVENOUS ONCE
Status: COMPLETED | OUTPATIENT
Start: 2024-05-22 | End: 2024-05-22

## 2024-05-22 RX ADMIN — ATROPINE SULFATE 0.5 MG: 0.1 INJECTION, SOLUTION ENDOTRACHEAL; INTRAMUSCULAR; INTRAVENOUS; SUBCUTANEOUS at 11:28

## 2024-05-22 NOTE — TELEPHONE ENCOUNTER
-I was able to call and speak with patient about recent stress testing with dobutamine stress echocardiogram and as the study was listed as normal according to revised cardiac risk index patient will remain moderate-high risk for planned operative procedure.  Patient fully understands and is accepting of these risks and wishes to proceed with surgery as planned.  In that setting patient is appropriate risk to proceed with right colectomy and follow-up with Dr. Raad Hsu as scheduled.

## 2024-05-23 NOTE — TELEPHONE ENCOUNTER
Caller: [facility]    Doctor: Dr. Hsu    Reason for call: patient saw Dr. Mar on 5/6 and had a stress test on 5/22. Jasmine called back and wanted to know if stress test results came in and if patient is cleared for procedure. If so, please fax info to: Fax: [phone].      Call back#: [phone] EXT: [phone]

## 2024-05-27 LAB
CHEST PAIN STATEMENT: NORMAL
MAX DIASTOLIC BP: 74 MMHG
MAX PREDICTED HEART RATE: 160 BPM
PROTOCOL NAME: NORMAL
REASON FOR TERMINATION: NORMAL
STRESS POST EXERCISE DUR MIN: 18 MIN
STRESS POST EXERCISE DUR SEC: 53 SEC
STRESS POST PEAK HR: 141 BPM
STRESS POST PEAK SYSTOLIC BP: 175 MMHG
TARGET HR FORMULA: NORMAL
TEST INDICATION: NORMAL

## 2024-05-31 DIAGNOSIS — E61.2 MAGNESIUM DEFICIENCY: ICD-10-CM

## 2024-05-31 RX ORDER — LANOLIN ALCOHOL/MO/W.PET/CERES
CREAM (GRAM) TOPICAL
Qty: 60 TABLET | Refills: 5 | Status: SHIPPED | OUTPATIENT
Start: 2024-05-31

## 2024-06-13 ENCOUNTER — TELEPHONE (OUTPATIENT)
Age: 59
End: 2024-06-13

## 2024-06-13 DIAGNOSIS — E11.621 DIABETIC ULCER OF FOOT ASSOCIATED WITH TYPE 2 DIABETES MELLITUS, WITH OTHER ULCER SEVERITY, UNSPECIFIED LATERALITY, UNSPECIFIED PART OF FOOT (HCC): ICD-10-CM

## 2024-06-13 DIAGNOSIS — I48.91 ATRIAL FIBRILLATION, UNSPECIFIED TYPE (HCC): ICD-10-CM

## 2024-06-13 DIAGNOSIS — I87.332 CHRONIC VENOUS HYPERTENSION (IDIOPATHIC) WITH ULCER AND INFLAMMATION OF LEFT LOWER EXTREMITY (HCC): ICD-10-CM

## 2024-06-13 DIAGNOSIS — I50.9 CONGESTIVE HEART FAILURE, UNSPECIFIED HF CHRONICITY, UNSPECIFIED HEART FAILURE TYPE (HCC): ICD-10-CM

## 2024-06-13 DIAGNOSIS — L97.508 DIABETIC ULCER OF FOOT ASSOCIATED WITH TYPE 2 DIABETES MELLITUS, WITH OTHER ULCER SEVERITY, UNSPECIFIED LATERALITY, UNSPECIFIED PART OF FOOT (HCC): ICD-10-CM

## 2024-06-13 DIAGNOSIS — Z86.79 HISTORY OF HEART FAILURE: ICD-10-CM

## 2024-06-13 DIAGNOSIS — R39.11 BENIGN PROSTATIC HYPERPLASIA WITH URINARY HESITANCY: ICD-10-CM

## 2024-06-13 DIAGNOSIS — N40.1 BENIGN PROSTATIC HYPERPLASIA WITH URINARY HESITANCY: ICD-10-CM

## 2024-06-13 DIAGNOSIS — Z79.4 TYPE 2 DIABETES MELLITUS WITH HYPERGLYCEMIA, WITH LONG-TERM CURRENT USE OF INSULIN (HCC): ICD-10-CM

## 2024-06-13 DIAGNOSIS — M86.9 OSTEOMYELITIS, UNSPECIFIED SITE, UNSPECIFIED TYPE (HCC): ICD-10-CM

## 2024-06-13 DIAGNOSIS — E11.65 TYPE 2 DIABETES MELLITUS WITH HYPERGLYCEMIA, WITH LONG-TERM CURRENT USE OF INSULIN (HCC): ICD-10-CM

## 2024-06-13 DIAGNOSIS — E03.9 HYPOTHYROIDISM, UNSPECIFIED TYPE: ICD-10-CM

## 2024-06-13 DIAGNOSIS — E66.01 MORBID OBESITY (HCC): ICD-10-CM

## 2024-06-13 DIAGNOSIS — J44.1 COPD WITH ACUTE EXACERBATION (HCC): ICD-10-CM

## 2024-06-13 DIAGNOSIS — I10 ESSENTIAL HYPERTENSION: ICD-10-CM

## 2024-06-13 RX ORDER — PANTOPRAZOLE SODIUM 40 MG/1
40 TABLET, DELAYED RELEASE ORAL DAILY
Qty: 90 TABLET | Refills: 0 | OUTPATIENT
Start: 2024-06-13

## 2024-06-13 RX ORDER — PANTOPRAZOLE SODIUM 40 MG/1
40 TABLET, DELAYED RELEASE ORAL DAILY
Qty: 90 TABLET | Refills: 0 | Status: SHIPPED | OUTPATIENT
Start: 2024-06-13

## 2024-06-13 NOTE — TELEPHONE ENCOUNTER
Patient is requesting a refill of pantoprazole (PROTONIX) 40 mg tablet be sent to [address].    Patient is very uncomfortable with reflux and pain after eating.     Thank you.

## 2024-06-13 NOTE — TELEPHONE ENCOUNTER
Pt called looking for a refill on his pantoprazole (PROTONIX) 40mg - pt stated that he last received this from [facility], but he does not see them anymore and still needs the medication. Pt requesting that [facility] refill the prescription for him.  LOV: 5/15/24  Next visit: 7/19/24    Warm transferred to Gina with the Rx line to assist pt further with his request.

## 2024-06-19 ENCOUNTER — TELEPHONE (OUTPATIENT)
Age: 59
End: 2024-06-19

## 2024-06-19 NOTE — TELEPHONE ENCOUNTER
Caller: [facility]     Doctor: Dr Mar    Reason for call: Office is requesting results of Echo done 5/22/2024 be faxed to them. Surgery is 6/21/2024    Fax #: [phone]

## 2024-06-28 ENCOUNTER — TRANSITIONAL CARE MANAGEMENT (OUTPATIENT)
Dept: FAMILY MEDICINE CLINIC | Facility: CLINIC | Age: 59
End: 2024-06-28

## 2024-06-28 ENCOUNTER — TELEPHONE (OUTPATIENT)
Dept: FAMILY MEDICINE CLINIC | Facility: CLINIC | Age: 59
End: 2024-06-28

## 2024-06-28 DIAGNOSIS — F41.9 ANXIETY: ICD-10-CM

## 2024-06-28 DIAGNOSIS — K58.9 IRRITABLE BOWEL SYNDROME, UNSPECIFIED TYPE: ICD-10-CM

## 2024-06-28 DIAGNOSIS — F32.2 SEVERE DEPRESSION (HCC): ICD-10-CM

## 2024-06-28 DIAGNOSIS — G40.89 OTHER SEIZURES (HCC): Primary | ICD-10-CM

## 2024-06-28 RX ORDER — CLONAZEPAM 0.5 MG/1
0.5 TABLET ORAL EVERY 12 HOURS PRN
Qty: 60 TABLET | Refills: 0 | Status: SHIPPED | OUTPATIENT
Start: 2024-06-28

## 2024-06-28 RX ORDER — LEVETIRACETAM 250 MG/1
250 TABLET ORAL 2 TIMES DAILY
Qty: 180 TABLET | Refills: 1 | Status: SHIPPED | OUTPATIENT
Start: 2024-06-28

## 2024-06-28 RX ORDER — LINACLOTIDE 290 UG/1
CAPSULE, GELATIN COATED ORAL
Qty: 90 CAPSULE | Refills: 1 | Status: SHIPPED | OUTPATIENT
Start: 2024-06-28

## 2024-06-28 RX ORDER — CITALOPRAM 40 MG/1
40 TABLET ORAL DAILY
Qty: 90 TABLET | Refills: 1 | Status: SHIPPED | OUTPATIENT
Start: 2024-06-28

## 2024-06-28 NOTE — TELEPHONE ENCOUNTER
Set up a TCM visit with pt.  When we were finished he stated he needs refills on his Keppra, 250 mg BID, Klonopin 0.5 mg, and his Celexa.  His neurologist has left the practice.  [facility] does not take his insurance and  is booking out until November.

## 2024-07-02 ENCOUNTER — OFFICE VISIT (OUTPATIENT)
Dept: FAMILY MEDICINE CLINIC | Facility: CLINIC | Age: 59
End: 2024-07-02
Payer: COMMERCIAL

## 2024-07-02 VITALS
BODY MASS INDEX: 51.25 KG/M2 | SYSTOLIC BLOOD PRESSURE: 118 MMHG | HEART RATE: 57 BPM | TEMPERATURE: 96.9 F | OXYGEN SATURATION: 97 % | DIASTOLIC BLOOD PRESSURE: 70 MMHG | WEIGHT: 315 LBS

## 2024-07-02 DIAGNOSIS — R26.2 AMBULATORY DYSFUNCTION: ICD-10-CM

## 2024-07-02 DIAGNOSIS — G62.9 NEUROPATHY: ICD-10-CM

## 2024-07-02 DIAGNOSIS — D50.9 IRON DEFICIENCY ANEMIA, UNSPECIFIED IRON DEFICIENCY ANEMIA TYPE: ICD-10-CM

## 2024-07-02 DIAGNOSIS — D37.4 NEOPLASM OF UNCERTAIN BEHAVIOR OF COLON: Primary | ICD-10-CM

## 2024-07-02 DIAGNOSIS — Z90.49 S/P LAPAROSCOPIC COLECTOMY: ICD-10-CM

## 2024-07-02 DIAGNOSIS — G40.89 OTHER SEIZURES (HCC): ICD-10-CM

## 2024-07-02 PROCEDURE — 99495 TRANSJ CARE MGMT MOD F2F 14D: CPT

## 2024-07-02 RX ORDER — OXYCODONE HYDROCHLORIDE 5 MG/1
5 TABLET ORAL EVERY 6 HOURS PRN
COMMUNITY
Start: 2024-06-25

## 2024-07-02 NOTE — PROGRESS NOTES
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 S/P laparoscopic colectomy  3. Neuropathy  -     Ambulatory Referral to Neurology; Future  4. Ambulatory dysfunction  -     Walker  5. Other seizures (HCC)  -     Ambulatory Referral to Neurology; Future  6. Iron deficiency anemia, unspecified iron deficiency anemia type  Patient is to continue follow-up with colorectal for postop and for neoplasm of colon.  Patient is educated on possible signs and symptoms of postop complications, and is to go to ER if these present.  Patient would like another order for a walker and referral to neurology today.  These are placed.  Patient is to follow-up on scheduled date of .       History of Present Illness     Transitional Care Management Review:   Juan Antonio Guy is a 58 y.o. male here for TCM follow up.     During the TCM phone call patient stated:  TCM Call       Date and time call was made  2024 10:36 AM    Hospital care reviewed  Records reviewed    Patient was hospitialized at  [facility]    Comment  [facility]    Date of Admission  24    Date of discharge  24    Diagnosis  Removal of mass on R side of colon    Disposition  Home    Were the patients medications reviewed and updated  Yes    Current Symptoms  Incisional pain    Incisional pain severity  Severe    Incisional pain onset  Ongoing          TCM Call       Post hospital issues  Reduced activity    Should patient be enrolled in anticoag monitoring?  No    Scheduled for follow up?  Yes    Patients specialists  Other (comment)    Other specialists names  Gastro/surgeon on 24    Did you obtain your prescribed medications  Yes    Do you need help managing your prescriptions or  medications  No    Is transportation to your appointment needed  No    I have advised the patient to call PCP with any new or worsening symptoms  Victoria Ruzicka-MA    Living Arrangements  Alone    Support System  Family; Neighboors    The type of support provided  Physical; Emotional    Do you have social support  Yes, as much as I need    Are you recieving any outpatient services  No    Are you recieving home care services  Yes    Types of home care services  Nurse visit    Are you using any community resources  No    Current waiver services  No    Have you fallen in the last 12 months  Yes    How many times  2    Interperter language line needed  No    Counseling  Patient; Family; Caregiver    Counseling topics  Diagnostic results; Prognosis; Activities of daily living; instructions for management; patient and family education; Importance of RX compliance; Risk factor reduction          Patient is a 58-year-old male presenting for TCM follow-up.  Patient was admitted from 6/21 to 6/25 s/p right colectomy due to malignant neoplasm of the colon.  Postop complications included incisional pain.  Patient is following up with colorectal on 7/12.  Denies increase in leg pain, leg swelling, chest pain, shortness of breath, pleuritic chest pain, syncope, dizziness, slurring of speech.  Patient is also still following with hematology for CBC and iron lab work.  He saw them on 7/1, and I will be checking if he needs infusions.  Patient denies any redness, discharge, dehiscence of incision sites.  States that pain is occasional, but is much better from when he was originally postop.      Review of Systems   Constitutional:  Negative for appetite change, chills, diaphoresis, fatigue and fever.   HENT:  Negative for congestion, ear discharge, ear pain, postnasal drip, rhinorrhea, sinus pressure, sinus pain, sneezing and sore throat.    Eyes:  Negative for pain, discharge, redness, itching and visual disturbance.    Respiratory:  Negative for apnea, cough, chest tightness, shortness of breath and wheezing.    Cardiovascular:  Negative for chest pain, palpitations and leg swelling.   Gastrointestinal:  Negative for abdominal pain, blood in stool, constipation, diarrhea, nausea and vomiting.   Endocrine: Negative for cold intolerance, heat intolerance, polydipsia and polyuria.   Genitourinary:  Negative for dysuria, flank pain, frequency, hematuria and urgency.   Musculoskeletal:  Negative for arthralgias, back pain, myalgias, neck pain and neck stiffness.   Skin:  Positive for wound (Incisional). Negative for color change and rash.   Allergic/Immunologic: Negative.    Neurological:  Negative for dizziness, tremors, seizures, syncope, facial asymmetry, speech difficulty, weakness, light-headedness, numbness and headaches.   Hematological:  Negative for adenopathy. Does not bruise/bleed easily.   Psychiatric/Behavioral:  Negative for agitation, confusion, decreased concentration, dysphoric mood, hallucinations, self-injury, sleep disturbance and suicidal ideas. The patient is not nervous/anxious and is not hyperactive.    All other systems reviewed and are negative.    Objective     /70 (BP Location: Left arm, Patient Position: Sitting)   Pulse 57   Temp (!) 96.9 °F (36.1 °C) (Tympanic)   Wt (!) 161 kg (354 lb)   SpO2 97%   BMI 51.25 kg/m²     Physical Exam  Vitals and nursing note reviewed.   Constitutional:       General: He is not in acute distress.     Appearance: Normal appearance. He is well-developed. He is obese. He is not ill-appearing, toxic-appearing or diaphoretic.   HENT:      Head: Normocephalic and atraumatic.      Right Ear: Tympanic membrane normal.      Left Ear: Tympanic membrane normal.      Nose: Nose normal.      Mouth/Throat:      Mouth: Mucous membranes are moist.      Pharynx: Oropharynx is clear.   Eyes:      Extraocular Movements: Extraocular movements intact.      Conjunctiva/sclera:  Conjunctivae normal.      Pupils: Pupils are equal, round, and reactive to light.   Cardiovascular:      Rate and Rhythm: Normal rate and regular rhythm.      Pulses: Normal pulses.      Heart sounds: Normal heart sounds. No murmur heard.  Pulmonary:      Effort: Pulmonary effort is normal. No respiratory distress.      Breath sounds: Normal breath sounds. No wheezing.   Chest:      Chest wall: No tenderness.   Abdominal:      General: Bowel sounds are normal.      Palpations: Abdomen is soft. There is no mass.      Tenderness: There is no abdominal tenderness.   Musculoskeletal:         General: No swelling or tenderness. Normal range of motion.      Cervical back: Normal range of motion and neck supple. No tenderness.      Right lower leg: Edema present.      Left lower leg: Edema present.   Lymphadenopathy:      Cervical: No cervical adenopathy.   Skin:     General: Skin is warm and dry.      Capillary Refill: Capillary refill takes less than 2 seconds.      Findings: Lesion (Four incisional wounds.  No erythema, discharge, dehiscence.) and rash (Venous stasis dermatitis) present. No erythema.   Neurological:      General: No focal deficit present.      Mental Status: He is alert and oriented to person, place, and time. Mental status is at baseline.      Cranial Nerves: No cranial nerve deficit.      Motor: No weakness.      Coordination: Coordination normal.      Gait: Gait abnormal (Antalgic).   Psychiatric:         Mood and Affect: Mood normal.         Behavior: Behavior normal.         Thought Content: Thought content normal.         Judgment: Judgment normal.       Medications have been reviewed by provider in current encounter    Administrative Statements

## 2024-07-15 ENCOUNTER — TELEPHONE (OUTPATIENT)
Age: 59
End: 2024-07-15

## 2024-07-15 NOTE — TELEPHONE ENCOUNTER
Hello Good afternoon,       Patient is now scheduled with Ethan Dias, 7/24/2024, 1:30 pm and is asking if we can help with his transportation?     Patient gave verbal Permission to call Sister Kellie at [phone], if he is not available at [phone].    Thank you in advance,     Katya

## 2024-07-19 DIAGNOSIS — E53.8 FOLATE DEFICIENCY: ICD-10-CM

## 2024-07-19 RX ORDER — FOLIC ACID 1 MG/1
1000 TABLET ORAL DAILY
Qty: 90 TABLET | Refills: 1 | Status: SHIPPED | OUTPATIENT
Start: 2024-07-19

## 2024-07-23 NOTE — TELEPHONE ENCOUNTER
PT SISTER CALLED ON 7/15 TO SET UP TRANSPORTATION.     PT CALLED IN REGARDS TO TRANSPORTATION. NOONE HAS CALLED HIM TO SET UP HIS RIDE.   PT HAS AN APPT ON 7/24/24 AT 1:30PM.     PT IS CONCERN THAT THE APPT WILL BE CANCEL DUE TO NO TRANSPORTATION.     PLEASE ASSIST PT.   [phone]

## 2024-07-24 NOTE — TELEPHONE ENCOUNTER
MSW phoned [facility] and set up lyft as a last resort    time   11:45pm    MSW phoned pt and made him aware of this

## 2024-07-24 NOTE — TELEPHONE ENCOUNTER
MSW phoned pt no answer.   MSW phoned [facility] and spoke with Toni who confirmed that pt is on his way home via [employer]

## 2024-07-24 NOTE — TELEPHONE ENCOUNTER
Maira,  Patient has county transportation services within his county however, for his upcoming appt with you [facility] needs a letter explaining why they need to transport him out of the [facility]. Are you agreeable to provide letter? Please see below, feel free to change/edit, thank you      To whom it may concern,     Juan Antonio Guy 1965 has evaluation with Maira Durán PA-C for a new patient appointment August 1, 2023 at 2pm for 60 minutes. The appointment will be held at [place] [address]. The type of evaluation that patient needs is not available within [facility]. Please consider approval for transportation services to [facility] in order for patient to have the care that he needs in a timely manner.     Thank you,

## 2024-07-24 NOTE — TELEPHONE ENCOUNTER
MSW phoned pt at [phone]  and he informed that he was dropped off at incorrect location. Pt walked to [facility] however he was late for his appt. Discussion with [employer].     MSW phoned [facility] and spoke with Toni and notified of incorrect drop off. He will write a report and will provide to his Manager Jimmy.    He also informed that a [employer] will  patient in 6 mins   MSW phoned pt and notified of same.

## 2024-07-24 NOTE — TELEPHONE ENCOUNTER
The letter is coming from the office for an initial evaluation   Fyi this writer phoned [facility] at [phone]  [address], [place] [address] this morning  They now are scheduling new patients in September. Provider will only see specific dx and seizures is one of them however he will not address the neuropathy.    If not agreeable to provide letter this writer can reach out to PCP.  has no other mode of transportation, only [facility] transport and lyft is utilized as a last resort. Without letter, [facility] will only provide transportation within the [facility]. Thanks

## 2024-07-24 NOTE — TELEPHONE ENCOUNTER
"Distance from patient's home to neuro appt [place] is 1hr and 12 mins away, a total of 56. 3miles.      As per qualifier tool patient does not meet the criteria to have a lyft     Pt is not yet established with [facility].    \"Is the destination within 25 miles of the [facility]’s provider to or from which the patient is being transported, or within 50 miles if the patient resides in a “rural area” as defined in the Complimentary Local Transportation Policy? Mileage is measured “as the crow flies” (i.e., the distance may be longer when driven)\"    MSW phoned patient's sister Kellie at [phone] and she informed that patient's number is :  [phone]    MSW phoned patient at [phone] and this writer requested phone number that is in the back of his [employer] card. Pt confirmed that he has transportation benefits through [facility] ([facility] transportation)  [employer] phone number [phone]  Pt informed that [facility] will want him to have neuro appt within his county however [facility] has not called him back to provide appt and their first appt available is in November. Pt informed that this date is too far away and that is why he scheduled with Neurology. Pt is aware that distance to neuro office is 1hr 12mins and he has no mode of transportation to get to medical appts outside of his county.       MSW phoned [facility] [phone] and spoke with Khalida about the case. She informed that if [facility] does not have appts available then Neurology will have to provide them with Copy of referral   Letter for medical urgency and that neuro appt at [facility] is not available  Fax [phone]  Approval can be determine right away as soon as they have the requested documents.     MSW phoned [facility] at [phone]  [address], [place] [address]  They now are scheduling new patients in September. Provider will only see specific dx and seizures is one of them however he will not address the " neuropathy.    MSW phoned [facility] and spoke with Tana SOLANO And she informed that patient does not have non emergency transportation benefits.  Reference # [id number]

## 2024-07-25 ENCOUNTER — OFFICE VISIT (OUTPATIENT)
Dept: FAMILY MEDICINE CLINIC | Facility: CLINIC | Age: 59
End: 2024-07-25
Payer: COMMERCIAL

## 2024-07-25 ENCOUNTER — TELEPHONE (OUTPATIENT)
Age: 59
End: 2024-07-25

## 2024-07-25 VITALS
OXYGEN SATURATION: 97 % | WEIGHT: 315 LBS | SYSTOLIC BLOOD PRESSURE: 130 MMHG | HEART RATE: 55 BPM | HEIGHT: 69 IN | BODY MASS INDEX: 46.65 KG/M2 | TEMPERATURE: 96.3 F | DIASTOLIC BLOOD PRESSURE: 70 MMHG

## 2024-07-25 DIAGNOSIS — G40.89 OTHER SEIZURES (HCC): ICD-10-CM

## 2024-07-25 DIAGNOSIS — N18.31 CHRONIC KIDNEY DISEASE, STAGE 3A (HCC): ICD-10-CM

## 2024-07-25 DIAGNOSIS — R63.0 DECREASED APPETITE: ICD-10-CM

## 2024-07-25 DIAGNOSIS — D37.4 NEOPLASM OF UNCERTAIN BEHAVIOR OF COLON: ICD-10-CM

## 2024-07-25 DIAGNOSIS — J44.1 COPD WITH ACUTE EXACERBATION (HCC): ICD-10-CM

## 2024-07-25 DIAGNOSIS — E11.42 DIABETIC PERIPHERAL NEUROPATHY (HCC): ICD-10-CM

## 2024-07-25 DIAGNOSIS — I10 ESSENTIAL HYPERTENSION: ICD-10-CM

## 2024-07-25 DIAGNOSIS — Z00.00 MEDICARE ANNUAL WELLNESS VISIT, SUBSEQUENT: Primary | ICD-10-CM

## 2024-07-25 DIAGNOSIS — I50.32 CHRONIC DIASTOLIC CONGESTIVE HEART FAILURE (HCC): ICD-10-CM

## 2024-07-25 DIAGNOSIS — E11.621 DIABETIC ULCER OF LEFT HEEL ASSOCIATED WITH TYPE 2 DIABETES MELLITUS, LIMITED TO BREAKDOWN OF SKIN (HCC): ICD-10-CM

## 2024-07-25 DIAGNOSIS — I87.332 CHRONIC VENOUS HYPERTENSION (IDIOPATHIC) WITH ULCER AND INFLAMMATION OF LEFT LOWER EXTREMITY (HCC): ICD-10-CM

## 2024-07-25 DIAGNOSIS — I48.0 PAROXYSMAL ATRIAL FIBRILLATION (HCC): ICD-10-CM

## 2024-07-25 DIAGNOSIS — F33.9 DEPRESSION, RECURRENT (HCC): ICD-10-CM

## 2024-07-25 DIAGNOSIS — E11.65 TYPE 2 DIABETES MELLITUS WITH HYPERGLYCEMIA, WITH LONG-TERM CURRENT USE OF INSULIN (HCC): ICD-10-CM

## 2024-07-25 DIAGNOSIS — L97.508 DIABETIC ULCER OF FOOT ASSOCIATED WITH TYPE 2 DIABETES MELLITUS, WITH OTHER ULCER SEVERITY, UNSPECIFIED LATERALITY, UNSPECIFIED PART OF FOOT (HCC): ICD-10-CM

## 2024-07-25 DIAGNOSIS — D69.6 THROMBOCYTOPENIA (HCC): ICD-10-CM

## 2024-07-25 DIAGNOSIS — F32.2 SEVERE DEPRESSION (HCC): ICD-10-CM

## 2024-07-25 DIAGNOSIS — D50.9 IRON DEFICIENCY ANEMIA, UNSPECIFIED IRON DEFICIENCY ANEMIA TYPE: ICD-10-CM

## 2024-07-25 DIAGNOSIS — D51.8 OTHER VITAMIN B12 DEFICIENCY ANEMIAS: ICD-10-CM

## 2024-07-25 DIAGNOSIS — R19.5 LOOSE STOOLS: ICD-10-CM

## 2024-07-25 DIAGNOSIS — Z79.4 TYPE 2 DIABETES MELLITUS WITH HYPERGLYCEMIA, WITH LONG-TERM CURRENT USE OF INSULIN (HCC): ICD-10-CM

## 2024-07-25 DIAGNOSIS — L97.421 DIABETIC ULCER OF LEFT HEEL ASSOCIATED WITH TYPE 2 DIABETES MELLITUS, LIMITED TO BREAKDOWN OF SKIN (HCC): ICD-10-CM

## 2024-07-25 DIAGNOSIS — E11.621 DIABETIC ULCER OF FOOT ASSOCIATED WITH TYPE 2 DIABETES MELLITUS, WITH OTHER ULCER SEVERITY, UNSPECIFIED LATERALITY, UNSPECIFIED PART OF FOOT (HCC): ICD-10-CM

## 2024-07-25 DIAGNOSIS — J43.8 OTHER EMPHYSEMA (HCC): ICD-10-CM

## 2024-07-25 DIAGNOSIS — D51.3 OTHER DIETARY VITAMIN B12 DEFICIENCY ANEMIA: ICD-10-CM

## 2024-07-25 DIAGNOSIS — E03.9 HYPOTHYROIDISM, UNSPECIFIED TYPE: ICD-10-CM

## 2024-07-25 PROBLEM — M86.9 OSTEOMYELITIS (HCC): Status: RESOLVED | Noted: 2021-01-04 | Resolved: 2024-07-25

## 2024-07-25 LAB — SL AMB POCT HEMOGLOBIN AIC: 8 (ref ?–6.5)

## 2024-07-25 PROCEDURE — 83036 HEMOGLOBIN GLYCOSYLATED A1C: CPT

## 2024-07-25 PROCEDURE — G0439 PPPS, SUBSEQ VISIT: HCPCS

## 2024-07-25 PROCEDURE — 99214 OFFICE O/P EST MOD 30 MIN: CPT

## 2024-07-25 RX ORDER — TIRZEPATIDE 5 MG/.5ML
5 INJECTION, SOLUTION SUBCUTANEOUS WEEKLY
Qty: 2 ML | Refills: 2 | Status: SHIPPED | OUTPATIENT
Start: 2024-07-25 | End: 2024-07-25

## 2024-07-25 NOTE — ASSESSMENT & PLAN NOTE
Weight is stable.  Educated on symptoms of decompensation, and is to go to the ER if these present.  On furosemide, potassium, metoprolol, lisinopril.  Wt Readings from Last 3 Encounters:   07/25/24 (!) 157 kg (345 lb 12.8 oz)   07/02/24 (!) 161 kg (354 lb)   05/15/24 (!) 161 kg (354 lb)

## 2024-07-25 NOTE — ASSESSMENT & PLAN NOTE
Continue Eliquis and metoprolol.  No symptoms today.  Educated on symptoms and to go to ER if they present.

## 2024-07-25 NOTE — TELEPHONE ENCOUNTER
Amilcar,    Patient will have his first evaluation with [facility] on 8/1. He does not have transportation to this appt. Patient is active with [facility] transportation [facility]. They are able to provide transportation to this appt as long as there are no providers within [facility] that can see him. They are requesting a letter and the referral for neuro eval.       I did contact    [facility] at [phone]  [address] [place] [address]  They now are scheduling new patients in September. Provider will only see specific dx and seizures is one of them however he will not address the neuropathy.    Are you agreeable to provide letter to [facility]? Please see below draft. If you have any additional question about this request [facility] phone number is [phone]. Letter can be submitted to :  Fax [phone]  Approval can be determine right away as soon as they have the requested documents.       Draft letter:    To whom it may concern,      Juan Antonio Guy 1965 is a patient under my care. He was referred for an evaluation with [facility]. He has a new patient appointment with Maira Durán PA-C on August 1, 2023 at 2pm for 60 minutes. The appointment will be held at [facility], [address] [place] [address]. The type of evaluation that patient needs is not available within [facility]. Please consider approval for transportation services to [facility] in order for patient to have the care that he needs in a timely manner.      Thank you,

## 2024-07-25 NOTE — ASSESSMENT & PLAN NOTE
Not in acute exacerbation today.  Continue current management.  Contact office if exacerbation occurs.

## 2024-07-25 NOTE — PROGRESS NOTES
Ambulatory Visit  Name: Juan Antonio Guy      : 1965      MRN: [id number]  Encounter Provider: Amilcar Appiah PA-C  Encounter Date: 2024   Encounter department: [facility]    Assessment & Plan   1. Medicare annual wellness visit, subsequent  2. Paroxysmal atrial fibrillation (HCC)  Assessment & Plan:  Continue Eliquis and metoprolol.  No symptoms today.  Educated on symptoms and to go to ER if they present.  3. Chronic venous hypertension (idiopathic) with ulcer and inflammation of left lower extremity (HCC)  Assessment & Plan:  Following with wound care and podiatry.  Go to ER if any worsening.  4. Chronic diastolic congestive heart failure (HCC)  Assessment & Plan:  Weight is stable.  Educated on symptoms of decompensation, and is to go to the ER if these present.  On furosemide, potassium, metoprolol, lisinopril.  Wt Readings from Last 3 Encounters:   24 (!) 157 kg (345 lb 12.8 oz)   24 (!) 161 kg (354 lb)   05/15/24 (!) 161 kg (354 lb)             5. Essential hypertension  Assessment & Plan:  At goal today.  Continue current management.  6. Other emphysema (HCC)  Assessment & Plan:  Stable.  Continue current management.  Contact office or go to ER if exacerbations occur.  7. COPD with acute exacerbation (HCC)  Assessment & Plan:  Not in acute exacerbation today.  Continue current management.  Contact office if exacerbation occurs.  8. Diabetic ulcer of left heel associated with type 2 diabetes mellitus, limited to breakdown of skin (HCC)  Assessment & Plan:  Continue follow-up with wound care and podiatry.  A1c has improved to 8.0 today.  Will increase the tirzepatide to get even better control.  Educated on healthy diet and activity.  Lab Results   Component Value Date    HGBA1C 8.0 (A) 2024     Orders:  -     tirzepatide (Mounjaro) 5 MG/0.5ML; Inject 0.5 mL (5 mg total) under the skin every 7 days  9. Diabetic ulcer of foot associated with type 2  diabetes mellitus, with other ulcer severity, unspecified laterality, unspecified part of foot (HCC)  Assessment & Plan:  Continue follow-up with wound care and podiatry.  A1c has improved to 8.0 today.  Will increase the tirzepatide to get even better control.  Educated on healthy diet and activity.  Lab Results   Component Value Date    HGBA1C 8.0 (A) 07/25/2024     Orders:  -     tirzepatide (Mounjaro) 5 MG/0.5ML; Inject 0.5 mL (5 mg total) under the skin every 7 days  10. Diabetic peripheral neuropathy (HCC)  Assessment & Plan:  Continue follow-up with wound care and podiatry.  A1c has improved to 8.0 today.  Will increase the tirzepatide to get even better control.  Educated on healthy diet and activity.  Lab Results   Component Value Date    HGBA1C 8.0 (A) 07/25/2024     Orders:  -     Vitamin B12; Future  -     Folate; Future  -     Vitamin B12  -     Folate  -     tirzepatide (Mounjaro) 5 MG/0.5ML; Inject 0.5 mL (5 mg total) under the skin every 7 days  11. Hypothyroidism, unspecified type  Assessment & Plan:  Will continue to monitor.  Continue levothyroxine.  Symptoms stable.  Orders:  -     TSH, 3rd generation with Free T4 reflex; Future; Expected date: 10/25/2024  -     TSH, 3rd generation with Free T4 reflex  12. Type 2 diabetes mellitus with hyperglycemia, with long-term current use of insulin ([facility])  Assessment & Plan:  A1c is much improved today.  Tolerating Mounjaro.  Will increase this today.  Educated on side effects and to contact office or go to ER if these present.  Will follow-up with A1c in 3 months.  Lab Results   Component Value Date    HGBA1C 8.0 (A) 07/25/2024     Orders:  -     POCT hemoglobin A1c  -     Comprehensive metabolic panel; Future; Expected date: 10/25/2024  -     Hemoglobin A1C; Future; Expected date: 10/25/2024  -     CBC and differential; Future; Expected date: 10/25/2024  -     Lipid Panel with Direct LDL reflex; Future; Expected date: 10/25/2024  -     Comprehensive  metabolic panel  -     Hemoglobin A1C  -     CBC and differential  -     Lipid Panel with Direct LDL reflex  -     tirzepatide (Mounjaro) 5 MG/0.5ML; Inject 0.5 mL (5 mg total) under the skin every 7 days  13. Chronic kidney disease, stage 3a (HCC)  Assessment & Plan:  Much improved.  Continues to be stable.  Avoid nephrotoxic agents.  Will continue to monitor.  Lab Results   Component Value Date    EGFR 89 05/10/2024    EGFR 89 04/27/2024    EGFR 90 04/25/2024    CREATININE 0.97 05/10/2024    CREATININE 0.97 04/27/2024    CREATININE 0.96 04/25/2024     Orders:  -     Vitamin D 25 hydroxy; Future  -     Vitamin B12; Future  -     Folate; Future  -     Vitamin D 25 hydroxy  -     Vitamin B12  -     Folate  14. Thrombocytopenia (HCC)  Assessment & Plan:  Will continue monitoring CBC.  Orders:  -     CBC and differential; Future; Expected date: 10/25/2024  -     CBC and differential  15. Depression, recurrent (HCC)  Assessment & Plan:  Referred back to counselor today.  Denies SI/HI.  Orders:  -     Ambulatory referral to Psych Services; Future  16. Severe depression (HCC)  Assessment & Plan:  Referred back to counselor today.  Denies SI/HI.  Orders:  -     Ambulatory referral to Psych Services; Future  17. Iron deficiency anemia, unspecified iron deficiency anemia type  Assessment & Plan:  Will continue to monitor CBC.  Orders:  -     CBC and differential; Future; Expected date: 10/25/2024  -     CBC and differential  -     Vitamin D 25 hydroxy; Future  -     Vitamin B12; Future  -     Folate; Future  -     Vitamin D 25 hydroxy  -     Vitamin B12  -     Folate  18. Other seizures (HCC)  Assessment & Plan:  Has appointment scheduled with neurology.  Denies any recent seizures.  19. Neoplasm of uncertain behavior of colon  Assessment & Plan:  Following with surgery for workup.  Contact surgery due to GI symptoms today.  Orders:  -     Vitamin D 25 hydroxy; Future  -     Vitamin B12; Future  -     Folate; Future  -      Vitamin D 25 hydroxy  -     Vitamin B12  -     Folate  20. Other vitamin B12 deficiency anemias  -     Vitamin B12; Future  -     Vitamin B12  21. Other dietary vitamin B12 deficiency anemia  -     Folate; Future  -     Folate  22. Decreased appetite  23. Loose stools     Patient is to discuss decreased appetite and loose stools with surgeon.  Patient is educated on possible reasons for this, and importance of follow-up with surgeon regarding this issue.  Will get lab work to ensure nutrition.  Patient understands and agrees to plan.    Preventive health issues were discussed with patient, and age appropriate screening tests were ordered as noted in patient's After Visit Summary. Personalized health advice and appropriate referrals for health education or preventive services given if needed, as noted in patient's After Visit Summary.    History of Present Illness     Patient is a 59-year-old male presenting for Medicare annual wellness visit.  Patient recently had a hemicolectomy due to neoplasm in the colon.  Patient is established with GI.  States he has been unchanged with his decreased appetite and loose stools.  Denies blood in stool, fatty stools, abdominal pain, decreased stools, nausea, vomiting, fever, chills.  Patient also needs referral back to psych for counseling.  Patient has no other concerns today.       Patient Care Team:  Amilcar Appiah PA-C as PCP - General (Physician Assistant)  Sher Denis DPM  [facility] (Ophthalmology)  CARLOS EDUARDO Soliman (Behavioral Health)  Breann Mcbride, Pharmacist as Pharmacist (Pharmacy)    Review of Systems   Constitutional:  Positive for appetite change. Negative for chills, diaphoresis, fatigue and fever.   HENT:  Negative for congestion, ear discharge, ear pain, postnasal drip, rhinorrhea, sinus pressure, sinus pain, sneezing and sore throat.    Eyes:  Negative for pain, discharge, redness, itching and visual disturbance.   Respiratory:   Negative for apnea, cough, chest tightness, shortness of breath and wheezing.    Cardiovascular:  Negative for chest pain, palpitations and leg swelling.   Gastrointestinal:  Positive for diarrhea. Negative for abdominal pain, blood in stool, constipation, nausea and vomiting.   Endocrine: Negative for cold intolerance, heat intolerance, polydipsia and polyuria.   Genitourinary:  Negative for dysuria, flank pain, frequency, hematuria and urgency.   Musculoskeletal:  Positive for gait problem. Negative for arthralgias, back pain, myalgias, neck pain and neck stiffness.   Skin:  Positive for wound. Negative for color change and rash.   Allergic/Immunologic: Negative.    Neurological:  Negative for dizziness, tremors, seizures, syncope, facial asymmetry, speech difficulty, weakness, light-headedness, numbness and headaches.   Hematological:  Negative for adenopathy. Does not bruise/bleed easily.   Psychiatric/Behavioral:  Negative for agitation, confusion, decreased concentration, dysphoric mood, hallucinations, self-injury, sleep disturbance and suicidal ideas. The patient is not nervous/anxious and is not hyperactive.    All other systems reviewed and are negative.    Medical History Reviewed by provider this encounter:  Tobacco  Allergies  Meds  Problems  Med Hx  Surg Hx  Fam Hx       Annual Wellness Visit Questionnaire   Juan Antonio is here for his Subsequent Wellness visit.     Health Risk Assessment:   Patient rates overall health as poor. Patient feels that their physical health rating is slightly worse. Patient is very dissatisfied with their life. Eyesight was rated as slightly worse. Hearing was rated as slightly worse. Patient feels that their emotional and mental health rating is slightly worse. Patients states they are sometimes angry. Patient states they are sometimes unusually tired/fatigued. Pain experienced in the last 7 days has been a lot. Patient's pain rating has been 6/10. Patient states that he  has experienced weight loss or gain in last 6 months.     Fall Risk Screening:   In the past year, patient has experienced: history of falling in past year    Number of falls: 2 or more  Injured during fall?: Yes    Feels unsteady when standing or walking?: Yes    Worried about falling?: No      Home Safety:  Patient has trouble with stairs inside or outside of their home. Patient has working smoke alarms and has working carbon monoxide detector. Home safety hazards include: loose rugs on the floor and poor household lighting.     Nutrition:   Current diet is Regular, No Added Salt and Limited junk food.     Medications:   Patient is not currently taking any over-the-counter supplements. Patient is not able to manage medications.     Activities of Daily Living (ADLs)/Instrumental Activities of Daily Living (IADLs):   Walk and transfer into and out of bed and chair?: Yes  Dress and groom yourself?: Yes    Bathe or shower yourself?: Yes    Feed yourself? Yes  Do your laundry/housekeeping?: Yes  Manage your money, pay your bills and track your expenses?: Yes  Make your own meals?: Yes    Do your own shopping?: Yes    Previous Hospitalizations:   Any hospitalizations or ED visits within the last 12 months?: Yes    How many hospitalizations have you had in the last year?: 3-4    Advance Care Planning:   Living will: Yes    Durable POA for healthcare: Yes    Advanced directive: Yes    Advanced directive counseling given: Yes    Five wishes given: No    Patient declined ACP directive: No    End of Life Decisions reviewed with patient: Yes    Provider agrees with end of life decisions: Yes      Cognitive Screening:   Provider or family/friend/caregiver concerned regarding cognition?: No    PREVENTIVE SCREENINGS      Cardiovascular Screening:    General: Screening Current      Diabetes Screening:     General: Screening Not Indicated and History Diabetes      Colorectal Cancer Screening:     General: Screening Current       Lung Cancer Screening:     General: Screening Not Indicated      Hepatitis C Screening:    General: Screening Current    Screening, Brief Intervention, and Referral to Treatment (SBIRT)    Screening      AUDIT-C Screenin) How often did you have a drink containing alcohol in the past year? never  2) How many drinks did you have on a typical day when you were drinking in the past year? 0  3) How often did you have 6 or more drinks on one occasion in the past year? never    AUDIT-C Score: 0  Interpretation: Score 0-3 (male): Negative screen for alcohol misuse    Single Item Drug Screening:  How often have you used an illegal drug (including marijuana) or a prescription medication for non-medical reasons in the past year? never    Single Item Drug Screen Score: 0  Interpretation: Negative screen for possible drug use disorder    Brief Intervention  Alcohol & drug use screenings were reviewed. No concerns regarding substance use disorder identified.     SDOH Risk Assessment  Social determinants of health (SDOH) risk assesment tool was completed. The tool at a minimum covered housing stability, food insecurity, transportation needs, and utility difficulty. Patient had at risk responses for the following SDOH domains: transportation needs.     Other Counseling Topics:   Car/seat belt/driving safety, skin self-exam, sunscreen and calcium and vitamin D intake and regular weightbearing exercise.     Social Determinants of Health     Financial Resource Strain: High Risk (2024)    [facility]    Overall Financial Resource Strain (CARDIA)     Difficulty of Paying Living Expenses: Very hard   Food Insecurity: No Food Insecurity (2024)    Hunger Vital Sign     Worried About Running Out of Food in the Last Year: Never true     Ran Out of Food in the Last Year: Never true   Transportation Needs: Unmet Transportation Needs (2024)    PRAPARE -  "Transportation     Lack of Transportation (Medical): Yes     Lack of Transportation (Non-Medical): Yes   Housing Stability: Low Risk  (7/25/2024)    Housing Stability Vital Sign     Unable to Pay for Housing in the Last Year: No     Number of Times Moved in the Last Year: 0     Homeless in the Last Year: No   Utilities: Not At Risk (7/25/2024)    [facility] Utilities     Threatened with loss of utilities: No     No results found.    Objective     /70 (BP Location: Left arm, Patient Position: Sitting)   Pulse 55   Temp (!) 96.3 °F (35.7 °C) (Tympanic)   Ht 5' 9\" (1.753 m)   Wt (!) 157 kg (345 lb 12.8 oz)   SpO2 97%   BMI 51.07 kg/m²     Physical Exam  Vitals and nursing note reviewed.   Constitutional:       General: He is not in acute distress.     Appearance: Normal appearance. He is well-developed. He is obese. He is not ill-appearing, toxic-appearing or diaphoretic.   HENT:      Head: Normocephalic and atraumatic.      Right Ear: Tympanic membrane normal.      Left Ear: Tympanic membrane normal.      Nose: Nose normal.      Mouth/Throat:      Mouth: Mucous membranes are moist.      Pharynx: Oropharynx is clear.   Eyes:      Extraocular Movements: Extraocular movements intact.      Conjunctiva/sclera: Conjunctivae normal.      Pupils: Pupils are equal, round, and reactive to light.   Cardiovascular:      Rate and Rhythm: Normal rate and regular rhythm.      Pulses: Pulses are weak.           Dorsalis pedis pulses are 1+ on the right side and 1+ on the left side.      Heart sounds: Normal heart sounds. No murmur heard.  Pulmonary:      Effort: Pulmonary effort is normal. No respiratory distress.      Breath sounds: Normal breath sounds. No wheezing.   Chest:      Chest wall: No tenderness.   Abdominal:      General: Bowel sounds are normal.      Palpations: Abdomen is soft. There is no mass.      Tenderness: There is no abdominal tenderness.   Musculoskeletal:         General: No swelling or tenderness. Normal " range of motion.      Cervical back: Normal range of motion and neck supple. No tenderness.      Right lower leg: No edema.      Left lower leg: No edema.   Feet:      Right foot:      Skin integrity: Dry skin present. No ulcer, skin breakdown, erythema, warmth or callus.      Left foot:      Skin integrity: Ulcer and dry skin present. No skin breakdown, erythema, warmth or callus.   Lymphadenopathy:      Cervical: No cervical adenopathy.   Skin:     General: Skin is warm and dry.      Capillary Refill: Capillary refill takes less than 2 seconds.      Findings: Lesion present. No erythema or rash.   Neurological:      General: No focal deficit present.      Mental Status: He is alert and oriented to person, place, and time. Mental status is at baseline.      Cranial Nerves: No cranial nerve deficit.      Motor: No weakness.      Coordination: Coordination normal.      Gait: Gait abnormal (Antalgic).   Psychiatric:         Mood and Affect: Mood normal.         Behavior: Behavior normal.         Thought Content: Thought content normal.         Judgment: Judgment normal.     Patient's shoes and socks removed.    Right Foot/Ankle   Right Foot Inspection  Skin Exam: skin normal, skin intact and dry skin. No warmth, no callus, no erythema, no maceration, no abnormal color, no pre-ulcer, no ulcer and no callus.     Toe Exam: ROM and strength within normal limits.     Sensory   Monofilament testing: absent    Vascular  Capillary refills: < 3 seconds  The right DP pulse is 1+.     Left Foot/Ankle  Left Foot Inspection  Skin Exam: skin normal, skin intact, dry skin and ulcer. No warmth, no erythema, no maceration, normal color, no pre-ulcer and no callus.     Toe Exam: ROM and strength within normal limits.     Sensory   Monofilament testing: absent    Vascular  Capillary refills: < 3 seconds  The left DP pulse is 1+.     Assign Risk Category  No deformity present  Loss of protective sensation  Weak pulses  Risk: 2

## 2024-07-25 NOTE — ASSESSMENT & PLAN NOTE
Much improved.  Continues to be stable.  Avoid nephrotoxic agents.  Will continue to monitor.  Lab Results   Component Value Date    EGFR 89 05/10/2024    EGFR 89 04/27/2024    EGFR 90 04/25/2024    CREATININE 0.97 05/10/2024    CREATININE 0.97 04/27/2024    CREATININE 0.96 04/25/2024

## 2024-07-25 NOTE — TELEPHONE ENCOUNTER
Onesimo,      Chart review from PCP office [facility] informed that pt will need  to reschedule his appt neuro appt because they cannot do transportation for 2pm and later. Can you change appt for a different time ? Thanks

## 2024-07-25 NOTE — ASSESSMENT & PLAN NOTE
Continue follow-up with wound care and podiatry.  A1c has improved to 8.0 today.  Will increase the tirzepatide to get even better control.  Educated on healthy diet and activity.  Lab Results   Component Value Date    HGBA1C 8.0 (A) 07/25/2024

## 2024-07-25 NOTE — TELEPHONE ENCOUNTER
Spoke to Khalida and she explained that pt needs to reschedule his appt because they cannot do transportation for 2pm and later. She stated she would call pt and inform him he would have to reschedule his appt.

## 2024-07-25 NOTE — ASSESSMENT & PLAN NOTE
A1c is much improved today.  Tolerating Mounjaro.  Will increase this today.  Educated on side effects and to contact office or go to ER if these present.  Will follow-up with A1c in 3 months.  Lab Results   Component Value Date    HGBA1C 8.0 (A) 07/25/2024

## 2024-07-25 NOTE — LETTER
July 25, 2024     Patient: Juan Antonio Guy  YOB: 1965  Date of Visit: 7/25/2024      To Whom it May Concern:    Juan Antonio Guy is under my professional care. Juan Antonio needs to go out of [facility] for neurology due to in need of sooner follow up with services unavailable in the county, and nothing is available in a timely fashion closer than his scheduled location.    If you have any questions or concerns, please don't hesitate to call.         Sincerely,          Amilcar Appiah PA-C        CC: No Recipients

## 2024-07-25 NOTE — PATIENT INSTRUCTIONS
Medicare Preventive Visit Patient Instructions  Thank you for completing your Welcome to Medicare Visit or Medicare Annual Wellness Visit today. Your next wellness visit will be due in one year (7/26/2025).  The screening/preventive services that you may require over the next 5-10 years are detailed below. Some tests may not apply to you based off risk factors and/or age. Screening tests ordered at today's visit but not completed yet may show as past due. Also, please note that scanned in results may not display below.  Preventive Screenings:  Service Recommendations Previous Testing/Comments   Colorectal Cancer Screening  Colonoscopy    Fecal Occult Blood Test (FOBT)/Fecal Immunochemical Test (FIT)  Fecal DNA/Cologuard Test  Flexible Sigmoidoscopy Age: 45-75 years old   Colonoscopy: every 10 years (May be performed more frequently if at higher risk)  OR  FOBT/FIT: every 1 year  OR  Cologuard: every 3 years  OR  Sigmoidoscopy: every 5 years  Screening may be recommended earlier than age 45 if at higher risk for colorectal cancer. Also, an individualized decision between you and your healthcare provider will decide whether screening between the ages of 76-85 would be appropriate. Colonoscopy: 04/16/2024  FOBT/FIT: Not on file  Cologuard: Not on file  Sigmoidoscopy: Not on file          Prostate Cancer Screening Individualized decision between patient and health care provider in men between ages of 55-69   Medicare will cover every 12 months beginning on the day after your 50th birthday PSA: No results in last 5 years           Hepatitis C Screening Once for adults born between 1945 and 1965  More frequently in patients at high risk for Hepatitis C Hep C Antibody: 04/30/2020        Diabetes Screening 1-2 times per year if you're at risk for diabetes or have pre-diabetes Fasting glucose: No results in last 5 years (No results in last 5 years)  A1C: 9.0 % (3/20/2024)      Cholesterol Screening Once every 5 years if you  don't have a lipid disorder. May order more often based on risk factors. Lipid panel: 03/20/2024         Other Preventive Screenings Covered by Medicare:  Abdominal Aortic Aneurysm (AAA) Screening: covered once if your at risk. You're considered to be at risk if you have a family history of AAA or a male between the age of 65-75 who smoking at least 100 cigarettes in your lifetime.  Lung Cancer Screening: covers low dose CT scan once per year if you meet all of the following conditions: (1) Age 55-77; (2) No signs or symptoms of lung cancer; (3) Current smoker or have quit smoking within the last 15 years; (4) You have a tobacco smoking history of at least 20 pack years (packs per day x number of years you smoked); (5) You get a written order from a healthcare provider.  Glaucoma Screening: covered annually if you're considered high risk: (1) You have diabetes OR (2) Family history of glaucoma OR (3)  aged 50 and older OR (4)  American aged 65 and older  Osteoporosis Screening: covered every 2 years if you meet one of the following conditions: (1) Have a vertebral abnormality; (2) On glucocorticoid therapy for more than 3 months; (3) Have primary hyperparathyroidism; (4) On osteoporosis medications and need to assess response to drug therapy.  HIV Screening: covered annually if you're between the age of 15-65. Also covered annually if you are younger than 15 and older than 65 with risk factors for HIV infection. For pregnant patients, it is covered up to 3 times per pregnancy.    Immunizations:  Immunization Recommendations   Influenza Vaccine Annual influenza vaccination during flu season is recommended for all persons aged >= 6 months who do not have contraindications   Pneumococcal Vaccine   * Pneumococcal conjugate vaccine = PCV13 (Prevnar 13), PCV15 (Vaxneuvance), PCV20 (Prevnar 20)  * Pneumococcal polysaccharide vaccine = PPSV23 (Pneumovax) Adults 19-63 yo with certain risk factors or  if 65+ yo  If never received any pneumonia vaccine: recommend Prevnar 20 (PCV20)  Give PCV20 if previously received 1 dose of PCV13 or PPSV23   Hepatitis B Vaccine 3 dose series if at intermediate or high risk (ex: diabetes, end stage renal disease, liver disease)   Respiratory syncytial virus (RSV) Vaccine - COVERED BY MEDICARE PART D  * RSVPreF3 (Arexvy) CDC recommends that adults 60 years of age and older may receive a single dose of RSV vaccine using shared clinical decision-making (SCDM)   Tetanus (Td) Vaccine - COST NOT COVERED BY MEDICARE PART B Following completion of primary series, a booster dose should be given every 10 years to maintain immunity against tetanus. Td may also be given as tetanus wound prophylaxis.   Tdap Vaccine - COST NOT COVERED BY MEDICARE PART B Recommended at least once for all adults. For pregnant patients, recommended with each pregnancy.   Shingles Vaccine (Shingrix) - COST NOT COVERED BY MEDICARE PART B  2 shot series recommended in those 19 years and older who have or will have weakened immune systems or those 50 years and older     Health Maintenance Due:      Topic Date Due   • Colorectal Cancer Screening  04/16/2025   • HIV Screening  Completed   • Hepatitis C Screening  Completed     Immunizations Due:      Topic Date Due   • Influenza Vaccine (1) 09/01/2024     Advance Directives   What are advance directives?  Advance directives are legal documents that state your wishes and plans for medical care. These plans are made ahead of time in case you lose your ability to make decisions for yourself. Advance directives can apply to any medical decision, such as the treatments you want, and if you want to donate organs.   What are the types of advance directives?  There are many types of advance directives, and each state has rules about how to use them. You may choose a combination of any of the following:  Living will:  This is a written record of the treatment you want. You can  also choose which treatments you do not want, which to limit, and which to stop at a certain time. This includes surgery, medicine, IV fluid, and tube feedings.   Durable power of  for healthcare (DPAHC):  This is a written record that states who you want to make healthcare choices for you when you are unable to make them for yourself. This person, called a proxy, is usually a family member or a friend. You may choose more than 1 proxy.  Do not resuscitate (DNR) order:  A DNR order is used in case your heart stops beating or you stop breathing. It is a request not to have certain forms of treatment, such as CPR. A DNR order may be included in other types of advance directives.  Medical directive:  This covers the care that you want if you are in a coma, near death, or unable to make decisions for yourself. You can list the treatments you want for each condition. Treatment may include pain medicine, surgery, blood transfusions, dialysis, IV or tube feedings, and a ventilator (breathing machine).  Values history:  This document has questions about your views, beliefs, and how you feel and think about life. This information can help others choose the care that you would choose.  Why are advance directives important?  An advance directive helps you control your care. Although spoken wishes may be used, it is better to have your wishes written down. Spoken wishes can be misunderstood, or not followed. Treatments may be given even if you do not want them. An advance directive may make it easier for your family to make difficult choices about your care.   Weight Management   Why it is important to manage your weight:  Being overweight increases your risk of health conditions such as heart disease, high blood pressure, type 2 diabetes, and certain types of cancer. It can also increase your risk for osteoarthritis, sleep apnea, and other respiratory problems. Aim for a slow, steady weight loss. Even a small amount of  weight loss can lower your risk of health problems.  How to lose weight safely:  A safe and healthy way to lose weight is to eat fewer calories and get regular exercise. You can lose up about 1 pound a week by decreasing the number of calories you eat by 500 calories each day.   Healthy meal plan for weight management:  A healthy meal plan includes a variety of foods, contains fewer calories, and helps you stay healthy. A healthy meal plan includes the following:  Eat whole-grain foods more often.  A healthy meal plan should contain fiber. Fiber is the part of grains, fruits, and vegetables that is not broken down by your body. Whole-grain foods are healthy and provide extra fiber in your diet. Some examples of whole-grain foods are whole-wheat breads and pastas, oatmeal, brown rice, and bulgur.  Eat a variety of vegetables every day.  Include dark, leafy greens such as spinach, kale, christian greens, and mustard greens. Eat yellow and orange vegetables such as carrots, sweet potatoes, and winter squash.   Eat a variety of fruits every day.  Choose fresh or canned fruit (canned in its own juice or light syrup) instead of juice. Fruit juice has very little or no fiber.  Eat low-fat dairy foods.  Drink fat-free (skim) milk or 1% milk. Eat fat-free yogurt and low-fat cottage cheese. Try low-fat cheeses such as mozzarella and other reduced-fat cheeses.  Choose meat and other protein foods that are low in fat.  Choose beans or other legumes such as split peas or lentils. Choose fish, skinless poultry (chicken or turkey), or lean cuts of red meat (beef or pork). Before you cook meat or poultry, cut off any visible fat.   Use less fat and oil.  Try baking foods instead of frying them. Add less fat, such as margarine, sour cream, regular salad dressing and mayonnaise to foods. Eat fewer high-fat foods. Some examples of high-fat foods include french fries, doughnuts, ice cream, and cakes.  Eat fewer sweets.  Limit foods and  drinks that are high in sugar. This includes candy, cookies, regular soda, and sweetened drinks.  Exercise:  Exercise at least 30 minutes per day on most days of the week. Some examples of exercise include walking, biking, dancing, and swimming. You can also fit in more physical activity by taking the stairs instead of the elevator or parking farther away from stores. Ask your healthcare provider about the best exercise plan for you.      © Copyright [employer] 2018 Information is for End User's use only and may not be sold, redistributed or otherwise used for commercial purposes. All illustrations and images included in CareNotes® are the copyrighted property of A.D.A.M., Inc. or [employer]

## 2024-07-26 ENCOUNTER — TELEPHONE (OUTPATIENT)
Age: 59
End: 2024-07-26

## 2024-07-26 NOTE — TELEPHONE ENCOUNTER
Called Pt in regards to referral received, in attempts to verify needs of services and advised of current wait list.  No answer, lvm for patient to call back at [phone], opt 3.

## 2024-07-31 DIAGNOSIS — E66.01 MORBID OBESITY (HCC): ICD-10-CM

## 2024-07-31 DIAGNOSIS — I87.332 CHRONIC VENOUS HYPERTENSION (IDIOPATHIC) WITH ULCER AND INFLAMMATION OF LEFT LOWER EXTREMITY (HCC): ICD-10-CM

## 2024-07-31 DIAGNOSIS — J44.1 COPD WITH ACUTE EXACERBATION (HCC): ICD-10-CM

## 2024-07-31 DIAGNOSIS — L97.508 DIABETIC ULCER OF FOOT ASSOCIATED WITH TYPE 2 DIABETES MELLITUS, WITH OTHER ULCER SEVERITY, UNSPECIFIED LATERALITY, UNSPECIFIED PART OF FOOT (HCC): ICD-10-CM

## 2024-07-31 DIAGNOSIS — I50.9 CONGESTIVE HEART FAILURE, UNSPECIFIED HF CHRONICITY, UNSPECIFIED HEART FAILURE TYPE (HCC): ICD-10-CM

## 2024-07-31 DIAGNOSIS — M86.9 OSTEOMYELITIS, UNSPECIFIED SITE, UNSPECIFIED TYPE (HCC): ICD-10-CM

## 2024-07-31 DIAGNOSIS — N40.1 BENIGN PROSTATIC HYPERPLASIA WITH URINARY HESITANCY: ICD-10-CM

## 2024-07-31 DIAGNOSIS — E11.621 DIABETIC ULCER OF FOOT ASSOCIATED WITH TYPE 2 DIABETES MELLITUS, WITH OTHER ULCER SEVERITY, UNSPECIFIED LATERALITY, UNSPECIFIED PART OF FOOT (HCC): ICD-10-CM

## 2024-07-31 DIAGNOSIS — R39.11 BENIGN PROSTATIC HYPERPLASIA WITH URINARY HESITANCY: ICD-10-CM

## 2024-07-31 DIAGNOSIS — I48.91 ATRIAL FIBRILLATION, UNSPECIFIED TYPE (HCC): ICD-10-CM

## 2024-07-31 LAB
25(OH)D3+25(OH)D2 SERPL-MCNC: 24 NG/ML (ref 30–100)
ALBUMIN SERPL-MCNC: 3.6 G/DL (ref 3.5–5.7)
ALP SERPL-CCNC: 68 U/L (ref 35–120)
ALT SERPL-CCNC: 12 U/L
ANION GAP SERPL CALCULATED.3IONS-SCNC: 10 MMOL/L (ref 3–11)
AST SERPL-CCNC: 14 U/L
BASOPHILS # BLD AUTO: 0 THOU/CMM (ref 0–0.1)
BASOPHILS NFR BLD AUTO: 1 %
BILIRUB SERPL-MCNC: 0.9 MG/DL (ref 0.2–1)
BUN SERPL-MCNC: 9 MG/DL (ref 7–28)
CALCIUM SERPL-MCNC: 9.1 MG/DL (ref 8.5–10.1)
CHLORIDE SERPL-SCNC: 103 MMOL/L (ref 100–109)
CHOLEST SERPL-MCNC: 85 MG/DL
CHOLEST/HDLC SERPL: 2.9 {RATIO}
CO2 SERPL-SCNC: 28 MMOL/L (ref 21–31)
CREAT SERPL-MCNC: 0.89 MG/DL (ref 0.53–1.3)
CYTOLOGY CMNT CVX/VAG CYTO-IMP: ABNORMAL
DIFFERENTIAL METHOD BLD: ABNORMAL
EOSINOPHIL # BLD AUTO: 0.2 THOU/CMM (ref 0–0.5)
EOSINOPHIL NFR BLD AUTO: 3 %
ERYTHROCYTE [DISTWIDTH] IN BLOOD BY AUTOMATED COUNT: 16.5 % (ref 12–16)
FOLATE SERPL-MCNC: 12.6 NG/ML
GFR/BSA.PRED SERPLBLD CYS-BASED-ARV: 97 ML/MIN/{1.73_M2}
GLUCOSE SERPL-MCNC: 257 MG/DL (ref 65–99)
HCT VFR BLD AUTO: 35.6 % (ref 37–48)
HDLC SERPL-MCNC: 29 MG/DL (ref 23–92)
HGB BLD-MCNC: 11.7 G/DL (ref 12.5–17)
LDLC SERPL CALC-MCNC: 7 MG/DL
LYMPHOCYTES # BLD AUTO: 1.3 THOU/CMM (ref 1–3)
LYMPHOCYTES NFR BLD AUTO: 25 %
MCH RBC QN AUTO: 27.5 PG (ref 27–36)
MCHC RBC AUTO-ENTMCNC: 33 G/DL (ref 32–37)
MCV RBC AUTO: 83 FL (ref 80–100)
MONOCYTES # BLD AUTO: 0.4 THOU/CMM (ref 0.3–1)
MONOCYTES NFR BLD AUTO: 8 %
NEUTROPHILS # BLD AUTO: 3.3 THOU/CMM (ref 1.8–7.8)
NEUTROPHILS NFR BLD AUTO: 63 %
NONHDLC SERPL-MCNC: 56 MG/DL
PLATELET # BLD AUTO: 144 THOU/CMM (ref 140–350)
PMV BLD REES-ECKER: 7.1 FL (ref 7.5–11.3)
POTASSIUM SERPL-SCNC: 4 MMOL/L (ref 3.5–5.2)
PROT SERPL-MCNC: 6.5 G/DL (ref 6.3–8.3)
RBC # BLD AUTO: 4.27 MILL/CMM (ref 4–5.4)
SODIUM SERPL-SCNC: 141 MMOL/L (ref 135–145)
TRIGL SERPL-MCNC: 247 MG/DL
TSH SERPL-ACNC: 2.05 UIU/ML (ref 0.45–5.33)
VIT B12 SERPL-MCNC: 174 PG/ML (ref 180–914)
WBC # BLD AUTO: 5.2 THOU/CMM (ref 4–10.5)

## 2024-08-01 ENCOUNTER — TELEPHONE (OUTPATIENT)
Dept: FAMILY MEDICINE CLINIC | Facility: CLINIC | Age: 59
End: 2024-08-01

## 2024-08-01 DIAGNOSIS — E55.9 VITAMIN D DEFICIENCY: ICD-10-CM

## 2024-08-01 DIAGNOSIS — D51.8 OTHER VITAMIN B12 DEFICIENCY ANEMIAS: Primary | ICD-10-CM

## 2024-08-01 RX ORDER — ERGOCALCIFEROL 1.25 MG/1
1 CAPSULE ORAL WEEKLY
Qty: 12 CAPSULE | Refills: 1 | Status: SHIPPED | OUTPATIENT
Start: 2024-08-01 | End: 2024-09-20

## 2024-08-01 RX ORDER — LANOLIN ALCOHOL/MO/W.PET/CERES
1000 CREAM (GRAM) TOPICAL DAILY
Qty: 90 TABLET | Refills: 1 | Status: SHIPPED | OUTPATIENT
Start: 2024-08-01

## 2024-08-01 RX ORDER — TAMSULOSIN HYDROCHLORIDE 0.4 MG/1
CAPSULE ORAL
Qty: 100 CAPSULE | Refills: 1 | Status: SHIPPED | OUTPATIENT
Start: 2024-08-01

## 2024-08-01 NOTE — TELEPHONE ENCOUNTER
----- Message from Amilcar Appiah PA-C sent at 7/31/2024  4:17 PM EDT -----  Blood count is improving.  
----- Message from Amilcar Appiah PA-C sent at 7/31/2024  5:04 PM EDT -----  Labs are stable.  Triglycerides are elevated.  Watch sugars, starches, fats.  We will keep monitoring this.  Vitamin D is still deficient, is patient still taking supplement?  
----- Message from Amilcar Appiah PA-C sent at 8/1/2024  6:59 AM EDT -----  B12 is deficient.  I will put in a supplement and recheck it before next visit.  
Pt aware.   
[phone]

## 2024-08-09 DIAGNOSIS — F41.9 ANXIETY: ICD-10-CM

## 2024-08-09 DIAGNOSIS — E11.65 TYPE 2 DIABETES MELLITUS WITH HYPERGLYCEMIA, WITH LONG-TERM CURRENT USE OF INSULIN (HCC): ICD-10-CM

## 2024-08-09 DIAGNOSIS — Z86.79 HISTORY OF HEART FAILURE: ICD-10-CM

## 2024-08-09 DIAGNOSIS — E11.621 DIABETIC ULCER OF FOOT ASSOCIATED WITH TYPE 2 DIABETES MELLITUS, WITH OTHER ULCER SEVERITY, UNSPECIFIED LATERALITY, UNSPECIFIED PART OF FOOT (HCC): ICD-10-CM

## 2024-08-09 DIAGNOSIS — L97.508 DIABETIC ULCER OF FOOT ASSOCIATED WITH TYPE 2 DIABETES MELLITUS, WITH OTHER ULCER SEVERITY, UNSPECIFIED LATERALITY, UNSPECIFIED PART OF FOOT (HCC): ICD-10-CM

## 2024-08-09 DIAGNOSIS — I50.9 CONGESTIVE HEART FAILURE, UNSPECIFIED HF CHRONICITY, UNSPECIFIED HEART FAILURE TYPE (HCC): ICD-10-CM

## 2024-08-09 DIAGNOSIS — I48.91 ATRIAL FIBRILLATION, UNSPECIFIED TYPE (HCC): ICD-10-CM

## 2024-08-09 DIAGNOSIS — Z79.4 TYPE 2 DIABETES MELLITUS WITH HYPERGLYCEMIA, WITH LONG-TERM CURRENT USE OF INSULIN (HCC): ICD-10-CM

## 2024-08-09 DIAGNOSIS — E66.01 MORBID OBESITY (HCC): ICD-10-CM

## 2024-08-09 DIAGNOSIS — E03.9 HYPOTHYROIDISM, UNSPECIFIED TYPE: ICD-10-CM

## 2024-08-09 DIAGNOSIS — I10 ESSENTIAL HYPERTENSION: ICD-10-CM

## 2024-08-09 DIAGNOSIS — I87.332 CHRONIC VENOUS HYPERTENSION (IDIOPATHIC) WITH ULCER AND INFLAMMATION OF LEFT LOWER EXTREMITY (HCC): ICD-10-CM

## 2024-08-09 RX ORDER — POTASSIUM CHLORIDE 1500 MG/1
TABLET, EXTENDED RELEASE ORAL
Qty: 90 TABLET | Refills: 1 | Status: SHIPPED | OUTPATIENT
Start: 2024-08-09

## 2024-08-13 RX ORDER — CLONAZEPAM 0.5 MG/1
0.5 TABLET ORAL EVERY 12 HOURS PRN
Qty: 60 TABLET | Refills: 0 | Status: SHIPPED | OUTPATIENT
Start: 2024-08-13

## 2024-08-15 ENCOUNTER — TELEPHONE (OUTPATIENT)
Dept: NEUROLOGY | Facility: CLINIC | Age: 59
End: 2024-08-15

## 2024-08-15 ENCOUNTER — CONSULT (OUTPATIENT)
Dept: NEUROLOGY | Facility: CLINIC | Age: 59
End: 2024-08-15
Payer: COMMERCIAL

## 2024-08-15 VITALS
WEIGHT: 315 LBS | RESPIRATION RATE: 14 BRPM | SYSTOLIC BLOOD PRESSURE: 82 MMHG | HEIGHT: 69 IN | DIASTOLIC BLOOD PRESSURE: 62 MMHG | TEMPERATURE: 97.1 F | OXYGEN SATURATION: 98 % | HEART RATE: 70 BPM | BODY MASS INDEX: 46.65 KG/M2

## 2024-08-15 DIAGNOSIS — G47.33 OBSTRUCTIVE SLEEP APNEA SYNDROME: ICD-10-CM

## 2024-08-15 DIAGNOSIS — G25.81 RESTLESS LEG SYNDROME: Primary | ICD-10-CM

## 2024-08-15 DIAGNOSIS — G40.89 OTHER SEIZURES (HCC): ICD-10-CM

## 2024-08-15 DIAGNOSIS — G62.9 NEUROPATHY: ICD-10-CM

## 2024-08-15 PROCEDURE — 99205 OFFICE O/P NEW HI 60 MIN: CPT | Performed by: PHYSICIAN ASSISTANT

## 2024-08-15 RX ORDER — BACLOFEN 10 MG/1
TABLET ORAL
COMMUNITY
Start: 2024-08-06

## 2024-08-15 NOTE — PATIENT INSTRUCTIONS
Continue current medications for now.  Will check MRI brain and routine EEG and follow up after that.  If there is no convincing high risk findings for seizure on the imaging or EEG, will likely stop your Keppra.  Unsure of the history.  Make sure your diabetes is well controlled to help stop progression of neuropathy   Referral to sleep medicine due to RLS and sleep apnea  Continue to use walker to prevent falls  Follow up in 2 months

## 2024-08-15 NOTE — ASSESSMENT & PLAN NOTE
Patient with longstanding peripheral neuropathy, likely caused by uncontrolled DM, as well as likely contribution from B12 deficiency.    He takes gabapentin 600mg BID for neuropathy symptoms.      He has almost no feeling in his feet.  He is currently not driving, tells me he has not driven x 2 years and does not have a car.  Would not recommend driving given the severity of his neuropathy.  This was discussed with patient.      His neuropathy is likely the main cause of his balance difficulty and falls, although likely multifactorial.  We discussed fall precautions in detail.  He should continue to use his walker.    He should continue to follow with podiatry for foot and nail care.  He has chronic diabetic ulcers.    We discussed the importance of controlling his DM under the direction of his PCP to help slow progression of his neuropathy.  Should continue newly prescribed B12 supplement by PCP with goal to maintain B12 >400.

## 2024-08-15 NOTE — ASSESSMENT & PLAN NOTE
Patient reports history of RLS, also has iron deficiency anemia.  He has never seen a sleep specialist.  Will refer to sleep medicine, also because he has untreated NEAL.  Would defer management to sleep med.  He is on Requip 1mg BID.
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
## 2024-08-15 NOTE — TELEPHONE ENCOUNTER
----- Message from Estefany ANDERSON sent at 8/15/2024  1:19 PM EDT -----  Regarding: Transportation  Patient was seen today in [place] office. Maira put the order for MRI, EEG and Sleep consul. MRI is for 3T machine and its done only in [facility] and EEG in [facility]. He is scheduled for both in the [place]. Patient would need to use out of county transportation for his visits. He would need letter that would explain why tests need to be done in [facility]. Please advise.

## 2024-08-15 NOTE — TELEPHONE ENCOUNTER
Maira     Transportation company [facility] [employer] needs letter explaining referral and time/location of next appts in order for them to provide transportation.   Are you agreeable to provide letter. Please feel free to change/edit as needed. Please see below documentation from Estefany as well. Thanks     Draft letter:     To whom it may concern,      Juan Antonio Guy 1965 is a patient under my care. He needs to do an MRI and a Neuro EEG test only available in [facility]    Appointments were scheduled for :    9/3/24 at 8:45am  [facility], [address]  [address], [place], [place] [address]      Neuro EEG  9/25/2024 at 10:45am  [facility], [address], [place], [place], [address]     Please consider approval for transportation services for continuation of care.     Thank you,

## 2024-08-15 NOTE — PROGRESS NOTES
"Patient ID: Juan Antonio Guy is a 59 y.o. male who presents today to establish care.    Assessment/Plan:    Neuropathy  Patient with longstanding peripheral neuropathy, likely caused by uncontrolled DM, as well as likely contribution from B12 deficiency.    He takes gabapentin 600mg BID for neuropathy symptoms.      He has almost no feeling in his feet.  He is currently not driving, tells me he has not driven x 2 years and does not have a car.  Would not recommend driving given the severity of his neuropathy.  This was discussed with patient.      His neuropathy is likely the main cause of his balance difficulty and falls, although likely multifactorial.  We discussed fall precautions in detail.  He should continue to use his walker.    He should continue to follow with podiatry for foot and nail care.  He has chronic diabetic ulcers.    We discussed the importance of controlling his DM under the direction of his PCP to help slow progression of his neuropathy.  Should continue newly prescribed B12 supplement by PCP with goal to maintain B12 >400.    Other seizures (HCC)  Patient reports history of \"seizure disorder\", although his history is very unclear, and description of the 2 \"major seizures\" he had in 1982 are extremely atypical for seizure. He described 2 extremely violent outbursts where he was \"throwing people off him\" and requiring multiple people to hold him down, be restrained, etc.  Unclear if this was part of a post-ictal state, but this is how he describes his actual seizures.  He apparently was on phenobarbital for several years before coming off in the 90s and then not having any seizures for a long time until about 5 years ago when he was having falls due to losing his balance, without any LOC.  This is also not really concerning for seizure.  I reviewed several notes from 2 different local neurologists who said he has a \"questionable history of seizures\" although absolutely no description of these " seizures.  Reportedly MRI and EEG normal (there is a normal EEG from [facility] in 2022 in his chart, no MRI).  At one point he was advised to d/c his Keppra, but appears that was not done.  He is also on a very low dose of lamotrigine, likely for mood.    Discussed with patient that his history is not convincing for epileptic seizures.  Question of psychiatric events at that time, although there are no records and we are not going to be able to obtain records from 1982.  There have not been any recent events concerning for seizure, and he is on an extremely low dose of levetiracetam.  Lamotrigine also at very low dose.  Discussed in epilepsy, we would require much higher doses of these medications in order to be therapeutic.  Considering he has not had any clear seizures on these doses, unlikely that he has underlying epilepsy.     I would like to obtain a brain MRI and a routine EEG.  Discussed if there are no high risk findings that would make me concerned he is at risk for recurrent seizures, I will have him stop the low dose of levetiracetam.  Lamotrigine seems to be for mood.  He does have a history of Bipolar, severe depression, anxiety, with U admission in the last few years.      I did review general seizure safety and first aid today.    Restless leg syndrome  Patient reports history of RLS, also has iron deficiency anemia.  He has never seen a sleep specialist.  Will refer to sleep medicine, also because he has untreated NEAL.  Would defer management to sleep med.  He is on Requip 1mg BID.    Obstructive sleep apnea syndrome  History of NEAL, does not use CPAP.  Referring to sleep medicine.     Patient will return in 2 months or sooner if needed     Diagnoses and all orders for this visit:    Restless leg syndrome  -     Ambulatory Referral to Sleep Medicine; Future    Neuropathy  -     Ambulatory Referral to Neurology    Other seizures (HCC)  -     Ambulatory Referral to Neurology  -     MRI brain seizure wo  "and w contrast; Future  -     EEG Routine and awake; Future    Obstructive sleep apnea syndrome  -     Ambulatory Referral to Sleep Medicine; Future    Other orders  -     baclofen 10 mg tablet           Subjective:    HPI  Juan Antonio Guy is a 59 y.o. male with extensive PMH including PE, Afib on Eliquis, CHF, COPD, NEAL not on CPAP, HTN, DM, neuropathy, B12 deficiency, hypothyroidism, MERCEDES, RLS, Bipolar disorder, depression, anxiety, LE edema with chronic ulcers and cellulitis, who presents today to establish care.    Patient reports he has most recently been followed by neurologist Dr. Leone who is no longer in the area.  He was previously seeing another neurologist, Dr. Sousa.  He reports he was seeing neurology for neuropathy, seizure disorder and RLS.    He reports he has had peripheral neuropathy for many years.  I reviewed some prior neurology notes scanned into the chart in [facility].  Appears he also has CTS.  He reports he has numbness in the feet and lower legs, so bad that he \"doesn't need to be numbed when they are doing wound care on my ulcers\".  He reports balance difficulty and falls.  His DM is not well controlled, recent A1C 8.0, down from 9.9 earlier this year.  He was also noted to have B12 deficiency recently, level 174.  He is on gabapentin 600mg BID for neuropathy.  He sees podiatry and wound care for chronic ulcers.  He has had many hospitalizations for cellulitis.  He has chronic LE edema.    He reports having RLS, on Requip 1mg BID.  He has MERCEDES.  He has NEAL but does not wear his CPAP.  He had never seen a sleep specialist.      In regards to seizure history, this is not well defined.  I read some notes from Dr. Sousa and Dr. Leone and these notes do not say anything about seizure semiology, what type of seizures, or any other history.  Both says he has a \"questionable seizure disorder\" and notes state that MRIs and EEGs were normal.  There are no MRI reports on file.  There is a routine EEG " [facility] which was read as a normal EEG.  At one point, he was taken off Keppra, per the OV note, but the next visit it said to continue (maybe was not communicated to his pharmacy that he should stop).    Patient tells me his first seizure was in 1982 and it was \"really severe\".  He describes that there was no warning and he was \"very violent\" and \"throwing people around\".  He does not think he lost consciousness, but was \"tossing people off me\" and \"it took 10-12 people to tackle me to the ground and hold me down so they could get me in the ambulance\".  He described being told he had to be \"tied up with belts\" to restrain him.  He tells me he was \"put into a coma x 2 weeks to get the seizing to stop\".  He says he was put on phenobarbital and then another seizure happened later that year that was also \"severe\".  He said he drove from [facility] to work at [employer] and was violent when he got to work and \"broke the handcuffs\" they had to put him in to restrain him.  He does not remember these events really, but was told about them.  He tells me that with the second seizure, when he was in the ER, his seizure was so severe that he was \"moving the gurney all around the ER\" because of how violent he was being.  He is not sure if he was on anything else besides phenobarbital, \"maybe something that starts with an M\".  He recalls seeing a Dr. Gramajo at the time (neurology).  He believes that sometime in the 90s he was taken off phenobarbital.  He did not have any apparent seizures until maybe 4-5 years ago.  When I asked him what those seizures were like, he said \"I was having falls due to losing my balance\", but denied any LOC, \"I remember each and every fall\".    He is not sure when he was diagnosed with the Bipolar and depression/anxiety, unsure if he had any mental health issues in 1982 when the apparent seizures happened.      He denies any recent events of LOC, altered awareness, " "jerking/twitching, abnormal involuntary movements, behavioral arrest, etc.      He reports he lives alone, but lives next door to his sister.  He has not driven in about 2 years, but says he still has a license.  Does not have a car.    AED/side effects/compliance:  Keppra 250mg BID  Lamotrigine 25mg BID (question if on for mood)  Also on gabapentin 600mg twice a day for neuropathy   Also on clonazepam 0.5mg twice a day for anxiety    Event/Seizure semiology:  -patient reports both initial seizures in  were \"severe\" and \"violent\".  Reports he was \"throwing people around\", was apparently conscious and \"it took 10-12 people to get me on the ground and hold me down to get me in the ambulance\".  Told me he had to be put in handcuffs because he was so violent and he \"broke the handcuffs\".    -more recent events 4-5 years ago described as \"falls because of losing balance, without LOC\", apparently someone was concerned these were seizures      Special Features  Status epilepticus: No  Self Injury Seizures: No  Precipitating Factors: No     Epilepsy Risk Factors:  Abnormal pregnancy: No  Abnormal birth/: No  Abnormal Development: No  Febrile seizures, simple: No  Febrile seizures, complex: No  CNS infection: No  Cerebral palsy: No  Head injury (moderate/severe): No  CNS neoplasm: No  CNS malformation: No  Neurosurgical procedure: No  Stroke: No  Alcohol abuse: No  Drug abuse: No  Family history Sz/epilepsy: No     Prior AEDs:  Phenobarbital, ?others      Prior workup:    No MRI on file.  Prior neurology notes state these were previously \"normal\"    2022 [facility]  Normal routine EEG     Past psychiatric history:  Bipolar, depression, history of inpatient admissions    Social history:  On disability   No alcohol, no drugs, no tobacco   Driving: (has license but does not have a car to drive)  Lives alone: yes.  Sister lives next door       The following portions of the patient's history were reviewed and updated " "as appropriate: current medications, past family history, past medical history, past social history, past surgical history, and problem list.       Objective:    Blood pressure (!) 82/62, pulse 70, temperature (!) 97.1 °F (36.2 °C), temperature source Temporal, resp. rate 14, height 5' 9\" (1.753 m), weight (!) 159 kg (351 lb 9.6 oz), SpO2 98%.    Physical Exam  Constitutional:       Appearance: He is obese.   Eyes:      Extraocular Movements: EOM normal.      Pupils: Pupils are equal, round, and reactive to light.   Skin:     Comments: Bilateral LE edema, legs wrapped.  Left foot in surgical shoe    Neurological:      Motor: Motor strength is normal.     Deep Tendon Reflexes:      Reflex Scores:       Bicep reflexes are 1+ on the right side and 1+ on the left side.       Brachioradialis reflexes are 1+ on the right side and 1+ on the left side.       Achilles reflexes are 0 on the right side and 0 on the left side.  Psychiatric:         Mood and Affect: Mood normal.         Speech: Speech normal.         Behavior: Behavior normal.         Neurological Exam  Mental Status   Oriented to person, place, time and situation. Speech is normal. Language is fluent with no aphasia. Attention and concentration are normal.    Cranial Nerves  CN II: Visual fields full to confrontation.  CN III, IV, VI: Extraocular movements intact bilaterally. Pupils equal round and reactive to light bilaterally.  CN V: Facial sensation is normal.  CN VII: Full and symmetric facial movement.  CN VIII: Hearing is normal.  CN IX, X: Palate elevates symmetrically  CN XI: Shoulder shrug strength is normal.  CN XII: Tongue midline without atrophy or fasciculations.    Motor   Normal muscle tone. Strength is 5/5 throughout all four extremities.    Sensory  Pinprick abnormality: Decreased to the knees bilaterally . Vibration abnormality: Absent vibratory sensation at the level of the toes and malleolus bilaterally .     Reflexes                         "                    Right                      Left  Brachioradialis                    1+                         1+  Biceps                                 1+                         1+  Patellar                                Tr                         Tr  Achilles                                0                         0    Coordination  Right: Finger-to-nose normal.Left: Finger-to-nose normal.    Gait    Wide based gait, slow, using walker .        ROS:    Review of Systems   Constitutional:  Negative for appetite change, fatigue and fever.   HENT:  Positive for hearing loss and tinnitus. Negative for trouble swallowing and voice change.    Eyes:  Positive for pain and visual disturbance. Negative for photophobia.   Respiratory: Negative.  Negative for shortness of breath.    Cardiovascular: Negative.  Negative for palpitations.   Gastrointestinal: Negative.  Negative for nausea and vomiting.   Endocrine: Negative.  Negative for cold intolerance.   Genitourinary:  Positive for frequency and urgency. Negative for dysuria.   Musculoskeletal:  Positive for back pain, gait problem, neck pain and neck stiffness. Negative for myalgias.   Skin: Negative.  Negative for rash.   Allergic/Immunologic: Negative.    Neurological:  Positive for dizziness, weakness and numbness. Negative for seizures, syncope, facial asymmetry, speech difficulty, light-headedness and headaches.   Hematological:  Bruises/bleeds easily.   Psychiatric/Behavioral:  Positive for sleep disturbance. Negative for confusion and hallucinations.    All other systems reviewed and are negative.      I have spent a total time of 75 minutes in caring for this patient on the day of the visit/encounter including Diagnostic results, Risks and benefits of tx options, Instructions for management, Patient and family education, Importance of tx compliance, Risk factor reductions, Impressions, Counseling / Coordination of care, Documenting in the medical record,  Reviewing / ordering tests, medicine, procedures  , and Obtaining or reviewing history  .

## 2024-08-15 NOTE — LETTER
August 15, 2024    To whom it may concern,      Juan Antonio Guy 1965 is a patient under my care. He needs to do an MRI and a Neuro EEG test only available in [facility]     Appointments were scheduled for :     9/3/24 at 8:45am  [facility], [address]  [address], [place], [place] [address]       Neuro EEG  9/25/2024 at 10:45am  [facility], [address], [place], [place], [address]      Please consider approval for transportation services for continuation of care.        Thank you,          Maira Durán PA-C  [facility]

## 2024-08-19 NOTE — TELEPHONE ENCOUNTER
Msw phoned [facility] at [phone] and spke with Eve who informed that EEG and MRI were approved. Pt to call to schedule the rides.     MSW phoned pt and made him that rides were approved for MRI and EEG. Pt will call them to schedule the trips.   MSW remains available for future social needs.

## 2024-08-23 DIAGNOSIS — Z86.79 HISTORY OF HEART FAILURE: ICD-10-CM

## 2024-08-23 DIAGNOSIS — I10 ESSENTIAL HYPERTENSION: ICD-10-CM

## 2024-08-23 PROCEDURE — 4010F ACE/ARB THERAPY RXD/TAKEN: CPT | Performed by: STUDENT IN AN ORGANIZED HEALTH CARE EDUCATION/TRAINING PROGRAM

## 2024-08-23 RX ORDER — ATORVASTATIN CALCIUM 40 MG/1
TABLET, FILM COATED ORAL
Qty: 90 TABLET | Refills: 1 | Status: SHIPPED | OUTPATIENT
Start: 2024-08-23

## 2024-08-23 RX ORDER — LISINOPRIL 2.5 MG/1
TABLET ORAL
Qty: 90 TABLET | Refills: 1 | Status: SHIPPED | OUTPATIENT
Start: 2024-08-23

## 2024-08-26 ENCOUNTER — OFFICE VISIT (OUTPATIENT)
Dept: FAMILY MEDICINE CLINIC | Facility: CLINIC | Age: 59
End: 2024-08-26
Payer: COMMERCIAL

## 2024-08-26 VITALS
SYSTOLIC BLOOD PRESSURE: 130 MMHG | DIASTOLIC BLOOD PRESSURE: 70 MMHG | WEIGHT: 315 LBS | BODY MASS INDEX: 46.65 KG/M2 | OXYGEN SATURATION: 97 % | HEART RATE: 68 BPM | TEMPERATURE: 97.6 F | HEIGHT: 69 IN

## 2024-08-26 DIAGNOSIS — M25.511 ACUTE PAIN OF RIGHT SHOULDER: Primary | ICD-10-CM

## 2024-08-26 DIAGNOSIS — M25.562 ACUTE PAIN OF LEFT KNEE: ICD-10-CM

## 2024-08-26 DIAGNOSIS — F32.2 MODERATELY SEVERE MAJOR DEPRESSION (HCC): ICD-10-CM

## 2024-08-26 PROCEDURE — G2211 COMPLEX E/M VISIT ADD ON: HCPCS | Performed by: FAMILY MEDICINE

## 2024-08-26 PROCEDURE — 99214 OFFICE O/P EST MOD 30 MIN: CPT | Performed by: FAMILY MEDICINE

## 2024-08-26 RX ORDER — OXYCODONE HYDROCHLORIDE 5 MG/1
5 CAPSULE ORAL AS NEEDED
COMMUNITY

## 2024-08-26 NOTE — PROGRESS NOTES
"Ambulatory Visit  Name: Juan Antonio Guy      : 1965      MRN: [id number]  Encounter Provider: Mo Ceja MD  Encounter Date: 2024   Encounter department: [facility]    Assessment & Plan   1. Acute pain of right shoulder  -     XR shoulder 2+ vw right; Future; Expected date: 2024  -     Ambulatory Referral to Physical Therapy; Future  -     Ambulatory Referral to Orthopedic Surgery; Future  2. Acute pain of left knee  -     Ambulatory Referral to Physical Therapy; Future  -     XR knee 4+ vw left injury; Future; Expected date: 2024  -     Ambulatory Referral to Orthopedic Surgery; Future  3. Moderately severe major depression (HCC)       History of Present Illness     He has right shoulder pain.  He's had bursitis for many years, but he has increasing pain for about a week.    He has left knee pain.  It does not swell or get red or warm.  He has no trauma.      He's taken some oxycodone without relief.      Knee Pain     Shoulder Pain         Review of Systems   Musculoskeletal:  Positive for arthralgias and gait problem.   All other systems reviewed and are negative.      Objective     /70 (BP Location: Left arm, Patient Position: Sitting)   Pulse 68   Temp 97.6 °F (36.4 °C) (Tympanic)   Ht 5' 9\" (1.753 m)   Wt (!) 163 kg (359 lb 12.8 oz)   SpO2 97%   BMI 53.13 kg/m²     Physical Exam  Vitals and nursing note reviewed.   Constitutional:       Appearance: Normal appearance. He is obese.   Cardiovascular:      Rate and Rhythm: Normal rate and regular rhythm.      Pulses: Normal pulses.      Heart sounds: Normal heart sounds.   Pulmonary:      Effort: Pulmonary effort is normal.      Breath sounds: Normal breath sounds.   Abdominal:      General: Abdomen is flat. Bowel sounds are normal.      Palpations: Abdomen is soft.   Musculoskeletal:         General: Normal range of motion.      Cervical back: Normal range of motion and neck supple.   Skin:     " General: Skin is warm and dry.      Capillary Refill: Capillary refill takes less than 2 seconds.   Neurological:      General: No focal deficit present.      Mental Status: He is alert and oriented to person, place, and time. Mental status is at baseline.   Psychiatric:         Mood and Affect: Mood normal.         Behavior: Behavior normal.         Thought Content: Thought content normal.         Judgment: Judgment normal.       Administrative Statements         Depression Screening Follow-up Plan: Patient's depression screening was positive with a PHQ-2 score of . Their PHQ-9 score was . Patient assessed for underlying major depression. They have no active suicidal ideations. Brief counseling provided and recommend additional follow-up/re-evaluation next office visit.

## 2024-08-30 ENCOUNTER — NURSE TRIAGE (OUTPATIENT)
Age: 59
End: 2024-08-30

## 2024-08-30 DIAGNOSIS — L03.115 CELLULITIS OF RIGHT LOWER EXTREMITY: Primary | ICD-10-CM

## 2024-08-30 NOTE — TELEPHONE ENCOUNTER
"Patient contacted the office this afternoon stating that he has an old wound on right leg, rubbed up against something and opened up earlier today. Not bleeding but is worried about cellulitis occurring. No appearance of cellulitis/redness. States that he is having chills and hands are very cold. Familiar of symptoms and wants to know if an abx could be prescribed to help at this time. Worried about the long weekend and wanting to be able to take something. Advised if symptoms would worsen he should report to the ED to be further evaluated. Will wait for pcp recommendation.     Reason for Disposition   Nursing judgment    Answer Assessment - Initial Assessment Questions  1. REASON FOR CALL or QUESTION: \"What is your reason for calling today?\" or \"How can I best help you?\" or \"What question do you have that I can help answer?\"      Patient states he has an old wound on right leg, rubbed up against something earlier which opened up, not bleeding. Worried about cellulitis occurring.    Protocols used: Information Only Call - No Triage-ADULT-[place]    " Unknown if ever smoked

## 2024-09-04 ENCOUNTER — HOSPITAL ENCOUNTER (OUTPATIENT)
Dept: RADIOLOGY | Facility: CLINIC | Age: 59
Discharge: HOME | End: 2024-09-04
Payer: COMMERCIAL

## 2024-09-04 ENCOUNTER — OFFICE VISIT (OUTPATIENT)
Dept: OBGYN CLINIC | Facility: CLINIC | Age: 59
End: 2024-09-04
Payer: COMMERCIAL

## 2024-09-04 VITALS
SYSTOLIC BLOOD PRESSURE: 130 MMHG | BODY MASS INDEX: 46.65 KG/M2 | DIASTOLIC BLOOD PRESSURE: 80 MMHG | OXYGEN SATURATION: 95 % | HEART RATE: 57 BPM | TEMPERATURE: 96.9 F | HEIGHT: 69 IN | WEIGHT: 315 LBS

## 2024-09-04 DIAGNOSIS — M25.511 ACUTE PAIN OF RIGHT SHOULDER: ICD-10-CM

## 2024-09-04 DIAGNOSIS — M17.12 PRIMARY OSTEOARTHRITIS OF LEFT KNEE: ICD-10-CM

## 2024-09-04 DIAGNOSIS — M25.562 ACUTE PAIN OF LEFT KNEE: ICD-10-CM

## 2024-09-04 DIAGNOSIS — M75.41 IMPINGEMENT SYNDROME OF RIGHT SHOULDER: Primary | ICD-10-CM

## 2024-09-04 PROCEDURE — 73030 X-RAY EXAM OF SHOULDER: CPT

## 2024-09-04 PROCEDURE — 20610 DRAIN/INJ JOINT/BURSA W/O US: CPT | Performed by: STUDENT IN AN ORGANIZED HEALTH CARE EDUCATION/TRAINING PROGRAM

## 2024-09-04 PROCEDURE — 99204 OFFICE O/P NEW MOD 45 MIN: CPT | Performed by: STUDENT IN AN ORGANIZED HEALTH CARE EDUCATION/TRAINING PROGRAM

## 2024-09-04 PROCEDURE — 73564 X-RAY EXAM KNEE 4 OR MORE: CPT

## 2024-09-04 RX ORDER — BUPIVACAINE HYDROCHLORIDE 2.5 MG/ML
2 INJECTION, SOLUTION INFILTRATION; PERINEURAL
Status: COMPLETED | OUTPATIENT
Start: 2024-09-04 | End: 2024-09-04

## 2024-09-04 RX ORDER — TRIAMCINOLONE ACETONIDE 40 MG/ML
40 INJECTION, SUSPENSION INTRA-ARTICULAR; INTRAMUSCULAR
Status: COMPLETED | OUTPATIENT
Start: 2024-09-04 | End: 2024-09-04

## 2024-09-04 RX ADMIN — BUPIVACAINE HYDROCHLORIDE 2 ML: 2.5 INJECTION, SOLUTION INFILTRATION; PERINEURAL at 11:00

## 2024-09-04 RX ADMIN — TRIAMCINOLONE ACETONIDE 40 MG: 40 INJECTION, SUSPENSION INTRA-ARTICULAR; INTRAMUSCULAR at 11:00

## 2024-09-04 NOTE — PROGRESS NOTES
"1. Impingement syndrome of right shoulder  Large joint arthrocentesis: R subacromial bursa      2. Acute pain of right shoulder  Ambulatory Referral to Orthopedic Surgery    Large joint arthrocentesis: R subacromial bursa      3. Acute pain of left knee  Ambulatory Referral to Orthopedic Surgery      4. Primary osteoarthritis of left knee          Orders Placed This Encounter   Procedures    Large joint arthrocentesis: R subacromial bursa        Imaging Studies (I personally reviewed images in PACS and report):    X-ray left knee 2024: No acute osseous abnormalities.  No joint effusion.  Patellofemoral osteoarthritic changes noted given the marginal osteophytes of the patellofemoral joint.  There is mild superior patellar enthesophyte present.  X-ray right shoulder 2024: No acute osseous abnormalities.  No significant degenerative changes.  Soft tissue is unremarkable.    X-ray right shoulder 2023: [facility].  Only report is available notin. Soft tissue calcifications in the right shoulder which may relate to a   calcific tendinitis and/or bursitis.   2. Mild juxta-articular osseous degenerative changes are present bilaterally.       IMPRESSION:  Acute on chronic right shoulder pain  Worsening over the past couple weeks without an injury.  States he has a history of shoulder \"bursitis\" for many years.  He notes receiving cortisone injections of his shoulder in the past but thinks it has been many years since his last injection.  Acute atraumatic left knee pain-osteoarthritic changes of his left knee are noted    Other factors:  CHF  CKD3  Afib  T2DM complicated with neuropathy, diabetic ulcers-A1c 8.0 on 2024  BMI 53    PLAN:    Clinical exam and radiographic imaging reviewed with patient today, with impression as per above. I have discussed with the patient the pathophysiology of this diagnosis and reviewed how the examination correlates with this diagnosis.    Prior imaging ordered by " Please advise on the below. Order prepped.    "Marcial completed in office today as noted above.  I will follow-up official radiology interpretation.  Treatment options were discussed at length, including risks and benefits; after discussing these treatment options, the patient elected to undergo a right-sided palpation guided subacromial cortisone injection today as per procedure note below due to her lack of improvement from conservative treatments.  Counseled we can repeat the injection every 3 to 4 months as needed in regards to pain control.  Counseled he can still use acetaminophen, heat/ice therapy 20 minutes on/off while waiting for cortisone to take effect.  I advised against oral NSAIDs given his history of CKD 3.  There was consideration for an injection of his left knee with cortisone as well.  However I did  that cortisone can temporarily increase his blood glucose levels and given his history of diabetes, patient was agreeable to defer an injection of his left knee today but we will make a follow-up in 2 weeks to reevaluate his symptoms and consider an injection at that time.    Return in about 2 weeks (around 9/18/2024) for Follow up to discuss left knee .      Portions of the record may have been created with voice recognition software. Occasional wrong word or \"sound a like\" substitutions may have occurred due to the inherent limitations of voice recognition software. Read the chart carefully and recognize, using context, where substitutions have occurred.     CHIEF COMPLAINT:  Chief Complaint   Patient presents with    Right Shoulder - Pain         HPI:  Juan Antonio Guy is a 59 y.o. male  who presents in regards to referral from Dr. Ceja for       Visit 9/4/2024 :  Initial evaluation of right shoulder pain/left knee pain:  Patient had radiographs ordered from his PCP already of his right shoulder and left knee and these will be obtained as noted above.  Pertinent PMHx as above  Reports ongoing right shoulder pain for years but " recently worsening over the past couple weeks without a precipitating injury.  He gives example of pain exacerbated when reaching outward from his couch to  objects and feeling a sharp/tight/aching pain of his shoulder.  He noticed that he was having increased stiffness with range of motion's in general and difficulty reaching above shoulder height on his back.  He reports clicking sensation of his shoulder but states is a chronic issue.  Denies shoulder swelling or discoloration.  Has been taking Tylenol without relief.  He reports that he is not supposed to be taking NSAIDs.  He has not had a cortisone injection of his right shoulder before.  He denies prior surgeries of his right shoulder in the past.  He has not seen formal PT for his shoulder previously.    Separately at the end of visit he also had complaints of his left knee pain:  X-ray imaging of his affected knee from his PCP was completed today as per above.  Briefly, patient denies a precipitating injury but notes pain within his knee with prolonged weightbearing.  He states he has had cortisone injections of his left knee in the past due to arthritis.  Unsure how long injections lasted.        Medical, Surgical, Family, and Social History    Past Medical History:   Diagnosis Date    Ambulatory dysfunction     Anemia     Anxiety     Arthritis     Atrial fibrillation (HCC)     CHF (congestive heart failure) (HCC)     Chronic kidney disease, stage 3 (HCC)     Chronic ulcer of left foot (HCC)     Chronic venous hypertension (idiopathic) with ulcer and inflammation of left lower extremity (HCC)     Congestive heart failure, unspecified HF chronicity, unspecified heart failure type (HCC)     COPD with acute exacerbation (HCC)     Encephalopathy     History of depression     History of osteomyelitis     Hyperkalemia     Hyperlipidemia     Hypertension     Hypoglycemia     Hypomagnesemia     Hyponatremia     Hypothyroidism     Morbid obesity (HCC)      "Pneumonia     Preop cardiovascular exam     Pulmonary embolism (HCC)     RLS (restless legs syndrome)     Sepsis (HCC)     Sleep apnea     Thrombocytopenia (HCC)     Type 2 diabetes mellitus (HCC)     Urinary retention     Vitamin D deficiency      Past Surgical History:   Procedure Laterality Date    COLON SURGERY Right 06/2024    right side of colon    GALLBLADDER SURGERY      TONSILLECTOMY AND ADENOIDECTOMY       Social History   Social History     Substance and Sexual Activity   Alcohol Use No     Social History     Substance and Sexual Activity   Drug Use No     Social History     Tobacco Use   Smoking Status Never   Smokeless Tobacco Never     Family History   Problem Relation Age of Onset    Cancer Mother         lymphoma    Hypertension Mother     Heart disease Father     Hypertension Father     Diabetes Sister     Cancer Maternal Grandmother      Allergies   Allergen Reactions    Carbamazepine Other (See Comments)     Elevated liver functions -\"Enlarges liver\"    Clindamycin Rash    Phenytoin Itching and Rash    Pneumococcal Vaccine Hives, Itching and Rash          Physical Exam  /80   Pulse 57   Temp (!) 96.9 °F (36.1 °C) (Temporal)   Ht 5' 9\" (1.753 m)   Wt (!) 163 kg (360 lb 6.4 oz)   SpO2 95%   BMI 53.22 kg/m²     Constitutional:  see vital signs  Gen: well-developed, normocephalic/atraumatic, well-groomed  Eyes: No inflammation or discharge of conjunctiva or lids; sclera clear   Pharynx: no inflammation, lesion, or mass of lips  Neck: supple, no masses, non-distended  MSK: no inflammation, lesion, mass, or clubbing of nails and digits except for other than mentioned below  SKIN: no visible rashes or skin lesions  Pulmonary/Chest: Effort normal. No respiratory distress.   NEURO: cranial nerves grossly intact  PSYCH:  Alert and oriented to person, place, and time; recent and remote memory intact; mood normal, no depression, anxiety, or agitation, judgment and insight good and intact     Ortho " Exam  Shoulder exam:       RIGHT    Inspection Erythema None     Swelling None     Increased Warmth None    Rotator cuff (resisted testing aggravates general shoulder pain per patient) ER 5/5     IR 5/5     Abduction 5-/5    ROM      Abduction 100     ER0 40     IRb Unable to reach behind his back    TTP:  + Lateral aspect over the greater tuberosity/subacromial area shoulder as well as the posterior aspect over supraspinatus and infraspinatus    Special Tests: O'Briens  (FF 90, add 10, resist thumbs up-, resist thumbs down+) Negative slap    Cross body Adduction Negative     Speeds  Negative     Yergason's Negative     Drop arm Negative     Neer Positive     Webb Positive            Left Knee Exam:  Erythema: no  Swelling: no  Increased Warmth: no  Tenderness: +MJL  ROM: 0-130  Knee flexion strength: 5/5  Knee extension strength: 5/5  Patellar Grind: +        Large joint arthrocentesis: R subacromial bursa  [place] Protocol:  procedure performed by consultantConsent: Verbal consent obtained.  Risks and benefits: risks, benefits and alternatives were discussed  Consent given by: patient  Patient understanding: patient states understanding of the procedure being performed  Site marked: the operative site was marked  Radiology Images displayed and confirmed. If images not available, report reviewed: imaging studies available  Patient identity confirmed: verbally with patient  Supporting Documentation  Indications: pain and diagnostic evaluation   Procedure Details  Location: shoulder - R subacromial bursa  Preparation: Patient was prepped and draped in the usual sterile fashion  Needle size: 22 G  Ultrasound guidance: no  Approach: posterior  Medications administered: 40 mg triamcinolone acetonide 40 mg/mL; 2 mL bupivacaine 0.25 %    Patient tolerance: patient tolerated the procedure well with no immediate complications  Dressing:  Sterile dressing applied

## 2024-09-04 NOTE — PATIENT INSTRUCTIONS
"Patient Education     Steroid injection   The Basics   Written by the doctors and editors at [facility]   What is a steroid injection? -- Steroids, also known as \"glucocorticoids,\" are medicines that help reduce swelling and pain. Doctors sometimes inject a steroid medicine into a joint or other part of the body to relieve pain. This is also sometimes called a \"cortisone shot.\"  After the injection, the steroid starts to work within a few days.  How long does a steroid injection work? -- It depends on the person and where the injection is given. For some people, the effects of a steroid injection can last for a few weeks or longer.  Sometimes, the doctor also injects a medicine called a \"local anesthetic\" with the steroid. This might help relieve pain until the steroid starts to work.  A steroid injection can help with pain, but it won't cure the problem that is causing the pain.  How do I prepare for a steroid injection? -- The doctor or nurse will tell you if you need to do anything special to prepare. Before your procedure, your doctor will do an exam.  Your doctor will also ask you about your \"health history.\" This involves asking you questions about any health problems you have or had in the past, past surgeries, and any medicines you take. Tell them about:   Any medicines you are taking - This includes any prescription or \"over-the-counter\" medicines you use, plus any herbal supplements you take. It helps to write down and bring a list of any medicines you take, or bring a bag with all of your medicines with you.   Any allergies you have   Any bleeding problems you have   Any other steroid injections you have had  Ask the doctor or nurse if you have questions or if there is anything you do not understand.  How is a steroid injection given? -- When it is time for the injection:   The doctor will clean the skin over the area where they plan to give the shot. (This is called the \"injection site.\")   They might use " ultrasound or a special kind of X-ray during the procedure. This is to check where to give the steroid.   Sometimes, they might give a shot of medicine to numb the skin.   They will inject the steroid.   Then, they will take out the needle and cover the injection site with a bandage.  What happens after a steroid injection? -- You can go home after the injection.  For the next few days, you might want to:   Apply a cold gel pack, bag of ice, or bag of frozen vegetables to the injection site every 1 to 2 hours, for 15 minutes each time. Put a thin towel between the ice (or other cold object) and your skin.   Rest the treated body part for a few days.   Take medicines to relieve pain. Examples include acetaminophen (sample brand name: Tylenol), ibuprofen (sample brand names: Advil, Motrin), or naproxen (sample brand name: Aleve).  If you have diabetes, the doctor might want you to check your blood sugar levels more often for a few days. The steroid medicine might temporarily raise your blood sugar.  What are the risks of a steroid injection? -- Your doctor will talk to you about all of the possible risks, and answer your questions. Possible risks include:   Bleeding, bruising, or soreness at the injection site   Damage to parts near the injection site - This might include cartilage damage, injury to nerves, tendon rupture, or thinning of skin and bones.   Skin around the injection site becoming lighter or whiter in color   Infection   Health conditions like diabetes or high blood pressure getting worse for a few days   Allergic reaction to the medicine  What else should I know? -- Most of the time, doctors limit the total number of steroid injections to a certain area.  A steroid injection might be only 1 part of your treatment plan. Take your other medicines as instructed. Also, follow your doctor's recommendations about other treatments. These might include things like physical therapy or devices like a brace or  cane.  All topics are updated as new evidence becomes available and our peer review process is complete.  This topic retrieved from [employer] on: Apr 25, 2024.  Topic 117197 Version 2.0  Release: 32.4.2 - C32.114  © 2024 [employer] and/or its affiliates. All rights reserved.  Consumer Information Use and Disclaimer   Disclaimer: This generalized information is a limited summary of diagnosis, treatment, and/or medication information. It is not meant to be comprehensive and should be used as a tool to help the user understand and/or assess potential diagnostic and treatment options. It does NOT include all information about conditions, treatments, medications, side effects, or risks that may apply to a specific patient. It is not intended to be medical advice or a substitute for the medical advice, diagnosis, or treatment of a health care provider based on the health care provider's examination and assessment of a patient's specific and unique circumstances. Patients must speak with a health care provider for complete information about their health, medical questions, and treatment options, including any risks or benefits regarding use of medications. This information does not endorse any treatments or medications as safe, effective, or approved for treating a specific patient. [employer] and its affiliates disclaim any warranty or liability relating to this information or the use thereof.The use of this information is governed by the Terms of Use, available at https://www.[employer]ightsuwer.com/en/know/clinical-effectiveness-terms. 2024© [employer]Date, Inc. and its affiliates and/or licensors. All rights reserved.  Copyright   © 2024 [employer] and/or its affiliates. All rights reserved.

## 2024-09-05 ENCOUNTER — TELEPHONE (OUTPATIENT)
Dept: FAMILY MEDICINE CLINIC | Facility: CLINIC | Age: 59
End: 2024-09-05

## 2024-09-09 ENCOUNTER — EVALUATION (OUTPATIENT)
Dept: PHYSICAL THERAPY | Facility: CLINIC | Age: 59
End: 2024-09-09
Payer: COMMERCIAL

## 2024-09-09 DIAGNOSIS — M25.511 ACUTE PAIN OF RIGHT SHOULDER: Primary | ICD-10-CM

## 2024-09-09 PROCEDURE — 97535 SELF CARE MNGMENT TRAINING: CPT

## 2024-09-09 PROCEDURE — 97162 PT EVAL MOD COMPLEX 30 MIN: CPT

## 2024-09-09 NOTE — PROGRESS NOTES
PT Evaluation     Today's date: 2024  Patient name: Juan Antonio Guy  : 1965  MRN: [id number]  Referring provider: Mo Ceja MD  Dx:   Encounter Diagnosis     ICD-10-CM    1. Acute pain of right shoulder  M25.511           Start Time: 1300  Stop Time: 1400  Total time in clinic (min): 60 minutes    Assessment  Impairments: abnormal muscle tone, abnormal or restricted ROM, abnormal movement, activity intolerance, impaired physical strength, lacks appropriate home exercise program, pain with function, poor posture  and poor body mechanics  Symptom irritability: moderate    Assessment details: Juan Antonio is a 59 y.o male who presents to OP PT with signs and symptoms consistent with R shoulder bursitis. Upon examination, PT notes key impairments of decreased bilateral shoulder ROM, impaired strength, abnormal sitting posture, and inability to stand for long periods of time. Due to this patient is limited with performing ADL/IADLs, reaching OH/behind back, inability to /grasp objects, repetitive lifting. Additionally, patient is restricted with participating in social gatherings and recreational activities. Patient will benefit from skilled PT to address above impairments with POC consisting of functional ROM, stretching, strengthening, balance, analgesic modalities and manual therapy in order to maximize functional independence. Thank you for your referral!       Goals  STG(In 4 weeks)  Patient will initiate HEP.  Patient will decrease R shoulder pain from 10/10  to 5/10  in order to improve QOL.  Patient will increase R shoulder ROM to WFL.   Patient will increase FOTO score from  IE to D/C score in order to improve QOL.    LTG(In 8 weeks)  Patient will decrease R shoulder pain no more than 5/10  in order to improve QOL.  Patient will increase R shoulder strength by 1-2 MMT in order to improve ability to /grasp objects.     Patient will be independent with HEP at D/c.               Plan  Patient  would benefit from: PT eval and skilled physical therapy  Planned modality interventions: cryotherapy    Planned therapy interventions: self care, home exercise program, neuromuscular re-education, manual therapy and joint mobilization    Frequency: 1x week  Duration in weeks: 4  Treatment plan discussed with: patient        Subjective Evaluation    History of Present Illness  Mechanism of injury: Patient presents with C/C of R shoulder pain that has been ongoing for a couple of weeks but he states that he has had it for several years he just keeps flaring it up periodically. Patient states that he got a cortisone injection 2024 but only had relief initially.           Quality of life: fair    Patient Goals  Patient goals for therapy: increased motion, decreased pain and increased strength    Pain  Current pain ratin  At best pain ratin  At worst pain rating: 10  Location: R shoulder  Quality: discomfort  Aggravating factors: lifting      Diagnostic Tests  X-ray: normal  Treatments  Previous treatment: physical therapy  Current treatment: physical therapy        Objective     Active Range of Motion   Left Shoulder   Normal active range of motion    Right Shoulder   Flexion: 96 degrees with pain  Extension: 22 degrees with pain  Abduction: 94 degrees with pain    Strength/Myotome Testing     Left Shoulder   Normal muscle strength    Right Shoulder     Planes of Motion   Flexion: 2+   Extension: 2+   Abduction: 2+   Adduction: 2+   External rotation at 90°: 2+   Internal rotation at 90°: 2+     Tests     Right Shoulder   Positive AC shear.              Precautions:       Manuals 24            LLLT R shoulder                                                     Neuro Re-Ed             Shoulder isometrics: all planes of motion              Table slides: flex, scaption              Scapular retractions                                                                  Ther Ex                                                                                                                      Ther Activity                                       Gait Training                                       Modalities             CP PRN

## 2024-09-16 ENCOUNTER — APPOINTMENT (OUTPATIENT)
Dept: PHYSICAL THERAPY | Facility: CLINIC | Age: 59
End: 2024-09-16
Payer: COMMERCIAL

## 2024-09-18 ENCOUNTER — OFFICE VISIT (OUTPATIENT)
Dept: OBGYN CLINIC | Facility: CLINIC | Age: 59
End: 2024-09-18
Payer: COMMERCIAL

## 2024-09-18 VITALS
WEIGHT: 315 LBS | HEART RATE: 76 BPM | SYSTOLIC BLOOD PRESSURE: 138 MMHG | HEIGHT: 69 IN | OXYGEN SATURATION: 97 % | BODY MASS INDEX: 46.65 KG/M2 | DIASTOLIC BLOOD PRESSURE: 74 MMHG | TEMPERATURE: 97.2 F

## 2024-09-18 DIAGNOSIS — M25.562 ACUTE PAIN OF LEFT KNEE: Primary | ICD-10-CM

## 2024-09-18 DIAGNOSIS — M75.41 IMPINGEMENT SYNDROME OF RIGHT SHOULDER: ICD-10-CM

## 2024-09-18 DIAGNOSIS — M17.12 PRIMARY OSTEOARTHRITIS OF LEFT KNEE: ICD-10-CM

## 2024-09-18 DIAGNOSIS — M25.511 ACUTE PAIN OF RIGHT SHOULDER: ICD-10-CM

## 2024-09-18 PROCEDURE — 99213 OFFICE O/P EST LOW 20 MIN: CPT | Performed by: STUDENT IN AN ORGANIZED HEALTH CARE EDUCATION/TRAINING PROGRAM

## 2024-09-18 NOTE — PROGRESS NOTES
"1. Acute pain of left knee        2. Primary osteoarthritis of left knee        3. Acute pain of right shoulder        4. Impingement syndrome of right shoulder            No orders of the defined types were placed in this encounter.       Imaging Studies (I personally reviewed images in PACS and report):    X-ray left knee 2024: No acute osseous abnormalities.  No joint effusion.  Patellofemoral osteoarthritic changes noted given the marginal osteophytes of the patellofemoral joint.  There is mild superior patellar enthesophyte present.  X-ray right shoulder 2024: No acute osseous abnormalities.  No significant degenerative changes.  Soft tissue is unremarkable.    X-ray right shoulder 2023: [facility].  Only report is available notin. Soft tissue calcifications in the right shoulder which may relate to a   calcific tendinitis and/or bursitis.   2. Mild juxta-articular osseous degenerative changes are present bilaterally.       IMPRESSION:  Acute on chronic right shoulder pain  Worsening over the past couple weeks without an injury.  States he has a history of shoulder \"bursitis\" for many years.  He notes receiving cortisone injections of his shoulder in the past but thinks it has been many years since his last injection.  Reportedly improving since injection and attending physical therapy for 1 session thus far  Acute atraumatic left knee pain-osteoarthritic changes of his left knee are noted -patient reports today that he actually feels his knee pain has not been as bad since her last appointment and does not feel he would want to undergo cortisone injection for this issue    Other factors:  CHF  CKD3  Afib  T2DM complicated with neuropathy, diabetic ulcers-A1c 8.0 on 2024  BMI 53    PLAN:    Clinical exam and radiographic imaging reviewed with patient today, with impression as per above. I have discussed with the patient the pathophysiology of this diagnosis and reviewed how the examination " "correlates with this diagnosis.   Reassured patient of his progressive improvement in regards to his right shoulder since the cortisone injection.  Counseled that we can always repeat the injection every 3 to 4 months as needed.  I did encourage him to continue formal physical therapy in order to maximize strength and flexibility of his shoulder to prevent recurrence of this pain in the future.  In regards to his left knee pain, patient at this point does not feel he would want a cortisone injection today.  I counseled we can always consider in the future.  In regards to pain in the future he is recommended to use acetaminophen, heat/ice therapy 20 minutes on/off while waiting for cortisone to take effect.  I advised against oral NSAIDs given his history of CKD 3.    Return in about 3 months (around 12/18/2024) for Follow up right shoulder/left knee.      Portions of the record may have been created with voice recognition software. Occasional wrong word or \"sound a like\" substitutions may have occurred due to the inherent limitations of voice recognition software. Read the chart carefully and recognize, using context, where substitutions have occurred.     CHIEF COMPLAINT:  Chief Complaint   Patient presents with    Left Knee - Follow-up    Right Shoulder - Follow-up         HPI:  Juan Antonio Guy is a 59 y.o. male  who presents in regards to referral from Dr. Ceja for       Visit 9/18/2024:  Follow-up right shoulder and left knee    Seen recently in regards to right shoulder pain in which he was given a subacromial cortisone injection.  Patient states he did not notice any relief at first but after about a week or so he noticed improvement in regards to his shoulder pain and stiffness.  He has attended 1 session of PT as well.  He states it is not completely resolved but it is much better than before and that he has mild intermittent pain of his shoulder and slightly more range of motion.  Denies shoulder " swelling, discoloration.  He does report continued intermittent crepitus of the shoulder but that this was always a chronic issue.    In regards to his left knee pain/osteoarthritis, he asked he states that the pain has not been bad over the past week.  He does use a walker for mobility.  However, he does not feel he is in need to undergo a cortisone injection of his left knee today.  He may consider in the future as he reports previously undergone intra-articular cortisone injections of his knee in the past.    Visit 9/4/2024 :  Initial evaluation of right shoulder pain/left knee pain:  Patient had radiographs ordered from his PCP already of his right shoulder and left knee and these will be obtained as noted above.  Pertinent PMHx as above  Reports ongoing right shoulder pain for years but recently worsening over the past couple weeks without a precipitating injury.  He gives example of pain exacerbated when reaching outward from his couch to  objects and feeling a sharp/tight/aching pain of his shoulder.  He noticed that he was having increased stiffness with range of motion's in general and difficulty reaching above shoulder height on his back.  He reports clicking sensation of his shoulder but states is a chronic issue.  Denies shoulder swelling or discoloration.  Has been taking Tylenol without relief.  He reports that he is not supposed to be taking NSAIDs.  He has not had a cortisone injection of his right shoulder before.  He denies prior surgeries of his right shoulder in the past.  He has not seen formal PT for his shoulder previously.    Separately at the end of visit he also had complaints of his left knee pain:  X-ray imaging of his affected knee from his PCP was completed today as per above.  Briefly, patient denies a precipitating injury but notes pain within his knee with prolonged weightbearing.  He states he has had cortisone injections of his left knee in the past due to arthritis.  Unsure  "how long injections lasted.        Medical, Surgical, Family, and Social History    Past Medical History:   Diagnosis Date    Ambulatory dysfunction     Anemia     Anxiety     Arthritis     Atrial fibrillation (HCC)     CHF (congestive heart failure) (HCC)     Chronic kidney disease, stage 3 (HCC)     Chronic ulcer of left foot (HCC)     Chronic venous hypertension (idiopathic) with ulcer and inflammation of left lower extremity (HCC)     Congestive heart failure, unspecified HF chronicity, unspecified heart failure type (HCC)     COPD with acute exacerbation (HCC)     Encephalopathy     History of depression     History of osteomyelitis     Hyperkalemia     Hyperlipidemia     Hypertension     Hypoglycemia     Hypomagnesemia     Hyponatremia     Hypothyroidism     Morbid obesity (HCC)     Pneumonia     Preop cardiovascular exam     Pulmonary embolism (HCC)     RLS (restless legs syndrome)     Sepsis (HCC)     Sleep apnea     Thrombocytopenia (HCC)     Type 2 diabetes mellitus (HCC)     Urinary retention     Vitamin D deficiency      Past Surgical History:   Procedure Laterality Date    COLON SURGERY Right 06/2024    right side of colon    GALLBLADDER SURGERY      TONSILLECTOMY AND ADENOIDECTOMY       Social History   Social History     Substance and Sexual Activity   Alcohol Use No     Social History     Substance and Sexual Activity   Drug Use No     Social History     Tobacco Use   Smoking Status Never   Smokeless Tobacco Never     Family History   Problem Relation Age of Onset    Cancer Mother         lymphoma    Hypertension Mother     Heart disease Father     Hypertension Father     Diabetes Sister     Cancer Maternal Grandmother      Allergies   Allergen Reactions    Carbamazepine Other (See Comments)     Elevated liver functions -\"Enlarges liver\"    Clindamycin Rash    Phenytoin Itching and Rash    Pneumococcal Vaccine Hives, Itching and Rash          Physical Exam  /74 (BP Location: Left arm, Patient " "Position: Sitting, Cuff Size: Large)   Pulse 76   Temp (!) 97.2 °F (36.2 °C) (Temporal)   Ht 5' 9\" (1.753 m)   Wt (!) 160 kg (353 lb 6.4 oz)   SpO2 97%   BMI 52.19 kg/m²     Constitutional:  see vital signs  Gen: Obese/BMI 52, normocephalic/atraumatic, well-groomed  Pulmonary/Chest: Effort normal. No respiratory distress.     Ortho Exam  Shoulder exam:       RIGHT    Inspection Erythema None     Swelling None     Increased Warmth None    Rotator cuff (resisted testing aggravates general shoulder pain per patient) ER 5/5     IR 5/5     Abduction 5-/5    ROM      Abduction 120     ER0 40     IRb L1    TTP:  + Minimally over the lateral aspect of his greater tuberosity    Special Tests:       Cross body Adduction Negative     Speeds  Negative     Yergason's Negative     Drop arm Negative     Neer Positive but mild pain     Webb Positive but mild pain            Left Knee Exam:  Erythema: no  Swelling: no  Increased Warmth: no  Tenderness: Denies pain with palpating over the medial/lateral joint lines, peripatellar joint line  ROM: 0-130  Knee flexion strength: 5/5  Knee extension strength: 5/5  Patellar Grind: +    Gait: Use of rollator for mobility    Procedures        "

## 2024-09-20 ENCOUNTER — HOSPITAL ENCOUNTER (OUTPATIENT)
Facility: MEDICAL CENTER | Age: 59
Discharge: HOME | End: 2024-09-20
Payer: COMMERCIAL

## 2024-09-20 DIAGNOSIS — F41.9 ANXIETY: ICD-10-CM

## 2024-09-20 DIAGNOSIS — I48.91 ATRIAL FIBRILLATION, UNSPECIFIED TYPE (HCC): ICD-10-CM

## 2024-09-20 DIAGNOSIS — G40.89 OTHER SEIZURES (HCC): ICD-10-CM

## 2024-09-20 PROCEDURE — A9585 GADOBUTROL INJECTION: HCPCS | Performed by: PHYSICIAN ASSISTANT

## 2024-09-20 PROCEDURE — 70553 MRI BRAIN STEM W/O & W/DYE: CPT

## 2024-09-20 RX ORDER — APIXABAN 5 MG/1
TABLET, FILM COATED ORAL
Qty: 180 TABLET | Refills: 1 | Status: SHIPPED | OUTPATIENT
Start: 2024-09-20

## 2024-09-20 RX ORDER — CLONAZEPAM 0.5 MG/1
0.5 TABLET ORAL EVERY 12 HOURS PRN
Qty: 60 TABLET | Refills: 0 | Status: SHIPPED | OUTPATIENT
Start: 2024-09-20

## 2024-09-20 RX ORDER — GADOBUTROL 604.72 MG/ML
16 INJECTION INTRAVENOUS
Status: COMPLETED | OUTPATIENT
Start: 2024-09-20 | End: 2024-09-20

## 2024-09-20 RX ADMIN — GADOBUTROL 16 ML: 604.72 INJECTION INTRAVENOUS at 09:58

## 2024-09-23 ENCOUNTER — OFFICE VISIT (OUTPATIENT)
Dept: PHYSICAL THERAPY | Facility: CLINIC | Age: 59
End: 2024-09-23
Payer: COMMERCIAL

## 2024-09-23 DIAGNOSIS — M25.511 ACUTE PAIN OF RIGHT SHOULDER: ICD-10-CM

## 2024-09-23 PROCEDURE — 97110 THERAPEUTIC EXERCISES: CPT

## 2024-09-23 NOTE — PROGRESS NOTES
Daily Note     Today's date: 2024  Patient name: Juan Antonio Guy  : 1965  MRN: [id number]  Referring provider: Mo Ceja MD  Dx:   Encounter Diagnosis     ICD-10-CM    1. Acute pain of right shoulder  M25.511 Ambulatory Referral to Physical Therapy          Start Time: 1030  Stop Time: 1100  Total time in clinic (min): 30 minutes    Subjective:  Patient states that his shoulder is feeling better today.             Objective: See treatment diary below.            Assessment: Patient began POC during today's therapy session with a focus on improving shoulder mobility and strength. Therapeutic exercise program was completed with good technique and post-exercise fatigue present . Continue to recommend PT in order to achieve maximal functional independence.            Plan: Continue per plan of care.      Precautions:       Manuals 24           LLLT R shoulder   NT                                                  Neuro Re-Ed             Shoulder isometrics: all planes of motion   10x            Table slides: flex, scaption   20x            Scapular retractions   20x            Shoulder shrugs   20x                                                  Ther Ex             UBE(improve R shoulder ROM)   L1 10'           Pulley's   NV                                                                                         Ther Activity                                       Gait Training                                       Modalities             CP PRN

## 2024-09-25 ENCOUNTER — HOSPITAL ENCOUNTER (OUTPATIENT)
Dept: NEUROLOGY | Facility: CLINIC | Age: 59
Discharge: HOME | End: 2024-09-25
Payer: COMMERCIAL

## 2024-09-25 DIAGNOSIS — G40.89 OTHER SEIZURES (HCC): ICD-10-CM

## 2024-09-25 PROCEDURE — 95816 EEG AWAKE AND DROWSY: CPT | Performed by: STUDENT IN AN ORGANIZED HEALTH CARE EDUCATION/TRAINING PROGRAM

## 2024-09-25 PROCEDURE — 95816 EEG AWAKE AND DROWSY: CPT

## 2024-09-27 DIAGNOSIS — F33.1 MODERATE EPISODE OF RECURRENT MAJOR DEPRESSIVE DISORDER (HCC): ICD-10-CM

## 2024-09-27 DIAGNOSIS — F32.2 SEVERE DEPRESSION (HCC): Primary | ICD-10-CM

## 2024-09-27 DIAGNOSIS — I48.91 ATRIAL FIBRILLATION, UNSPECIFIED TYPE (HCC): ICD-10-CM

## 2024-09-28 RX ORDER — LAMOTRIGINE 25 MG/1
25 TABLET ORAL 2 TIMES DAILY
Qty: 60 TABLET | Refills: 4 | OUTPATIENT
Start: 2024-09-28

## 2024-09-30 ENCOUNTER — OFFICE VISIT (OUTPATIENT)
Dept: PHYSICAL THERAPY | Facility: CLINIC | Age: 59
End: 2024-09-30
Payer: COMMERCIAL

## 2024-09-30 DIAGNOSIS — M25.511 ACUTE PAIN OF RIGHT SHOULDER: Primary | ICD-10-CM

## 2024-09-30 PROCEDURE — 97110 THERAPEUTIC EXERCISES: CPT

## 2024-09-30 RX ORDER — LAMOTRIGINE 25 MG/1
25 TABLET ORAL 2 TIMES DAILY
Qty: 180 TABLET | Refills: 1 | Status: SHIPPED | OUTPATIENT
Start: 2024-09-30

## 2024-09-30 NOTE — PROGRESS NOTES
Daily Note     Today's date: 2024  Patient name: Juan Antonio Guy  : 1965  MRN: [id number]  Referring provider: Mo Ceja MD  Dx:   Encounter Diagnosis     ICD-10-CM    1. Acute pain of right shoulder  M25.511           Start Time: 1030  Stop Time: 1105  Total time in clinic (min): 35 minutes    Subjective:  Patient states that his shoulder is feeling much better.           Objective: See treatment diary below.          Assessment: Began therapy session on UBE to improve R shoulder ROM. Focused on improving R shoulder ROM and parascapular strength during today's therapy session. Therapeutic exercise program was completed with good technique and post-exercise fatigue present. Patient continues to improve R shoulder ROM.         Plan: Continue per plan of care.      Precautions:       Manuals 24          R shoulder PROM   NT NT                                                 Neuro Re-Ed             Shoulder isometrics: all planes of motion   10x  15x           Table slides: flex, scaption   20x  20x           Scapular retractions   20x  20x with ball           Shoulder shrugs   20x 20x                                                 Ther Ex             UBE(improve R shoulder ROM)   L1 10' L1 10'          Pulley's   NV NT          Seated Shoulder flexion/abduction with cane   20x ea                                                                           Ther Activity                                       Gait Training                                       Modalities             CP PRN

## 2024-10-07 ENCOUNTER — APPOINTMENT (OUTPATIENT)
Dept: PHYSICAL THERAPY | Facility: CLINIC | Age: 59
End: 2024-10-07
Payer: COMMERCIAL

## 2024-10-08 ENCOUNTER — OFFICE VISIT (OUTPATIENT)
Dept: PHYSICAL THERAPY | Facility: CLINIC | Age: 59
End: 2024-10-08
Payer: COMMERCIAL

## 2024-10-08 DIAGNOSIS — M25.511 ACUTE PAIN OF RIGHT SHOULDER: Primary | ICD-10-CM

## 2024-10-08 PROCEDURE — 97110 THERAPEUTIC EXERCISES: CPT

## 2024-10-08 NOTE — PROGRESS NOTES
Daily Note     Today's date: 10/8/2024  Patient name: Juan Antonio Guy  : 1965  MRN: [id number]  Referring provider: Mo Ceja MD  Dx:   Encounter Diagnosis     ICD-10-CM    1. Acute pain of right shoulder  M25.511           Start Time: 1100  Stop Time: 1145  Total time in clinic (min): 45 minutes    Subjective: Patient states that his shoulder is feeling better since beginning therapy.           Objective: See treatment diary below.            Assessment: Began therapy session on UBE to improve R shoulder mobility.  Focused on improving R shoulder strength during today's therapy session. Instructed and performed front and lateral shoulder raises to improve shoulder strength. Patient was able to perform with no increase in symptoms. Therapeutic exercise program was completed with good technique and post-exercise fatigue present . Continue to recommend PT in order to achieve maximal functional independence.            Plan: Continue per plan of care.      Precautions:       Manuals 9/9/24 9/23/24 9/30/24 10/8/24         R shoulder PROM   NT NT NT                                                Neuro Re-Ed             Shoulder isometrics: all planes of motion   10x  15x  15x          Table slides: flex, scaption   20x  20x  20x          Scapular retractions   20x  20x with ball  20x with ball          Shoulder shrugs   20x 20x 20x 2#         Front raises     20x 2#         Lateral raises    20x 2#                       Ther Ex             UBE(improve R shoulder ROM)   L1 10' L1 10' L1 10'          Pulley's   NV NT          Seated Shoulder flexion/abduction with cane   20x ea 20x ea          Bicep curls    20x 2#                                                              Ther Activity                                       Gait Training                                       Modalities             CP PRN

## 2024-10-09 ENCOUNTER — TELEPHONE (OUTPATIENT)
Age: 59
End: 2024-10-09

## 2024-10-09 NOTE — TELEPHONE ENCOUNTER
Jaqui LOPEZ Called from [facility] and [employer] requesting the fax number to send a Physician Certification Form. I advised of the Fax# [phone].

## 2024-10-14 ENCOUNTER — OFFICE VISIT (OUTPATIENT)
Dept: NEUROLOGY | Facility: CLINIC | Age: 59
End: 2024-10-14
Payer: COMMERCIAL

## 2024-10-14 VITALS
HEIGHT: 69 IN | WEIGHT: 315 LBS | TEMPERATURE: 97.2 F | HEART RATE: 78 BPM | DIASTOLIC BLOOD PRESSURE: 76 MMHG | SYSTOLIC BLOOD PRESSURE: 130 MMHG | OXYGEN SATURATION: 96 % | BODY MASS INDEX: 46.65 KG/M2

## 2024-10-14 DIAGNOSIS — G62.9 NEUROPATHY: ICD-10-CM

## 2024-10-14 DIAGNOSIS — G25.81 RESTLESS LEG SYNDROME: ICD-10-CM

## 2024-10-14 DIAGNOSIS — G40.89 OTHER SEIZURES (HCC): Primary | ICD-10-CM

## 2024-10-14 DIAGNOSIS — G47.33 OBSTRUCTIVE SLEEP APNEA SYNDROME: ICD-10-CM

## 2024-10-14 PROCEDURE — 99214 OFFICE O/P EST MOD 30 MIN: CPT | Performed by: PHYSICIAN ASSISTANT

## 2024-10-14 NOTE — ASSESSMENT & PLAN NOTE
Patient with longstanding peripheral neuropathy, likely caused by uncontrolled DM, as well as contribution from B12 deficiency.     He takes gabapentin 600mg BID for neuropathy symptoms.      His neuropathy is likely the main cause of his balance difficulty and falls, although likely multifactorial. We discussed fall precautions in detail. He should continue to use his walker.  He has not driven in 2 years and should not be driving given the severity of his neuropathy.      He should continue to follow with podiatry for foot and nail care. He has chronic diabetic ulcers.     We discussed the importance of controlling his DM under the direction of his PCP to help slow progression of his neuropathy. Should continue B12 supplement prescribed by PCP with goal to maintain B12 level >400, which can be followed by his PCP.

## 2024-10-14 NOTE — ASSESSMENT & PLAN NOTE
Patient reports history of RLS, also has iron deficiency anemia.  He has never seen a sleep specialist.  At timing of last visit I referred him to sleep medicine, also because he has untreated NEAL.  Would defer management to sleep med.  He is on Requip 1mg BID.

## 2024-10-14 NOTE — PROGRESS NOTES
"Patient ID: Juan Antonio Guy is a 59 y.o. male.    Assessment/Plan:    Other seizures (HCC)  Patient reports history of \"seizure disorder\", although his history is very unclear, and description of the 2 \"major seizures\" he had in 1982 are extremely atypical for seizure. He apparently was on phenobarbital for several years before coming off in the 90s and then not having any seizures for a long time until about 5 years ago when he was having falls due to losing his balance, without any LOC. This is also not really concerning for seizure. I reviewed several notes from 2 different local neurologists who said he has a \"questionable history of seizures\" although absolutely no description of these seizures. Reportedly prior MRI and EEG normal (there is a normal EEG from [facility] in 2022 in his chart, no MRI). At one point he was advised to d/c his Keppra, but appears that was not done. He is also on a very low dose of lamotrigine, likely for mood.    I updated his brain MRI and routine EEG recently.  No structural abnormality on MRI, and routine EEG is normal.    We again discussed that his history of not convincing for epileptic seizures. Question of psychiatric events at that time, although there are no records and we are not going to be able to obtain records from 1982. There have not been any recent events concerning for seizure, and he is on an extremely low dose of levetiracetam. Lamotrigine also at very low dose, being used for mood. Discussed with epilepsy, we would require much higher doses of these medications in order to be therapeutic. Considering he has not had any clear seizures on these doses, unlikely that he has underlying epilepsy.      Since there are no high risk findings on his MRI or EEG that would make me concerned he is at risk for recurrent seizures, I will have him stop the low dose of levetiracetam. Lamotrigine is being used for mood. He does have a history of Bipolar, severe depression, anxiety, " with [facility] admission in the last few years.     Plan:  - discontinue levetiracetam   - patient to contact the office if any events concerning for seizure.  If so, would favor increasing his lamotrigine    Neuropathy  Patient with longstanding peripheral neuropathy, likely caused by uncontrolled DM, as well as contribution from B12 deficiency.     He takes gabapentin 600mg BID for neuropathy symptoms.      His neuropathy is likely the main cause of his balance difficulty and falls, although likely multifactorial. We discussed fall precautions in detail. He should continue to use his walker.  He has not driven in 2 years and should not be driving given the severity of his neuropathy.      He should continue to follow with podiatry for foot and nail care. He has chronic diabetic ulcers.     We discussed the importance of controlling his DM under the direction of his PCP to help slow progression of his neuropathy. Should continue B12 supplement prescribed by PCP with goal to maintain B12 level >400, which can be followed by his PCP.    Restless leg syndrome  Patient reports history of RLS, also has iron deficiency anemia.  He has never seen a sleep specialist.  At timing of last visit I referred him to sleep medicine, also because he has untreated NEAL.  Would defer management to sleep med.  He is on Requip 1mg BID.    Obstructive sleep apnea syndrome  History of NEAL, does not use CPAP.  Referred to sleep medicine at timing of last visit.     Patient will return in 6 months.  Likely follow up PRN at that time if doing well      Diagnoses and all orders for this visit:    Other seizures (HCC)    Neuropathy    Restless leg syndrome    Obstructive sleep apnea syndrome           Subjective:    HPI    Juan Antonio Guy is a 59 y.o. male with extensive PMH including PE, Afib on Eliquis, CHF, COPD, NEAL not on CPAP, HTN, DM, neuropathy, B12 deficiency, hypothyroidism, MERCEDES, RLS, Bipolar disorder, depression, anxiety, LE edema with  "chronic ulcers and cellulitis, who presents today for neurologic follow up.    Interval history 10/14/2024  Patient reports he has been doing well since last visit.  He denies any events concerning for seizure.  He reports his neuropathy is stable, uses his walker, no recent falls.    He was never contacted by sleep medicine to schedule a consult for RLS and untreated sleep apnea.     MRI brain seizure w/wo contrast 9/20/2024  No acute infarction, edema, or pathologic intra-axial enhancement.  There is no evidence of mesial temporal sclerosis.  Mild chronic microangiopathic ischemic changes.  Low-lying cerebellar tonsils without   meeting criteria for Chiari I malformation (Patient says this is a chronic finding)    Routine EEG 9/25/2024  Normal     AED/side effects/compliance:  Keppra 250mg BID  Lamotrigine 25mg BID (question if on for mood)  Also on gabapentin 600mg twice a day for neuropathy   Also on clonazepam 0.5mg twice a day for anxiety     Event/Seizure semiology:  -patient reports both initial seizures in 1982 were \"severe\" and \"violent\". Reports he was \"throwing people around\", was apparently conscious and \"it took 10-12 people to get me on the ground and hold me down to get me in the ambulance\". Told me he had to be put in handcuffs because he was so violent and he \"broke the handcuffs\".   -more recent events 4-5 years ago described as \"falls because of losing balance, without LOC\", apparently someone was concerned these were seizures      Intake history 8/15/2024   Patient reports he has most recently been followed by neurologist Dr. Leone who is no longer in the area. He was previously seeing another neurologist, Dr. Sousa. He reports he was seeing neurology for neuropathy, seizure disorder and RLS.     He reports he has had peripheral neuropathy for many years. I reviewed some prior neurology notes scanned into the chart in [facility]. Appears he also has CTS. He reports he has numbness in the feet and " "lower legs, so bad that he \"doesn't need to be numbed when they are doing wound care on my ulcers\". He reports balance difficulty and falls. His DM is not well controlled, recent A1C 8.0, down from 9.9 earlier this year. He was also noted to have B12 deficiency recently, level 174. He is on gabapentin 600mg BID for neuropathy. He sees podiatry and wound care for chronic ulcers. He has had many hospitalizations for cellulitis. He has chronic LE edema.   He reports having RLS, on Requip 1mg BID. He has MERCEDES. He has NEAL but does not wear his CPAP. He had never seen a sleep specialist.      In regards to seizure history, this is not well defined. I read some notes from Dr. Sousa and Dr. Leone and these notes do not say anything about seizure semiology, what type of seizures, or any other history. Both says he has a \"questionable seizure disorder\" and notes state that MRIs and EEGs were normal. There are no MRI reports on file. There is a routine EEG from [facility] 11/30/2022 which was read as a normal EEG. At one point, he was taken off Keppra, per the OV note, but the next visit it said to continue (maybe was not communicated to his pharmacy that he should stop).   Patient tells me his first seizure was in 1982 and it was \"really severe\". He describes that there was no warning and he was \"very violent\" and \"throwing people around\". He does not think he lost consciousness, but was \"tossing people off me\" and \"it took 10-12 people to tackle me to the ground and hold me down so they could get me in the ambulance\". He described being told he had to be \"tied up with belts\" to restrain him. He tells me he was \"put into a coma x 2 weeks to get the seizing to stop\". He says he was put on phenobarbital and then another seizure happened later that year that was also \"severe\". He said he drove from [facility] to work at [employer] and was violent when he got to work and \"broke the handcuffs\" they had to put him in to restrain him. " "He does not remember these events really, but was told about them. He tells me that with the second seizure, when he was in the ER, his seizure was so severe that he was \"moving the gurney all around the ER\" because of how violent he was being. He is not sure if he was on anything else besides phenobarbital, \"maybe something that starts with an M\". He recalls seeing a Dr. Gramajo at the time (neurology). He believes that sometime in the s he was taken off phenobarbital. He did not have any apparent seizures until maybe 4-5 years ago. When I asked him what those seizures were like, he said \"I was having falls due to losing my balance\", but denied any LOC, \"I remember each and every fall\".   He is not sure when he was diagnosed with the Bipolar and depression/anxiety, unsure if he had any mental health issues in  when the apparent seizures happened.      He denies any recent events of LOC, altered awareness, jerking/twitching, abnormal involuntary movements, behavioral arrest, etc.      He reports he lives alone, but lives next door to his sister.  He has not driven in about 2 years, but says he still has a license. Does not have a car.     Special Features  Status epilepticus: No  Self Injury Seizures: No  Precipitating Factors: No     Epilepsy Risk Factors:  Abnormal pregnancy: No  Abnormal birth/: No  Abnormal Development: No  Febrile seizures, simple: No  Febrile seizures, complex: No  CNS infection: No  Cerebral palsy: No  Head injury (moderate/severe): No  CNS neoplasm: No  CNS malformation: No  Neurosurgical procedure: No  Stroke: No  Alcohol abuse: No  Drug abuse: No  Family history Sz/epilepsy: No     Prior AEDs:  Phenobarbital, ?others      Prior workup:    No MRI on file. Prior neurology notes state these were previously \"normal\"     2022 [facility]  Normal routine EEG      Past psychiatric history:  Bipolar, depression, history of inpatient admissions     Social history:  On disability   No " "alcohol, no drugs, no tobacco   Driving: (has license but does not have a car to drive)  Lives alone: yes. Sister lives next door        The following portions of the patient's history were reviewed and updated as appropriate: current medications, past family history, past medical history, past social history, past surgical history, and problem list.         Objective:    Blood pressure 130/76, pulse 78, temperature (!) 97.2 °F (36.2 °C), temperature source Temporal, height 5' 9\" (1.753 m), weight (!) 164 kg (360 lb 12.8 oz), SpO2 96%.    Physical Exam  Constitutional:       Appearance: He is obese.   Eyes:      Extraocular Movements: EOM normal.      Pupils: Pupils are equal, round, and reactive to light.   Skin:     Comments: Bilateral LE edema, venous stasis changes noted, multiple bandages   Neurological:      Motor: Motor strength is normal.     Deep Tendon Reflexes:      Reflex Scores:       Bicep reflexes are 1+ on the right side and 1+ on the left side.       Brachioradialis reflexes are 1+ on the right side and 1+ on the left side.       Achilles reflexes are 0 on the right side and 0 on the left side.  Psychiatric:         Mood and Affect: Mood normal.         Speech: Speech normal.         Behavior: Behavior normal.         Neurological Exam  Mental Status   Oriented to person, place, time and situation. Speech is normal. Language is fluent with no aphasia. Attention and concentration are normal.    Cranial Nerves  CN II: Visual fields full to confrontation.  CN III, IV, VI: Extraocular movements intact bilaterally. Pupils equal round and reactive to light bilaterally.  CN V: Facial sensation is normal.  CN VII: Full and symmetric facial movement.  CN VIII: Hearing is normal.  CN IX, X: Palate elevates symmetrically  CN XI: Shoulder shrug strength is normal.  CN XII: Tongue midline without atrophy or fasciculations.    Motor   Normal muscle tone. Strength is 5/5 throughout all four " extremities.    Sensory  Pinprick abnormality: Decreased to the knees bilaterally . Vibration abnormality: Absent vibratory sensation at the level of the toes and malleolus bilaterally .     Reflexes                                            Right                      Left  Brachioradialis                    1+                         1+  Biceps                                 1+                         1+  Patellar                                Tr                         Tr  Achilles                                0                         0    Coordination  Right: Finger-to-nose normal.Left: Finger-to-nose normal.    Gait    Wide based gait, slow, using walker .        ROS:    Review of Systems   Constitutional: Negative.  Negative for chills, fatigue and fever.   HENT: Negative.  Negative for ear pain, hearing loss, sore throat, tinnitus and trouble swallowing.    Eyes:  Negative for photophobia, pain and visual disturbance.   Respiratory: Negative.  Negative for cough and shortness of breath.    Cardiovascular: Negative.  Negative for chest pain and palpitations.   Gastrointestinal:  Negative for abdominal pain, constipation, diarrhea, nausea and vomiting.   Endocrine: Negative.    Genitourinary: Negative.  Negative for difficulty urinating, dysuria, hematuria and urgency.   Musculoskeletal: Negative.  Negative for arthralgias, back pain and gait problem.   Skin: Negative.  Negative for color change and rash.   Neurological:  Positive for numbness. Negative for dizziness, tremors, seizures, syncope, weakness and headaches.   Hematological: Negative.    Psychiatric/Behavioral:  Negative for confusion and sleep disturbance.    All other systems reviewed and are negative.

## 2024-10-14 NOTE — PATIENT INSTRUCTIONS
Discontinue Keppra (levetiracetam).    Your brain MRI and EEG do not indicate there is increased risk for seizure and seizure history is questionable.    Continue to work on diabetic control with PCP which can help slow progression of neuropathy. Continue to follow with podiatry as indicated  Would still suggest seeing sleep medicine for restless legs and sleep apnea (I placed the referral last visit--can call the department of sleep medicine to schedule an appointment)  Return in 6 months.  If at that time you are doing well, can follow up as-needed

## 2024-10-14 NOTE — ASSESSMENT & PLAN NOTE
"Patient reports history of \"seizure disorder\", although his history is very unclear, and description of the 2 \"major seizures\" he had in 1982 are extremely atypical for seizure. He apparently was on phenobarbital for several years before coming off in the 90s and then not having any seizures for a long time until about 5 years ago when he was having falls due to losing his balance, without any LOC. This is also not really concerning for seizure. I reviewed several notes from 2 different local neurologists who said he has a \"questionable history of seizures\" although absolutely no description of these seizures. Reportedly prior MRI and EEG normal (there is a normal EEG from [facility] in 2022 in his chart, no MRI). At one point he was advised to d/c his Keppra, but appears that was not done. He is also on a very low dose of lamotrigine, likely for mood.    I updated his brain MRI and routine EEG recently.  No structural abnormality on MRI, and routine EEG is normal.    We again discussed that his history of not convincing for epileptic seizures. Question of psychiatric events at that time, although there are no records and we are not going to be able to obtain records from 1982. There have not been any recent events concerning for seizure, and he is on an extremely low dose of levetiracetam. Lamotrigine also at very low dose, being used for mood. Discussed with epilepsy, we would require much higher doses of these medications in order to be therapeutic. Considering he has not had any clear seizures on these doses, unlikely that he has underlying epilepsy.      Since there are no high risk findings on his MRI or EEG that would make me concerned he is at risk for recurrent seizures, I will have him stop the low dose of levetiracetam. Lamotrigine is being used for mood. He does have a history of Bipolar, severe depression, anxiety, with [facility] admission in the last few years.     Plan:  - discontinue levetiracetam   - patient to " contact the office if any events concerning for seizure.  If so, would favor increasing his lamotrigine

## 2024-10-14 NOTE — TELEPHONE ENCOUNTER
[facility] and [facility] called to make aman the form was rec'd.  Spoke to clerical and the form is on the PCP's desk.  He will return on Wednesday,

## 2024-10-15 ENCOUNTER — APPOINTMENT (OUTPATIENT)
Dept: PHYSICAL THERAPY | Facility: CLINIC | Age: 59
End: 2024-10-15
Payer: COMMERCIAL

## 2024-10-17 ENCOUNTER — OFFICE VISIT (OUTPATIENT)
Dept: PHYSICAL THERAPY | Facility: CLINIC | Age: 59
End: 2024-10-17
Payer: COMMERCIAL

## 2024-10-17 DIAGNOSIS — M25.511 ACUTE PAIN OF RIGHT SHOULDER: Primary | ICD-10-CM

## 2024-10-17 PROCEDURE — 97110 THERAPEUTIC EXERCISES: CPT

## 2024-10-17 NOTE — PROGRESS NOTES
Daily Note     Today's date: 10/17/2024  Patient name: Juan Antonio Guy  : 1965  MRN: [id number]  Referring provider: Mo Ceja MD  Dx:   Encounter Diagnosis     ICD-10-CM    1. Acute pain of right shoulder  M25.511           Start Time: 1000  Stop Time: 1050  Total time in clinic (min): 50 minutes    Subjective: Patient states that he has a pain in his right chest and shoulder.           Objective: See treatment diary below.        Assessment: Patient began therapy session on UBE for warm-up. Focused on improving R shoulder strength during today's therapy session. Therapeutic exercise program was completed with good technique and post-exercise fatigue present . Continue to recommend PT in order to achieve maximal functional independence.          Plan: Continue per plan of care.      Precautions:       Manuals 9/9/24 9/23/24 9/30/24 10/8/24 10/17/24        R shoulder PROM   NT NT NT                                                Neuro Re-Ed             Shoulder isometrics: all planes of motion   10x  15x  15x          Table slides: flex, scaption   20x  20x  20x          Scapular retractions   20x  20x with ball  20x with ball          Shoulder shrugs   20x 20x 20x 2#         Front raises     20x 2#         Lateral raises    20x 2#                       Ther Ex             UBE(improve R shoulder ROM)   L1 10' L1 10' L1 10'          Seated Shoulder flexion/abduction with cane   20x ea 20x ea          Bicep curls    20x 2#                                                              Ther Activity                                       Gait Training                                       Modalities             CP PRN

## 2024-10-21 DIAGNOSIS — E11.65 TYPE 2 DIABETES MELLITUS WITH HYPERGLYCEMIA, WITH LONG-TERM CURRENT USE OF INSULIN (HCC): ICD-10-CM

## 2024-10-21 DIAGNOSIS — I87.332 CHRONIC VENOUS HYPERTENSION (IDIOPATHIC) WITH ULCER AND INFLAMMATION OF LEFT LOWER EXTREMITY (HCC): ICD-10-CM

## 2024-10-21 DIAGNOSIS — I48.91 ATRIAL FIBRILLATION, UNSPECIFIED TYPE (HCC): ICD-10-CM

## 2024-10-21 DIAGNOSIS — M86.9 OSTEOMYELITIS, UNSPECIFIED SITE, UNSPECIFIED TYPE (HCC): ICD-10-CM

## 2024-10-21 DIAGNOSIS — L97.508 DIABETIC ULCER OF FOOT ASSOCIATED WITH TYPE 2 DIABETES MELLITUS, WITH OTHER ULCER SEVERITY, UNSPECIFIED LATERALITY, UNSPECIFIED PART OF FOOT (HCC): ICD-10-CM

## 2024-10-21 DIAGNOSIS — J44.1 COPD WITH ACUTE EXACERBATION (HCC): ICD-10-CM

## 2024-10-21 DIAGNOSIS — E11.621 DIABETIC ULCER OF FOOT ASSOCIATED WITH TYPE 2 DIABETES MELLITUS, WITH OTHER ULCER SEVERITY, UNSPECIFIED LATERALITY, UNSPECIFIED PART OF FOOT (HCC): ICD-10-CM

## 2024-10-21 DIAGNOSIS — N40.1 BENIGN PROSTATIC HYPERPLASIA WITH URINARY HESITANCY: ICD-10-CM

## 2024-10-21 DIAGNOSIS — E03.9 HYPOTHYROIDISM, UNSPECIFIED TYPE: ICD-10-CM

## 2024-10-21 DIAGNOSIS — Z86.79 HISTORY OF HEART FAILURE: ICD-10-CM

## 2024-10-21 DIAGNOSIS — Z79.4 TYPE 2 DIABETES MELLITUS WITH HYPERGLYCEMIA, WITH LONG-TERM CURRENT USE OF INSULIN (HCC): ICD-10-CM

## 2024-10-21 DIAGNOSIS — I10 ESSENTIAL HYPERTENSION: ICD-10-CM

## 2024-10-21 DIAGNOSIS — I50.9 CONGESTIVE HEART FAILURE, UNSPECIFIED HF CHRONICITY, UNSPECIFIED HEART FAILURE TYPE (HCC): ICD-10-CM

## 2024-10-21 DIAGNOSIS — E66.01 MORBID OBESITY (HCC): ICD-10-CM

## 2024-10-21 DIAGNOSIS — R39.11 BENIGN PROSTATIC HYPERPLASIA WITH URINARY HESITANCY: ICD-10-CM

## 2024-10-22 RX ORDER — PANTOPRAZOLE SODIUM 40 MG/1
40 TABLET, DELAYED RELEASE ORAL DAILY
Qty: 90 TABLET | Refills: 0 | Status: SHIPPED | OUTPATIENT
Start: 2024-10-22

## 2024-10-23 ENCOUNTER — OFFICE VISIT (OUTPATIENT)
Dept: PHYSICAL THERAPY | Facility: CLINIC | Age: 59
End: 2024-10-23
Payer: COMMERCIAL

## 2024-10-23 DIAGNOSIS — M25.511 ACUTE PAIN OF RIGHT SHOULDER: Primary | ICD-10-CM

## 2024-10-23 PROCEDURE — 97110 THERAPEUTIC EXERCISES: CPT

## 2024-10-23 NOTE — PROGRESS NOTES
Daily Note     Today's date: 10/23/2024  Patient name: Juan Antonio Guy  : 1965  MRN: [id number]  Referring provider: Mo Ceja MD  Dx:   Encounter Diagnosis     ICD-10-CM    1. Acute pain of right shoulder  M25.511           Start Time: 1055  Stop Time: 1155  Total time in clinic (min): 60 minutes    Subjective: Patient states that both of his shoulders are bothering him today.           Objective: See treatment diary below.          Assessment: Began therapy session on UBE for warm-up. Focused on improving R shoulder strength during today's therapy session by progressing weight from 2 to 3 #. Therapeutic exercise program was completed with good technique, no increase in previous symptoms, and post-exercise fatigue present . Ended therapy session with MHP to right shoulder.             Plan: Continue per plan of care.      Precautions:       Manuals 9/9/24 9/23/24 9/30/24 10/8/24 10/17/24 10/23/24       R shoulder PROM   NT NT NT                                                Neuro Re-Ed             Table slides: flex, scaption   20x  20x  20x  20x  20x ea       Scapular retractions   20x  20x with ball  20x with ball  20x with ball  20x with ball        Shoulder shrugs   20x 20x 20x 2# 20x 2#  20x 3#        Front raises     20x 2# 20x 2# 20x 3#        Lateral raises    20x 2#  20x 2#  20x 3#        Shoulder press       20x 3#        Ther Ex             UBE(improve R shoulder ROM)   L1 10' L1 10' L1 10'  L1 10' L1 10'       Seated Shoulder flexion/abduction with cane   20x ea 20x ea  20x ea  20x ea        Bicep curls    20x 2#  20x 2#  20x 3#                                                            Ther Activity                                       Gait Training                                       Modalities             CP PRN

## 2024-10-28 DIAGNOSIS — F41.9 ANXIETY: ICD-10-CM

## 2024-10-29 RX ORDER — CLONAZEPAM 0.5 MG/1
0.5 TABLET ORAL EVERY 12 HOURS PRN
Qty: 60 TABLET | Refills: 0 | Status: SHIPPED | OUTPATIENT
Start: 2024-10-29

## 2024-10-30 ENCOUNTER — OFFICE VISIT (OUTPATIENT)
Dept: PHYSICAL THERAPY | Facility: CLINIC | Age: 59
End: 2024-10-30
Payer: COMMERCIAL

## 2024-10-30 DIAGNOSIS — M25.511 ACUTE PAIN OF RIGHT SHOULDER: Primary | ICD-10-CM

## 2024-10-30 PROCEDURE — 97110 THERAPEUTIC EXERCISES: CPT

## 2024-10-30 NOTE — PROGRESS NOTES
Daily Note     Today's date: 10/30/2024  Patient name: Juan Antonio Guy  : 1965  MRN: [id number]  Referring provider: Mo Ceja MD  Dx:   Encounter Diagnosis     ICD-10-CM    1. Acute pain of right shoulder  M25.511           Start Time: 1000  Stop Time: 1100  Total time in clinic (min): 60 minutes    Subjective: Patient states that his right shoulder is bothering him today.            Objective: See treatment diary below.          Assessment: Began therapy session on UBE for warm-up. Focused on improving R shoulder strength during today's therapy session. Therapeutic exercise program was completed with good technique and post-exercise fatigue present . Continue to recommend PT in order to achieve maximal functional independence.            Plan: Continue per plan of care.      Precautions:       Manuals 10/30/24 9/30/24 10/8/24 10/17/24 10/23/24   R shoulder PROM  NT NT NT                             Neuro Re-Ed        Table slides: flex, scaption  20x  20x  20x  20x  20x ea   Scapular retractions  20x with ball 20x with ball  20x with ball  20x with ball  20x with ball    Shoulder shrugs  20x 3#  20x 20x 2# 20x 2#  20x 3#    Front raises  20x 3#   20x 2# 20x 2# 20x 3#    Lateral raises 20x 3#   20x 2#  20x 2#  20x 3#    Shoulder press  20x 3#     20x 3#    Ther Ex        UBE(improve R shoulder ROM)  L1 10' L1 10' L1 10'  L1 10' L1 10'   Seated Shoulder flexion/abduction with cane NT 20x ea 20x ea  20x ea  20x ea    Bicep curls 20x 3#   20x 2#  20x 2#  20x 3#                                    Ther Activity                        Gait Training                        Modalities        CP PRN  MHP 10' R

## 2024-11-04 ENCOUNTER — TELEPHONE (OUTPATIENT)
Dept: FAMILY MEDICINE CLINIC | Facility: CLINIC | Age: 59
End: 2024-11-04

## 2024-11-04 NOTE — TELEPHONE ENCOUNTER
Spoke with pt who verbalized understanding that PCP on [facility] needs to be updated prior to upcoming appt.

## 2024-11-06 ENCOUNTER — OFFICE VISIT (OUTPATIENT)
Dept: PHYSICAL THERAPY | Facility: CLINIC | Age: 59
End: 2024-11-06
Payer: COMMERCIAL

## 2024-11-06 ENCOUNTER — OFFICE VISIT (OUTPATIENT)
Dept: FAMILY MEDICINE CLINIC | Facility: CLINIC | Age: 59
End: 2024-11-06
Payer: COMMERCIAL

## 2024-11-06 VITALS
TEMPERATURE: 97.9 F | DIASTOLIC BLOOD PRESSURE: 84 MMHG | WEIGHT: 315 LBS | BODY MASS INDEX: 54.34 KG/M2 | OXYGEN SATURATION: 98 % | HEART RATE: 68 BPM | SYSTOLIC BLOOD PRESSURE: 138 MMHG

## 2024-11-06 DIAGNOSIS — E55.9 VITAMIN D DEFICIENCY: ICD-10-CM

## 2024-11-06 DIAGNOSIS — E66.01 MORBID OBESITY (HCC): ICD-10-CM

## 2024-11-06 DIAGNOSIS — E11.621 DIABETIC ULCER OF LEFT HEEL ASSOCIATED WITH TYPE 2 DIABETES MELLITUS, LIMITED TO BREAKDOWN OF SKIN (HCC): ICD-10-CM

## 2024-11-06 DIAGNOSIS — E03.9 HYPOTHYROIDISM, UNSPECIFIED TYPE: ICD-10-CM

## 2024-11-06 DIAGNOSIS — Z23 ENCOUNTER FOR IMMUNIZATION: ICD-10-CM

## 2024-11-06 DIAGNOSIS — G40.89 OTHER SEIZURES (HCC): ICD-10-CM

## 2024-11-06 DIAGNOSIS — L97.421 DIABETIC ULCER OF LEFT HEEL ASSOCIATED WITH TYPE 2 DIABETES MELLITUS, LIMITED TO BREAKDOWN OF SKIN (HCC): ICD-10-CM

## 2024-11-06 DIAGNOSIS — E11.621 DIABETIC ULCER OF FOOT ASSOCIATED WITH TYPE 2 DIABETES MELLITUS, WITH OTHER ULCER SEVERITY, UNSPECIFIED LATERALITY, UNSPECIFIED PART OF FOOT (HCC): ICD-10-CM

## 2024-11-06 DIAGNOSIS — Z79.4 TYPE 2 DIABETES MELLITUS WITH HYPERGLYCEMIA, WITH LONG-TERM CURRENT USE OF INSULIN (HCC): Primary | ICD-10-CM

## 2024-11-06 DIAGNOSIS — I10 ESSENTIAL HYPERTENSION: ICD-10-CM

## 2024-11-06 DIAGNOSIS — D50.9 IRON DEFICIENCY ANEMIA, UNSPECIFIED IRON DEFICIENCY ANEMIA TYPE: ICD-10-CM

## 2024-11-06 DIAGNOSIS — I48.0 PAROXYSMAL ATRIAL FIBRILLATION (HCC): ICD-10-CM

## 2024-11-06 DIAGNOSIS — I50.32 CHRONIC DIASTOLIC CONGESTIVE HEART FAILURE (HCC): ICD-10-CM

## 2024-11-06 DIAGNOSIS — D69.6 THROMBOCYTOPENIA (HCC): ICD-10-CM

## 2024-11-06 DIAGNOSIS — E11.65 TYPE 2 DIABETES MELLITUS WITH HYPERGLYCEMIA, WITH LONG-TERM CURRENT USE OF INSULIN (HCC): Primary | ICD-10-CM

## 2024-11-06 DIAGNOSIS — F33.9 DEPRESSION, RECURRENT (HCC): ICD-10-CM

## 2024-11-06 DIAGNOSIS — N18.31 CHRONIC KIDNEY DISEASE, STAGE 3A (HCC): ICD-10-CM

## 2024-11-06 DIAGNOSIS — E11.42 DIABETIC PERIPHERAL NEUROPATHY (HCC): ICD-10-CM

## 2024-11-06 DIAGNOSIS — D51.8 OTHER VITAMIN B12 DEFICIENCY ANEMIAS: ICD-10-CM

## 2024-11-06 DIAGNOSIS — L97.508 DIABETIC ULCER OF FOOT ASSOCIATED WITH TYPE 2 DIABETES MELLITUS, WITH OTHER ULCER SEVERITY, UNSPECIFIED LATERALITY, UNSPECIFIED PART OF FOOT (HCC): ICD-10-CM

## 2024-11-06 DIAGNOSIS — J43.8 OTHER EMPHYSEMA (HCC): ICD-10-CM

## 2024-11-06 DIAGNOSIS — M25.511 ACUTE PAIN OF RIGHT SHOULDER: Primary | ICD-10-CM

## 2024-11-06 DIAGNOSIS — J44.1 COPD WITH ACUTE EXACERBATION (HCC): ICD-10-CM

## 2024-11-06 DIAGNOSIS — I87.332 CHRONIC VENOUS HYPERTENSION (IDIOPATHIC) WITH ULCER AND INFLAMMATION OF LEFT LOWER EXTREMITY (HCC): ICD-10-CM

## 2024-11-06 PROBLEM — F32.2 SEVERE DEPRESSION (HCC): Status: RESOLVED | Noted: 2023-04-25 | Resolved: 2024-11-06

## 2024-11-06 LAB — SL AMB POCT HEMOGLOBIN AIC: 10.9 (ref ?–6.5)

## 2024-11-06 PROCEDURE — 97110 THERAPEUTIC EXERCISES: CPT

## 2024-11-06 PROCEDURE — 99214 OFFICE O/P EST MOD 30 MIN: CPT

## 2024-11-06 PROCEDURE — G0008 ADMIN INFLUENZA VIRUS VAC: HCPCS

## 2024-11-06 PROCEDURE — 83036 HEMOGLOBIN GLYCOSYLATED A1C: CPT

## 2024-11-06 PROCEDURE — 90673 RIV3 VACCINE NO PRESERV IM: CPT

## 2024-11-06 RX ORDER — TIRZEPATIDE 7.5 MG/.5ML
7.5 INJECTION, SOLUTION SUBCUTANEOUS WEEKLY
Qty: 2 ML | Refills: 2 | Status: SHIPPED | OUTPATIENT
Start: 2024-11-06

## 2024-11-06 NOTE — ASSESSMENT & PLAN NOTE
Is seeing neurology, and they do not believe that this is accurate for his history.  Was taken off levetiracetam, and has not had any seizures.  Contact office or neurology if seizures occur.

## 2024-11-06 NOTE — ASSESSMENT & PLAN NOTE
Wt Readings from Last 3 Encounters:   11/06/24 (!) 167 kg (368 lb)   10/14/24 (!) 164 kg (360 lb 12.8 oz)   09/18/24 (!) 160 kg (353 lb 6.4 oz)     Appears euvolemic on exam today.  Go to ER if symptoms occur.

## 2024-11-06 NOTE — PROGRESS NOTES
Ambulatory Visit  Name: Juan Antonio Guy      : 1965      MRN: [id number]  Encounter Provider: Amilcar Appiah PA-C  Encounter Date: 2024   Encounter department: [facility]    Assessment & Plan  Type 2 diabetes mellitus with hyperglycemia, with long-term current use of insulin (HCC)  A1c 10.9 today.  Will increase Mounjaro to 7.5 mg weekly.  Patient is to continue follow-up with endocrinology.  Educated on healthy diet and activity.  Educated patient on sequelae of uncontrolled diabetes, and dangers of this with this current A1c.  Patient is verbalizing understanding and agrees to plan.  Lab Results   Component Value Date    HGBA1C 10.9 (A) 2024       Orders:    POCT hemoglobin A1c    Hemoglobin A1C; Future    Comprehensive metabolic panel; Future    CBC and differential; Future    Lipid Panel with Direct LDL reflex; Future    Hemoglobin A1C    Comprehensive metabolic panel    CBC and differential    Lipid Panel with Direct LDL reflex    Tirzepatide (Mounjaro) 7.5 MG/0.5ML SOAJ; Inject 7.5 mg under the skin once a week    Paroxysmal atrial fibrillation (HCC)  Rate controlled on metoprolol and anticoagulated on Eliquis.  No symptoms of this.  Contact office or go to ER if symptoms occur.       Chronic venous hypertension (idiopathic) with ulcer and inflammation of left lower extremity (HCC)  Stable.  Contact office with worsening.  Continue follow-up with vascular.       Chronic diastolic congestive heart failure (HCC)  Wt Readings from Last 3 Encounters:   24 (!) 167 kg (368 lb)   10/14/24 (!) 164 kg (360 lb 12.8 oz)   24 (!) 160 kg (353 lb 6.4 oz)     Appears euvolemic on exam today.  Go to ER if symptoms occur.               Essential hypertension  At goal today.  Continue current management.       COPD with acute exacerbation (HCC)  Not in acute exacerbation today.  Contact office if any worsening.       Other emphysema (HCC)  Not in acute exacerbation  today.  Contact office if any worsening.       Diabetic ulcer of left heel associated with type 2 diabetes mellitus, limited to breakdown of skin (HCC)  Stable.  Contact office or go to ER if any worsening.  Lab Results   Component Value Date    HGBA1C 10.9 (A) 11/06/2024       Orders:    Tirzepatide (Mounjaro) 7.5 MG/0.5ML SOAJ; Inject 7.5 mg under the skin once a week    Diabetic ulcer of foot associated with type 2 diabetes mellitus, with other ulcer severity, unspecified laterality, unspecified part of foot (HCC)  Stable.  Contact office or go to ER if any worsening.  Lab Results   Component Value Date    HGBA1C 10.9 (A) 11/06/2024       Orders:    Tirzepatide (Mounjaro) 7.5 MG/0.5ML SOAJ; Inject 7.5 mg under the skin once a week    Diabetic peripheral neuropathy (HCC)  Stable.  Contact office or go to ER if any worsening.  Lab Results   Component Value Date    HGBA1C 10.9 (A) 11/06/2024       Orders:    Tirzepatide (Mounjaro) 7.5 MG/0.5ML SOAJ; Inject 7.5 mg under the skin once a week    Hypothyroidism, unspecified type  Will continue to monitor.  Contact office if any symptoms of this.       Chronic kidney disease, stage 3a ([facility])  Lab Results   Component Value Date    EGFR 97 07/31/2024    EGFR 89 05/10/2024    EGFR 89 04/27/2024    CREATININE 0.89 07/31/2024    CREATININE 0.97 05/10/2024    CREATININE 0.97 04/27/2024     Will continue to monitor.  Avoid nephrotoxic agents.       Thrombocytopenia ([facility])  Will continue to monitor.       Depression, recurrent ([facility])  Depression Screening Follow-up Plan: Patient's depression screening was positive with a PHQ-9 score of 9.   Stable.  Denies SI/HI.  Continue current management.  Contact office if any worsening.       Iron deficiency anemia, unspecified iron deficiency anemia type  Will continue to monitor.       Other seizures ([facility])  Is seeing neurology, and they do not believe that this is accurate for his history.  Was taken off levetiracetam, and has not had any  seizures.  Contact office or neurology if seizures occur.       Morbid obesity (HCC)    Educated on healthy diet and activity.       Encounter for immunization    Orders:    influenza vaccine, recombinant, PF, 0.5 mL IM (Flublok)    Vitamin D deficiency  Continue supplement.  Will continue to monitor.  Orders:    Vitamin D 25 hydroxy; Future    Vitamin D 25 hydroxy    Other vitamin B12 deficiency anemias  Continue supplement.  Will continue to monitor.  Orders:    Vitamin B12; Future    Vitamin B12      Depression Screening and Follow-up Plan: Patient's depression screening was positive with a PHQ-9 score of 9. Patient assessed for underlying major depression. Brief counseling provided and recommend additional follow-up/re-evaluation next office visit.     History of Present Illness     HPI  Patient is a 59-year-old male presenting for follow-up.  Patient has no concerns today.  History obtained from : patient  Review of Systems   Constitutional:  Negative for appetite change, chills, diaphoresis, fatigue and fever.   HENT:  Negative for congestion, ear discharge, ear pain, postnasal drip, rhinorrhea, sinus pressure, sinus pain, sneezing and sore throat.    Eyes:  Negative for pain, discharge, redness, itching and visual disturbance.   Respiratory:  Negative for apnea, cough, chest tightness, shortness of breath and wheezing.    Cardiovascular:  Negative for chest pain, palpitations and leg swelling.   Gastrointestinal:  Negative for abdominal pain, blood in stool, constipation, diarrhea, nausea and vomiting.   Endocrine: Negative for cold intolerance, heat intolerance, polydipsia and polyuria.   Genitourinary:  Negative for dysuria, flank pain, frequency, hematuria and urgency.   Musculoskeletal:  Positive for arthralgias and gait problem. Negative for back pain, myalgias, neck pain and neck stiffness.   Skin:  Negative for color change and rash.   Allergic/Immunologic: Negative.    Neurological:  Negative for  dizziness, tremors, seizures, syncope, facial asymmetry, speech difficulty, weakness, light-headedness, numbness and headaches.   Hematological:  Negative for adenopathy. Does not bruise/bleed easily.   Psychiatric/Behavioral:  Negative for agitation, confusion, decreased concentration, dysphoric mood, hallucinations, self-injury, sleep disturbance and suicidal ideas. The patient is not nervous/anxious and is not hyperactive.    All other systems reviewed and are negative.    Medical History Reviewed by provider this encounter:  Tobacco  Allergies  Meds  Problems  Med Hx  Surg Hx  Fam Hx       Current Outpatient Medications on File Prior to Visit   Medication Sig Dispense Refill    acetaminophen (TYLENOL) 650 mg CR tablet Take 1 tablet (650 mg total) by mouth every 8 (eight) hours as needed for mild pain or moderate pain 90 tablet 0    Acidophilus Lactobacillus CAPS Take by mouth      albuterol (PROVENTIL HFA,VENTOLIN HFA) 90 mcg/act inhaler Inhale 2 puffs every 6 (six) hours as needed for wheezing 3 Inhaler 3    atorvastatin (LIPITOR) 40 mg tablet TAKE 1 TABLET BY MOUTH ONCE DAILY. 90 tablet 1    azelastine (ASTELIN) 0.1 % nasal spray 2 sprays into each nostril in the morning 30 mL 11    Blood Glucose Monitoring Suppl (OneTouch Verio Reflect) w/Device KIT Check blood sugars four times daily. Please substitute with appropriate alternative as covered by patient's insurance. Dx: E11.65 1 kit 0    cetirizine (ZyrTEC) 10 mg tablet TAKE 1 TABLET BY MOUTH ONCE DAILY. 90 tablet 1    citalopram (CeleXA) 40 mg tablet Take 1 tablet (40 mg total) by mouth daily 90 tablet 1    clobetasol propionate (Clobex) 0.05 % shampoo Apply 1 application topically 3 (three) times a week 118 mL 3    clonazePAM (KlonoPIN) 0.5 mg tablet TAKE 1 TABLET (0.5 MG TOTAL) BY MOUTH EVERY 12 (TWELVE) HOURS AS NEEDED FOR ANXIETY 60 tablet 0    docusate sodium (COLACE) 100 mg capsule Take 100 mg by mouth 2 (two) times a day      Easy Touch Pen  Needles 31G X 8 MM MISC Use 1 each 3 (three) times a day      Eliquis 5 MG TAKE 1 TABLET BY MOUTH TWO TIMES A DAY. 180 tablet 1    Empagliflozin (JARDIANCE) 10 MG TABS tablet Take 10 mg by mouth every morning      ergocalciferol (ERGOCALCIFEROL) 1.25 MG (35050 UT) capsule Take 1 capsule (50,000 Units total) by mouth once a week 12 capsule 1    ferrous sulfate 325 (65 Fe) mg tablet Take 325 mg by mouth daily with breakfast      folic acid (FOLVITE) 1 mg tablet TAKE 1 TABLET BY MOUTH ONCE DAILY. 90 tablet 1    furosemide (LASIX) 40 mg tablet Take 1 tablet (40 mg total) by mouth daily 90 tablet 0    gabapentin (NEURONTIN) 600 MG tablet Take 600 mg by mouth 2 (two) times a day      glucose blood (OneTouch Verio) test strip Check blood sugars four times daily. Please substitute with appropriate alternative as covered by patient's insurance. Dx: E11.65 400 each 3    insulin aspart (NovoLOG FlexPen) 100 UNIT/ML injection pen Inject 26 Units under the skin 3 (three) times a day with meals Per sliding scale - managed by Dr. Tabares + scheduled 15 mL 0    lamoTRIgine (LaMICtal) 25 mg tablet Take 1 tablet (25 mg total) by mouth 2 (two) times a day 180 tablet 1    Lantus SoloStar 100 units/mL SOPN INJECT 56 UNITS TOTAL UNDER THE SKIN DAILY 15 mL 1    latanoprost (XALATAN) 0.005 % ophthalmic solution Administer 0.005 drops to both eyes      levothyroxine 300 MCG tablet Take 1 tablet (300 mcg total) by mouth daily Take with 50 mcg tablet 90 tablet 0    Linzess 290 MCG CAPS TAKE 1 CAPSULE BY MOUTH ONCE DAILY. 90 capsule 1    lisinopril (ZESTRIL) 2.5 mg tablet TAKE 1 TABLET BY MOUTH ONCE DAILY. 90 tablet 1    magnesium Oxide (MAG-OX) 400 mg TABS TAKE 1 TABLET BY MOUTH TWO TIMES A DAY. 60 tablet 5    metoprolol tartrate (LOPRESSOR) 25 mg tablet Take 1 tablet by mouth (25 mg) 1 hour prior to coronary CTA.  Hold for heart rate less than 50 bpm. 1 tablet 0    nystatin (MYCOSTATIN) powder Apply topically as needed      OneTouch Delica  Lancets 33G MISC Check blood sugars four times daily. Please substitute with appropriate alternative as covered by patient's insurance. Dx: E11.65 400 each 3    pantoprazole (PROTONIX) 40 mg tablet TAKE 1 TABLET (40 MG TOTAL) BY MOUTH DAILY 90 tablet 0    potassium chloride (Klor-Con M20) 20 mEq tablet TAKE 1 TABLET BY MOUTH EACH MORNING. 90 tablet 1    rOPINIRole (REQUIP) 1 mg tablet Take 1 mg by mouth 3 (three) times a day      tamsulosin (FLOMAX) 0.4 mg TAKE 1 CAPSULE DAILY WITH DINNER 100 capsule 1    timolol (TIMOPTIC) 0.5 % ophthalmic solution Administer 1 drop to the right eye 2 (two) times a day      vitamin B-12 (VITAMIN B-12) 1,000 mcg tablet Take 1 tablet (1,000 mcg total) by mouth daily 90 tablet 1    [DISCONTINUED] tirzepatide (Mounjaro) 5 MG/0.5ML Inject 0.5 mL (5 mg total) under the skin every 7 days 6 mL 1    baclofen 10 mg tablet  (Patient not taking: Reported on 11/6/2024)      ketoconazole (NIZORAL) 2 % shampoo Apply 1 application topically 2 (two) times a week for 8 doses (Patient not taking: Reported on 9/4/2024) 120 mL 3    oxyCODONE (OXY-IR) 5 MG capsule Take 5 mg by mouth as needed for moderate pain (Patient not taking: Reported on 9/4/2024)       No current facility-administered medications on file prior to visit.      Social History     Tobacco Use    Smoking status: Never    Smokeless tobacco: Never   Vaping Use    Vaping status: Never Used   Substance and Sexual Activity    Alcohol use: No    Drug use: No    Sexual activity: Not Currently     Partners: Female         Objective     /84 (BP Location: Left arm, Patient Position: Sitting)   Pulse 68   Temp 97.9 °F (36.6 °C) (Tympanic)   Wt (!) 167 kg (368 lb)   SpO2 98%   BMI 54.34 kg/m²     Physical Exam  Vitals and nursing note reviewed.   Constitutional:       General: He is not in acute distress.     Appearance: Normal appearance. He is well-developed. He is obese. He is not ill-appearing, toxic-appearing or diaphoretic.    HENT:      Head: Normocephalic and atraumatic.      Right Ear: Tympanic membrane normal.      Left Ear: Tympanic membrane normal.      Nose: Nose normal.      Mouth/Throat:      Mouth: Mucous membranes are moist.      Pharynx: Oropharynx is clear.   Eyes:      Extraocular Movements: Extraocular movements intact.      Conjunctiva/sclera: Conjunctivae normal.      Pupils: Pupils are equal, round, and reactive to light.   Cardiovascular:      Rate and Rhythm: Normal rate and regular rhythm.      Pulses: Normal pulses.      Heart sounds: Normal heart sounds. No murmur heard.  Pulmonary:      Effort: Pulmonary effort is normal. No respiratory distress.      Breath sounds: Normal breath sounds. No wheezing.   Chest:      Chest wall: No tenderness.   Abdominal:      General: Bowel sounds are normal.      Palpations: Abdomen is soft. There is no mass.      Tenderness: There is no abdominal tenderness.   Musculoskeletal:         General: Swelling and tenderness present.      Cervical back: Normal range of motion and neck supple. No tenderness.      Right lower leg: Edema present.      Left lower leg: Edema present.   Lymphadenopathy:      Cervical: No cervical adenopathy.   Skin:     General: Skin is warm and dry.      Capillary Refill: Capillary refill takes less than 2 seconds.      Findings: Rash (venous stasis dermatitis) present. No erythema.   Neurological:      General: No focal deficit present.      Mental Status: He is alert and oriented to person, place, and time. Mental status is at baseline.      Cranial Nerves: No cranial nerve deficit.      Motor: No weakness.      Coordination: Coordination normal.      Gait: Gait abnormal (Antalgic).   Psychiatric:         Mood and Affect: Mood normal.         Behavior: Behavior normal.         Thought Content: Thought content normal.         Judgment: Judgment normal.

## 2024-11-06 NOTE — PROGRESS NOTES
PT Re-Evaluation     Today's date: 2024  Patient name: Juan Antonio Guy  : 1965  MRN: [id number]  Referring provider: Mo Ceja MD  Dx:   Encounter Diagnosis     ICD-10-CM    1. Acute pain of right shoulder  M25.511             Start Time: 0100  Stop Time: 1055  Total time in clinic (min): 595 minutes    Assessment  Impairments: abnormal muscle tone, abnormal or restricted ROM, abnormal movement, activity intolerance, impaired physical strength, lacks appropriate home exercise program, pain with function, poor posture  and poor body mechanics  Symptom irritability: moderate    Assessment details: Juan Antonio is a 59 y.o male who presents to OP PT with signs and symptoms consistent with R shoulder bursitis. Upon re-examination, PT notes an increase in bilateral shoulder ROM, strength,  sitting posture, but still limited with inability to stand for long periods of time.     Understanding of Dx/Px/POC: good     Prognosis: fair    Goals  STG(In 4 weeks)  Patient will initiate HEP. MET   Patient will decrease R shoulder pain from 10/10  to 5/10  in order to improve QOL. MET   Patient will increase R shoulder ROM to WFL. Progressing   Patient will increase FOTO score from  IE to D/C score in order to improve QOL. Progressing     LTG(In 8 weeks)  Patient will decrease R shoulder pain no more than 5/10  in order to improve QOL. Progressing   Patient will increase R shoulder strength by 1-2 MMT in order to improve ability to /grasp objects.  Progressing   Patient will be independent with HEP at D/c.                Plan  Patient would benefit from: PT eval and skilled physical therapy  Planned modality interventions: cryotherapy    Planned therapy interventions: self care, home exercise program, neuromuscular re-education, manual therapy and joint mobilization    Frequency: 1x week  Duration in weeks: 4  Treatment plan discussed with: patient        Subjective Evaluation    History of Present  Illness  Mechanism of injury: Patient presents with C/C of R shoulder pain that has been ongoing for a couple of weeks but he states that he has had it for several years he just keeps flaring it up periodically. Patient states that he got a cortisone injection 2024 but only had relief initially.           Quality of life: fair    Patient Goals  Patient goals for therapy: increased motion, decreased pain and increased strength    Pain  Current pain ratin  At best pain ratin  At worst pain rating: 10  Location: R shoulder  Quality: discomfort  Aggravating factors: lifting      Diagnostic Tests  X-ray: normal  Treatments  Previous treatment: physical therapy  Current treatment: physical therapy        Objective     Active Range of Motion   Left Shoulder   Normal active range of motion    Right Shoulder   Flexion: WFL and with pain  Extension: WFL and with pain  Abduction: WFL and with pain    Strength/Myotome Testing     Left Shoulder   Normal muscle strength    Right Shoulder     Planes of Motion   Flexion: 4-   Extension: 4-   Abduction: 4-   Adduction: 4-   External rotation at 90°: 2+   Internal rotation at 90°: 2+     Tests     Right Shoulder   Positive AC shear.              Precautions:          Manuals 10/30/24 11/6/24 10/8/24 10/17/24 10/23/24   R shoulder PROM  NT NT NT                                                 Neuro Re-Ed             Table slides: flex, scaption  20x  20x  20x  20x  20x ea   Scapular retractions  20x with ball 20x with ball  20x with ball  20x with ball  20x with ball    Shoulder shrugs  20x 3#  20x 3#  20x 2# 20x 2#  20x 3#    Front raises  20x 3#  20x 3#  20x 2# 20x 2# 20x 3#    Lateral raises 20x 3#   20x 3# 20x 2#  20x 2#  20x 3#    Shoulder press  20x 3#   20x 3#      20x 3#    Ther Ex             UBE(improve R shoulder ROM)  L1 10' L1 10' L1 10'  L1 10' L1 10'   Seated Shoulder flexion/abduction with cane NT 20x ea 20x ea  20x ea  20x ea    Bicep curls 20x  3#   20x 3#  20x 2#  20x 2#  20x 3#                                                            Ther Activity                                         Gait Training                                         Modalities             CP PRN  [facility] 10' R   [facility] 10' R

## 2024-11-06 NOTE — ASSESSMENT & PLAN NOTE
Stable.  Contact office or go to ER if any worsening.  Lab Results   Component Value Date    HGBA1C 10.9 (A) 11/06/2024       Orders:    Tirzepatide (Mounjaro) 7.5 MG/0.5ML SOAJ; Inject 7.5 mg under the skin once a week

## 2024-11-06 NOTE — ASSESSMENT & PLAN NOTE
Rate controlled on metoprolol and anticoagulated on Eliquis.  No symptoms of this.  Contact office or go to ER if symptoms occur.

## 2024-11-06 NOTE — ASSESSMENT & PLAN NOTE
Lab Results   Component Value Date    EGFR 97 07/31/2024    EGFR 89 05/10/2024    EGFR 89 04/27/2024    CREATININE 0.89 07/31/2024    CREATININE 0.97 05/10/2024    CREATININE 0.97 04/27/2024     Will continue to monitor.  Avoid nephrotoxic agents.

## 2024-11-06 NOTE — ASSESSMENT & PLAN NOTE
Depression Screening Follow-up Plan: Patient's depression screening was positive with a PHQ-9 score of 9.   Stable.  Denies SI/HI.  Continue current management.  Contact office if any worsening.

## 2024-11-06 NOTE — ASSESSMENT & PLAN NOTE
A1c 10.9 today.  Will increase Mounjaro to 7.5 mg weekly.  Patient is to continue follow-up with endocrinology.  Educated on healthy diet and activity.  Educated patient on sequelae of uncontrolled diabetes, and dangers of this with this current A1c.  Patient is verbalizing understanding and agrees to plan.  Lab Results   Component Value Date    HGBA1C 10.9 (A) 11/06/2024       Orders:    POCT hemoglobin A1c    Hemoglobin A1C; Future    Comprehensive metabolic panel; Future    CBC and differential; Future    Lipid Panel with Direct LDL reflex; Future    Hemoglobin A1C    Comprehensive metabolic panel    CBC and differential    Lipid Panel with Direct LDL reflex    Tirzepatide (Mounjaro) 7.5 MG/0.5ML SOAJ; Inject 7.5 mg under the skin once a week

## 2024-11-13 ENCOUNTER — OFFICE VISIT (OUTPATIENT)
Dept: PHYSICAL THERAPY | Facility: CLINIC | Age: 59
End: 2024-11-13
Payer: COMMERCIAL

## 2024-11-13 DIAGNOSIS — M25.511 ACUTE PAIN OF RIGHT SHOULDER: Primary | ICD-10-CM

## 2024-11-13 PROCEDURE — 97110 THERAPEUTIC EXERCISES: CPT

## 2024-11-13 NOTE — PROGRESS NOTES
Daily Note     Today's date: 2024  Patient name: Juan Antonio Guy  : 1965  MRN: [id number]  Referring provider: Mo Ceja MD  Dx:   Encounter Diagnosis     ICD-10-CM    1. Acute pain of right shoulder  M25.511           Start Time: 1100  Stop Time: 1157  Total time in clinic (min): 57 minutes    Subjective: Patient states that his right shoulder has been bothering him the past couple of days.           Objective: See treatment diary below.          Assessment: Began therapy session on UBE to improve right shoulder strength. Progressed weight from 3# to 4# to improve shoulder strength. Added MTP/LTP to improve scapular/shoulder strength. Patient tolerated progression with no increase in previous symptoms. Continue to recommend PT in order to achieve maximal functional independence.            Plan: Continue per plan of care.      Precautions:          Manuals 10/30/24 11/6/24 11/13/24 10/17/24 10/23/24   R shoulder PROM  NT NT NT                                                 Neuro Re-Ed             Table slides: flex, scaption  20x  20x  D/C  20x  20x ea   Scapular retractions  20x with ball 20x with ball   D/C  20x with ball  20x with ball    Shoulder shrugs  20x 3#  20x 3#  20x 4# 20x 2#  20x 3#    Front raises  20x 3#  20x 3#  20x 4# 20x 2# 20x 3#    Lateral raises 20x 3#   20x 3# 20x 4#  20x 2#  20x 3#    Shoulder press  20x 3#   20x 3#   20x 4#    20x 3#    Seated rows     20x 4#      Ther Ex             UBE(improve R shoulder ROM)  L1 10' L1 10' L1 10'  L1 10' L1 10'   Seated Shoulder flexion/abduction with cane NT 20x ea 20x ea  20x ea  20x ea    Bicep curls 20x 3#   20x 3#  20x 4#  20x 2#  20x 3#     MTP/LTP seated       20x BlueTB                                                  Ther Activity                                         Gait Training                                         Modalities             CP PRN  MHP 10' R   MHP 10' R   MHP R 10'

## 2024-11-22 DIAGNOSIS — T78.40XS ALLERGY, SEQUELA: ICD-10-CM

## 2024-11-22 RX ORDER — CETIRIZINE HYDROCHLORIDE 10 MG/1
10 TABLET ORAL DAILY
Qty: 90 TABLET | Refills: 1 | Status: SHIPPED | OUTPATIENT
Start: 2024-11-22

## 2024-11-26 ENCOUNTER — TELEPHONE (OUTPATIENT)
Dept: FAMILY MEDICINE CLINIC | Facility: CLINIC | Age: 59
End: 2024-11-26

## 2024-11-26 DIAGNOSIS — E11.65 TYPE 2 DIABETES MELLITUS WITH HYPERGLYCEMIA, WITH LONG-TERM CURRENT USE OF INSULIN (HCC): Primary | ICD-10-CM

## 2024-11-26 DIAGNOSIS — Z79.4 TYPE 2 DIABETES MELLITUS WITH HYPERGLYCEMIA, WITH LONG-TERM CURRENT USE OF INSULIN (HCC): Primary | ICD-10-CM

## 2024-11-26 NOTE — TELEPHONE ENCOUNTER
----- Message from Amilcar Appiah PA-C sent at 11/26/2024  1:03 PM EST -----  Hello.  Please let patient know due to their last A1c being above 9, I have referred them to speak to our clinical pharmacist for recommendations, and possible treatment options for better diabetes control.  Lowering the A1c is important to decrease risk of co morbidities associated with uncontrolled diabetes.  This is a telehealth appointment.  If they would like to complete this then the referral is in the system, and if not then please let me know.  Thank you!

## 2024-11-27 ENCOUNTER — TELEPHONE (OUTPATIENT)
Dept: FAMILY MEDICINE CLINIC | Facility: CLINIC | Age: 59
End: 2024-11-27

## 2024-11-27 ENCOUNTER — OFFICE VISIT (OUTPATIENT)
Dept: PHYSICAL THERAPY | Facility: CLINIC | Age: 59
End: 2024-11-27
Payer: COMMERCIAL

## 2024-11-27 DIAGNOSIS — M25.511 ACUTE PAIN OF RIGHT SHOULDER: Primary | ICD-10-CM

## 2024-11-27 PROCEDURE — 97164 PT RE-EVAL EST PLAN CARE: CPT

## 2024-11-27 PROCEDURE — 97110 THERAPEUTIC EXERCISES: CPT

## 2024-11-27 NOTE — PROGRESS NOTES
PT Re-Evaluation     Today's date: 2024  Patient name: Juan Antonio Guy  : 1965  MRN: [id number]  Referring provider: Mo Ceja MD  Dx:   Encounter Diagnosis     ICD-10-CM    1. Acute pain of right shoulder  M25.511             Start Time: 1100  Stop Time: 1158  Total time in clinic (min): 58 minutes    Assessment  Impairments: abnormal muscle tone, abnormal or restricted ROM, abnormal movement, activity intolerance, impaired physical strength, lacks appropriate home exercise program, pain with function, poor posture  and poor body mechanics  Symptom irritability: moderate    Assessment details: Juan Antonio is a 59 y.o male who has attended 10 OP PT with signs and symptoms consistent with R shoulder bursitis. Upon re-examination, PT notes an increase in bilateral shoulder ROM and strength but still limited with abnormal sitting posture and inability to stand for long periods of time.     Understanding of Dx/Px/POC: fair     Prognosis: fair    Goals  STG(In 4 weeks)  Patient will initiate HEP. MET  Patient will decrease R shoulder pain from 10/10  to 5/10  in order to improve QOL. MET  Patient will increase R shoulder ROM to WFL. MET  Patient will increase FOTO score from  IE to D/C score in order to improve QOL. Progressing    LTG(In 8 weeks)  Patient will decrease R shoulder pain no more than 5/10  in order to improve QOL. Progressing   Patient will increase R shoulder strength by 1-2 MMT in order to improve ability to /grasp objects.  Progressing   Patient will be independent with HEP at D/c.               Plan  Patient would benefit from: PT eval and skilled physical therapy  Planned modality interventions: cryotherapy    Planned therapy interventions: self care, home exercise program, neuromuscular re-education, manual therapy and joint mobilization    Frequency: 1x week  Duration in weeks: 4  Treatment plan discussed with: patient        Subjective Evaluation    History of Present  Illness  Mechanism of injury: Patient presents with C/C of R shoulder pain that has been ongoing for a couple of weeks but he states that he has had it for several years he just keeps flaring it up periodically. Patient states that he got a cortisone injection 2024 but only had relief initially.           Quality of life: fair    Patient Goals  Patient goals for therapy: increased motion, decreased pain and increased strength    Pain  Current pain ratin  At best pain ratin  At worst pain ratin  Location: R shoulder  Quality: discomfort  Aggravating factors: lifting      Diagnostic Tests  X-ray: normal  Treatments  Previous treatment: physical therapy  Current treatment: physical therapy        Objective     Active Range of Motion   Left Shoulder   Normal active range of motion    Right Shoulder   Flexion: WFL and with pain  Extension: WFL and with pain  Abduction: WFL and with pain    Strength/Myotome Testing     Left Shoulder   Normal muscle strength    Right Shoulder     Planes of Motion   Flexion: 4-   Extension: 4-   Abduction: 4-   Adduction: 4-   External rotation at 90°: 4-   Internal rotation at 90°: 4-     Tests     Right Shoulder   Positive AC shear.              Precautions:       Manuals 10/30/24 11/6/24 11/13/24 11/27/24 10/23/24                                             Neuro Re-Ed             Table slides: flex, scaption  20x  20x  D/C  20x   20x ea   Scapular retractions  20x with ball 20x with ball   D/C  D/C 20x with ball    Shoulder shrugs  20x 3#  20x 3#  20x 4# 20x 4#  20x 3#    Front raises  20x 3#  20x 3#  20x 4# 20x 4# 20x 3#    Lateral raises 20x 3#   20x 3# 20x 4#  20x 4#  20x 3#    Shoulder press  20x 3#   20x 3#   20x 4#   20x 4#  20x 3#    Seated rows       20x 4#   20x 4#      Ther Ex             UBE(improve R shoulder ROM)  L1 10' L1 10' L1 10'  L1 10' L1 10'   Seated Shoulder flexion/abduction with cane NT 20x ea 20x ea  20x ea  20x ea    Bicep curls 20x  3#   20x 3#  20x 4#  20x 4#  20x 3#     MTP/LTP seated       20x BlueTB   NT                                               Ther Activity                                         Gait Training                                         Modalities             CP PRN  MHP 10' R   MHP 10' R   MHP R 10'   MHP R 15'  MHP R 10'

## 2024-12-04 ENCOUNTER — OFFICE VISIT (OUTPATIENT)
Dept: PHYSICAL THERAPY | Facility: CLINIC | Age: 59
End: 2024-12-04
Payer: COMMERCIAL

## 2024-12-04 ENCOUNTER — CLINICAL SUPPORT (OUTPATIENT)
Dept: FAMILY MEDICINE CLINIC | Facility: CLINIC | Age: 59
End: 2024-12-04

## 2024-12-04 DIAGNOSIS — Z79.4 TYPE 2 DIABETES MELLITUS WITH HYPERGLYCEMIA, WITH LONG-TERM CURRENT USE OF INSULIN (HCC): Primary | ICD-10-CM

## 2024-12-04 DIAGNOSIS — M25.511 ACUTE PAIN OF RIGHT SHOULDER: Primary | ICD-10-CM

## 2024-12-04 DIAGNOSIS — E11.65 TYPE 2 DIABETES MELLITUS WITH HYPERGLYCEMIA, WITH LONG-TERM CURRENT USE OF INSULIN (HCC): Primary | ICD-10-CM

## 2024-12-04 PROCEDURE — 97110 THERAPEUTIC EXERCISES: CPT

## 2024-12-04 PROCEDURE — PBNCHG PB NO CHARGE PLACEHOLDER: Performed by: PHARMACIST

## 2024-12-04 RX ORDER — TIRZEPATIDE 10 MG/.5ML
10 INJECTION, SOLUTION SUBCUTANEOUS WEEKLY
Qty: 2 ML | Refills: 2 | Status: SHIPPED | OUTPATIENT
Start: 2024-12-04

## 2024-12-04 NOTE — PROGRESS NOTES
[facility] Clinical Pharmacy Services  Breann Mcbride, Pharmacist    Assessment/ Plan     Assessment & Plan  Type 2 diabetes mellitus with hyperglycemia, with long-term current use of insulin ([facility])    Lab Results   Component Value Date    HGBA1C 10.9 (A) 11/06/2024     Goal A1c <7% .   Complications:  Microvascular complications:CKD, diabetic foot ulcer, neuropathy  Macrovascular complications: CHF  Current Diabetes Regimen:  Mounjaro 7.5 mg weekly  Lantus 56 units daily  Novolog 26 TID with meals plus SS  Historical DM Meds (reason for discontinuation):  Jardiance   On Additional Therapies:  Statin: yes  ACEI/ARB: yes    Assessment: Patient follows with endocrinologist Dr. Tabares. A1c remains above goal. Increase Mounjaro further to 10 mg weekly.    He has not been taking Jardiance and states this was stopped when Mounjaro was started by endo. Med removed from med list.    He would benefit from CGM for closer monitoring of diabetes and glucose control.     Changes to medication regimen:  Increase Mounjaro to 10 mg weekly  Initiate Dexcom G7 CGM. Ordering process started through [facility] ([employer])     Orders:    Ambulatory referral to clinical pharmacy    Tirzepatide (Mounjaro) 10 MG/0.5ML SOAJ; Inject 10 mg under the skin once a week      Follow-up: 2 weeks     Subjective   HPI    Medication Adherence/ Tolerability/ Cost:  Empagliflozin Tabs- not taking   insulin aspart 26 units TID plus sliding scale   Lantus SoloStar Sopn 56 units daily   Mounjaro 7.5 mg weekly- Patient has 3 injections       Review of Systems   Gastrointestinal:  Negative for abdominal pain, constipation, diarrhea, nausea and vomiting.        2. Lifestyle:       3. Home monitoring devices  Glucometer: Yes, Brand:   Continuous Glucose Monitor: No, Brand:     Objective       Blood Sugar Readings  The patient is currently checking blood glucose 3 times per day. Patient does not report with SMBG logs.    Did not have log. Reports  readings have been 230 mg/dL - 250 mg/dL       ASCVD Risk:  The ASCVD Risk score (Marcia BAEZ, et al., 2019) failed to calculate for the following reasons:    The valid total cholesterol range is 130 to 320 mg/dL     Vitals:  There were no vitals filed for this visit.    Eye Exam:    Lab Results   Component Value Date    LEFTDIABRET Positive 03/29/2024    RIGHTDIABRET Positive 03/29/2024       Labs:  Lab Results   Component Value Date    SODIUM 141 07/31/2024    K 4.0 07/31/2024    EGFR 97 07/31/2024    CREATININE 0.89 07/31/2024    YWCYNXTO98 174 (L) 07/31/2024    MICROALBCRE 33.1 (H) 03/20/2024       Lab Results   Component Value Date    HGBA1C 10.9 (A) 11/06/2024    HGBA1C 8.0 (A) 07/25/2024    HGBA1C 9.0 (H) 03/20/2024       Telemedicine consent  The patient was identified by name and date of birth. Juan Antonio Fischerciciramos was informed that this is a telemedicine visit and that the visit is being conducted through Telephone.  My office door was closed. No one else was in the room.  He acknowledged consent and understanding of privacy and security of the video platform. The patient has agreed to participate and understands they can discontinue the visit at any time.    Pharmacist Tracking Tool     Pharmacist Tracking Tool  Reason For Outreach: Embedded Pharmacist  Demographics:  Intervention Method: Phone  Type of Intervention: New  Topics Addressed: Diabetes  Pharmacologic Interventions: Medication Initiation, Dose or Frequency Adjusted, and Med Rec  Non-Pharmacologic Interventions: Disease state education, Medication/Device education, and Personal CGM  Time:  Direct Patient Care:  20  mins  Care Coordination:  15  mins  Recommendation Recipient: Patient/Caregiver and Provider  Outcome: Accepted

## 2024-12-04 NOTE — ASSESSMENT & PLAN NOTE
Lab Results   Component Value Date    HGBA1C 10.9 (A) 11/06/2024     Goal A1c <7% .   Complications:  Microvascular complications:CKD, diabetic foot ulcer, neuropathy  Macrovascular complications: CHF  Current Diabetes Regimen:  Mounjaro 7.5 mg weekly  Lantus 56 units daily  Novolog 26 TID with meals plus SS  Historical DM Meds (reason for discontinuation):  Jardiance   On Additional Therapies:  Statin: yes  ACEI/ARB: yes    Assessment: Patient follows with endocrinologist Dr. Tabares. A1c remains above goal. Increase Mounjaro further to 10 mg weekly.    He has not been taking Jardiance and states this was stopped when Mounjaro was started by endo. Med removed from med list.    He would benefit from CGM for closer monitoring of diabetes and glucose control.     Changes to medication regimen:  Increase Mounjaro to 10 mg weekly  Initiate Dexcom G7 CGM. Ordering process started through [facility] ([facility])     Orders:    Ambulatory referral to clinical pharmacy    Tirzepatide (Mounjaro) 10 MG/0.5ML SOAJ; Inject 10 mg under the skin once a week

## 2024-12-06 DIAGNOSIS — F41.9 ANXIETY: ICD-10-CM

## 2024-12-06 LAB
DME PARACHUTE DELIVERY DATE ACTUAL: NORMAL
DME PARACHUTE DELIVERY DATE EXPECTED: NORMAL
DME PARACHUTE DELIVERY DATE REQUESTED: NORMAL
DME PARACHUTE ITEM DESCRIPTION: NORMAL
DME PARACHUTE ITEM DESCRIPTION: NORMAL
DME PARACHUTE ORDER STATUS: NORMAL
DME PARACHUTE SUPPLIER NAME: NORMAL
DME PARACHUTE SUPPLIER PHONE: NORMAL

## 2024-12-09 ENCOUNTER — DOCTOR'S OFFICE (OUTPATIENT)
Dept: URBAN - NONMETROPOLITAN AREA CLINIC 1 | Facility: CLINIC | Age: 59
Setting detail: OPHTHALMOLOGY
End: 2024-12-09
Payer: MEDICARE

## 2024-12-09 DIAGNOSIS — H43.393: ICD-10-CM

## 2024-12-09 DIAGNOSIS — H40.1132: ICD-10-CM

## 2024-12-09 DIAGNOSIS — H43.813: ICD-10-CM

## 2024-12-09 DIAGNOSIS — H35.81: ICD-10-CM

## 2024-12-09 DIAGNOSIS — E11.3293: ICD-10-CM

## 2024-12-09 LAB
LEFT EYE DIABETIC RETINOPATHY: POSITIVE
RIGHT EYE DIABETIC RETINOPATHY: POSITIVE

## 2024-12-09 PROCEDURE — 76514 ECHO EXAM OF EYE THICKNESS: CPT | Performed by: OPHTHALMOLOGY

## 2024-12-09 PROCEDURE — 92133 CPTRZD OPH DX IMG PST SGM ON: CPT | Performed by: OPHTHALMOLOGY

## 2024-12-09 PROCEDURE — 99213 OFFICE O/P EST LOW 20 MIN: CPT | Performed by: OPHTHALMOLOGY

## 2024-12-09 RX ORDER — CLONAZEPAM 0.5 MG/1
0.5 TABLET ORAL EVERY 12 HOURS PRN
Qty: 60 TABLET | Refills: 0 | Status: SHIPPED | OUTPATIENT
Start: 2024-12-09

## 2024-12-09 ASSESSMENT — REFRACTION_MANIFEST
OS_ADD: +2.50
OS_AXIS: 025
OD_VA1: 20/40+2
OD_VA2: 20/30-2
OD_SPHERE: +0.50
OS_VA2: 20/30-2
OD_ADD: +2.50
OS_VA1: 20/40+2
OS_SPHERE: +0.75
OD_CYLINDER: -0.50
OS_CYLINDER: -0.25
OD_AXIS: 020

## 2024-12-09 ASSESSMENT — REFRACTION_AUTOREFRACTION
OD_SPHERE: +1.50
OS_SPHERE: +1.50
OD_AXIS: 179
OS_CYLINDER: -0.50
OS_AXIS: 062
OD_CYLINDER: -1.25

## 2024-12-09 ASSESSMENT — REFRACTION_CURRENTRX
OD_VPRISM_DIRECTION: BF
OD_AXIS: 018
OS_VPRISM_DIRECTION: BF
OD_ADD: +2.50
OS_CYLINDER: -0.25
OS_SPHERE: +0.75
OS_OVR_VA: 20/
OS_AXIS: 029
OD_CYLINDER: -0.50
OD_OVR_VA: 20/
OS_ADD: +2.75
OD_SPHERE: +0.50

## 2024-12-09 ASSESSMENT — LID EXAM ASSESSMENTS
OD_BLEPHARITIS: 1+
OS_BLEPHARITIS: 1+

## 2024-12-09 ASSESSMENT — PACHYMETRY
OD_CT_UM: 556
OD_CT_CORRECTION: -1
OS_CT_UM: 559
OS_CT_CORRECTION: -1

## 2024-12-09 ASSESSMENT — CONFRONTATIONAL VISUAL FIELD TEST (CVF)
OS_FINDINGS: FULL
OD_FINDINGS: FULL

## 2024-12-09 ASSESSMENT — VISUAL ACUITY
OD_BCVA: 20/40
OS_BCVA: 20/50+2

## 2024-12-09 ASSESSMENT — KERATOMETRY
OD_AXISANGLE_DEGREES: 013
OD_K2POWER_DIOPTERS: 45.25
OS_K1POWER_DIOPTERS: 45.00
OS_K2POWER_DIOPTERS: 45.50
OS_AXISANGLE_DEGREES: 012
OD_K1POWER_DIOPTERS: 46.75

## 2024-12-10 ENCOUNTER — APPOINTMENT (OUTPATIENT)
Dept: PHYSICAL THERAPY | Facility: CLINIC | Age: 59
End: 2024-12-10
Payer: COMMERCIAL

## 2024-12-13 DIAGNOSIS — K58.9 IRRITABLE BOWEL SYNDROME, UNSPECIFIED TYPE: ICD-10-CM

## 2024-12-13 RX ORDER — LINACLOTIDE 290 UG/1
CAPSULE, GELATIN COATED ORAL DAILY
Qty: 90 CAPSULE | Refills: 1 | Status: SHIPPED | OUTPATIENT
Start: 2024-12-13

## 2024-12-17 ENCOUNTER — APPOINTMENT (OUTPATIENT)
Dept: PHYSICAL THERAPY | Facility: CLINIC | Age: 59
End: 2024-12-17
Payer: COMMERCIAL

## 2024-12-18 ENCOUNTER — OFFICE VISIT (OUTPATIENT)
Dept: OBGYN CLINIC | Facility: CLINIC | Age: 59
End: 2024-12-18
Payer: COMMERCIAL

## 2024-12-18 VITALS
OXYGEN SATURATION: 96 % | WEIGHT: 315 LBS | TEMPERATURE: 98.5 F | SYSTOLIC BLOOD PRESSURE: 122 MMHG | DIASTOLIC BLOOD PRESSURE: 66 MMHG | BODY MASS INDEX: 46.65 KG/M2 | HEART RATE: 74 BPM | HEIGHT: 69 IN

## 2024-12-18 DIAGNOSIS — G89.29 CHRONIC RIGHT SHOULDER PAIN: Primary | ICD-10-CM

## 2024-12-18 DIAGNOSIS — M75.41 IMPINGEMENT SYNDROME OF RIGHT SHOULDER: ICD-10-CM

## 2024-12-18 DIAGNOSIS — M25.511 CHRONIC RIGHT SHOULDER PAIN: Primary | ICD-10-CM

## 2024-12-18 PROCEDURE — 99213 OFFICE O/P EST LOW 20 MIN: CPT | Performed by: STUDENT IN AN ORGANIZED HEALTH CARE EDUCATION/TRAINING PROGRAM

## 2024-12-18 NOTE — PROGRESS NOTES
"1. Chronic right shoulder pain  MRI shoulder right wo contrast      2. Impingement syndrome of right shoulder  MRI shoulder right wo contrast          Orders Placed This Encounter   Procedures    MRI shoulder right wo contrast        Imaging Studies (I personally reviewed images in PACS and report):    X-ray right shoulder 2024: No acute osseous abnormalities.  No significant degenerative changes.  Soft tissue is unremarkable.    X-ray right shoulder 2023: [facility].  Only report is available notin. Soft tissue calcifications in the right shoulder which may relate to a   calcific tendinitis and/or bursitis.   2. Mild juxta-articular osseous degenerative changes are present bilaterally.       IMPRESSION:  Chronic right shoulder pain, stiffness  Reportedly only brief relief from subacromial cortisone injection concurrent with formal PT  Ddx: Rotator cuff tendinosis, bicipital tendinitis, bursitis.  Other aggravating factors include his mobility with use of a rollator, BMI    Other factors:  CHF  CKD3  Afib  T2DM complicated with neuropathy, diabetic ulcers-A1c 8.0 on 2024  BMI 53    PLAN:    Clinical exam and radiographic imaging reviewed with patient today, with impression as per above. I have discussed with the patient the pathophysiology of this diagnosis and reviewed how the examination correlates with this diagnosis.   Given patient reports no improvement of his right shoulder symptoms since last visit with conservative treatments of formal PT, cortisone injection I have ordered an MRI of his right shoulder without contrast for further evaluation. May consider USG glenohumeral CSI in future but will determine after MRI  I advised against oral NSAIDs given his history of CKD 3.    Return for Follow up after MRI of right shoulder.      Portions of the record may have been created with voice recognition software. Occasional wrong word or \"sound a like\" substitutions may have occurred due to the " inherent limitations of voice recognition software. Read the chart carefully and recognize, using context, where substitutions have occurred.     CHIEF COMPLAINT:  Follow up right shoulder    HPI:  Juan Antonio Guy is a 59 y.o. male  who presents for       Visit 12/18/2024:  Follow up right shoulder pain  History as per below. Pertinent PMHx as above  On last visit patient had undergone subacromial CSI and has also been attending PT  Patient reports no improvement of pain/stiffness/aching of right shoulder despite these interventions and has difficulty reaching above shoulder height. Reports shoulder crepitus with motion.  Denies new injuries since last visit. Denies N/T of RUE.    Visit 9/18/2024:  Follow-up right shoulder and left knee    Seen recently in regards to right shoulder pain in which he was given a subacromial cortisone injection.  Patient states he did not notice any relief at first but after about a week or so he noticed improvement in regards to his shoulder pain and stiffness.  He has attended 1 session of PT as well.  He states it is not completely resolved but it is much better than before and that he has mild intermittent pain of his shoulder and slightly more range of motion.  Denies shoulder swelling, discoloration.  He does report continued intermittent crepitus of the shoulder but that this was always a chronic issue.    In regards to his left knee pain/osteoarthritis, he asked he states that the pain has not been bad over the past week.  He does use a walker for mobility.  However, he does not feel he is in need to undergo a cortisone injection of his left knee today.  He may consider in the future as he reports previously undergone intra-articular cortisone injections of his knee in the past.    Visit 9/4/2024 :  Initial evaluation of right shoulder pain/left knee pain:  Patient had radiographs ordered from his PCP already of his right shoulder and left knee and these will be obtained as  noted above.  Pertinent PMHx as above  Reports ongoing right shoulder pain for years but recently worsening over the past couple weeks without a precipitating injury.  He gives example of pain exacerbated when reaching outward from his couch to  objects and feeling a sharp/tight/aching pain of his shoulder.  He noticed that he was having increased stiffness with range of motion's in general and difficulty reaching above shoulder height on his back.  He reports clicking sensation of his shoulder but states is a chronic issue.  Denies shoulder swelling or discoloration.  Has been taking Tylenol without relief.  He reports that he is not supposed to be taking NSAIDs.  He has not had a cortisone injection of his right shoulder before.  He denies prior surgeries of his right shoulder in the past.  He has not seen formal PT for his shoulder previously.    Separately at the end of visit he also had complaints of his left knee pain:  X-ray imaging of his affected knee from his PCP was completed today as per above.  Briefly, patient denies a precipitating injury but notes pain within his knee with prolonged weightbearing.  He states he has had cortisone injections of his left knee in the past due to arthritis.  Unsure how long injections lasted.        Medical, Surgical, Family, and Social History    Past Medical History:   Diagnosis Date    Ambulatory dysfunction     Anemia     Anxiety     Arthritis     Atrial fibrillation (HCC)     CHF (congestive heart failure) (HCC)     Chronic kidney disease, stage 3 (HCC)     Chronic ulcer of left foot (HCC)     Chronic venous hypertension (idiopathic) with ulcer and inflammation of left lower extremity (HCC)     Congestive heart failure, unspecified HF chronicity, unspecified heart failure type (HCC)     COPD with acute exacerbation (HCC)     Encephalopathy     History of depression     History of osteomyelitis     Hyperkalemia     Hyperlipidemia     Hypertension      "Hypoglycemia     Hypomagnesemia     Hyponatremia     Hypothyroidism     Morbid obesity (HCC)     Pneumonia     Preop cardiovascular exam     Pulmonary embolism (HCC)     RLS (restless legs syndrome)     Sepsis (HCC)     Sleep apnea     Thrombocytopenia (HCC)     Type 2 diabetes mellitus (HCC)     Urinary retention     Vitamin D deficiency      Past Surgical History:   Procedure Laterality Date    COLON SURGERY Right 06/2024    right side of colon    GALLBLADDER SURGERY      TONSILLECTOMY AND ADENOIDECTOMY       Social History   Social History     Substance and Sexual Activity   Alcohol Use No     Social History     Substance and Sexual Activity   Drug Use No     Social History     Tobacco Use   Smoking Status Never   Smokeless Tobacco Never     Family History   Problem Relation Age of Onset    Cancer Mother         lymphoma    Hypertension Mother     Heart disease Father     Hypertension Father     Diabetes Sister     Cancer Maternal Grandmother      Allergies   Allergen Reactions    Carbamazepine Other (See Comments)     Elevated liver functions -\"Enlarges liver\"    Clindamycin Rash    Phenytoin Itching and Rash    Pneumococcal Vaccine Hives, Itching and Rash          Physical Exam  /66 (BP Location: Left arm, Patient Position: Sitting, Cuff Size: Large)   Pulse 74   Temp 98.5 °F (36.9 °C) (Temporal)   Ht 5' 9\" (1.753 m)   Wt (!) 166 kg (365 lb 12.8 oz)   SpO2 96%   BMI 54.02 kg/m²     Constitutional:  see vital signs  Gen: Obese/BMI 52, normocephalic/atraumatic, well-groomed  Pulmonary/Chest: Effort normal. No respiratory distress.     Ortho Exam  Shoulder exam:       RIGHT    Inspection Erythema None     Swelling None     Increased Warmth None    Rotator cuff (resisted testing aggravates general shoulder pain per patient) ER 5/5     IR 5-/5     Abduction 5-/5    ROM FF AROM 140, PROM 160     Abduction AROM 120, PROM 140     ER0 40     IRb L5    TTP:  +anterior aspect over glenohumeral joint lone and " posteriorly over the supraspinatus and infraspinatus    Special Tests:       Cross body Adduction Negative     Speeds  Negative     Yergason's Negative     Drop arm Negative     Neer Positive      Webb Positive       Gait: Uses Rollator for mobility    Procedures

## 2024-12-19 ENCOUNTER — DOCTOR'S OFFICE (OUTPATIENT)
Dept: URBAN - NONMETROPOLITAN AREA CLINIC 1 | Facility: CLINIC | Age: 59
Setting detail: OPHTHALMOLOGY
End: 2024-12-19
Payer: MEDICARE

## 2024-12-19 DIAGNOSIS — E11.3293: ICD-10-CM

## 2024-12-19 DIAGNOSIS — H43.813: ICD-10-CM

## 2024-12-19 DIAGNOSIS — H43.393: ICD-10-CM

## 2024-12-19 DIAGNOSIS — H40.1132: ICD-10-CM

## 2024-12-19 LAB
LEFT EYE DIABETIC RETINOPATHY: POSITIVE
RIGHT EYE DIABETIC RETINOPATHY: POSITIVE

## 2024-12-19 PROCEDURE — 92202 OPSCPY EXTND ON/MAC DRAW: CPT | Performed by: OPHTHALMOLOGY

## 2024-12-19 PROCEDURE — 92134 CPTRZ OPH DX IMG PST SGM RTA: CPT | Performed by: OPHTHALMOLOGY

## 2024-12-19 PROCEDURE — 99214 OFFICE O/P EST MOD 30 MIN: CPT | Performed by: OPHTHALMOLOGY

## 2024-12-19 ASSESSMENT — REFRACTION_MANIFEST
OD_AXIS: 020
OS_VA1: 20/40+2
OD_ADD: +2.50
OD_CYLINDER: -0.50
OS_VA2: 20/30-2
OD_VA2: 20/30-2
OS_ADD: +2.50
OD_SPHERE: +0.50
OS_SPHERE: +0.75
OD_VA1: 20/40+2
OS_CYLINDER: -0.25
OS_AXIS: 025

## 2024-12-19 ASSESSMENT — REFRACTION_CURRENTRX
OD_ADD: +2.50
OS_ADD: +2.75
OS_AXIS: 029
OD_OVR_VA: 20/
OS_VPRISM_DIRECTION: BF
OD_CYLINDER: -0.50
OS_SPHERE: +0.75
OD_AXIS: 018
OD_VPRISM_DIRECTION: BF
OS_OVR_VA: 20/
OS_CYLINDER: -0.25
OD_SPHERE: +0.50

## 2024-12-19 ASSESSMENT — REFRACTION_AUTOREFRACTION
OS_SPHERE: +1.50
OD_SPHERE: +1.50
OD_CYLINDER: -1.25
OD_AXIS: 179
OS_CYLINDER: -0.50
OS_AXIS: 062

## 2024-12-19 ASSESSMENT — LID EXAM ASSESSMENTS
OD_BLEPHARITIS: 1+
OS_BLEPHARITIS: 1+

## 2024-12-19 ASSESSMENT — VISUAL ACUITY
OD_BCVA: 20/30-2
OS_BCVA: 20/40-2

## 2024-12-19 ASSESSMENT — KERATOMETRY
OS_AXISANGLE_DEGREES: 012
OS_K1POWER_DIOPTERS: 45.00
OD_K1POWER_DIOPTERS: 46.75
OD_K2POWER_DIOPTERS: 45.25
OD_AXISANGLE_DEGREES: 013
OS_K2POWER_DIOPTERS: 45.50

## 2024-12-23 ENCOUNTER — OFFICE VISIT (OUTPATIENT)
Dept: PHYSICAL THERAPY | Facility: CLINIC | Age: 59
End: 2024-12-23
Payer: COMMERCIAL

## 2024-12-23 DIAGNOSIS — M25.511 ACUTE PAIN OF RIGHT SHOULDER: Primary | ICD-10-CM

## 2024-12-23 PROCEDURE — 97110 THERAPEUTIC EXERCISES: CPT

## 2024-12-25 NOTE — PSYCH
" Behavioral Health Psychotherapy Assessment    Date of Initial Psychotherapy Assessment: 24  Referral Source: the client   Has a release of information been signed for the referral source? NA    Preferred Name: Juan Antonio Guy  Preferred Pronouns: He/him  YOB: 1965 Age: 59 y.o.  Sex assigned at birth: male   Gender Identity: male  Race:   Preferred Language: English    Emergency Contact:  Full Name: Kellie Santos   Relationship to Client: Sister   Contact information: [phone]    Primary Care Physician:  Amilcar Appiah PA-C  [address] [place] [address]  [phone]  Has a release of information been signed? Yes    Physical Health History:  Past surgical procedures:  mass removed side of his colon  Do you have a history of any of the following: seizures, heart/cardiac issues, diabetes, and thyroid disease  Do you have any mobility issues? Yes, describe: due to physical health issues the client has problems walking and stability     Relevant Family History:  The client lives in the [facility] in an senior apartment with his sister in the next apartment. He presently is on the public [employer] program, and is receiving case management through [facility] and personal AIDE services . The client is youngest of 6 children both Parent's had been  before. The client was an only boy in the family. He reported family history of housing instability. At present time the client has little contact with his other siblings except his sister who he lives near. Both Parent's  when he was 23, and he was in the special education classes.     Presenting Problem (What brings you in?)  \" I think it is time to come back and deal with what is going on\" \" my depression has increased\"     Mental Health Advance Directive:  Do you currently have a Mental Health Advance Directive?no    Diagnosis:   Diagnosis ICD-10-CM Associated Orders   1. Schizoaffective disorder, bipolar type " ([facility])  F25.0         Phone number for National Suicide Prevention Life Line was given to the Client/ [phone] /[phone]  Phone number for [facility] was given  to the Client [phone]   Initial Assessment:     Current Mental Status:    Appearance: disheveled and malodorous      Behavior/Manner: cooperative      Affect/Mood:  Anxious and depressed    Speech:  Repetitive    Sleep:  Interrupted and insomnia    Oriented to: oriented to self, oriented to place and oriented to time       Clinical Symptoms    Depression: yes      Anxiety: yes      Sanaz: yes      Depression Symptoms: depressed mood, restlessness, excessive crying, fatigue, indecision, poor concentration, sleep disturbance, insomnia and irritable      Anxiety Symptoms: irritable and restlessness      Sanaz Symptoms: distinct period of elevated mood, decreased need for sleep, more talkative, racing thoughts and distractibility      Have you ever been assaultive to others or the environment: No      Have you ever been self-injurious: No      Counseling History:  Previous Counseling or Treatment  (Mental Health or Drug & Alcohol): Yes    Previous Counseling Details:  The client was previously seen for therapy with this therapist; he was hospitalized on a 302 due to cutting his arm.  Have you previously taken psychiatric medications: Yes    Previous Medications Attempted:  Found in [facility]    Suicide Risk Assessment  Have you ever had a suicide attempt: Yes    Have you had incidents of suicidal ideation: Yes    Are you currently experiencing suicidal thoughts: No    Additional Suicide Risk Information:  The client has a moderate risk of SI. He was was given information on the Critical access hospital services and National Suicide prevention hotline    Substance Abuse/Addiction Assessment:  Alcohol: No    Heroin: No    Fentanyl: No    Opiates: No    Cocaine: No    Hallucinogens: No    Club Drugs: No    Benzodiazepines: No    Other Rx Meds: No    Marijuana: No     Tobacco/Nicotine: No    Have you experienced blackouts as a result of substance use: No    Have you had any periods of abstinence: No    Have you experienced symptoms of withdrawal: No    Have you ever overdosed on any substances?: No    Are you currently using any Medication Assisted Treatment for Substance Use: No      Compulsive Behaviors:  Compulsive Behavior Information:  None     Disordered Eating History:  Do you have a history of disordered eating: No      Social Determinants of Health:    SDOH:  Financial instability, social isolation and stress    Trauma and Abuse History:    Have you ever been abused: No      Legal History:    Have you ever been arrested  or had a DUI: No      Have you been incarcerated: No      Are you currently on parole/probation: No      Any current Children and Youth involvement: No      Any pending legal charges: No      Relationship History:    Current marital status: single      Natural Supports:  Siblings and other    Other natural supports:  Friend next door    Relationship History:  The client has limited contact with his family except his one sister. He has not been  and has a long history of financial and relationship instability    Employment History    Are you currently employed: No      Currently seeking employment: No      Longest period of employment:  Worked as a     Future work goals:  N/a    Sources of income/financial support:  Social Security Disability (SSDI)     History:      Status: no history of  duty  Educational History:     Have you ever been diagnosed with a learning disability: Yes      Learning disability:  Reading and mathatmatical issues/learning support    School attended/attending:  [employer]    Have you ever had an IEP or 504-plan: Yes      Do you need assistance with reading or writing: No      Recommended Treatment:     Psychotherapy:  Individual sessions    Frequency:  2 times    Session  frequency:  Monthly  The Client is re seeking services due to increased problems within his relationships and environments. He reported during the session delusions/ false fixed beliefs, symptoms of feeling off or empty, sad and worthless, with other periods of increase in energy and behavior that is out of character. The Client’s communication pattern are impaired, with often times giving partial answers or answers that are  completely unrelated, he has problems managing personal care including cleanliness and physical appearance. These issues have cause clinical significance distress or impairment in his occupational, social, and other important areas of functioning. Due to these symptoms The client was given the diagnosis of schizoaffective disorder bipolar type. (F25.0)     Visit start and stop times:    12/26/24  Start Time: 1008  Stop Time: 1125  Total Visit Time: 77 minutes

## 2024-12-26 ENCOUNTER — SOCIAL WORK (OUTPATIENT)
Dept: BEHAVIORAL/MENTAL HEALTH CLINIC | Facility: CLINIC | Age: 59
End: 2024-12-26
Payer: COMMERCIAL

## 2024-12-26 DIAGNOSIS — F25.0 SCHIZOAFFECTIVE DISORDER, BIPOLAR TYPE (HCC): Primary | ICD-10-CM

## 2024-12-26 PROCEDURE — 90837 PSYTX W PT 60 MINUTES: CPT | Performed by: SOCIAL WORKER

## 2024-12-31 ENCOUNTER — OFFICE VISIT (OUTPATIENT)
Dept: PHYSICAL THERAPY | Facility: CLINIC | Age: 59
End: 2024-12-31
Payer: COMMERCIAL

## 2024-12-31 DIAGNOSIS — M25.511 ACUTE PAIN OF RIGHT SHOULDER: Primary | ICD-10-CM

## 2024-12-31 PROCEDURE — 97110 THERAPEUTIC EXERCISES: CPT

## 2024-12-31 NOTE — PROGRESS NOTES
Daily Note     Today's date: 2024  Patient name: Juan Antonio Guy  : 1965  MRN: [id number]  Referring provider: Mo Ceja MD  Dx:   Encounter Diagnosis     ICD-10-CM    1. Acute pain of right shoulder  M25.511           Start Time: 1000  Stop Time: 1100  Total time in clinic (min): 60 minutes    Subjective: Patient states that both of his shoulders are feeling pretty good today.           Objective: See treatment diary below.          Assessment: Began therapy session on UBE with increased resistance to level 3 with good toleration. Therapeutic exercise program was completed with good technique and post-exercise fatigue present. Continue to recommend PT in order to achieve maximal functional independence.            Plan: Continue per plan of care.      Precautions:       Manuals 24                                             Neuro Re-Ed             Table slides: flex, scaption  20x  D/C  D/C  20x   20x ea   Scapular retractions  20x with ball D/C   D/C  D/C D/C   Shoulder shrugs  20x 4#  20x 4#  20x 4# 20x 4#  20x 4#    Front raises  20x 4#  20x 4#  20x 4# 20x 4# 20x 4#    Lateral raises 20x 4#   20x 4# 20x 4#  20x 4#  20x 4#    Shoulder press  20x 4#   20x 4#   20x 4#   20x 4#  20x 4#    Seated rows     20x 4#   20x 4#   20x 4#   20x 4#   Ther Ex             UBE(improve R shoulder ROM)  L1 10' L1 10' L1 10'  L1 10' L1 10'   Seated Shoulder flexion/abduction with cane NT NT 20x ea  20x ea  20x ea    Bicep curls 20x 4#   20x 4#  20x 4#  20x 4#  20x 4#    MTP/LTP seated                                                         Ther Activity                                         Gait Training                                         Modalities             CP PRN  MHP 10' R   MHP 10' R   MHP R 10'   MHP R 15'  MHP R 10'

## 2025-01-07 ENCOUNTER — OFFICE VISIT (OUTPATIENT)
Dept: PHYSICAL THERAPY | Facility: CLINIC | Age: 60
End: 2025-01-07
Payer: COMMERCIAL

## 2025-01-07 DIAGNOSIS — M25.511 ACUTE PAIN OF RIGHT SHOULDER: Primary | ICD-10-CM

## 2025-01-07 PROCEDURE — 97110 THERAPEUTIC EXERCISES: CPT

## 2025-01-07 NOTE — PROGRESS NOTES
"Daily Note     Today's date: 2025  Patient name: Juan Antonio Guy  : 1965  MRN: [id number]  Referring provider: Mo Ceja MD  Dx:   Encounter Diagnosis     ICD-10-CM    1. Acute pain of right shoulder  M25.511                      Subjective: Patient states that both shoulders and knees are \"hurting today\" due to the \"cold weather\".          Objective: See treatment diary below.          Assessment: Began therapy session on UBE x 10 min for warm up f/b strngthening and ROM exercises as per flow sheet. Some post exercise fatigue evident.  Pt was able to initiate sitting rows w RTB 2 x 10. Continue to recommend PT in order to achieve maximal functional independence.            Plan: Continue per plan of care.      Precautions:       Manuals 24                                             Neuro Re-Ed             Table slides: flex, scaption  20x  D/C  D/C  20x   20x ea   Scapular retractions  20x with ball D/C   D/C  D/C D/C   Shoulder shrugs  20x 4#  20x 4#  20x 4# 20x 4#  20x 4#    Front raises  20x 4#  20x 4#  20x 4# 20x 4# 20x 4#    Lateral raises 20x 4#   20x 4# 20x 4#  20x 4#  20x 4#    Shoulder press   OH press 20x 4#   20x 4#   20x 4#   X10 4#  20x 4#  20x 4#    Seated rows     20x 4#   20x 4#   20x 4#   seated rows w RTB 2x10   Ther Ex             UBE(improve R shoulder ROM)  L1 10' L1 10' L1 10'  L1 10' L1 10'   Seated Shoulder flexion/abduction with cane NT NT 20x ea  20x ea  20x ea    Bicep curls 20x 4#   20x 4#  20x 4#  20x 4#  20x 4#    MTP/LTP seated                                                         Ther Activity                                         Gait Training                                         Modalities             CP PRN  MHP 10' R   MHP 10' R   MHP R 10'   MHP R 15'  MHP R 10'                                    "

## 2025-01-15 ENCOUNTER — OFFICE VISIT (OUTPATIENT)
Dept: PHYSICAL THERAPY | Facility: CLINIC | Age: 60
End: 2025-01-15
Payer: COMMERCIAL

## 2025-01-15 DIAGNOSIS — M25.511 ACUTE PAIN OF RIGHT SHOULDER: Primary | ICD-10-CM

## 2025-01-15 PROCEDURE — 97110 THERAPEUTIC EXERCISES: CPT

## 2025-01-15 NOTE — PROGRESS NOTES
Daily Note     Today's date: 1/15/2025  Patient name: Juan Antonio Guy  : 1965  MRN: [id number]  Referring provider: Mo Ceja MD  Dx:   Encounter Diagnosis     ICD-10-CM    1. Acute pain of right shoulder  M25.511         Visit: 5/10    Start Time: 1000  Stop Time: 1100  Total time in clinic (min): 60 minutes    Subjective: Patient states that his shoulders are feeling pretty good today.             Objective: See treatment diary below.          Assessment: Focused on improving bilateral shoulder strength during today's therapy session. Added shoulder ER/IR TB to improve strength. Therapeutic exercise program was completed with good technique and post-exercise fatigue present . Ended therapy session with MHP to bilateral shoulders. Continue to recommend PT in order to achieve maximal functional independence.            Plan: Continue per plan of care.      Precautions:       Manuals 12/23/24 12/31/24 1/7/25 1/15/25 12/                                             Neuro Re-Ed             Table slides: flex, scaption  20x  D/C  D/C  D/C   20x ea   Scapular retractions  20x with ball D/C   D/C  D/C D/C   Shoulder shrugs  20x 4#  20x 4#  20x 4# 20x 4#  20x 4#    Front raises  20x 4#  20x 4#  20x 4# 20x 4# 20x 4#    Lateral raises 20x 4#   20x 4# 20x 4#  20x 4#  20x 4#    Shoulder press   OH press 20x 4#   20x 4#   20x 4#   X10 4#  20x 4#  20x 4#    Seated rows     20x 4#   20x 4#   20x 4#   seated rows w RTB 2x10   Ther Ex             UBE(improve R shoulder ROM)  L1 10' L1 10' L1 10'  L1 10' L1 10'   Seated Shoulder flexion/abduction with cane NT NT 20x ea  NT   20x ea    Bicep curls 20x 4#   20x 4#  20x 4#  20x 4#  20x 4#    MTP/LTP seated          20x ea GTB       Seated ER/IR with TB         20x ea GTB                                  Ther Activity                                         Gait Training                                         Modalities             CP PRN  MHP 10' R   MHP 10' R   MHP R  10'   ANDRES R 15'  ANDRES R 10'

## 2025-01-17 ENCOUNTER — TELEPHONE (OUTPATIENT)
Dept: FAMILY MEDICINE CLINIC | Facility: CLINIC | Age: 60
End: 2025-01-17

## 2025-01-20 DIAGNOSIS — F41.9 ANXIETY: ICD-10-CM

## 2025-01-20 NOTE — TELEPHONE ENCOUNTER
Left vm for pt to call the office to reschedule his missed appointment with Breann.  This is a virtual appt.  Please transfer to our office so we can schedule.

## 2025-01-21 RX ORDER — CLONAZEPAM 0.5 MG/1
0.5 TABLET ORAL EVERY 12 HOURS PRN
Qty: 60 TABLET | Refills: 0 | Status: SHIPPED | OUTPATIENT
Start: 2025-01-21

## 2025-01-22 ENCOUNTER — OFFICE VISIT (OUTPATIENT)
Dept: PHYSICAL THERAPY | Facility: CLINIC | Age: 60
End: 2025-01-22
Payer: COMMERCIAL

## 2025-01-22 DIAGNOSIS — M25.511 ACUTE PAIN OF RIGHT SHOULDER: Primary | ICD-10-CM

## 2025-01-22 PROCEDURE — 97110 THERAPEUTIC EXERCISES: CPT

## 2025-01-22 NOTE — PROGRESS NOTES
Daily Note     Today's date: 2025  Patient name: Juan Antonio Guy  : 1965  MRN: [id number]  Referring provider: Mo Ceja MD  Dx:   Encounter Diagnosis     ICD-10-CM    1. Acute pain of right shoulder  M25.511         Visit: 5/10               Subjective: Patient states that his shoulders are feeling pretty good today. Pt also reports his MRI was rescheduled for early February.            Objective: See treatment diary below.          Assessment: Pt began tx session on UBE x 10 min for warm up/strengthening F/B exercises program focusing on B shd ROM and strengthening.  Therapeutic exercise program was completed with good technique and post-exercise fatigue present . Ended therapy session with MHP to bilateral shoulders, w skin inspection post tx w good results. Continue to recommend PT in order to achieve maximal functional independence.            Plan: Continue per plan of care.      Precautions:       Manuals 12/23/24 12/31/24 1/7/25 1/15/25 1/22/25                                             Neuro Re-Ed             Table slides: flex, scaption  20x  D/C  D/C  D/C   D/C   Scapular retractions  20x with ball D/C   D/C  D/C D/C   Shoulder shrugs  20x 4#  20x 4#  20x 4# 20x 4#  20x 4#    Front raises  20x 4#  20x 4#  20x 4# 20x 4# 20x 4#    Lateral raises 20x 4#   20x 4# 20x 4#  20x 4#  20x 4#    Shoulder press   OH press 20x 4#   20x 4#   20x 4#   X10 4#  20x 4#  20x 4#    Seated rows     20x 4#   20x 4#   20x 4#   seated rows 4# 2x10   Ther Ex             UBE(improve R shoulder ROM)  L1 10' L1 10' L1 10'  L1 10' L2 10'   Seated Shoulder flexion/abduction with cane NT NT 20x ea  NT   20x ea    Bicep curls 20x 4#   20x 4#  20x 4#  20x 4#  20x 4#    MTP/LTP seated          20x ea GTB   GTB  2x10 ea    Seated ER/IR with TB         20x ea GTB   GTB  2x10 ea                               Ther Activity                                         Gait Training                                          Modalities             CP PRN  MHP 10' R   MHP 10' R   MHP R 10'   MHP R 15'  MHP B shoulders 10'

## 2025-01-27 ENCOUNTER — TELEMEDICINE (OUTPATIENT)
Dept: FAMILY MEDICINE CLINIC | Facility: CLINIC | Age: 60
End: 2025-01-27

## 2025-01-27 DIAGNOSIS — E11.65 TYPE 2 DIABETES MELLITUS WITH HYPERGLYCEMIA, WITH LONG-TERM CURRENT USE OF INSULIN (HCC): Primary | ICD-10-CM

## 2025-01-27 DIAGNOSIS — Z79.4 TYPE 2 DIABETES MELLITUS WITH HYPERGLYCEMIA, WITH LONG-TERM CURRENT USE OF INSULIN (HCC): Primary | ICD-10-CM

## 2025-01-27 PROCEDURE — PBNCHG PB NO CHARGE PLACEHOLDER: Performed by: PHARMACIST

## 2025-01-27 RX ORDER — INSULIN GLARGINE 100 [IU]/ML
62 INJECTION, SOLUTION SUBCUTANEOUS DAILY
Qty: 15 ML | Refills: 1 | Status: SHIPPED | OUTPATIENT
Start: 2025-01-27

## 2025-01-27 NOTE — ASSESSMENT & PLAN NOTE
Lab Results   Component Value Date    HGBA1C 10.9 (A) 11/06/2024     Goal A1c <7% .   Complications:  Microvascular complications:CKD, diabetic foot ulcer, neuropathy  Macrovascular complications: CHF  Current Diabetes Regimen:  Mounjaro 10 mg weekly  Lantus 56 units daily  Novolog 26 TID with meals plus SS  Historical DM Meds (reason for discontinuation):  Jardiance   On Additional Therapies:  Statin: yes  ACEI/ARB: yes  Glucose control on CGM report (30 day)  Within goal range of 70 - 180 mg/dL 10% of time (goal >70% of time)  Average glucose 306 mg/dL (goal <154 mg/dL)      Assessment:     A1c remains above goal. Patient follows with endocrinologist Dr. Tabares. Next follow up with him is 8/13/25.      He is tolerating Mounjaro 10 mg weekly although he does report he has very little appetite. Discussed making sure to still eat 3 smaller meals per day even if he does not feel hungry. Ideally would like to increase further however I do want him to get into a better routine of eating more consistent meals.     Blood sugar readings remain elevated 30 day average 306 mg/dL.        Changes to medication regimen:  Increase Lantus to 62 units daily   Continue Mounjaro 10 mg weekly and Novolog 26 units TID with sliding scale    Orders:    Insulin Glargine Solostar (Lantus SoloStar) 100 UNIT/ML SOPN; Inject 0.62 mL (62 Units total) under the skin daily

## 2025-01-27 NOTE — PROGRESS NOTES
[facility]  Kvng Hernandez    Administrative Statements   Encounter provider Kvng Hernandez    The Patient is located at Home and in the following state in which I hold an active license [place].    The patient was identified by name and date of birth. Juan Antonio Guy was informed that this is a telemedicine visit and that the visit is being conducted through Telephone.  My office door was closed. No one else was in the room.  He acknowledged consent and understanding of privacy and security of the video platform. The patient has agreed to participate and understands they can discontinue the visit at any time.    Assessment/ Plan     Assessment & Plan  Type 2 diabetes mellitus with hyperglycemia, with long-term current use of insulin ([facility])    Lab Results   Component Value Date    HGBA1C 10.9 (A) 11/06/2024     Goal A1c <7% .   Complications:  Microvascular complications:CKD, diabetic foot ulcer, neuropathy  Macrovascular complications: CHF  Current Diabetes Regimen:  Mounjaro 10 mg weekly  Lantus 56 units daily  Novolog 26 TID with meals plus SS  Historical DM Meds (reason for discontinuation):  Jardiance   On Additional Therapies:  Statin: yes  ACEI/ARB: yes  Glucose control on CGM report (30 day)  Within goal range of 70 - 180 mg/dL 10% of time (goal >70% of time)  Average glucose 306 mg/dL (goal <154 mg/dL)      Assessment:     A1c remains above goal. Patient follows with endocrinologist Dr. Tabares. Next follow up with him is 8/13/25.      He is tolerating Mounjaro 10 mg weekly although he does report he has very little appetite. Discussed making sure to still eat 3 smaller meals per day even if he does not feel hungry. Ideally would like to increase further however I do want him to get into a better routine of eating more consistent meals.     Blood sugar readings remain elevated 30 day average 306 mg/dL.        Changes to medication regimen:  Increase Lantus to  62 units daily   Continue Mounjaro 10 mg weekly and Novolog 26 units TID with sliding scale    Orders:    Insulin Glargine Solostar (Lantus SoloStar) 100 UNIT/ML SOPN; Inject 0.62 mL (62 Units total) under the skin daily      Follow-up: 2 weeks     Subjective   HPI    Medication Adherence/ Tolerability/ Cost:  insulin aspart 26 units TID plus sliding scale   Lantus SoloStar Sopn 56 units daily   Mounjaro 10 mg weekly- reports no appetite      Review of Systems   Gastrointestinal:  Negative for abdominal pain, constipation, diarrhea, nausea and vomiting.        2. Lifestyle:   Reports having very little appetite likely from the Mounjaro. It is 3 pm today and so far has only eaten a few crackers. We dicussed the importance of eating at least 3 small meals daily for glucose regulation.     3. Home monitoring devices  Glucometer: Yes, Brand:   Continuous Glucose Monitor: Yes, Brand: Dexcom     Objective       Dexcom ([facility]     30 day average: 306 mg/dL   GMI- not enough data    Very High 76%  High 14%  In range 10%  Low 0%    Blood Glucose Readings  The patient is currently checking blood glucose 1-3 times per day. Patient reports with SMBG logs.    Date AM Post-Breakfast Lunch Dinner Post-Dinner Comments   1/13  286   281    1/19 345    361    1/20 285        1/21   349  280     252   186     1/27 202    211    Avg 271    283        ASCVD Risk:  The ASCVD Risk score (Marcia BAEZ, et al., 2019) failed to calculate for the following reasons:    The valid total cholesterol range is 130 to 320 mg/dL     Vitals:  There were no vitals filed for this visit.    Eye Exam:    Lab Results   Component Value Date    LEFTDIABRET Positive 12/19/2024    RIGHTDIABRET Positive 12/19/2024       Labs:  Lab Results   Component Value Date    SODIUM 141 07/31/2024    K 4.0 07/31/2024    EGFR 97 07/31/2024    CREATININE 0.89 07/31/2024    WYWMMTDX79 174 (L) 07/31/2024    MICROALBCRE 33.1 (H) 03/20/2024       Lab Results    Component Value Date    HGBA1C 10.9 (A) 11/06/2024    HGBA1C 8.0 (A) 07/25/2024    HGBA1C 9.0 (H) 03/20/2024       Pharmacist Tracking Tool     Pharmacist Tracking Tool  Reason For Outreach: Embedded Pharmacist  Demographics:  Intervention Method: Phone  Type of Intervention: Follow-Up  Topics Addressed: Diabetes  Pharmacologic Interventions: Dose or Frequency Adjusted, Prevent or Manage AUGUST, and Med Rec  Non-Pharmacologic Interventions: Disease state education, Medication/Device education, and Personal CGM  Time:  Direct Patient Care:  30  mins  Care Coordination:  15  mins  Recommendation Recipient: Patient/Caregiver and Provider  Outcome: Accepted

## 2025-01-28 ENCOUNTER — TELEPHONE (OUTPATIENT)
Dept: FAMILY MEDICINE CLINIC | Facility: CLINIC | Age: 60
End: 2025-01-28

## 2025-01-28 NOTE — TELEPHONE ENCOUNTER
Patient mentioned that a new walker was ordered from his several months ago and he never heard anything. Are you able to look into this ? From what I can see I think one was ordered in July maybe?

## 2025-01-29 ENCOUNTER — OFFICE VISIT (OUTPATIENT)
Dept: PHYSICAL THERAPY | Facility: CLINIC | Age: 60
End: 2025-01-29
Payer: COMMERCIAL

## 2025-01-29 DIAGNOSIS — M25.511 ACUTE PAIN OF RIGHT SHOULDER: Primary | ICD-10-CM

## 2025-01-29 PROCEDURE — 97110 THERAPEUTIC EXERCISES: CPT

## 2025-01-29 NOTE — PROGRESS NOTES
Daily Note     Today's date: 2025  Patient name: Juan Antonio Guy  : 1965  MRN: [id number]  Referring provider: Mo Ceja MD  Dx:   Encounter Diagnosis     ICD-10-CM    1. Acute pain of right shoulder  M25.511           Start Time: 1045  Stop Time: 1140  Total time in clinic (min): 55 minutes    Subjective: Patient states that his shoulder is feeling pretty good today. He indicates he had to have his MRI rescheduled to .         Objective: See treatment diary below.          Assessment: Therapeutic exercise program was completed with good technique and post-exercise fatigue present. Continue to recommend PT in order to achieve maximal functional independence.        Plan: Continue per plan of care.      Precautions:       Manuals 12/23/24 12/31/24 1/7/25 1/15/25 1/22/25                                             Neuro Re-Ed             Scapular retractions  20x with ball D/C   D/C  D/C D/C   Shoulder shrugs  20x 4#  20x 4#  20x 4# 20x 4#  20x 4#    Front raises  20x 4#  20x 4#  20x 4# 20x 4# 20x 4#    Lateral raises 20x 4#   20x 4# 20x 4#  20x 4#  20x 4#    Shoulder press   OH press 20x 4#   20x 4#   20x 4#   X10 4#  20x 4#  20x 4#    Seated rows  20x 4#   20x 4#   20x 4#   20x 4#   seated rows 4# 2x10   Ther Ex             UBE(improve R shoulder ROM)  L1 10' L1 10' L1 10'  L1 10' L2 10'   Seated Shoulder flexion/abduction with cane NT NT 20x ea  NT   20x ea    Bicep curls 20x 4#   20x 4#  20x 4#  20x 4#  20x 4#    MTP/LTP seated          20x ea GTB   GTB  2x10 ea    Seated ER/IR with TB         20x ea GTB   GTB  2x10 ea                               Ther Activity                                         Gait Training                                         Modalities             CP PRN  MHP 10' R   MHP 10' R   MHP R 10'   MHP R 15'  MHP B shoulders 10'

## 2025-02-03 ENCOUNTER — TELEPHONE (OUTPATIENT)
Age: 60
End: 2025-02-03

## 2025-02-03 DIAGNOSIS — I10 ESSENTIAL HYPERTENSION: ICD-10-CM

## 2025-02-03 DIAGNOSIS — I50.9 CONGESTIVE HEART FAILURE, UNSPECIFIED HF CHRONICITY, UNSPECIFIED HEART FAILURE TYPE (HCC): ICD-10-CM

## 2025-02-03 DIAGNOSIS — I87.332 CHRONIC VENOUS HYPERTENSION (IDIOPATHIC) WITH ULCER AND INFLAMMATION OF LEFT LOWER EXTREMITY (HCC): ICD-10-CM

## 2025-02-03 DIAGNOSIS — N40.1 BENIGN PROSTATIC HYPERPLASIA WITH URINARY HESITANCY: ICD-10-CM

## 2025-02-03 DIAGNOSIS — R39.11 BENIGN PROSTATIC HYPERPLASIA WITH URINARY HESITANCY: ICD-10-CM

## 2025-02-03 DIAGNOSIS — E61.2 MAGNESIUM DEFICIENCY: ICD-10-CM

## 2025-02-03 DIAGNOSIS — E66.01 MORBID OBESITY (HCC): ICD-10-CM

## 2025-02-03 DIAGNOSIS — E53.8 FOLATE DEFICIENCY: ICD-10-CM

## 2025-02-03 DIAGNOSIS — Z79.4 TYPE 2 DIABETES MELLITUS WITH HYPERGLYCEMIA, WITH LONG-TERM CURRENT USE OF INSULIN (HCC): ICD-10-CM

## 2025-02-03 DIAGNOSIS — I48.91 ATRIAL FIBRILLATION, UNSPECIFIED TYPE (HCC): ICD-10-CM

## 2025-02-03 DIAGNOSIS — Z86.79 HISTORY OF HEART FAILURE: ICD-10-CM

## 2025-02-03 DIAGNOSIS — M86.9 OSTEOMYELITIS, UNSPECIFIED SITE, UNSPECIFIED TYPE (HCC): ICD-10-CM

## 2025-02-03 DIAGNOSIS — E11.621 DIABETIC ULCER OF FOOT ASSOCIATED WITH TYPE 2 DIABETES MELLITUS, WITH OTHER ULCER SEVERITY, UNSPECIFIED LATERALITY, UNSPECIFIED PART OF FOOT (HCC): ICD-10-CM

## 2025-02-03 DIAGNOSIS — E11.65 TYPE 2 DIABETES MELLITUS WITH HYPERGLYCEMIA, WITH LONG-TERM CURRENT USE OF INSULIN (HCC): ICD-10-CM

## 2025-02-03 DIAGNOSIS — J44.1 COPD WITH ACUTE EXACERBATION (HCC): ICD-10-CM

## 2025-02-03 DIAGNOSIS — I87.332 CHRONIC VENOUS HYPERTENSION (IDIOPATHIC) WITH ULCER AND INFLAMMATION OF LEFT LOWER EXTREMITY (HCC): Primary | ICD-10-CM

## 2025-02-03 DIAGNOSIS — E03.9 HYPOTHYROIDISM, UNSPECIFIED TYPE: ICD-10-CM

## 2025-02-03 DIAGNOSIS — L97.508 DIABETIC ULCER OF FOOT ASSOCIATED WITH TYPE 2 DIABETES MELLITUS, WITH OTHER ULCER SEVERITY, UNSPECIFIED LATERALITY, UNSPECIFIED PART OF FOOT (HCC): ICD-10-CM

## 2025-02-03 RX ORDER — CEPHALEXIN 500 MG/1
500 CAPSULE ORAL EVERY 6 HOURS SCHEDULED
Qty: 28 CAPSULE | Refills: 0 | Status: SHIPPED | OUTPATIENT
Start: 2025-02-03 | End: 2025-02-10

## 2025-02-03 NOTE — TELEPHONE ENCOUNTER
Spoke to pt about his wound care and he used to see Dr. Fisher and he told him he would have to go to .  I told him we now have wound care in [place] & he said he would like to go there because it is closer.  I told him the referral is placed and they will call him to schedule his appt.  I told him when they call to tell them he wants to go to [place].  Pt verbalized understanding.    Also, let him know an antibiotic was sent in for him.

## 2025-02-03 NOTE — TELEPHONE ENCOUNTER
Pt states that the wound on his foot opened up and believes he has cellulitis in his left leg. He is in a lot of pain and is requesting antibiotics to be sent to [facility].    Please advise

## 2025-02-04 RX ORDER — LANOLIN ALCOHOL/MO/W.PET/CERES
400 CREAM (GRAM) TOPICAL 2 TIMES DAILY
Qty: 180 TABLET | Refills: 1 | Status: SHIPPED | OUTPATIENT
Start: 2025-02-04

## 2025-02-04 RX ORDER — PANTOPRAZOLE SODIUM 40 MG/1
40 TABLET, DELAYED RELEASE ORAL DAILY
Qty: 90 TABLET | Refills: 1 | Status: SHIPPED | OUTPATIENT
Start: 2025-02-04

## 2025-02-04 RX ORDER — FOLIC ACID 1 MG/1
1000 TABLET ORAL DAILY
Qty: 90 TABLET | Refills: 1 | Status: SHIPPED | OUTPATIENT
Start: 2025-02-04

## 2025-02-05 ENCOUNTER — APPOINTMENT (OUTPATIENT)
Dept: PHYSICAL THERAPY | Facility: CLINIC | Age: 60
End: 2025-02-05
Payer: COMMERCIAL

## 2025-02-07 ENCOUNTER — OFFICE VISIT (OUTPATIENT)
Dept: FAMILY MEDICINE CLINIC | Facility: CLINIC | Age: 60
End: 2025-02-07
Payer: COMMERCIAL

## 2025-02-07 VITALS
TEMPERATURE: 96.9 F | OXYGEN SATURATION: 96 % | SYSTOLIC BLOOD PRESSURE: 109 MMHG | DIASTOLIC BLOOD PRESSURE: 51 MMHG | BODY MASS INDEX: 46.65 KG/M2 | HEART RATE: 71 BPM | WEIGHT: 315 LBS | HEIGHT: 69 IN

## 2025-02-07 DIAGNOSIS — I10 ESSENTIAL HYPERTENSION: ICD-10-CM

## 2025-02-07 DIAGNOSIS — E66.01 MORBID OBESITY (HCC): ICD-10-CM

## 2025-02-07 DIAGNOSIS — E11.621 DIABETIC ULCER OF FOOT ASSOCIATED WITH TYPE 2 DIABETES MELLITUS, WITH OTHER ULCER SEVERITY, UNSPECIFIED LATERALITY, UNSPECIFIED PART OF FOOT (HCC): ICD-10-CM

## 2025-02-07 DIAGNOSIS — E11.621 DIABETIC ULCER OF LEFT HEEL ASSOCIATED WITH TYPE 2 DIABETES MELLITUS, LIMITED TO BREAKDOWN OF SKIN (HCC): ICD-10-CM

## 2025-02-07 DIAGNOSIS — I50.32 CHRONIC DIASTOLIC CONGESTIVE HEART FAILURE (HCC): ICD-10-CM

## 2025-02-07 DIAGNOSIS — D50.9 IRON DEFICIENCY ANEMIA, UNSPECIFIED IRON DEFICIENCY ANEMIA TYPE: ICD-10-CM

## 2025-02-07 DIAGNOSIS — J43.8 OTHER EMPHYSEMA (HCC): ICD-10-CM

## 2025-02-07 DIAGNOSIS — I87.332 CHRONIC VENOUS HYPERTENSION (IDIOPATHIC) WITH ULCER AND INFLAMMATION OF LEFT LOWER EXTREMITY (HCC): ICD-10-CM

## 2025-02-07 DIAGNOSIS — E11.42 DIABETIC PERIPHERAL NEUROPATHY (HCC): ICD-10-CM

## 2025-02-07 DIAGNOSIS — F33.9 DEPRESSION, RECURRENT (HCC): ICD-10-CM

## 2025-02-07 DIAGNOSIS — L97.508 DIABETIC ULCER OF FOOT ASSOCIATED WITH TYPE 2 DIABETES MELLITUS, WITH OTHER ULCER SEVERITY, UNSPECIFIED LATERALITY, UNSPECIFIED PART OF FOOT (HCC): ICD-10-CM

## 2025-02-07 DIAGNOSIS — D37.4 NEOPLASM OF UNCERTAIN BEHAVIOR OF COLON: ICD-10-CM

## 2025-02-07 DIAGNOSIS — Z79.4 TYPE 2 DIABETES MELLITUS WITH HYPERGLYCEMIA, WITH LONG-TERM CURRENT USE OF INSULIN (HCC): Primary | ICD-10-CM

## 2025-02-07 DIAGNOSIS — I48.0 PAROXYSMAL ATRIAL FIBRILLATION (HCC): ICD-10-CM

## 2025-02-07 DIAGNOSIS — D69.6 THROMBOCYTOPENIA (HCC): ICD-10-CM

## 2025-02-07 DIAGNOSIS — J44.1 COPD WITH ACUTE EXACERBATION (HCC): ICD-10-CM

## 2025-02-07 DIAGNOSIS — N18.31 CHRONIC KIDNEY DISEASE, STAGE 3A (HCC): ICD-10-CM

## 2025-02-07 DIAGNOSIS — E11.65 TYPE 2 DIABETES MELLITUS WITH HYPERGLYCEMIA, WITH LONG-TERM CURRENT USE OF INSULIN (HCC): Primary | ICD-10-CM

## 2025-02-07 DIAGNOSIS — L97.421 DIABETIC ULCER OF LEFT HEEL ASSOCIATED WITH TYPE 2 DIABETES MELLITUS, LIMITED TO BREAKDOWN OF SKIN (HCC): ICD-10-CM

## 2025-02-07 PROBLEM — G40.89 OTHER SEIZURES (HCC): Status: RESOLVED | Noted: 2022-12-21 | Resolved: 2025-02-07

## 2025-02-07 LAB
CREAT UR-MCNC: 136.6 MG/DL
MICROALBUMIN UR-MCNC: 12.8 MG/L
MICROALBUMIN/CREAT 24H UR: 9 MG/G CREATININE (ref 0–30)
SL AMB POCT HEMOGLOBIN AIC: 10 (ref ?–6.5)

## 2025-02-07 PROCEDURE — 82043 UR ALBUMIN QUANTITATIVE: CPT

## 2025-02-07 PROCEDURE — 83036 HEMOGLOBIN GLYCOSYLATED A1C: CPT

## 2025-02-07 PROCEDURE — 82570 ASSAY OF URINE CREATININE: CPT

## 2025-02-07 PROCEDURE — 99214 OFFICE O/P EST MOD 30 MIN: CPT

## 2025-02-07 PROCEDURE — G2211 COMPLEX E/M VISIT ADD ON: HCPCS

## 2025-02-07 NOTE — ASSESSMENT & PLAN NOTE
At goal today.  Continue lisinopril, metoprolol.  Encouraged to take at home blood pressures and contact office if persistently elevated.

## 2025-02-07 NOTE — ASSESSMENT & PLAN NOTE
Will continue to monitor.  Orders:    CBC and differential; Future    Iron Panel (Includes Ferritin, Iron Sat%, Iron, and TIBC); Future

## 2025-02-07 NOTE — ASSESSMENT & PLAN NOTE
A1c improved from 10.9 to 10.  Continue NovoLog, Lantus, Mounjaro.  Continue follow-up and plan per endocrinology.  Did recently change medications 2 weeks ago.  Will not make any changes today due to this.  Tolerating changes.  Educated on low blood sugar protocol.  Does have some decreased appetite with Mounjaro but no other side effects.  Rate controlled on  Lab Results   Component Value Date    HGBA1C 10 (A) 02/07/2025       Orders:    POCT hemoglobin A1c    Albumin / creatinine urine ratio; Future    Comprehensive metabolic panel; Future    CBC and differential; Future    Lipid Panel with Direct LDL reflex; Future

## 2025-02-07 NOTE — ASSESSMENT & PLAN NOTE
Had missed a couple of behavioral health appointments.  Would like to follow-up with this.  Will put in new referral.  Denies SI/HI.    Orders:    Ambulatory referral to Psych Services; Future

## 2025-02-07 NOTE — ASSESSMENT & PLAN NOTE
Lab Results   Component Value Date    HGBA1C 10 (A) 02/07/2025   Educated on self foot exams and footcare.  Continue follow-up and plan with podiatry/wound care.  Continue antibiotic.    Orders:    Comprehensive metabolic panel; Future    CBC and differential; Future    Lipid Panel with Direct LDL reflex; Future

## 2025-02-07 NOTE — ASSESSMENT & PLAN NOTE
Stable on current management.  Not in acute exacerbation today.  Contact office if exacerbation occurs.

## 2025-02-07 NOTE — ASSESSMENT & PLAN NOTE
Wt Readings from Last 3 Encounters:   02/07/25 (!) 165 kg (364 lb)   12/18/24 (!) 166 kg (365 lb 12.8 oz)   11/06/24 (!) 167 kg (368 lb)     Appears euvolemic on history and physical today.  Go to ER with signs of decompensation.

## 2025-02-07 NOTE — ASSESSMENT & PLAN NOTE
Continue with follow-up with wound care.  Continue Keflex.  Go to ER with signs/symptoms of worsening.

## 2025-02-07 NOTE — ASSESSMENT & PLAN NOTE
See above  Lab Results   Component Value Date    HGBA1C 10 (A) 02/07/2025       Orders:    Comprehensive metabolic panel; Future    CBC and differential; Future    Lipid Panel with Direct LDL reflex; Future

## 2025-02-07 NOTE — ASSESSMENT & PLAN NOTE
Lab Results   Component Value Date    EGFR 97 07/31/2024    EGFR 89 05/10/2024    EGFR 89 04/27/2024    CREATININE 0.89 07/31/2024    CREATININE 0.97 05/10/2024    CREATININE 0.97 04/27/2024   Resolved.  Avoid nephrotoxic agents.

## 2025-02-07 NOTE — PROGRESS NOTES
Name: Juan Antonio Guy      : 1965      MRN: [id number]  Encounter Provider: Amilcar Appiah PA-C  Encounter Date: 2025   Encounter department: [facility]  :  Assessment & Plan  Type 2 diabetes mellitus with hyperglycemia, with long-term current use of insulin (HCC)  A1c improved from 10.9 to 10.  Continue NovoLog, Lantus, Mounjaro.  Continue follow-up and plan per endocrinology.  Did recently change medications 2 weeks ago.  Will not make any changes today due to this.  Tolerating changes.  Educated on low blood sugar protocol.  Does have some decreased appetite with Mounjaro but no other side effects.  Rate controlled on  Lab Results   Component Value Date    HGBA1C 10 (A) 2025       Orders:    POCT hemoglobin A1c    Albumin / creatinine urine ratio; Future    Comprehensive metabolic panel; Future    CBC and differential; Future    Lipid Panel with Direct LDL reflex; Future    Paroxysmal atrial fibrillation (HCC)  Metoprolol and anticoagulated on Eliquis.  Go to ER with signs/symptoms of this.       Chronic venous hypertension (idiopathic) with ulcer and inflammation of left lower extremity (HCC)  Continue with follow-up with wound care.  Continue Keflex.  Go to ER with signs/symptoms of worsening.       Chronic diastolic congestive heart failure (HCC)  Wt Readings from Last 3 Encounters:   25 (!) 165 kg (364 lb)   24 (!) 166 kg (365 lb 12.8 oz)   24 (!) 167 kg (368 lb)     Appears euvolemic on history and physical today.  Go to ER with signs of decompensation.               Essential hypertension  At goal today.  Continue lisinopril, metoprolol.  Encouraged to take at home blood pressures and contact office if persistently elevated.       COPD with acute exacerbation (HCC)  Stable on current management.  Not in acute exacerbation today.  Contact office if exacerbation occurs.       Other emphysema (HCC)  Stable on current management.  Not in acute  exacerbation today.  Contact office if exacerbation occurs.       Neoplasm of uncertain behavior of colon  Continue follow-up and plan per GI.       Diabetic peripheral neuropathy (HCC)    Lab Results   Component Value Date    HGBA1C 10 (A) 02/07/2025   Educated on self foot exams and footcare.  Continue follow-up and plan with podiatry/wound care.  Continue antibiotic.    Orders:    Comprehensive metabolic panel; Future    CBC and differential; Future    Lipid Panel with Direct LDL reflex; Future    Diabetic ulcer of foot associated with type 2 diabetes mellitus, with other ulcer severity, unspecified laterality, unspecified part of foot (HCC)  See above  Lab Results   Component Value Date    HGBA1C 10 (A) 02/07/2025       Orders:    Comprehensive metabolic panel; Future    CBC and differential; Future    Lipid Panel with Direct LDL reflex; Future    Diabetic ulcer of left heel associated with type 2 diabetes mellitus, limited to breakdown of skin (HCC)  See above  Lab Results   Component Value Date    HGBA1C 10 (A) 02/07/2025       Orders:    Comprehensive metabolic panel; Future    CBC and differential; Future    Lipid Panel with Direct LDL reflex; Future    Chronic kidney disease, stage 3a (HCC)  Lab Results   Component Value Date    EGFR 97 07/31/2024    EGFR 89 05/10/2024    EGFR 89 04/27/2024    CREATININE 0.89 07/31/2024    CREATININE 0.97 05/10/2024    CREATININE 0.97 04/27/2024   Resolved.  Avoid nephrotoxic agents.         Thrombocytopenia (HCC)  Will continue to monitor.  Orders:    CBC and differential; Future    Depression, recurrent (HCC)  Had missed a couple of behavioral health appointments.  Would like to follow-up with this.  Will put in new referral.  Denies SI/HI.    Orders:    Ambulatory referral to Psych Services; Future    Iron deficiency anemia, unspecified iron deficiency anemia type  Will continue to monitor.  Orders:    CBC and differential; Future    Iron Panel (Includes Ferritin, Iron  Sat%, Iron, and TIBC); Future    Morbid obesity (HCC)  Educated on healthy diet and activity.                History of Present Illness   Patient is a 59-year-old male presenting for follow-up.  Has left foot ulcer that caused cellulitis.  Was prescribed Keflex 4 days ago.  Patient states that there is great improvement.  Denies fever, chills, dizziness, SOB, MILLER.  Does still have ulcer with mild erythema.  No discharge.  States that Keflex had worked much better.  Is following with clinical pharmacy and endocrinology due to uncontrolled diabetes.  Recently made changes 2 weeks ago.  Patient also following with GI for history of colon neoplasm.  Patient is also following with cardiology for A-fib history.  Patient following with PT and orthopedics for chronic shoulder pain.  Patient has no questions or concerns today.      Review of Systems   Constitutional:  Negative for appetite change, chills, diaphoresis, fatigue and fever.   HENT:  Negative for congestion, ear discharge, ear pain, postnasal drip, rhinorrhea, sinus pressure, sinus pain, sneezing and sore throat.    Eyes:  Negative for pain, discharge, redness, itching and visual disturbance.   Respiratory:  Negative for apnea, cough, chest tightness, shortness of breath and wheezing.    Cardiovascular:  Negative for chest pain, palpitations and leg swelling.   Gastrointestinal:  Negative for abdominal pain, blood in stool, constipation, diarrhea, nausea and vomiting.   Endocrine: Negative for cold intolerance, heat intolerance, polydipsia and polyuria.   Genitourinary:  Negative for dysuria, flank pain, frequency, hematuria and urgency.   Musculoskeletal:  Positive for arthralgias and gait problem. Negative for back pain, myalgias, neck pain and neck stiffness.   Skin:  Positive for wound. Negative for color change and rash.   Allergic/Immunologic: Negative.    Neurological:  Negative for dizziness, tremors, seizures, syncope, facial asymmetry, speech  "difficulty, weakness, light-headedness, numbness and headaches.   Hematological:  Negative for adenopathy. Does not bruise/bleed easily.   Psychiatric/Behavioral:  Negative for agitation, confusion, decreased concentration, dysphoric mood, hallucinations, self-injury, sleep disturbance and suicidal ideas. The patient is not nervous/anxious and is not hyperactive.    All other systems reviewed and are negative.      Objective   /51 (BP Location: Left arm, Patient Position: Sitting)   Pulse 71   Temp (!) 96.9 °F (36.1 °C) (Tympanic)   Ht 5' 9\" (1.753 m)   Wt (!) 165 kg (364 lb)   SpO2 96%   BMI 53.75 kg/m²      Physical Exam  Vitals and nursing note reviewed.   Constitutional:       General: He is not in acute distress.     Appearance: Normal appearance. He is well-developed. He is obese. He is not ill-appearing, toxic-appearing or diaphoretic.   HENT:      Head: Normocephalic and atraumatic.      Right Ear: Tympanic membrane normal.      Left Ear: Tympanic membrane normal.      Nose: Nose normal.      Mouth/Throat:      Mouth: Mucous membranes are moist.      Pharynx: Oropharynx is clear.   Eyes:      Extraocular Movements: Extraocular movements intact.      Conjunctiva/sclera: Conjunctivae normal.      Pupils: Pupils are equal, round, and reactive to light.   Cardiovascular:      Rate and Rhythm: Normal rate and regular rhythm.      Pulses: Normal pulses.      Heart sounds: Normal heart sounds. No murmur heard.  Pulmonary:      Effort: Pulmonary effort is normal. No respiratory distress.      Breath sounds: Normal breath sounds. No wheezing.   Chest:      Chest wall: No tenderness.   Abdominal:      General: Bowel sounds are normal.      Palpations: Abdomen is soft. There is no mass.      Tenderness: There is no abdominal tenderness.   Musculoskeletal:         General: Swelling present. No tenderness. Normal range of motion.      Cervical back: Normal range of motion and neck supple. No tenderness.      " Right lower leg: No edema.      Left lower leg: No edema.   Lymphadenopathy:      Cervical: No cervical adenopathy.   Skin:     General: Skin is warm and dry.      Capillary Refill: Capillary refill takes less than 2 seconds.      Findings: Erythema and lesion present. No rash.   Neurological:      General: No focal deficit present.      Mental Status: He is alert and oriented to person, place, and time. Mental status is at baseline.      Cranial Nerves: No cranial nerve deficit.      Motor: No weakness.      Coordination: Coordination normal.      Gait: Gait abnormal (Utilizes walker).   Psychiatric:         Mood and Affect: Mood normal.         Behavior: Behavior normal.         Thought Content: Thought content normal.         Judgment: Judgment normal.

## 2025-02-10 ENCOUNTER — TELEMEDICINE (OUTPATIENT)
Dept: FAMILY MEDICINE CLINIC | Facility: CLINIC | Age: 60
End: 2025-02-10

## 2025-02-10 ENCOUNTER — RESULTS FOLLOW-UP (OUTPATIENT)
Dept: FAMILY MEDICINE CLINIC | Facility: CLINIC | Age: 60
End: 2025-02-10

## 2025-02-10 DIAGNOSIS — Z79.4 TYPE 2 DIABETES MELLITUS WITH HYPERGLYCEMIA, WITH LONG-TERM CURRENT USE OF INSULIN (HCC): Primary | ICD-10-CM

## 2025-02-10 DIAGNOSIS — E11.65 TYPE 2 DIABETES MELLITUS WITH HYPERGLYCEMIA, WITH LONG-TERM CURRENT USE OF INSULIN (HCC): Primary | ICD-10-CM

## 2025-02-10 PROCEDURE — PBNCHG PB NO CHARGE PLACEHOLDER: Performed by: PHARMACIST

## 2025-02-10 RX ORDER — INSULIN GLARGINE 300 U/ML
66 INJECTION, SOLUTION SUBCUTANEOUS DAILY
Qty: 6 ML | Refills: 2 | Status: SHIPPED | OUTPATIENT
Start: 2025-02-10

## 2025-02-10 NOTE — PROGRESS NOTES
[facility]  Kvng Hernandez    Administrative Statements   Encounter provider Kvng Hernandez    The Patient is located at Home and in the following state in which I hold an active license [place].    The patient was identified by name and date of birth. Juan Antonio Guy was informed that this is a telemedicine visit and that the visit is being conducted through Telephone.  My office door was closed. No one else was in the room.  He acknowledged consent and understanding of privacy and security of the video platform. The patient has agreed to participate and understands they can discontinue the visit at any time.    Assessment/ Plan     Assessment & Plan  Type 2 diabetes mellitus with hyperglycemia, with long-term current use of insulin ([facility])    Lab Results   Component Value Date    HGBA1C 10 (A) 02/07/2025     Goal A1c <7% .   Complications:  Microvascular complications:CKD, diabetic foot ulcer, neuropathy  Macrovascular complications: CHF  Current Diabetes Regimen:  Mounjaro 10 mg weekly  Lantus 62 units daily  Novolog 26 TID with meals plus SS  Historical DM Meds (reason for discontinuation):  Jardiance   On Additional Therapies:  Statin: yes  ACEI/ARB: yes     Assessment:     A1c remains above goal. Patient also follows with endocrinologist Dr. Tabares. Next follow up with him is 8/13/25.      He has not worn Dexcom for past 2 weeks. Plans to have his sister help him reapply new sensor this week.  His average lunch glucose is 214 and his average bedtime glucose is 194 mg/dL.    Ideally would like to increase his Mounjaro further however he has had a lot of appetite suppression with this current dose and he is not regularly eating meals at this time.  We did discuss eating something for breakfast even if it is a meal replacement shakes such as Glucerna or boost or a protein bar.  He was agreeable to trying some of these options.    He is on a high dose of basal  insulin once daily and would likely benefit from more concentrated insulin for better absorption and so that we can further adjust the dose.  He currently has an imbalance of basal bolus insulin and likely needs an increase in his long-acting insulin.       Changes to medication regimen:  Switch Lantus to Toujeo 66 units daily   Continue Mounjaro 10 mg weekly and NovoLog 26 units 3 times a day plus sliding scale      Orders:    insulin glargine (Toujeo Max SoloStar) 300 units/mL CONCENTRATED U-300 injection pen (2-unit dial); Inject 66 Units under the skin daily      Follow-up: 3 weeks     Subjective   HPI    Medication Adherence/ Tolerability/ Cost:  insulin aspart 26 units TID plus sliding scale   Lantus SoloStar Sopn 62 units daily   Mounjaro 10 mg weekly- reports no appetite and diarrhea.       Review of Systems   Gastrointestinal:  Negative for abdominal pain, constipation, diarrhea, nausea and vomiting.        2. Lifestyle:   Wakes at 6- 7 AM   Breakfast:: may or may not eat  Lunch 11- 1 PM: sometimes light like tuna fish   Dinner: Moms meals     3. Home monitoring devices  Glucometer: Yes, Brand:   Continuous Glucose Monitor: Yes, Brand: Dexcom     Hypoglycemia event: Had 1 low blood sugar event less than 70 that occurred yesterday.  Reports that he injected NovoLog but then did not eat which resulted in a low blood sugar.  Counseled him on only injecting NovoLog as he is about to sit down to eat.  If he does not eat he should not inject NovoLog    Objective       Dexcom (data from [facility] )   Has not had sensor on in past 14 days. He needs his sister to help him reapply     Blood Glucose Readings  The patient is currently checking blood glucose 1-3 times per day. Patient reports with SMBG logs.    Date AM Post-Breakfast Lunch Dinner Post-Dinner Comments   2/1   262  180    2/2   240  214    2/5   183  225    2/6   213  234    2/8   194  129    2/9   195  62  Injected meal time insulin    Avg   214   194        ASCVD Risk:  The ASCVD Risk score (Marcia BAEZ, et al., 2019) failed to calculate for the following reasons:    The valid total cholesterol range is 130 to 320 mg/dL     Vitals:  There were no vitals filed for this visit.    Eye Exam:    Lab Results   Component Value Date    LEFTDIABRET Positive 12/19/2024    RIGHTDIABRET Positive 12/19/2024       Labs:  Lab Results   Component Value Date    SODIUM 141 07/31/2024    K 4.0 07/31/2024    EGFR 97 07/31/2024    CREATININE 0.89 07/31/2024    OMARABOY45 174 (L) 07/31/2024    MICROALBCRE 9 02/07/2025       Lab Results   Component Value Date    HGBA1C 10 (A) 02/07/2025    HGBA1C 10.9 (A) 11/06/2024    HGBA1C 8.0 (A) 07/25/2024       Pharmacist Tracking Tool     Pharmacist Tracking Tool  Reason For Outreach: Embedded Pharmacist  Demographics:  Intervention Method: Phone  Type of Intervention: Follow-Up  Topics Addressed: Diabetes  Pharmacologic Interventions: Dose or Frequency Adjusted, Medication Conversion, Prevent or Manage AUGUST, and Med Rec  Non-Pharmacologic Interventions: Disease state education, Medication/Device education, and Personal CGM  Time:  Direct Patient Care:  30  mins  Care Coordination:  15  mins  Recommendation Recipient: Patient/Caregiver and Provider  Outcome: Accepted

## 2025-02-10 NOTE — ASSESSMENT & PLAN NOTE
Lab Results   Component Value Date    HGBA1C 10 (A) 02/07/2025     Goal A1c <7% .   Complications:  Microvascular complications:CKD, diabetic foot ulcer, neuropathy  Macrovascular complications: CHF  Current Diabetes Regimen:  Mounjaro 10 mg weekly  Lantus 62 units daily  Novolog 26 TID with meals plus SS  Historical DM Meds (reason for discontinuation):  Jardiance   On Additional Therapies:  Statin: yes  ACEI/ARB: yes     Assessment:     A1c remains above goal. Patient also follows with endocrinologist Dr. Tabares. Next follow up with him is 8/13/25.      He has not worn Dexcom for past 2 weeks. Plans to have his sister help him reapply new sensor this week.  His average lunch glucose is 214 and his average bedtime glucose is 194 mg/dL.    Ideally would like to increase his Mounjaro further however he has had a lot of appetite suppression with this current dose and he is not regularly eating meals at this time.  We did discuss eating something for breakfast even if it is a meal replacement shakes such as Glucerna or boost or a protein bar.  He was agreeable to trying some of these options.    He is on a high dose of basal insulin once daily and would likely benefit from more concentrated insulin for better absorption and so that we can further adjust the dose.  He currently has an imbalance of basal bolus insulin and likely needs an increase in his long-acting insulin.       Changes to medication regimen:  Switch Lantus to Toujeo 66 units daily   Continue Mounjaro 10 mg weekly and NovoLog 26 units 3 times a day plus sliding scale      Orders:    insulin glargine (Toujeo Max SoloStar) 300 units/mL CONCENTRATED U-300 injection pen (2-unit dial); Inject 66 Units under the skin daily

## 2025-02-12 ENCOUNTER — APPOINTMENT (OUTPATIENT)
Dept: PHYSICAL THERAPY | Facility: CLINIC | Age: 60
End: 2025-02-12
Payer: COMMERCIAL

## 2025-02-14 ENCOUNTER — OFFICE VISIT (OUTPATIENT)
Facility: CLINIC | Age: 60
End: 2025-02-14
Payer: COMMERCIAL

## 2025-02-14 VITALS
RESPIRATION RATE: 18 BRPM | TEMPERATURE: 97.8 F | DIASTOLIC BLOOD PRESSURE: 72 MMHG | HEIGHT: 72 IN | BODY MASS INDEX: 42.66 KG/M2 | SYSTOLIC BLOOD PRESSURE: 147 MMHG | WEIGHT: 315 LBS | HEART RATE: 67 BPM

## 2025-02-14 DIAGNOSIS — E11.42 DIABETIC PERIPHERAL NEUROPATHY (HCC): ICD-10-CM

## 2025-02-14 DIAGNOSIS — E08.621 DIABETIC ULCER OF TOE OF LEFT FOOT ASSOCIATED WITH DIABETES MELLITUS DUE TO UNDERLYING CONDITION, LIMITED TO BREAKDOWN OF SKIN (HCC): Primary | ICD-10-CM

## 2025-02-14 DIAGNOSIS — I87.8 VENOUS STASIS: ICD-10-CM

## 2025-02-14 DIAGNOSIS — E11.65 TYPE 2 DIABETES MELLITUS WITH HYPERGLYCEMIA, WITH LONG-TERM CURRENT USE OF INSULIN (HCC): ICD-10-CM

## 2025-02-14 DIAGNOSIS — B35.3 TINEA PEDIS OF BOTH FEET: ICD-10-CM

## 2025-02-14 DIAGNOSIS — L85.3 XEROSIS CUTIS: ICD-10-CM

## 2025-02-14 DIAGNOSIS — Z79.4 TYPE 2 DIABETES MELLITUS WITH HYPERGLYCEMIA, WITH LONG-TERM CURRENT USE OF INSULIN (HCC): ICD-10-CM

## 2025-02-14 DIAGNOSIS — L97.521 DIABETIC ULCER OF TOE OF LEFT FOOT ASSOCIATED WITH DIABETES MELLITUS DUE TO UNDERLYING CONDITION, LIMITED TO BREAKDOWN OF SKIN (HCC): Primary | ICD-10-CM

## 2025-02-14 PROCEDURE — 99204 OFFICE O/P NEW MOD 45 MIN: CPT | Performed by: STUDENT IN AN ORGANIZED HEALTH CARE EDUCATION/TRAINING PROGRAM

## 2025-02-14 PROCEDURE — 97597 DBRDMT OPN WND 1ST 20 CM/<: CPT | Performed by: STUDENT IN AN ORGANIZED HEALTH CARE EDUCATION/TRAINING PROGRAM

## 2025-02-14 PROCEDURE — 99213 OFFICE O/P EST LOW 20 MIN: CPT | Performed by: STUDENT IN AN ORGANIZED HEALTH CARE EDUCATION/TRAINING PROGRAM

## 2025-02-14 RX ORDER — KETOCONAZOLE 20 MG/ML
1 SHAMPOO, SUSPENSION TOPICAL 2 TIMES WEEKLY
Qty: 120 ML | Refills: 0 | Status: SHIPPED | OUTPATIENT
Start: 2025-02-17

## 2025-02-14 RX ORDER — AMMONIUM LACTATE 12 G/100G
CREAM TOPICAL DAILY
Qty: 385 G | Refills: 3 | Status: SHIPPED | OUTPATIENT
Start: 2025-02-14

## 2025-02-14 NOTE — PROGRESS NOTES
Patient ID: Juan Antonio Gyu is a 59 y.o. male Date of Birth 1965       Chief Complaint   Patient presents with    New Patient Visit     Left foot       Allergies  Carbamazepine, Clindamycin, Phenytoin, and Pneumococcal vaccine    Diagnosis:  1. Diabetic ulcer of toe of left foot associated with diabetes mellitus due to underlying condition, limited to breakdown of skin ([facility])  -     Wound cleansing and dressings Diabetic Ulcer Left Plantar; Future; Expected date: 02/14/2025  2. Type 2 diabetes mellitus with hyperglycemia, with long-term current use of insulin ([facility])  3. Diabetic peripheral neuropathy ([facility])  4. Venous stasis  5. Xerosis cutis  -     ammonium lactate (LAC-HYDRIN) 12 % cream; Apply topically in the morning To feet and leg dry skin  6. Tinea pedis of both feet  -     ketoconazole (NIZORAL) 2 % shampoo; Apply 1 Application topically 2 (two) times a week To feet in shower      Diagnosis ICD-10-CM Associated Orders   1. Diabetic ulcer of toe of left foot associated with diabetes mellitus due to underlying condition, limited to breakdown of skin ([facility])  E08.621 Wound cleansing and dressings Diabetic Ulcer Left Plantar    L97.521       2. Type 2 diabetes mellitus with hyperglycemia, with long-term current use of insulin ([facility])  E11.65     Z79.4       3. Diabetic peripheral neuropathy ([facility])  E11.42       4. Venous stasis  I87.8       5. Xerosis cutis  L85.3 ammonium lactate (LAC-HYDRIN) 12 % cream      6. Tinea pedis of both feet  B35.3 ketoconazole (NIZORAL) 2 % shampoo             Assessment & Plan:  See wound care notes (dermagran, board gauze, spandigrip)  - Left plantar 1st toe sulcus with superficial, stale appearing DFU without acute SOI. Selective dbt as below.   - WBAT diabetic shoe  - elevate LE when nonambulatory. Low sodium diet recommended.  - PCP note from 2/7/25 reviewed.   - A1c 10% 2/7/25. Much better BS control rec  - I rx'd amlactin for daily use to dry skin of feet and legs  - I rx'd  antifungal foot wash for 2x/wk use  - report to Dr. Fisher for RFC as he was doing prior  - F/u 2wks w/ Dr. Alvarez      Subjective:   Juan Antonio presents to wound care today concerning left 1st toe/foot DFU. Present for a few weeks, unknown cause. Was on keflex per PCP recently.   + COPD, CHF, DM w/ PN, PVD.         The following portions of the patient's history were reviewed and updated as appropriate:   Patient Active Problem List   Diagnosis    Cellulitis of right lower extremity    Diabetic foot ulcer (HCC)    Type 2 diabetes mellitus with hyperglycemia, with long-term current use of insulin (HCC)    Morbid obesity (HCC)    Hypothyroid    Anxiety    Neuropathy    Essential hypertension    H/O osteomyelitis    Hx pulmonary embolism    Encounter for medical examination to establish care    Diabetic foot ulcer associated with type 2 diabetes mellitus (HCC)    Chronic venous hypertension (idiopathic) with ulcer and inflammation of left lower extremity (HCC)    COPD with acute exacerbation (HCC)    Congestive heart failure (HCC)    Atrial fibrillation (HCC)    Major depressive disorder    Obstructive sleep apnea syndrome    Other emphysema (HCC)    Thrombocytopenia (HCC)    Ambulatory dysfunction    Anemia in chronic kidney disease    Restless leg syndrome    Polypharmacy    Diabetic peripheral neuropathy (HCC)    Iron deficiency anemia    Depression, recurrent (HCC)    Suicidal ideation    Atopic dermatitis of scalp    Financial difficulties    Transportation insecurity    Glaucoma of both eyes    History of vertebral compression fracture    COVID-19    Neoplasm of uncertain behavior of colon     Past Medical History:   Diagnosis Date    Ambulatory dysfunction     Anemia     Anxiety     Arthritis     Atrial fibrillation (HCC)     CHF (congestive heart failure) (HCC)     Chronic kidney disease, stage 3 (HCC)     Chronic ulcer of left foot (HCC)     Chronic venous hypertension (idiopathic) with ulcer and inflammation of  left lower extremity (HCC)     Congestive heart failure, unspecified HF chronicity, unspecified heart failure type (HCC)     COPD with acute exacerbation (HCC)     Encephalopathy     History of depression     History of osteomyelitis     Hyperkalemia     Hyperlipidemia     Hypertension     Hypoglycemia     Hypomagnesemia     Hyponatremia     Hypothyroidism     Morbid obesity (HCC)     Pneumonia     Preop cardiovascular exam     Pulmonary embolism (HCC)     RLS (restless legs syndrome)     Sepsis (HCC)     Sleep apnea     Thrombocytopenia (HCC)     Type 2 diabetes mellitus (HCC)     Urinary retention     Vitamin D deficiency      Past Surgical History:   Procedure Laterality Date    COLON SURGERY Right 06/2024    right side of colon    GALLBLADDER SURGERY      TONSILLECTOMY AND ADENOIDECTOMY       Social History     Socioeconomic History    Marital status: Single     Spouse name: Not on file    Number of children: Not on file    Years of education: Not on file    Highest education level: Not on file   Occupational History    Not on file   Tobacco Use    Smoking status: Never    Smokeless tobacco: Never   Vaping Use    Vaping status: Never Used   Substance and Sexual Activity    Alcohol use: No    Drug use: No    Sexual activity: Not Currently     Partners: Female   Other Topics Concern    Not on file   Social History Narrative    Not on file     Social Drivers of Health     Financial Resource Strain: High Risk (4/23/2024)    [facility]    Overall Financial Resource Strain (CARDIA)     Difficulty of Paying Living Expenses: Very hard   Food Insecurity: No Food Insecurity (7/25/2024)    Nursing - Inadequate Food Risk Classification     Worried About Running Out of Food in the Last Year: Never true     Ran Out of Food in the Last Year: Never true     Ran Out of Food in the Last Year: Not on file   Transportation Needs: Unmet Transportation Needs (7/25/2024)     PRAPARE - Transportation     Lack of Transportation (Medical): Yes     Lack of Transportation (Non-Medical): Yes   Physical Activity: Not on file   Stress: Not on file   Social Connections: Not on file   Intimate Partner Violence: Not At Risk (4/23/2024)    [facility] from [facility], [facility]    Humiliation, Afraid, Rape, and Kick questionnaire     Fear of Current or Ex-Partner: No     Emotionally Abused: No     Physically Abused: No     Sexually Abused: No   Housing Stability: Low Risk  (7/25/2024)    Housing Stability Vital Sign     Unable to Pay for Housing in the Last Year: No     Number of Times Moved in the Last Year: 0     Homeless in the Last Year: No        Current Outpatient Medications:     ammonium lactate (LAC-HYDRIN) 12 % cream, Apply topically in the morning To feet and leg dry skin, Disp: 385 g, Rfl: 3    [START ON 2/17/2025] ketoconazole (NIZORAL) 2 % shampoo, Apply 1 Application topically 2 (two) times a week To feet in shower, Disp: 120 mL, Rfl: 0    acetaminophen (TYLENOL) 650 mg CR tablet, Take 1 tablet (650 mg total) by mouth every 8 (eight) hours as needed for mild pain or moderate pain, Disp: 90 tablet, Rfl: 0    Acidophilus Lactobacillus CAPS, Take by mouth, Disp: , Rfl:     albuterol (PROVENTIL HFA,VENTOLIN HFA) 90 mcg/act inhaler, Inhale 2 puffs every 6 (six) hours as needed for wheezing, Disp: 3 Inhaler, Rfl: 3    atorvastatin (LIPITOR) 40 mg tablet, TAKE 1 TABLET BY MOUTH ONCE DAILY., Disp: 90 tablet, Rfl: 1    azelastine (ASTELIN) 0.1 % nasal spray, 2 sprays into each nostril in the morning (Patient taking differently: 2 sprays into each nostril as needed), Disp: 30 mL, Rfl: 11    baclofen 10 mg tablet, , Disp: , Rfl:     Blood Glucose Monitoring Suppl (OneTouch Verio Reflect) w/Device KIT, Check blood sugars four times daily. Please substitute with appropriate alternative as covered by patient's insurance. Dx: E11.65, Disp: 1 kit, Rfl: 0     cetirizine (ZyrTEC) 10 mg tablet, TAKE 1 TABLET BY MOUTH ONCE DAILY., Disp: 90 tablet, Rfl: 1    citalopram (CeleXA) 40 mg tablet, Take 1 tablet (40 mg total) by mouth daily, Disp: 90 tablet, Rfl: 1    clobetasol propionate (Clobex) 0.05 % shampoo, Apply 1 application topically 3 (three) times a week, Disp: 118 mL, Rfl: 3    clonazePAM (KlonoPIN) 0.5 mg tablet, TAKE 1 TABLET (0.5 MG TOTAL) BY MOUTH EVERY 12 (TWELVE) HOURS AS NEEDED FOR ANXIETY, Disp: 60 tablet, Rfl: 0    docusate sodium (COLACE) 100 mg capsule, Take 100 mg by mouth 2 (two) times a day, Disp: , Rfl:     Easy Touch Pen Needles 31G X 8 MM MISC, Use 1 each 3 (three) times a day, Disp: , Rfl:     Eliquis 5 MG, TAKE 1 TABLET BY MOUTH TWO TIMES A DAY., Disp: 180 tablet, Rfl: 1    ergocalciferol (ERGOCALCIFEROL) 1.25 MG (70531 UT) capsule, Take 1 capsule (50,000 Units total) by mouth once a week, Disp: 12 capsule, Rfl: 1    ferrous sulfate 325 (65 Fe) mg tablet, Take 325 mg by mouth daily with breakfast, Disp: , Rfl:     folic acid (FOLVITE) 1 mg tablet, TAKE 1 TABLET BY MOUTH ONCE DAILY., Disp: 90 tablet, Rfl: 1    furosemide (LASIX) 40 mg tablet, Take 1 tablet (40 mg total) by mouth daily, Disp: 90 tablet, Rfl: 0    gabapentin (NEURONTIN) 600 MG tablet, Take 600 mg by mouth 2 (two) times a day, Disp: , Rfl:     glucose blood (OneTouch Verio) test strip, Check blood sugars four times daily. Please substitute with appropriate alternative as covered by patient's insurance. Dx: E11.65, Disp: 400 each, Rfl: 3    insulin aspart (NovoLOG FlexPen) 100 UNIT/ML injection pen, Inject 26 Units under the skin 3 (three) times a day with meals Per sliding scale - managed by Dr. Tabares + scheduled, Disp: 15 mL, Rfl: 0    insulin glargine (Toujeo Max SoloStar) 300 units/mL CONCENTRATED U-300 injection pen (2-unit dial), Inject 66 Units under the skin daily, Disp: 6 mL, Rfl: 2    lamoTRIgine (LaMICtal) 25 mg tablet, Take 1 tablet (25 mg total) by mouth 2 (two) times a  day, Disp: 180 tablet, Rfl: 1    latanoprost (XALATAN) 0.005 % ophthalmic solution, Administer 0.005 drops to both eyes, Disp: , Rfl:     levothyroxine 300 MCG tablet, Take 1 tablet (300 mcg total) by mouth daily Take with 50 mcg tablet, Disp: 90 tablet, Rfl: 0    Linzess 290 MCG CAPS, TAKE 1 CAPSULE BY MOUTH ONCE DAILY., Disp: 90 capsule, Rfl: 1    lisinopril (ZESTRIL) 2.5 mg tablet, TAKE 1 TABLET BY MOUTH ONCE DAILY., Disp: 90 tablet, Rfl: 1    magnesium Oxide (MAG-OX) 400 mg TABS, TAKE 1 TABLET BY MOUTH TWO TIMES A DAY., Disp: 180 tablet, Rfl: 1    metoprolol tartrate (LOPRESSOR) 25 mg tablet, Take 1 tablet by mouth (25 mg) 1 hour prior to coronary CTA.  Hold for heart rate less than 50 bpm., Disp: 1 tablet, Rfl: 0    nystatin (MYCOSTATIN) powder, Apply topically as needed, Disp: , Rfl:     OneTouch Delica Lancets 33G MISC, Check blood sugars four times daily. Please substitute with appropriate alternative as covered by patient's insurance. Dx: E11.65, Disp: 400 each, Rfl: 3    oxyCODONE (OXY-IR) 5 MG capsule, Take 5 mg by mouth as needed for moderate pain (Patient not taking: Reported on 12/18/2024), Disp: , Rfl:     pantoprazole (PROTONIX) 40 mg tablet, TAKE 1 TABLET (40 MG TOTAL) BY MOUTH DAILY, Disp: 90 tablet, Rfl: 1    potassium chloride (Klor-Con M20) 20 mEq tablet, TAKE 1 TABLET BY MOUTH EACH MORNING., Disp: 90 tablet, Rfl: 1    rOPINIRole (REQUIP) 1 mg tablet, Take 1 mg by mouth 3 (three) times a day, Disp: , Rfl:     tamsulosin (FLOMAX) 0.4 mg, TAKE 1 CAPSULE DAILY WITH DINNER, Disp: 100 capsule, Rfl: 1    timolol (TIMOPTIC) 0.5 % ophthalmic solution, Administer 1 drop to the right eye 2 (two) times a day, Disp: , Rfl:     Tirzepatide (Mounjaro) 10 MG/0.5ML SOAJ, Inject 10 mg under the skin once a week, Disp: 2 mL, Rfl: 2    vitamin B-12 (VITAMIN B-12) 1,000 mcg tablet, Take 1 tablet (1,000 mcg total) by mouth daily, Disp: 90 tablet, Rfl: 1  Family History   Problem Relation Age of Onset    Cancer  Mother         lymphoma    Hypertension Mother     Heart disease Father     Hypertension Father     Diabetes Sister     Cancer Maternal Grandmother       Review of Systems   Constitutional:  Negative for chills and fever.   Respiratory:  Positive for shortness of breath (chronic). Negative for cough.    Cardiovascular:  Positive for leg swelling. Negative for chest pain.   Skin:  Positive for wound.   Neurological:  Positive for numbness (PN).       Objective:  /72   Pulse 67   Temp 97.8 °F (36.6 °C)   Resp 18   Ht 6' (1.829 m)   BMI 49.37 kg/m²     Physical Exam  Constitutional:       General: He is not in acute distress.     Appearance: Normal appearance. He is not ill-appearing.   Cardiovascular:      Comments: Bilateral DP and PT pulses diminished due to edema. Pulses dopplerable.   Pedal hair is absent. Legs to toes warm to cool. Varicosities with severe LE edema noted.  Pulmonary:      Effort: No respiratory distress.   Musculoskeletal:         General: No tenderness or deformity. Normal range of motion.   Skin:     Capillary Refill: Capillary refill takes less than 2 seconds.      Findings: Lesion (Left plantar 1st toe sulcus with superficial appearing DFU.) present. No erythema.      Comments: B/L LE skin is atrophic - thin, dry and shiny in appearance.   Chronic venous stasis skin changes.   Toenails x10 are elongated, dystrophic, discolored with thickening and subungual debris.  Plantar foot tinea pedis b/l.    Neurological:      General: No focal deficit present.      Mental Status: He is alert and oriented to person, place, and time.      Comments: Patient endorses numbness, tingling and burning to B/L feet.    Psychiatric:         Mood and Affect: Mood normal.         Behavior: Behavior normal.             Wound 02/14/25 Plantar Left (Active)   Wound Image   02/14/25 1242   Wound Description Pink;Epithelialization 02/14/25 1243   Serene-wound Assessment Dry;Scaly;Callus 02/14/25 1243   Wound  "Length (cm) 0.1 cm 02/14/25 1243   Wound Width (cm) 0.4 cm 02/14/25 1243   Wound Depth (cm) 0.1 cm 02/14/25 1243   Wound Surface Area (cm^2) 0.04 cm^2 02/14/25 1243   Wound Volume (cm^3) 0.004 cm^3 02/14/25 1243   Calculated Wound Volume (cm^3) 0 cm^3 02/14/25 1243   Drainage Amount JAMSHID 02/14/25 1243           Debridement   Wound 02/14/25 Diabetic Ulcer Plantar Left    Universal Protocol:  procedure performed by consultantConsent: Verbal consent obtained.  Risks and benefits: risks, benefits and alternatives were discussed  Consent given by: patient  Time out: Immediately prior to procedure a \"time out\" was called to verify the correct patient, procedure, equipment, support staff and site/side marked as required.  Patient understanding: patient states understanding of the procedure being performed  Patient consent: the patient's understanding of the procedure matches consent given  Patient identity confirmed: verbally with patient    Debridement Details  Performed by: physician  Debridement type: selective      Post-debridement measurements  Length (cm): 0.1  Width (cm): 0.4  Depth (cm): 0.1  Percent debrided: 100%  Surface Area (cm^2): 0.04  Area Debrided (cm^2): 0.04  Volume (cm^3): 0    Devitalized tissue debrided: biofilm  Instrument(s) utilized: blade  Technique utilized: nonexcisionalBleeding: small  Hemostasis obtained with: pressure  Procedural pain (0-10): insensate  Post-procedural pain: insensate   Response to treatment: procedure was tolerated well               Wound Instructions:  Orders Placed This Encounter   Procedures    Wound cleansing and dressings Diabetic Ulcer Left Plantar     Wound location Left foot    Change dressing: every 2-3 days  Wash left foot with antifungal wash 3 times a week.   The dressing must stay dry and intact. You may shower with dressing protected by cast cover or bag. Or you may sponge bathe. Call wound center or home health nurse if dressing becomes wet.   On dressing " "change days, cleanse the wound with wound cleanser or mild soap and water, rinse, pat dry.     Apply dermagran to wound  Cover with a bordered gauze  Apply amlactin lotion to feet and legs for dry skin.      Spandagrip Size G  Tubular elastic bandage: Apply from base of toes to behind the knee. Apply in AM, may remove for sleep.  Avoid prolonged standing in one place.  Elevate leg(s) above the level of the heart when sitting or as much as possible.     Standing Status:   Future     Expected Date:   2/14/2025     Expiration Date:   2/28/2025    Debridement     This order was created via procedure documentation         Lashawn Sanchez DPM    Portions of the record may have been created with voice recognition software. Occasional wrong word or \"sound a like\" substitutions may have occurred due to the inherent limitations of voice recognition software. Read the chart carefully and recognize, using context, where substitutions have occurred.    "

## 2025-02-14 NOTE — TELEPHONE ENCOUNTER
NEUROSURGERY    Stephanie Julian   (:  1957, MRN:  [id number], CSN:  [id number], [facility]  Admit Date:  2/10/2025      Hospital Day: 5  Admitting Provider:  Ruperto Bob DO    Attending Provider:  Ruperto Bob DO  Primary Care Physician:  Jasmin Vaughan MD   Stable 4 Days Post-Op s/p    OPEN REDUCTION T12 BURST FRACTURE, FUSION, STABILIZATION WITH INSTRUMENTATION T10-L3      Subjective:  Patient feeling a better today. Surgical pain not as severe as previous days. Looking forward to going home today.        Objective:     Visit Vitals  /75 (BP Location: RUE - Right upper extremity, Patient Position: Supine)   Pulse 79   Temp 98.1 °F (36.7 °C) (Oral)   Resp 16   Ht 5' 10\" (1.778 m)   Wt 82.7 kg (182 lb 5.1 oz)   LMP 2009   SpO2 94%   BMI 26.16 kg/m²     Dressing clean dry intact without erythema, swelling or drainage  Motor Exam demonstrates no fasciculations, wasting, or changes in tone in BLE.  Moving all extremities strong and equal  Sensation is grossly intact to crude touch in BLE.       Assessment:      Stable Post-Op day 4 s/p OPEN REDUCTION T12 BURST FRACTURE, FUSION, STABILIZATION WITH INSTRUMENTATION T10-L3.  Appears Comfortable. Patient ready for discharge home. Discussed showering and wound care.     Plan:  Dressing: May reinforce if needed  Brace to be worn when up and ambulating. Does not need to wear it while in bed or sleeping.   PT/OT  Nutrition: Continue regular diet as tolerated  Activity: As tolerated with assistance as needed, encourage walking  DVT Prophylaxis: SCD's and ambulation   Drain: Continue to record output every shift and as needed  Pain: Continue current pain regimen  Discharge: home today        No results found    C-Reactive Protein (mg/dL)   Date Value   2022 <0.3       No results found    No results available in last 24 hours  No results found for: \"HGBA1C\"    No results found    No results found         Patient Vitals for the  Requested medication(s) are due for refill today: Yes  Patient has already received a courtesy refill: No  Other reason request has been forwarded to provider: past 24 hrs:   BP Temp Temp src Pulse Resp SpO2   02/14/25 0756 -- -- -- -- 16 --   02/14/25 0717 124/75 98.1 °F (36.7 °C) Oral 79 16 94 %   02/14/25 0308 -- -- -- -- 18 --   02/13/25 2134 -- -- -- -- 16 --   02/13/25 2034 -- -- -- -- 18 --   02/13/25 2028 (!) 145/66 97.7 °F (36.5 °C) Oral 83 17 94 %   02/13/25 1615 -- -- -- -- 16 --   02/13/25 1544 110/65 98.2 °F (36.8 °C) Oral 86 18 93 %   02/13/25 1328 -- -- -- -- 18 --       Date 02/13/25 0700 - 02/14/25 0659 02/14/25 0700 - 02/15/25 0659   Shift 2635-7438 9689-9642 8976-0278 24 Hour Total 7700-9142 3004-5302 5122-1118 24 Hour Total   INTAKE   P.O.  240 360 600       Shift Total  240 360 600       OUTPUT   Shift Total           Weight (kg) 82.7 82.7 82.7 82.7 82.7 82.7 82.7 82.7          IV Medications:       Scheduled Medications:  Current Facility-Administered Medications   Medication Dose Route Frequency Provider Last Rate Last Admin    oxyCODONE-acetaminophen (PERCOCET)  MG tablet 1 tablet  1 tablet Oral Q4H PRN Keya Solares APNP   1 tablet at 02/14/25 0756    polyethylene glycol (MIRALAX) packet 17 g  17 g Oral Daily Keya Solares, APNP        amLODIPine (NORVASC) tablet 2.5 mg  2.5 mg Oral Daily Keya Solares, APNP   2.5 mg at 02/14/25 0757    busPIRone (BUSPAR) tablet 15 mg  15 mg Oral BID Keya Solares, APNP   15 mg at 02/14/25 0756    gabapentin (NEURONTIN) capsule 300 mg  300 mg Oral TID Keya Solares, APNP   300 mg at 02/14/25 0757    levothyroxine (SYNTHROID, LEVOTHROID) tablet 25 mcg  25 mcg Oral QAM AC Keya Solares APNP   25 mcg at 02/14/25 0609    cycloSPORINE (RESTASIS) 0.05 % ophthalmic emulsion 1 drop  1 drop Both Eyes BID Keya Solares APNP   1 drop at 02/14/25 0757    acetaminophen (TYLENOL) tablet 650 mg  650 mg Oral Q8H PRN Keya Solares APNP   650 mg at 02/14/25 0612    morphine injection 2 mg  2 mg Intravenous Q2H PRN Keya Solares APNP   2 mg at 02/10/25 1529     ondansetron (ZOFRAN ODT) disintegrating tablet 4 mg  4 mg Oral Q12H PRN Keya Solares, APNP   4 mg at 02/10/25 1718    Or    ondansetron (ZOFRAN) injection 4 mg  4 mg Intravenous Q12H PRN Keya Solares, APNP        docusate sodium-sennosides (SENOKOT S) 50-8.6 MG 2 tablet  2 tablet Oral BID PRN Keya Solares, APNP        bisacodyl (DULCOLAX) suppository 10 mg  10 mg Rectal Daily PRN Keya Solares, APNP        magnesium hydroxide (MILK OF MAGNESIA) 400 MG/5ML suspension 30 mL  30 mL Oral Daily PRN Keya Solares, APNP        calcium carbonate (TUMS) chewable tablet 1,000 mg  1,000 mg Oral Q4H PRN Keya Solares, APNP        sodium chloride 0.9 % injection 10 mL  10 mL Intravenous PRN Keya Solares, APNP        sodium chloride 0.9 % injection 2 mL  2 mL Intracatheter 2 times per day Keya Solares, APNP   2 mL at 02/14/25 0757    metoCLOPramide (REGLAN) tablet 5 mg  5 mg Oral Q6H PRN Keya Solares APNP        Or    metoCLOPramide (REGLAN) injection 5 mg  5 mg Intravenous Q6H PRN Keya Solares, APNP        venlafaxine XR (EFFEXOR XR) 24 hr capsule 225 mg  225 mg Oral QAM Keya Solares, APNP   225 mg at 02/14/25 0609     PRN Medications:  oxyCODONE-acetaminophen, 1 tablet, Q4H PRN  acetaminophen, 650 mg, Q8H PRN  morphine injection, 2 mg, Q2H PRN  ondansetron, 4 mg, Q12H PRN   Or  ondansetron (ZOFRAN) IV/IM, 4 mg, Q12H PRN  docusate sodium-sennosides, 2 tablet, BID PRN  bisacodyl, 10 mg, Daily PRN  magnesium hydroxide, 30 mL, Daily PRN  calcium carbonate, 1,000 mg, Q4H PRN  sodium chloride, 10 mL, PRN  metoCLOPramide, 5 mg, Q6H PRN   Or  metoCLOPramide (REGLAN) injection, 5 mg, Q6H PRN         Above all reviewed

## 2025-02-14 NOTE — PROGRESS NOTES
Wound Procedure Treatment Diabetic Ulcer Left Plantar    Performed by: Verna Crews RN  Authorized by: Lashawn Sanchez DPM    Associated wounds:   Wound 02/14/25 Diabetic Ulcer Plantar Left  Wound cleansed with:  NSS  Applied to periwound:  Ammonium lactate  Applied primary dressing:  Dermagran and Other  Dressing secured with:  Elastic tubular stocking and Size G  Comments:  Bordered gauze

## 2025-02-14 NOTE — PATIENT INSTRUCTIONS
Orders Placed This Encounter   Procedures    Wound cleansing and dressings Diabetic Ulcer Left Plantar     Wound location Left foot    Change dressing: every 2-3 days  Wash left foot with antifungal wash 3 times a week.   The dressing must stay dry and intact. You may shower with dressing protected by cast cover or bag. Or you may sponge bathe. Call wound center or home health nurse if dressing becomes wet.   On dressing change days, cleanse the wound with wound cleanser or mild soap and water, rinse, pat dry.     Apply dermagran to wound  Cover with a bordered gauze  Apply amlactin lotion to feet and legs for dry skin.      Spandagrip Size G  Tubular elastic bandage: Apply from base of toes to behind the knee. Apply in AM, may remove for sleep.  Avoid prolonged standing in one place.  Elevate leg(s) above the level of the heart when sitting or as much as possible.     Standing Status:   Future     Expected Date:   2/14/2025     Expiration Date:   2/28/2025

## 2025-02-19 ENCOUNTER — OFFICE VISIT (OUTPATIENT)
Dept: PHYSICAL THERAPY | Facility: CLINIC | Age: 60
End: 2025-02-19
Payer: COMMERCIAL

## 2025-02-19 DIAGNOSIS — M25.511 ACUTE PAIN OF RIGHT SHOULDER: Primary | ICD-10-CM

## 2025-02-19 PROCEDURE — 97110 THERAPEUTIC EXERCISES: CPT

## 2025-02-19 NOTE — PROGRESS NOTES
"Daily Note     Today's date: 2025  Patient name: Juan Antonio Guy  : 1965  MRN: [id number]  Referring provider: Mo Ceja MD  Dx:   Encounter Diagnosis     ICD-10-CM    1. Acute pain of right shoulder  M25.511                      Subjective: Patient reports B shd and L knee pain today stating, \"it's the old arthritis\". Pt reports not being able to attend over the last week or so due to illness.        Objective: See treatment diary below.          Assessment: Pt began tx session on arm bike x 10 min at L1 for warm up at a slow, steady pace as not to aggravate the shds.  Therapeutic exercise program was completed with good technique and post-exercise fatigue present. P.T. student assisted in overseeing pt's exercises.  Pt denied any increase in shd or knee pain during today's program...paced self. Continue to recommend PT in order to achieve maximal functional independence.        Plan: Continue per plan of care.      Precautions:       Manuals 2/19/25 12/31/24 1/7/25 1/15/25 1/22/25                                             Neuro Re-Ed             Scapular retractions  20x with ball D/C   D/C  D/C D/C   Shoulder shrugs  20x 4#  20x 4#  20x 4# 20x 4#  20x 4#    Front raises  20x 4#  20x 4#  20x 4# 20x 4# 20x 4#    Lateral raises 20x 4#   20x 4# 20x 4#  20x 4#  20x 4#    Shoulder press   OH press 20x 4#   20x 4#   20x 4#   X10 4#  20x 4#  20x 4#    Seated rows  20x 4#   20x 4#   20x 4#   20x 4#   seated rows 4# 2x10   Ther Ex             UBE(improve R shoulder ROM)  L1 10' L1 10' L1 10'  L1 10' L2 10'   Seated Shoulder flexion/abduction with cane NT NT 20x ea  NT   20x ea    Bicep curls 20x 4#   20x 4#  20x 4#  20x 4#  20x 4#    MTP/LTP seated   GTB  2x10 ea       20x ea GTB   GTB  2x10 ea    Seated ER/IR with TB   GTB  2x10 ea      20x ea GTB   GTB  2x10 ea                               Ther Activity                                         Gait Training                                       "   Modalities             CP PRN  MHP 10' R   MHP 10' R   MHP R 10'   MHP R 15'  MHP B shoulders 10'

## 2025-02-25 ENCOUNTER — TELEPHONE (OUTPATIENT)
Age: 60
End: 2025-02-25

## 2025-02-25 ENCOUNTER — OFFICE VISIT (OUTPATIENT)
Dept: PHYSICAL THERAPY | Facility: CLINIC | Age: 60
End: 2025-02-25
Payer: COMMERCIAL

## 2025-02-25 DIAGNOSIS — M25.511 ACUTE PAIN OF RIGHT SHOULDER: Primary | ICD-10-CM

## 2025-02-25 PROCEDURE — 97112 NEUROMUSCULAR REEDUCATION: CPT

## 2025-02-25 PROCEDURE — 97110 THERAPEUTIC EXERCISES: CPT

## 2025-02-25 NOTE — PROGRESS NOTES
Daily Note     Today's date: 2025  Patient name: Juan Antonio Guy  : 1965  MRN: [id number]  Referring provider: Mo Ceja MD  Dx:   Encounter Diagnosis     ICD-10-CM    1. Acute pain of right shoulder  M25.511                      Subjective: Patient reports he is scheduled for an MRI this week to his R shoulder and then sees his Dr for a follow up.        Objective: See treatment diary below.          Assessment: Pt began tx session on arm bike x 10 min at L1 for warm up at a slow, steady pace as not to aggravate the shds.  Pt received some gentle R shd PROM to tolerance. Therapeutic exercise program was completed with good technique and post-exercise fatigue present.  Received MH x 12 min to B shds post exercise.  Pt denied any increase in shd or knee pain during today's program...paced self. Continue to recommend PT in order to achieve maximal functional independence.        Plan: Continue per plan of care.      Precautions:       Manuals 2/19/25 2/25/25 1/7/25 1/15/25 1/22/25    R shd PROM    CK                                     Neuro Re-Ed             Scapular retractions  20x with ball D/C   D/C  D/C D/C   Shoulder shrugs  20x 4#  20x 4#  20x 4# 20x 4#  20x 4#    Front raises  20x 4#  20x 4#  20x 4# 20x 4# 20x 4#    Lateral raises 20x 4#   20x 4# 20x 4#  20x 4#  20x 4#    Shoulder press   OH press 20x 4#   20x 4#   20x 4#   X10 4#  20x 4#  20x 4#    Seated rows  20x 4#   20x 4#   20x 4#   20x 4#   seated rows 4# 2x10   Ther Ex             UBE(improve R shoulder ROM)  L1 10' L1 10' L1 10'  L1 10' L2 10'   Seated Shoulder flexion/abduction with cane NT NT 20x ea  NT   20x ea    Bicep curls 20x 4#   20x 4#  20x 4#  20x 4#  20x 4#    MTP/LTP seated   GTB  2x10 ea  GTB  2x10 ea     20x ea GTB   GTB  2x10 ea    Seated ER/IR with TB   GTB  2x10 ea  GTB  2x10 ea    20x ea GTB   GTB  2x10 ea                               Ther Activity                                         Gait Training                                          Modalities             CP PRN  MHP 10' R   MHP 10' R   MHP R 10'   MHP R 15'  MHP B shoulders 10'

## 2025-02-26 ENCOUNTER — HOSPITAL ENCOUNTER (OUTPATIENT)
Dept: MRI IMAGING | Facility: HOSPITAL | Age: 60
Discharge: HOME | End: 2025-02-26
Attending: STUDENT IN AN ORGANIZED HEALTH CARE EDUCATION/TRAINING PROGRAM
Payer: COMMERCIAL

## 2025-02-26 DIAGNOSIS — G89.29 CHRONIC RIGHT SHOULDER PAIN: ICD-10-CM

## 2025-02-26 DIAGNOSIS — M25.511 CHRONIC RIGHT SHOULDER PAIN: ICD-10-CM

## 2025-02-26 DIAGNOSIS — M75.41 IMPINGEMENT SYNDROME OF RIGHT SHOULDER: ICD-10-CM

## 2025-02-26 PROCEDURE — 73221 MRI JOINT UPR EXTREM W/O DYE: CPT

## 2025-03-03 ENCOUNTER — TELEMEDICINE (OUTPATIENT)
Dept: FAMILY MEDICINE CLINIC | Facility: CLINIC | Age: 60
End: 2025-03-03

## 2025-03-03 DIAGNOSIS — E11.65 TYPE 2 DIABETES MELLITUS WITH HYPERGLYCEMIA, WITH LONG-TERM CURRENT USE OF INSULIN (HCC): Primary | ICD-10-CM

## 2025-03-03 DIAGNOSIS — Z79.4 TYPE 2 DIABETES MELLITUS WITH HYPERGLYCEMIA, WITH LONG-TERM CURRENT USE OF INSULIN (HCC): Primary | ICD-10-CM

## 2025-03-03 PROCEDURE — PBNCHG PB NO CHARGE PLACEHOLDER: Performed by: PHARMACIST

## 2025-03-03 RX ORDER — TIRZEPATIDE 12.5 MG/.5ML
12.5 INJECTION, SOLUTION SUBCUTANEOUS WEEKLY
Qty: 2 ML | Refills: 0 | Status: SHIPPED | OUTPATIENT
Start: 2025-03-03

## 2025-03-03 RX ORDER — INSULIN GLARGINE 300 U/ML
66 INJECTION, SOLUTION SUBCUTANEOUS DAILY
Qty: 6 ML | Refills: 2 | Status: SHIPPED | OUTPATIENT
Start: 2025-03-03

## 2025-03-03 NOTE — PROGRESS NOTES
[facility] Clinical Pharmacy Services  Kvng Hernandez    Administrative Statements   Encounter provider Kvng Hernandez    The Patient is located at Home and in the following state in which I hold an active license [place].    The patient was identified by name and date of birth. Juan Antonio Guy was informed that this is a telemedicine visit and that the visit is being conducted through Telephone.  My office door was closed. No one else was in the room.  He acknowledged consent and understanding of privacy and security of the video platform. The patient has agreed to participate and understands they can discontinue the visit at any time.    Assessment/ Plan     Assessment & Plan  Type 2 diabetes mellitus with hyperglycemia, with long-term current use of insulin ([facility])    Lab Results   Component Value Date    HGBA1C 10 (A) 02/07/2025     Patients blood sugar readings increased and are elevated in 200-350 mg/dL range. He did make any of the changes we discussed last follow up. He does not remember us talking about those changes last visit.  I do suspect medication compliance is a challenge.  I had him write down the instructions that we talked about today.  He did repeat them back to me.  He also does not have anything scheduled with endocrine for follow-up so he was going to call right after we hung up.  I also instructed him that instructions can also be found in his after visit summary on [employer].     Changes to medication regimen:  Switch Lantus to Toujeo 66 units daily   Mounjaro: Increase to 12.5 mg weekly  NovoLog: Continue 26 units 3 times a day plus sliding scale    Orders:    Tirzepatide (Mounjaro) 12.5 MG/0.5ML SOAJ; Inject 12.5 mg under the skin once a week    insulin glargine (Toujeo Max SoloStar) 300 units/mL CONCENTRATED U-300 injection pen (2-unit dial); Inject 66 Units under the skin daily      Follow-up: 3 weeks     Subjective   HPI    Medication Adherence/ Tolerability/  Cost:  insulin aspart 26 units TID plus sliding scale . Estimates he missed 2 x/ weeks   Lantus SoloStar Sopn 62 units daily - he accidentally decreased to 56 units but then increased back to 62 units. He did not go up to 66 units as we had discussed. He did not get Toujeo from the pharmacy and does not remember our discussion talking about switching  Mounjaro 10 mg weekly- reports no appetite and diarrhea.       Review of Systems   Gastrointestinal:  Negative for abdominal pain, constipation, diarrhea, nausea and vomiting.        2. Lifestyle:   Wakes at 6- 7 AM   Breakfast:: may or may not eat  Lunch 11- 1 PM: sometimes light like tuna fish   Dinner: Moms meals   Admits to overly snacking at night    3. Home monitoring devices  Glucometer: Yes, Brand:   Continuous Glucose Monitor: Yes, Brand: Dexcom     Objective       [facility]   Has not had sensor on in past 2 months. He needs his sister to help him reapply     Blood Glucose Readings  The patient is currently checking blood glucose 1-3 times per day. Patient reports with SMBG logs.    Date AM Post-Breakfast Lunch Dinner Post-Dinner Comments   2/17 200  360  360    2/19 225  345  361    2/21   349  280 56 units    2/22   282  272    2/28   289  266 62 units   3/1   324  274    3/2   280  260    3/3   240      Avg   308  296        ASCVD Risk:  The ASCVD Risk score (Marcia DK, et al., 2019) failed to calculate for the following reasons:    The valid total cholesterol range is 130 to 320 mg/dL     Vitals:  There were no vitals filed for this visit.    Eye Exam:    Lab Results   Component Value Date    LEFTDIABRET Positive 12/19/2024    RIGHTDIABRET Positive 12/19/2024       Labs:  Lab Results   Component Value Date    SODIUM 141 07/31/2024    K 4.0 07/31/2024    EGFR 97 07/31/2024    CREATININE 0.89 07/31/2024    HHACAWRG71 174 (L) 07/31/2024    MICROALBCRE 9 02/07/2025       Lab Results   Component Value Date    HGBA1C 10 (A) 02/07/2025     HGBA1C 10.9 (A) 11/06/2024    HGBA1C 8.0 (A) 07/25/2024       Pharmacist Tracking Tool     Pharmacist Tracking Tool  Reason For Outreach: Embedded Pharmacist  Demographics:  Intervention Method: Phone  Type of Intervention: Follow-Up  Topics Addressed: Diabetes  Pharmacologic Interventions: Dose or Frequency Adjusted, Medication Conversion, Prevent or Manage AUGUST, and Med Rec  Non-Pharmacologic Interventions: Adherence addressed, Disease state education, Medication/Device education, and Personal CGM  Time:  Direct Patient Care:  30  mins  Care Coordination:  15  mins  Recommendation Recipient: Patient/Caregiver and Provider  Outcome: Accepted

## 2025-03-03 NOTE — ASSESSMENT & PLAN NOTE
Lab Results   Component Value Date    HGBA1C 10 (A) 02/07/2025     Patients blood sugar readings increased and are elevated in 200-350 mg/dL range. He did make any of the changes we discussed last follow up. He does not remember us talking about those changes last visit.  I do suspect medication compliance is a challenge.  I had him write down the instructions that we talked about today.  He did repeat them back to me.  He also does not have anything scheduled with endocrine for follow-up so he was going to call right after we hung up.  I also instructed him that instructions can also be found [facility].     Changes to medication regimen:  Switch Lantus to Toujeo 66 units daily   Mounjaro: Increase to 12.5 mg weekly  NovoLog: Continue 26 units 3 times a day plus sliding scale    Orders:    Tirzepatide (Mounjaro) 12.5 MG/0.5ML SOAJ; Inject 12.5 mg under the skin once a week    insulin glargine (Toujeo Max SoloStar) 300 units/mL CONCENTRATED U-300 injection pen (2-unit dial); Inject 66 Units under the skin daily

## 2025-03-03 NOTE — PATIENT INSTRUCTIONS
Switch Lantus to Toujeo 66 units daily   Mounjaro: Increase to 12.5 mg weekly  NovoLog: Continue 26 units 3 times daily with meals  plus sliding scale

## 2025-03-05 ENCOUNTER — OFFICE VISIT (OUTPATIENT)
Dept: PHYSICAL THERAPY | Facility: CLINIC | Age: 60
End: 2025-03-05
Payer: COMMERCIAL

## 2025-03-05 DIAGNOSIS — M25.511 ACUTE PAIN OF RIGHT SHOULDER: Primary | ICD-10-CM

## 2025-03-05 PROCEDURE — 97164 PT RE-EVAL EST PLAN CARE: CPT

## 2025-03-05 PROCEDURE — 97110 THERAPEUTIC EXERCISES: CPT

## 2025-03-05 NOTE — PROGRESS NOTES
PT Re-Evaluation     Today's date: 3/5/2025  Patient name: Juan Antonio Guy  : 1965  MRN: [id number]  Referring provider: Mo Ceja MD  Dx:   Encounter Diagnosis     ICD-10-CM    1. Acute pain of right shoulder  M25.511               Start Time: 1055  Stop Time: 1151  Total time in clinic (min): 56 minutes    Assessment  Impairments: abnormal muscle tone, abnormal or restricted ROM, abnormal movement, activity intolerance, impaired physical strength, lacks appropriate home exercise program, pain with function, poor posture  and poor body mechanics  Symptom irritability: moderate    Assessment details: Juan Antonio is a 59 y.o male who has attended 20 OP PT with signs and symptoms consistent with R shoulder bursitis. Upon re-examination, PT notes an increase in bilateral shoulder ROM and strength but still limited with abnormal sitting posture and inability to stand for long periods of time.     Understanding of Dx/Px/POC: fair     Prognosis: fair    Goals  STG(In 4 weeks)  Patient will initiate HEP. MET  Patient will decrease R shoulder pain from 10/10  to 5/10  in order to improve QOL. MET  Patient will increase R shoulder ROM to WFL. MET  Patient will increase FOTO score from  IE to D/C score in order to improve QOL. Progressing    LTG(In 8 weeks)  Patient will decrease R shoulder pain no more than 5/10  in order to improve QOL. Progressing   Patient will increase R shoulder strength by 1-2 MMT in order to improve ability to /grasp objects.  Progressing   Patient will be independent with HEP at D/c.               Plan  Patient would benefit from: PT eval and skilled physical therapy  Planned modality interventions: cryotherapy    Planned therapy interventions: self care, home exercise program, neuromuscular re-education, manual therapy and joint mobilization    Frequency: 1x week  Duration in weeks: 4  Treatment plan discussed with: patient        Subjective Evaluation    History of Present  Illness  Mechanism of injury: Patient presents with C/C of R shoulder pain that has been ongoing for a couple of weeks but he states that he has had it for several years he just keeps flaring it up periodically. Patient states that he got a cortisone injection 2024 but only had relief initially.           Quality of life: fair    Patient Goals  Patient goals for therapy: increased motion, decreased pain and increased strength    Pain  Current pain ratin  At best pain ratin  At worst pain ratin  Location: R shoulder  Quality: discomfort  Aggravating factors: lifting      Diagnostic Tests  X-ray: normal  Treatments  Previous treatment: physical therapy  Current treatment: physical therapy        Objective     Active Range of Motion   Left Shoulder   Normal active range of motion    Right Shoulder   Flexion: WFL and with pain  Extension: WFL and with pain  Abduction: WFL and with pain    Strength/Myotome Testing     Left Shoulder   Normal muscle strength    Right Shoulder     Planes of Motion   Flexion: 4-   Extension: 4-   Abduction: 4-   Adduction: 4-   External rotation at 90°: 4-   Internal rotation at 90°: 4-     Tests     Right Shoulder   Positive AC shear.              Precautions:           Manuals 2/19/25 2/25/25 3/5/25 1/15/25 1/22/25    R shd PROM    CK                                     Neuro Re-Ed             Shoulder shrugs  20x 4#  20x 4#  20x 4# 20x 4#  20x 4#    Front raises  20x 4#  20x 4#  20x 4# 20x 4# 20x 4#    Lateral raises 20x 4#   20x 4# 20x 4#  20x 4#  20x 4#    Shoulder press   OH press 20x 4#   20x 4#   20x 4#   X10 4#  20x 4#  20x 4#    Seated rows  20x 4#   20x 4#   20x 4#   20x 4#   seated rows 4# 2x10   Ther Ex             UBE(improve R shoulder ROM)  L1 10' L1 10' L1 10'  L1 10' L2 10'   Seated Shoulder flexion/abduction with cane NT NT 20x ea  NT   20x ea    Bicep curls 20x 4#   20x 4#  20x 4#  20x 4#  20x 4#    MTP/LTP seated   GTB  2x10 ea  GTB  2x10 ea    GTB 2x10 ea   20x ea GTB   GTB  2x10 ea    Seated ER/IR with TB   GTB  2x10 ea  GTB  2x10 ea  GTB 2x10 ea   20x ea GTB   GTB  2x10 ea                               Ther Activity                                         Gait Training                                         Modalities             CP PRN  MHP 10' R   MHP 10' R   MHP R 10'   MHP R 15'  MHP B shoulders 10'

## 2025-03-06 NOTE — PROGRESS NOTES
Patient ID: Juan Antonio Guy is a 59 y.o. male Date of Birth 1965       Chief Complaint   Patient presents with   • Follow Up Wound Care Visit       Allergies:  Carbamazepine, Clindamycin, Phenytoin, and Pneumococcal vaccine    Diagnosis:      Diagnosis ICD-10-CM Associated Orders   1. Diabetic ulcer of toe of left foot associated with diabetes mellitus due to underlying condition, limited to breakdown of skin ([facility])  E08.621     L97.521       2. Type 2 diabetes mellitus with hyperglycemia, with long-term current use of insulin ([facility])  E11.65     Z79.4       3. Venous stasis  I87.8       4. Xerosis cutis  L85.3       5. Tinea pedis of both feet  B35.3       6. Venous ulcer of right lower extremity without varicose veins ([facility])  I87.2 Wound cleansing and dressings Diabetic Ulcer Left Plantar    L97.919 Wound Procedure Treatment Venous Ulcer Posterior;Right;Lower Leg     EXTERNAL Referral to Home Health      7. Lymphedema  I89.0                 Assessment & Plan:  Left plantar first toe diabetic ulceration is healed.  However, continues with tinea pedis of both feet.  In addition to thick flaking scaly skin/xerosis of bilateral lower extremities.  Has not been applying AmLactin or utilizing the antifungal foot wash as prescribed by Dr. Sanchez.  Recommend utilizing AmLactin and antifungal foot wash as directed  Continue regular follow-up outpatient with podiatry and use of diabetic shoes  New VSU right lower extremity: Small ulceration lateral right lower extremity near ankle with copious serous drainage.  No acute erythema, increased warmth, lymphangitic streaking.  No purulence or malodor.  No induration, fluctuance, crepitus.  Bilateral lower extremities with evidence of chronic venous insufficiency/venous stasis with areas of hyperpigmentation/hemosiderin staining, dry flaking skin, lower extremity edema.  Stemmers sign positive bilaterally consistent with lymphedema.  Apply silver alginate to open weeping  "areas followed by superabsorbent dressing   Moisturize intact skin of lower extremities with AmLactin  Compression: As patient has not been wearing any compression, continue to recommend starting with Spandagrips.  Appropriate use/positioning of these was discussed.  He expressed understanding.  Also reviewed that he will likely need increased compression for improved lower extremity edema control however he states that he has had difficulty tolerating any other types.  Is open to trying Spandagrips   Reviewed importance of regular lower extremity elevation above level of heart when resting and avoid prolonged periods of time with legs in dependent position  T2DM:  A1C results reviewed with the patient today. 10 as of 2/7/2025.  Uncontrolled.  He reports he is working on getting this down.  Importance of improved diabetic control reviewed.  He expresses understanding.  Reviewed extreme importance of very close monitoring of wounds. Discussed that diabetes places patients at increased risk for wound complications despite adequate cleansing and care.  Should patient experience any acute change or any of the following symptoms including but not limited to fever, chills, increased pain at the wound, swelling, purulent drainage, foul odor, etc... to immediately proceed to emergency department. Pt expresses understanding.   Follow-up in 1-2 weeks or sooner if needed    Portions of the record may have been created with voice recognition software. Occasional wrong word or \"sound alike\" substitutions may have occurred due to the inherent limitations of voice recognition software. Read the chart carefully and recognize, using context, where substitutions have occurred.    Subjective:   3/7/2025: Juan Antonio is a 59-year-old male with a past medical history including but not limited to uncontrolled diabetes, hypertension, lymphedema, ambulatory dysfunction, COPD, CHF who presents to the wound center today for follow-up visit.  He " initially presented to the wound center last week due to concerns for left diabetic foot wound.  He was recommended to utilize antifungal foot wash twice a week, apply AmLactin to his legs, and apply Dermagran to his wound.  In addition he was instructed to utilize compression with mammograms.  When he arrived to the wound center there was no dressing in place.  He reports he has not had fever or chills.  States his leg swelling is chronic.  Denies other new acute complaints today.      The following portions of the patient's history were reviewed and updated as appropriate:   Patient Active Problem List   Diagnosis   • Cellulitis of right lower extremity   • Diabetic foot ulcer (HCC)   • Type 2 diabetes mellitus with hyperglycemia, with long-term current use of insulin (HCC)   • Morbid obesity (HCC)   • Hypothyroid   • Anxiety   • Neuropathy   • Essential hypertension   • H/O osteomyelitis   • Hx pulmonary embolism   • Encounter for medical examination to establish care   • Diabetic foot ulcer associated with type 2 diabetes mellitus (HCC)   • Chronic venous hypertension (idiopathic) with ulcer and inflammation of left lower extremity (HCC)   • COPD with acute exacerbation (HCC)   • Congestive heart failure (HCC)   • Atrial fibrillation (HCC)   • Major depressive disorder   • Obstructive sleep apnea syndrome   • Other emphysema (HCC)   • Thrombocytopenia (HCC)   • Ambulatory dysfunction   • Anemia in chronic kidney disease   • Restless leg syndrome   • Polypharmacy   • Diabetic peripheral neuropathy (HCC)   • Iron deficiency anemia   • Depression, recurrent (HCC)   • Suicidal ideation   • Atopic dermatitis of scalp   • Financial difficulties   • Transportation insecurity   • Glaucoma of both eyes   • History of vertebral compression fracture   • COVID-19   • Neoplasm of uncertain behavior of colon     Past Medical History:   Diagnosis Date   • Ambulatory dysfunction    • Anemia    • Anxiety    • Arthritis    •  Atrial fibrillation (HCC)    • CHF (congestive heart failure) (HCC)    • Chronic kidney disease, stage 3 (HCC)    • Chronic ulcer of left foot (HCC)    • Chronic venous hypertension (idiopathic) with ulcer and inflammation of left lower extremity (HCC)    • Congestive heart failure, unspecified HF chronicity, unspecified heart failure type (HCC)    • COPD with acute exacerbation (HCC)    • Encephalopathy    • History of depression    • History of osteomyelitis    • Hyperkalemia    • Hyperlipidemia    • Hypertension    • Hypoglycemia    • Hypomagnesemia    • Hyponatremia    • Hypothyroidism    • Morbid obesity (HCC)    • Pneumonia    • Preop cardiovascular exam    • Pulmonary embolism (HCC)    • RLS (restless legs syndrome)    • Sepsis (HCC)    • Sleep apnea    • Thrombocytopenia (HCC)    • Type 2 diabetes mellitus (HCC)    • Urinary retention    • Vitamin D deficiency      Past Surgical History:   Procedure Laterality Date   • COLON SURGERY Right 06/2024    right side of colon   • GALLBLADDER SURGERY     • TONSILLECTOMY AND ADENOIDECTOMY       Family History   Problem Relation Age of Onset   • Cancer Mother         lymphoma   • Hypertension Mother    • Heart disease Father    • Hypertension Father    • Diabetes Sister    • Cancer Maternal Grandmother       Social History     Socioeconomic History   • Marital status: Single     Spouse name: Not on file   • Number of children: Not on file   • Years of education: Not on file   • Highest education level: Not on file   Occupational History   • Not on file   Tobacco Use   • Smoking status: Never   • Smokeless tobacco: Never   Vaping Use   • Vaping status: Never Used   Substance and Sexual Activity   • Alcohol use: No   • Drug use: No   • Sexual activity: Not Currently     Partners: Female   Other Topics Concern   • Not on file   Social History Narrative   • Not on file     Social Drivers of Health     Financial Resource Strain: High Risk (4/23/2024)    [facility]    Overall Financial Resource Strain (CARDIA)    • Difficulty of Paying Living Expenses: Very hard   Food Insecurity: No Food Insecurity (7/25/2024)    Nursing - Inadequate Food Risk Classification    • Worried About Running Out of Food in the Last Year: Never true    • Ran Out of Food in the Last Year: Never true    • Ran Out of Food in the Last Year: Not on file   Transportation Needs: Unmet Transportation Needs (7/25/2024)    PRAPARE - Transportation    • Lack of Transportation (Medical): Yes    • Lack of Transportation (Non-Medical): Yes   Physical Activity: Not on file   Stress: Not on file   Social Connections: Not on file   Intimate Partner Violence: Not At Risk (4/23/2024)    [facility]    Humiliation, Afraid, Rape, and Kick questionnaire    • Fear of Current or Ex-Partner: No    • Emotionally Abused: No    • Physically Abused: No    • Sexually Abused: No   Housing Stability: Low Risk  (7/25/2024)    Housing Stability Vital Sign    • Unable to Pay for Housing in the Last Year: No    • Number of Times Moved in the Last Year: 0    • Homeless in the Last Year: No        Current Outpatient Medications:   •  acetaminophen (TYLENOL) 650 mg CR tablet, Take 1 tablet (650 mg total) by mouth every 8 (eight) hours as needed for mild pain or moderate pain, Disp: 90 tablet, Rfl: 0  •  Acidophilus Lactobacillus CAPS, Take by mouth, Disp: , Rfl:   •  albuterol (PROVENTIL HFA,VENTOLIN HFA) 90 mcg/act inhaler, Inhale 2 puffs every 6 (six) hours as needed for wheezing, Disp: 3 Inhaler, Rfl: 3  •  ammonium lactate (LAC-HYDRIN) 12 % cream, Apply topically in the morning To feet and leg dry skin, Disp: 385 g, Rfl: 3  •  atorvastatin (LIPITOR) 40 mg tablet, TAKE 1 TABLET BY MOUTH ONCE DAILY., Disp: 90 tablet, Rfl: 1  •  azelastine (ASTELIN) 0.1 % nasal spray, 2 sprays into each nostril in the morning (Patient  taking differently: 2 sprays into each nostril as needed), Disp: 30 mL, Rfl: 11  •  baclofen 10 mg tablet, , Disp: , Rfl:   •  Blood Glucose Monitoring Suppl (OneTouch Verio Reflect) w/Device KIT, Check blood sugars four times daily. Please substitute with appropriate alternative as covered by patient's insurance. Dx: E11.65, Disp: 1 kit, Rfl: 0  •  cetirizine (ZyrTEC) 10 mg tablet, TAKE 1 TABLET BY MOUTH ONCE DAILY., Disp: 90 tablet, Rfl: 1  •  citalopram (CeleXA) 40 mg tablet, Take 1 tablet (40 mg total) by mouth daily, Disp: 90 tablet, Rfl: 1  •  clobetasol propionate (Clobex) 0.05 % shampoo, Apply 1 application topically 3 (three) times a week, Disp: 118 mL, Rfl: 3  •  clonazePAM (KlonoPIN) 0.5 mg tablet, TAKE 1 TABLET (0.5 MG TOTAL) BY MOUTH EVERY 12 (TWELVE) HOURS AS NEEDED FOR ANXIETY, Disp: 60 tablet, Rfl: 0  •  docusate sodium (COLACE) 100 mg capsule, Take 100 mg by mouth 2 (two) times a day, Disp: , Rfl:   •  Easy Touch Pen Needles 31G X 8 MM MISC, Use 1 each 3 (three) times a day, Disp: , Rfl:   •  Eliquis 5 MG, TAKE 1 TABLET BY MOUTH TWO TIMES A DAY., Disp: 180 tablet, Rfl: 1  •  ergocalciferol (ERGOCALCIFEROL) 1.25 MG (60172 UT) capsule, Take 1 capsule (50,000 Units total) by mouth once a week, Disp: 12 capsule, Rfl: 1  •  ferrous sulfate 325 (65 Fe) mg tablet, Take 325 mg by mouth daily with breakfast, Disp: , Rfl:   •  folic acid (FOLVITE) 1 mg tablet, TAKE 1 TABLET BY MOUTH ONCE DAILY., Disp: 90 tablet, Rfl: 1  •  furosemide (LASIX) 40 mg tablet, Take 1 tablet (40 mg total) by mouth daily, Disp: 90 tablet, Rfl: 0  •  gabapentin (NEURONTIN) 600 MG tablet, Take 600 mg by mouth 2 (two) times a day, Disp: , Rfl:   •  glucose blood (OneTouch Verio) test strip, Check blood sugars four times daily. Please substitute with appropriate alternative as covered by patient's insurance. Dx: E11.65, Disp: 400 each, Rfl: 3  •  insulin aspart (NovoLOG FlexPen) 100 UNIT/ML injection pen, Inject 26 Units under the skin  3 (three) times a day with meals Per sliding scale - managed by Dr. Tabares + scheduled, Disp: 15 mL, Rfl: 0  •  insulin glargine (Toujeo Max SoloStar) 300 units/mL CONCENTRATED U-300 injection pen (2-unit dial), Inject 66 Units under the skin daily, Disp: 6 mL, Rfl: 2  •  ketoconazole (NIZORAL) 2 % shampoo, Apply 1 Application topically 2 (two) times a week To feet in shower, Disp: 120 mL, Rfl: 0  •  lamoTRIgine (LaMICtal) 25 mg tablet, Take 1 tablet (25 mg total) by mouth 2 (two) times a day, Disp: 180 tablet, Rfl: 1  •  latanoprost (XALATAN) 0.005 % ophthalmic solution, Administer 0.005 drops to both eyes, Disp: , Rfl:   •  levothyroxine 300 MCG tablet, Take 1 tablet (300 mcg total) by mouth daily Take with 50 mcg tablet, Disp: 90 tablet, Rfl: 0  •  Linzess 290 MCG CAPS, TAKE 1 CAPSULE BY MOUTH ONCE DAILY., Disp: 90 capsule, Rfl: 1  •  lisinopril (ZESTRIL) 2.5 mg tablet, TAKE 1 TABLET BY MOUTH ONCE DAILY., Disp: 90 tablet, Rfl: 1  •  magnesium Oxide (MAG-OX) 400 mg TABS, TAKE 1 TABLET BY MOUTH TWO TIMES A DAY., Disp: 180 tablet, Rfl: 1  •  metoprolol tartrate (LOPRESSOR) 25 mg tablet, Take 1 tablet by mouth (25 mg) 1 hour prior to coronary CTA.  Hold for heart rate less than 50 bpm., Disp: 1 tablet, Rfl: 0  •  nystatin (MYCOSTATIN) powder, Apply topically as needed, Disp: , Rfl:   •  OneTouch Delica Lancets 33G MISC, Check blood sugars four times daily. Please substitute with appropriate alternative as covered by patient's insurance. Dx: E11.65, Disp: 400 each, Rfl: 3  •  oxyCODONE (OXY-IR) 5 MG capsule, Take 5 mg by mouth as needed for moderate pain (Patient not taking: Reported on 12/18/2024), Disp: , Rfl:   •  pantoprazole (PROTONIX) 40 mg tablet, TAKE 1 TABLET (40 MG TOTAL) BY MOUTH DAILY, Disp: 90 tablet, Rfl: 1  •  potassium chloride (Klor-Con M20) 20 mEq tablet, TAKE 1 TABLET BY MOUTH EACH MORNING., Disp: 90 tablet, Rfl: 1  •  rOPINIRole (REQUIP) 1 mg tablet, Take 1 mg by mouth 3 (three) times a day,  Disp: , Rfl:   •  tamsulosin (FLOMAX) 0.4 mg, TAKE 1 CAPSULE DAILY WITH DINNER, Disp: 100 capsule, Rfl: 1  •  timolol (TIMOPTIC) 0.5 % ophthalmic solution, Administer 1 drop to the right eye 2 (two) times a day, Disp: , Rfl:   •  Tirzepatide (Mounjaro) 12.5 MG/0.5ML SOAJ, Inject 12.5 mg under the skin once a week, Disp: 2 mL, Rfl: 0  •  vitamin B-12 (VITAMIN B-12) 1,000 mcg tablet, Take 1 tablet (1,000 mcg total) by mouth daily, Disp: 90 tablet, Rfl: 1    Review of Systems   Constitutional:  Negative for chills, fever and unexpected weight change.   Respiratory:  Negative for shortness of breath.    Cardiovascular:  Positive for leg swelling. Negative for chest pain and palpitations.   Musculoskeletal:  Positive for gait problem.   Skin:  Positive for wound.   Psychiatric/Behavioral:  The patient is not nervous/anxious.        Objective:  /70   Pulse 67   Temp (!) 97 °F (36.1 °C)   Resp 18   Pain Score: 0-No pain     Physical Exam  Vitals reviewed.   Constitutional:       General: He is not in acute distress.     Appearance: He is not ill-appearing, toxic-appearing or diaphoretic.      Comments: No acute distress, disheveled in appearance.  BMI > 49   Cardiovascular:      Rate and Rhythm: Normal rate.      Comments: Despite edema, was able to palpate DP/PT bilaterally which are diminished  Pulmonary:      Effort: Pulmonary effort is normal. No respiratory distress.   Musculoskeletal:      Right lower leg: Edema present.      Left lower leg: Edema present.   Feet:      Right foot:      Toenail Condition: Fungal disease present.     Left foot:      Toenail Condition: Fungal disease present.     Comments: L foot healed    Tinea pedis b/l   Skin:     General: Skin is warm and dry.      Findings: Wound present.             Comments: 1- Small ulceration lateral right lower extremity near ankle with copious serous drainage.  No acute erythema, increased warmth, lymphangitic streaking.  No purulence or malodor.  No  induration, fluctuance, crepitus.  Bilateral lower extremities with evidence of chronic venous insufficiency/venous stasis with areas of hyperpigmentation/hemosiderin staining, dry flaking skin, lower extremity edema.  Stemmers sign positive bilaterally consistent with lymphedema      Lymphedema with positive Stemmer sign   Neurological:      Mental Status: He is alert and oriented to person, place, and time.   Psychiatric:         Mood and Affect: Mood normal.         Behavior: Behavior normal.         Wound 02/14/25 Diabetic Ulcer Plantar Left (Active)   Enter Palm score: Palm Grade 1: Partial or full-thickness ulcer (superficial) 02/14/25 1243   Wound Image   03/07/25 1001   Wound Description Pink;Epithelialization 03/07/25 0958   Serene-wound Assessment Dry;Scaly;Callus 03/07/25 0958   Wound Length (cm) 0 cm 03/07/25 0958   Wound Width (cm) 0 cm 03/07/25 0958   Wound Depth (cm) 0 cm 03/07/25 0958   Wound Surface Area (cm^2) 0 cm^2 03/07/25 0958   Wound Volume (cm^3) 0 cm^3 03/07/25 0958   Calculated Wound Volume (cm^3) 0 cm^3 03/07/25 0958   Drainage Amount None 03/07/25 0958   Non-staged Wound Description Full thickness 02/14/25 1243       Wound 03/07/25 Venous Ulcer Leg Posterior;Right;Lower (Active)   Wound Image   03/07/25 1023   Wound Description Pink 03/07/25 1032   Serene-wound Assessment Edema;Scaly;Dry 03/07/25 1032   Wound Length (cm) 4 cm 03/07/25 1032   Wound Width (cm) 3 cm 03/07/25 1032   Wound Depth (cm) 0.1 cm 03/07/25 1032   Wound Surface Area (cm^2) 12 cm^2 03/07/25 1032   Wound Volume (cm^3) 1.2 cm^3 03/07/25 1032   Calculated Wound Volume (cm^3) 1.2 cm^3 03/07/25 1032   Drainage Amount Moderate 03/07/25 1032   Drainage Description Clear 03/07/25 1032   Non-staged Wound Description Full thickness 03/07/25 1032             Lab Results   Component Value Date    HGBA1C 10 (A) 02/07/2025       Wound Instructions:  Orders Placed This Encounter   Procedures   • Wound cleansing and dressings Diabetic  Ulcer Left Plantar     Wound cleansing and dressings Diabetic Ulcer Left Plantar wound healed   New wound right lower leg          Wash lboth feet and legs  with antifungal wash 3 times a week.   The dressing must stay dry and intact. You may shower with dressing protected by cast cover or bag. Or you may sponge bathe. Call wound center or home health nurse if dressing becomes wet.   On dressing change days, cleanse the wound with wound cleanser or mild soap and water, rinse, pat dry.      Apply silver alginate  to right posterior lower left leg wound  Cover with super absorbant secure with karlee and tape   Change dressings 3 weeks   Apply amlactin lotion to feet and legs for dry skin.    Please apply lotion 2 times a day   Spandagrip Size G    Tubular elastic bandage: Apply from base of toes to behind the knee. Apply in AM, may remove for sleep.  Avoid prolonged standing in one place.  Elevate leg(s) above the level of the heart when sitting or as much as possible.    If you develop fever, chills, nausea or vomiting, increase in drainage or foul smelling drainage go to the closest emergency department for evaluation.      Home health for wound care     Follow up in 2 weeks     Standing Status:   Future     Expiration Date:   3/14/2025   • Wound Procedure Treatment Venous Ulcer Posterior;Right;Lower Leg     This order was created via procedure documentation   • EXTERNAL Referral to Home Health     Standing Status:   Future     Expected Date:   3/10/2025     Expiration Date:   3/7/2026     Referral Priority:   Routine     Referral Type:   Home Health     Referral Reason:   Specialty Services Required     Referred to Provider:   [facility]     Requested Specialty:   Home Health Services     Number of Visits Requested:   1     Expiration Date:   3/7/2026       Selena Alvarez MD

## 2025-03-07 ENCOUNTER — OFFICE VISIT (OUTPATIENT)
Facility: CLINIC | Age: 60
End: 2025-03-07
Payer: COMMERCIAL

## 2025-03-07 VITALS
SYSTOLIC BLOOD PRESSURE: 158 MMHG | TEMPERATURE: 97 F | HEART RATE: 67 BPM | RESPIRATION RATE: 18 BRPM | DIASTOLIC BLOOD PRESSURE: 70 MMHG

## 2025-03-07 DIAGNOSIS — I87.2 VENOUS ULCER OF RIGHT LOWER EXTREMITY WITHOUT VARICOSE VEINS (HCC): ICD-10-CM

## 2025-03-07 DIAGNOSIS — I89.0 LYMPHEDEMA: ICD-10-CM

## 2025-03-07 DIAGNOSIS — Z79.4 TYPE 2 DIABETES MELLITUS WITH HYPERGLYCEMIA, WITH LONG-TERM CURRENT USE OF INSULIN (HCC): ICD-10-CM

## 2025-03-07 DIAGNOSIS — F41.9 ANXIETY: ICD-10-CM

## 2025-03-07 DIAGNOSIS — L85.3 XEROSIS CUTIS: ICD-10-CM

## 2025-03-07 DIAGNOSIS — I87.8 VENOUS STASIS: ICD-10-CM

## 2025-03-07 DIAGNOSIS — E55.9 VITAMIN D DEFICIENCY: ICD-10-CM

## 2025-03-07 DIAGNOSIS — E08.621 DIABETIC ULCER OF TOE OF LEFT FOOT ASSOCIATED WITH DIABETES MELLITUS DUE TO UNDERLYING CONDITION, LIMITED TO BREAKDOWN OF SKIN (HCC): Primary | ICD-10-CM

## 2025-03-07 DIAGNOSIS — B35.3 TINEA PEDIS OF BOTH FEET: ICD-10-CM

## 2025-03-07 DIAGNOSIS — L97.919 VENOUS ULCER OF RIGHT LOWER EXTREMITY WITHOUT VARICOSE VEINS (HCC): ICD-10-CM

## 2025-03-07 DIAGNOSIS — L97.521 DIABETIC ULCER OF TOE OF LEFT FOOT ASSOCIATED WITH DIABETES MELLITUS DUE TO UNDERLYING CONDITION, LIMITED TO BREAKDOWN OF SKIN (HCC): Primary | ICD-10-CM

## 2025-03-07 DIAGNOSIS — E11.65 TYPE 2 DIABETES MELLITUS WITH HYPERGLYCEMIA, WITH LONG-TERM CURRENT USE OF INSULIN (HCC): ICD-10-CM

## 2025-03-07 PROCEDURE — 99213 OFFICE O/P EST LOW 20 MIN: CPT | Performed by: FAMILY MEDICINE

## 2025-03-07 PROCEDURE — 99204 OFFICE O/P NEW MOD 45 MIN: CPT | Performed by: FAMILY MEDICINE

## 2025-03-07 RX ORDER — ERGOCALCIFEROL 1.25 MG/1
50000 CAPSULE, LIQUID FILLED ORAL WEEKLY
Qty: 4 CAPSULE | Refills: 5 | Status: SHIPPED | OUTPATIENT
Start: 2025-03-07

## 2025-03-07 RX ORDER — CLONAZEPAM 0.5 MG/1
TABLET ORAL
Qty: 60 TABLET | Refills: 0 | Status: SHIPPED | OUTPATIENT
Start: 2025-03-07

## 2025-03-07 NOTE — LETTER
[facility]  [address] [address]  Phone#  [phone]    Patient:  Juan Antonio Guy  YOB: 1965  Phone:  [phone]  Date of Visit:  3/7/2025    Orders Placed This Encounter   Procedures   • Wound cleansing and dressings Diabetic Ulcer Left Plantar     Wound cleansing and dressings Diabetic Ulcer Left Plantar wound healed   New wound right lower leg          Wash lboth feet and legs  with antifungal wash 3 times a week.   The dressing must stay dry and intact. You may shower with dressing protected by cast cover or bag. Or you may sponge bathe. Call wound center or home health nurse if dressing becomes wet.   On dressing change days, cleanse the wound with wound cleanser or mild soap and water, rinse, pat dry.      Apply silver alginate  to right posterior lower left leg wound  Cover with super absorbant secure with karlee and tape   Change dressings 3 weeks   Apply amlactin lotion to feet and legs for dry skin.    Please apply lotion 2 times a day   Spandagrip Size G    Tubular elastic bandage: Apply from base of toes to behind the knee. Apply in AM, may remove for sleep.  Avoid prolonged standing in one place.  Elevate leg(s) above the level of the heart when sitting or as much as possible.    If you develop fever, chills, nausea or vomiting, increase in drainage or foul smelling drainage go to the closest emergency department for evaluation.      Home health for wound care     Follow up in 2 weeks     Standing Status:   Future     Expiration Date:   3/14/2025   • Wound Procedure Treatment Venous Ulcer Posterior;Right;Lower Leg     This order was created via procedure documentation   • EXTERNAL Referral to Home Health     Standing Status:   Future     Expected Date:   3/10/2025     Expiration Date:   3/7/2026     Referral Priority:   Routine     Referral Type:   Home Health     Referral Reason:   Specialty Services Required     Referred to  Provider:   [facility]     Requested Specialty:   Home Health Services     Number of Visits Requested:   1     Expiration Date:   3/7/2026         Electronically signed by Selena Alvarez MD

## 2025-03-07 NOTE — PATIENT INSTRUCTIONS
Orders Placed This Encounter   Procedures    Wound cleansing and dressings Diabetic Ulcer Left Plantar     Wound cleansing and dressings Diabetic Ulcer Left Plantar wound healed   New wound right lower leg          Wash lboth feet and legs  with antifungal wash 3 times a week.   The dressing must stay dry and intact. You may shower with dressing protected by cast cover or bag. Or you may sponge bathe. Call wound center or home health nurse if dressing becomes wet.   On dressing change days, cleanse the wound with wound cleanser or mild soap and water, rinse, pat dry.      Apply silver alginate  to right posterior lower left leg wound  Cover with super absorbant secure with karlee and tape   Change dressings 3 weeks   Apply amlactin lotion to feet and legs for dry skin.    Please apply lotion 2 times a day   Spandagrip Size G    Tubular elastic bandage: Apply from base of toes to behind the knee. Apply in AM, may remove for sleep.  Avoid prolonged standing in one place.  Elevate leg(s) above the level of the heart when sitting or as much as possible.    If you develop fever, chills, nausea or vomiting, increase in drainage or foul smelling drainage go to the closest emergency department for evaluation.      Home health for wound care     Follow up in 2 weeks     Standing Status:   Future     Expiration Date:   3/14/2025

## 2025-03-07 NOTE — PROGRESS NOTES
Wound Procedure Treatment Venous Ulcer Posterior;Right;Lower Leg    Performed by: Lilly Maharaj RN  Authorized by: Selena Alvarez MD  Associated wounds:   Wound 03/07/25 Venous Ulcer Leg Posterior;Right;Lower    Wound cleansed with:  Soap and water   Applied to periwound:  Moisture lotion   Applied primary dressing:  Silver and Calcium alginate   Applied secondary dressing:  Superabsorber   Dressing secured with:  Kerlix, Size G, Tape and Elastic tubular stocking

## 2025-03-12 ENCOUNTER — OFFICE VISIT (OUTPATIENT)
Dept: OBGYN CLINIC | Facility: CLINIC | Age: 60
End: 2025-03-12
Payer: COMMERCIAL

## 2025-03-12 VITALS — HEIGHT: 72 IN | WEIGHT: 315 LBS | BODY MASS INDEX: 42.66 KG/M2

## 2025-03-12 DIAGNOSIS — M75.51 BURSITIS OF RIGHT SHOULDER: ICD-10-CM

## 2025-03-12 DIAGNOSIS — G89.29 CHRONIC RIGHT SHOULDER PAIN: Primary | ICD-10-CM

## 2025-03-12 DIAGNOSIS — M75.41 IMPINGEMENT SYNDROME OF RIGHT SHOULDER: ICD-10-CM

## 2025-03-12 DIAGNOSIS — M75.21 BICIPITAL TENDINITIS OF RIGHT SHOULDER: ICD-10-CM

## 2025-03-12 DIAGNOSIS — M25.511 CHRONIC RIGHT SHOULDER PAIN: Primary | ICD-10-CM

## 2025-03-12 PROCEDURE — 20610 DRAIN/INJ JOINT/BURSA W/O US: CPT | Performed by: STUDENT IN AN ORGANIZED HEALTH CARE EDUCATION/TRAINING PROGRAM

## 2025-03-12 PROCEDURE — 99213 OFFICE O/P EST LOW 20 MIN: CPT | Performed by: STUDENT IN AN ORGANIZED HEALTH CARE EDUCATION/TRAINING PROGRAM

## 2025-03-12 RX ADMIN — BUPIVACAINE HYDROCHLORIDE 2 ML: 2.5 INJECTION, SOLUTION INFILTRATION; PERINEURAL at 11:00

## 2025-03-12 RX ADMIN — TRIAMCINOLONE ACETONIDE 40 MG: 40 INJECTION, SUSPENSION INTRA-ARTICULAR; INTRAMUSCULAR at 11:00

## 2025-03-12 NOTE — PROGRESS NOTES
1. Chronic right shoulder pain  Ambulatory Referral to Physical Therapy    Large joint arthrocentesis: R subacromial bursa      2. Impingement syndrome of right shoulder  Ambulatory Referral to Physical Therapy    Large joint arthrocentesis: R subacromial bursa      3. Bicipital tendinitis of right shoulder  Ambulatory Referral to Physical Therapy      4. Bursitis of right shoulder  Ambulatory Referral to Physical Therapy    Large joint arthrocentesis: R subacromial bursa            Orders Placed This Encounter   Procedures    Large joint arthrocentesis: R subacromial bursa    Ambulatory Referral to Physical Therapy        Imaging Studies (I personally reviewed images in PACS and report):    MRI right shoulder contrast 2025:  Supraspinatus and subscapularis tendinosis as described. No full-thickness rotator cuff tear.     Mild subacromial/subdeltoid bursitis.     Intra-articular long head biceps tendinosis with small focal split tear of the extra-articular long head biceps.    X-ray right shoulder 2024: No acute osseous abnormalities.  No significant degenerative changes.  Soft tissue is unremarkable.    X-ray right shoulder 2023: [facility].  Only report is available notin. Soft tissue calcifications in the right shoulder which may relate to a   calcific tendinitis and/or bursitis.   2. Mild juxta-articular osseous degenerative changes are present bilaterally.       IMPRESSION:  Chronic right shoulder pain, stiffness  Reportedly only brief relief from subacromial cortisone injection concurrent with formal PT  MRI noting rotator cuff tendinosis/bursitis without full-thickness rotator cuff tear, intra-articular long head biceps tendinosis with small tear    Other factors:  CHF  CKD3  Afib  T2DM complicated with neuropathy, diabetic ulcers-A1c 8.0 on 2024  BMI 53    PLAN:    Clinical exam and radiographic imaging reviewed with patient today, with impression as per above. I have discussed with the  "patient the pathophysiology of this diagnosis and reviewed how the examination correlates with this diagnosis.   MRI reviewed as noted above.  Counseled patient that there were no findings that absolutely warrant surgical intervention.  Counseled that tendinosis/bursitis is typically a syndrome treated conservatively with physical therapy, pain control with either acetaminophen, CSI injections.    Counts we could consider repeating subacromial cortisone injection continue formal PT at this time.  Patient was agreeable to this plan and procedure was performed as per below below.  Counseled we can repeat injection every 3 to 4 months as needed regards to pain control.  There is evidence of a partial tear of his long head of his biceps tendon.  I had considered doing ultrasound-guided biceps tendon sheath cortisone injection but on clinical exam patient does not appear tender in this region of his shoulder nor is his pain exacerbated with bicipital testing with speeds/Yergason's.  Could be a consideration in the future though if pain changes.  Did offer second opinion from orthopedic surgery to refer but patient feels he would want to continue treating conservatively at this time.  I advised against oral NSAIDs given his history of CKD 3.    Return in about 3 months (around 6/12/2025).      Portions of the record may have been created with voice recognition software. Occasional wrong word or \"sound a like\" substitutions may have occurred due to the inherent limitations of voice recognition software. Read the chart carefully and recognize, using context, where substitutions have occurred.     CHIEF COMPLAINT:  Follow up right shoulder    HPI:  Juan Antonio Guy is a 59 y.o. male  who presents for     Visit 3/12/2025:  Follow-up right shoulder pain  MRI was obtained since last visit and reviewed as noted above  Patient reports continued pain over his lateral and posterior shoulder.  Aggravated with range of motion " movements especially reaching above shoulder height behind his back.  He states it is aggravated when transitioning from sitting to standing as he uses a rollator to push himself up from sitting.  No pain at rest.  Denies N/T of right upper extremity.  Limited relief from PT    Visit 12/18/2024:  Follow up right shoulder pain  History as per below. Pertinent PMHx as above  On last visit patient had undergone subacromial CSI and has also been attending PT  Patient reports no improvement of pain/stiffness/aching of right shoulder despite these interventions and has difficulty reaching above shoulder height. Reports shoulder crepitus with motion.  Denies new injuries since last visit. Denies N/T of RUE.    Visit 9/18/2024:  Follow-up right shoulder and left knee    Seen recently in regards to right shoulder pain in which he was given a subacromial cortisone injection.  Patient states he did not notice any relief at first but after about a week or so he noticed improvement in regards to his shoulder pain and stiffness.  He has attended 1 session of PT as well.  He states it is not completely resolved but it is much better than before and that he has mild intermittent pain of his shoulder and slightly more range of motion.  Denies shoulder swelling, discoloration.  He does report continued intermittent crepitus of the shoulder but that this was always a chronic issue.    In regards to his left knee pain/osteoarthritis, he asked he states that the pain has not been bad over the past week.  He does use a walker for mobility.  However, he does not feel he is in need to undergo a cortisone injection of his left knee today.  He may consider in the future as he reports previously undergone intra-articular cortisone injections of his knee in the past.    Visit 9/4/2024 :  Initial evaluation of right shoulder pain/left knee pain:  Patient had radiographs ordered from his PCP already of his right shoulder and left knee and these  will be obtained as noted above.  Pertinent PMHx as above  Reports ongoing right shoulder pain for years but recently worsening over the past couple weeks without a precipitating injury.  He gives example of pain exacerbated when reaching outward from his couch to  objects and feeling a sharp/tight/aching pain of his shoulder.  He noticed that he was having increased stiffness with range of motion's in general and difficulty reaching above shoulder height on his back.  He reports clicking sensation of his shoulder but states is a chronic issue.  Denies shoulder swelling or discoloration.  Has been taking Tylenol without relief.  He reports that he is not supposed to be taking NSAIDs.  He has not had a cortisone injection of his right shoulder before.  He denies prior surgeries of his right shoulder in the past.  He has not seen formal PT for his shoulder previously.    Separately at the end of visit he also had complaints of his left knee pain:  X-ray imaging of his affected knee from his PCP was completed today as per above.  Briefly, patient denies a precipitating injury but notes pain within his knee with prolonged weightbearing.  He states he has had cortisone injections of his left knee in the past due to arthritis.  Unsure how long injections lasted.        Medical, Surgical, Family, and Social History    Past Medical History:   Diagnosis Date    Ambulatory dysfunction     Anemia     Anxiety     Arthritis     Atrial fibrillation (HCC)     CHF (congestive heart failure) (HCC)     Chronic kidney disease, stage 3 (HCC)     Chronic ulcer of left foot (HCC)     Chronic venous hypertension (idiopathic) with ulcer and inflammation of left lower extremity (HCC)     Congestive heart failure, unspecified HF chronicity, unspecified heart failure type (HCC)     COPD with acute exacerbation (HCC)     Encephalopathy     History of depression     History of osteomyelitis     Hyperkalemia     Hyperlipidemia      "Hypertension     Hypoglycemia     Hypomagnesemia     Hyponatremia     Hypothyroidism     Morbid obesity (HCC)     Pneumonia     Preop cardiovascular exam     Pulmonary embolism (HCC)     RLS (restless legs syndrome)     Sepsis (HCC)     Sleep apnea     Thrombocytopenia (HCC)     Type 2 diabetes mellitus (HCC)     Urinary retention     Vitamin D deficiency      Past Surgical History:   Procedure Laterality Date    COLON SURGERY Right 06/2024    right side of colon    GALLBLADDER SURGERY      TONSILLECTOMY AND ADENOIDECTOMY       Social History   Social History     Substance and Sexual Activity   Alcohol Use No     Social History     Substance and Sexual Activity   Drug Use No     Social History     Tobacco Use   Smoking Status Never   Smokeless Tobacco Never     Family History   Problem Relation Age of Onset    Cancer Mother         lymphoma    Hypertension Mother     Heart disease Father     Hypertension Father     Diabetes Sister     Cancer Maternal Grandmother      Allergies   Allergen Reactions    Carbamazepine Other (See Comments)     Elevated liver functions -\"Enlarges liver\"    Clindamycin Rash    Phenytoin Itching and Rash    Pneumococcal Vaccine Hives, Itching and Rash          Physical Exam  Ht 6' (1.829 m)   Wt (!) 166 kg (367 lb)   BMI 49.77 kg/m²     Constitutional:  see vital signs  Gen: Obese/BMI 52, normocephalic/atraumatic, well-groomed  Pulmonary/Chest: Effort normal. No respiratory distress.     Ortho Exam  Shoulder exam:       RIGHT    Inspection Erythema None     Swelling None     Increased Warmth None    Rotator cuff (resisted testing aggravates general shoulder pain per patient) ER 5-/5     IR 5/5     Abduction 5-/5    ROM FF AROM 140, PROM 160     Abduction AROM 120, PROM 140     ER0 40     IRb L5    TTP:  Lateral aspect over the subacromial region.  Denies pain when palpating over anterior glenohumeral joint line, bicipital groove.  Positive tenderness over posterior aspect over " supraspinatus/infraspinatus      Gait: Uses Rollator for mobility.  Has to push himself up with a rollator from a sitting position.    Large joint arthrocentesis: R subacromial bursa  [place] Protocol:  procedure performed by consultantConsent: Verbal consent obtained.  Risks and benefits: risks, benefits and alternatives were discussed  Consent given by: patient  Patient understanding: patient states understanding of the procedure being performed  Site marked: the operative site was marked  Radiology Images displayed and confirmed. If images not available, report reviewed: imaging studies available  Patient identity confirmed: verbally with patient  Supporting Documentation  Indications: pain   Procedure Details  Location: shoulder - R subacromial bursa  Preparation: Patient was prepped and draped in the usual sterile fashion  Needle size: 22 G  Ultrasound guidance: no  Approach: posterior  Medications administered: 40 mg triamcinolone acetonide 40 mg/mL; 2 mL bupivacaine 0.25 %    Patient tolerance: patient tolerated the procedure well with no immediate complications  Dressing:  Sterile dressing applied

## 2025-03-13 ENCOUNTER — OFFICE VISIT (OUTPATIENT)
Dept: PHYSICAL THERAPY | Facility: CLINIC | Age: 60
End: 2025-03-13
Payer: COMMERCIAL

## 2025-03-13 DIAGNOSIS — M25.511 ACUTE PAIN OF RIGHT SHOULDER: Primary | ICD-10-CM

## 2025-03-13 PROCEDURE — 97110 THERAPEUTIC EXERCISES: CPT

## 2025-03-13 NOTE — PROGRESS NOTES
Daily Note     Today's date: 3/13/2025  Patient name: Juan Antonio Guy  : 1965  MRN: [id number]  Referring provider: Mo Ceja MD  Dx:   Encounter Diagnosis     ICD-10-CM    1. Acute pain of right shoulder  M25.511           Start Time: 1100  Stop Time: 1155  Total time in clinic (min): 55 minutes    Visit: 1   Subjective: Patient stated having received injection in shoulder yesterday and is sore today.       Objective: See treatment diary below      Assessment: Patient repeated warm up on UBE and TE program. He demonstrated good technique with exercises. No changes with intensity. He noted stiffness in shoulder. Ice applied to shoulder in seated post treat with good skin integrity pre and post. Patient continues to benefit from further PT to progress towards goals.       Plan: Continue per plan of care.      Precautions:           Manuals 2/19/25 2/25/25 3/5/25 3/13 1/22/25    R shd PROM    CK                                     Neuro Re-Ed             Shoulder shrugs  20x 4#  20x 4#  20x 4# 4# 20x  20x 4#    Front raises  20x 4#  20x 4#  20x 4# 4# 20x  20x 4#    Lateral raises 20x 4#   20x 4# 20x 4#  4#20x  20x 4#    Shoulder press   OH press 20x 4#   20x 4#   20x 4#   X10 4# 4# 20x  20x 4#    Seated rows  20x 4#   20x 4#   20x 4#  4# 20x   seated rows 4# 2x10   Ther Ex            UBE(improve R shoulder ROM)  L1 10' L1 10' L1 10'  10 min   L1  L2 10'   Seated Shoulder flexion/abduction with cane NT NT 20x ea  20x each  20x ea    Bicep curls 20x 4#   20x 4#  20x 4#  4# 20x  20x 4#    MTP/LTP seated   GTB  2x10 ea  GTB  2x10 ea   GTB 2x10 ea  GTB 2x10   GTB  2x10 ea    Seated ER/IR with TB   GTB  2x10 ea  GTB  2x10 ea  GTB 2x10 ea  GTB 2x10   GTB  2x10 ea                               Ther Activity                                         Gait Training                                         Modalities             CP PRN  MHP 10' R   MHP 10' R   MHP R 10'   CP post treat   MHP B shoulders 10'

## 2025-03-14 RX ORDER — TRIAMCINOLONE ACETONIDE 40 MG/ML
40 INJECTION, SUSPENSION INTRA-ARTICULAR; INTRAMUSCULAR
Status: COMPLETED | OUTPATIENT
Start: 2025-03-12 | End: 2025-03-12

## 2025-03-14 RX ORDER — BUPIVACAINE HYDROCHLORIDE 2.5 MG/ML
2 INJECTION, SOLUTION INFILTRATION; PERINEURAL
Status: COMPLETED | OUTPATIENT
Start: 2025-03-12 | End: 2025-03-12

## 2025-03-17 ENCOUNTER — OFFICE VISIT (OUTPATIENT)
Dept: PHYSICAL THERAPY | Facility: CLINIC | Age: 60
End: 2025-03-17
Payer: COMMERCIAL

## 2025-03-17 DIAGNOSIS — M25.511 ACUTE PAIN OF RIGHT SHOULDER: Primary | ICD-10-CM

## 2025-03-17 PROCEDURE — 97140 MANUAL THERAPY 1/> REGIONS: CPT

## 2025-03-17 PROCEDURE — 97110 THERAPEUTIC EXERCISES: CPT

## 2025-03-17 NOTE — PROGRESS NOTES
"Daily Note     Today's date: 3/17/2025  Patient name: Juan Antonio Guy  : 1965  MRN: [id number]  Referring provider: Mo Ceja MD  Dx:   Encounter Diagnosis     ICD-10-CM    1. Acute pain of right shoulder  M25.511                      Visit: 2/10   Subjective: Patient reports getting some pain releif in the R shd following his injection last week..the patient states, \"it feels pretty good today, I'm surprised\".      Objective: See treatment diary below      Assessment: Patient began tx session on UBE x 10 min for warm up, and strengthening. Pt demonstrated good technique with exercises. Pt was able to tolerate some R shd PROM with some stretch at end range.  Patient continues to benefit from further PT to progress towards goals.       Plan: Continue per plan of care.      Precautions:           Manuals 2/19/25 2/25/25 3/5/25 3/13 3/17/25      R shd PROM    CK      CK                               Neuro Re-Ed             Shoulder shrugs  20x 4#  20x 4#  20x 4# 4# 20x  20x 4#    Front raises  20x 4#  20x 4#  20x 4# 4# 20x  20x 4#    Lateral raises 20x 4#   20x 4# 20x 4#  4#20x  20x 4#    Shoulder press   OH press 20x 4#   20x 4#   20x 4#   X10 4# 4# 20x  20x 4#    Seated rows  20x 4#   20x 4#   20x 4#  4# 20x   seated rows 4# 2x10   Ther Ex            UBE(improve R shoulder ROM)  L1 10' L1 10' L1 10'  10 min   L1  L2 10'   Seated Shoulder flexion/abduction with cane NT NT 20x ea  20x each  20x ea    Bicep curls 20x 4#   20x 4#  20x 4#  4# 20x  20x 4#    MTP/LTP seated   GTB  2x10 ea  GTB  2x10 ea   GTB 2x10 ea  GTB 2x10   GTB  2x10 ea    Seated ER/IR with TB   GTB  2x10 ea  GTB  2x10 ea  GTB 2x10 ea  GTB 2x10   GTB  2x10 ea                               Ther Activity                                         Gait Training                                         Modalities             CP PRN  MHP 10' R   MHP 10' R   MHP R 10'   post treat   MHP B shoulders 10'                                "

## 2025-03-19 ENCOUNTER — APPOINTMENT (OUTPATIENT)
Dept: PHYSICAL THERAPY | Facility: CLINIC | Age: 60
End: 2025-03-19
Payer: COMMERCIAL

## 2025-03-20 ENCOUNTER — OFFICE VISIT (OUTPATIENT)
Dept: PHYSICAL THERAPY | Facility: CLINIC | Age: 60
End: 2025-03-20
Payer: COMMERCIAL

## 2025-03-20 DIAGNOSIS — M25.511 ACUTE PAIN OF RIGHT SHOULDER: Primary | ICD-10-CM

## 2025-03-20 PROCEDURE — 97140 MANUAL THERAPY 1/> REGIONS: CPT

## 2025-03-20 PROCEDURE — 97110 THERAPEUTIC EXERCISES: CPT

## 2025-03-20 NOTE — PROGRESS NOTES
"Daily Note     Today's date: 3/20/2025  Patient name: Juan Antonio Guy  : 1965  MRN: [id number]  Referring provider: Mo Ceja MD  Dx:   Encounter Diagnosis     ICD-10-CM    1. Acute pain of right shoulder  M25.511           Start Time: 1100  Stop Time: 1155  Total time in clinic (min): 55 minutes    Visit: 3/10   Subjective: Patient stated the injection he received has \"been working\" and is pleased.       Objective: See treatment diary below      Assessment: Patient began tx session on UBE with good response. He demonstrated good technique again this session, with no changes made in intensity. He noted mild soreness in shoulders post DB TE. Patient continues to benefit from further PT to progress towards goals.       Plan: Continue per plan of care.      Precautions:           Manuals 3/20  2/25/25 3/5/25 3/13 3/17/25      R shd PROM    CK      CK                               Neuro Re-Ed             Shoulder shrugs  4# 20x  20x 4#  20x 4# 4# 20x  20x 4#    Front raises  4# 20x  20x 4#  20x 4# 4# 20x  20x 4#    Lateral raises 4# 20x   20x 4# 20x 4#  4#20x  20x 4#    Shoulder press   OH press 4# 20x   20x 4#   20x 4#   X10 4# 4# 20x  20x 4#    Seated rows  4# 20x   20x 4#   20x 4#  4# 20x   seated rows 4# 2x10   Ther Ex            UBE(improve R shoulder ROM)  L1 10' L1 10' L1 10'  10 min   L1  L2 10'   Seated Shoulder flexion/abduction with cane  NT 20x ea  20x each  20x ea    Bicep curls 4# 20x   20x 4#  20x 4#  4# 20x  20x 4#    MTP/LTP seated  GTB 2x10 each   GTB  2x10 ea   GTB 2x10 ea  GTB 2x10   GTB  2x10 ea    Seated ER/IR with TB  GTB  2x10 each   GTB  2x10 ea  GTB 2x10 ea  GTB 2x10   GTB  2x10 ea                               Ther Activity                                         Gait Training                                         Modalities             CP PRN     MHP 10' R   MHP R 10'   post treat   MHP B shoulders 10'                                "

## 2025-03-21 ENCOUNTER — OFFICE VISIT (OUTPATIENT)
Facility: CLINIC | Age: 60
End: 2025-03-21
Payer: COMMERCIAL

## 2025-03-21 VITALS
HEART RATE: 58 BPM | SYSTOLIC BLOOD PRESSURE: 148 MMHG | DIASTOLIC BLOOD PRESSURE: 66 MMHG | TEMPERATURE: 97 F | RESPIRATION RATE: 18 BRPM

## 2025-03-21 DIAGNOSIS — Z79.4 TYPE 2 DIABETES MELLITUS WITH HYPERGLYCEMIA, WITH LONG-TERM CURRENT USE OF INSULIN (HCC): ICD-10-CM

## 2025-03-21 DIAGNOSIS — E11.65 TYPE 2 DIABETES MELLITUS WITH HYPERGLYCEMIA, WITH LONG-TERM CURRENT USE OF INSULIN (HCC): ICD-10-CM

## 2025-03-21 DIAGNOSIS — I87.2 VENOUS ULCER OF RIGHT LOWER EXTREMITY WITHOUT VARICOSE VEINS (HCC): Primary | ICD-10-CM

## 2025-03-21 DIAGNOSIS — L97.919 VENOUS ULCER OF RIGHT LOWER EXTREMITY WITHOUT VARICOSE VEINS (HCC): Primary | ICD-10-CM

## 2025-03-21 PROCEDURE — 99212 OFFICE O/P EST SF 10 MIN: CPT | Performed by: FAMILY MEDICINE

## 2025-03-21 PROCEDURE — 99213 OFFICE O/P EST LOW 20 MIN: CPT | Performed by: FAMILY MEDICINE

## 2025-03-21 NOTE — ASSESSMENT & PLAN NOTE
Lab Results   Component Value Date    HGBA1C 10 (A) 02/07/2025     Goal A1c <7% .   On Additional Therapies:  Statin: yes  ACEI/ARB: yes    Assessment:   Tolerating Mounjaro 12.5 milligrams weekly.  Per endo goal is to maximize dose of Mounjaro to 15 mg weekly.  He put a new Dexcom sensor on today during our phone call and plan to follow-up in 2 weeks to go through glucose report with him.     Changes to medication regimen:  Toujeo: Continue 66 units daily   Mounjaro: Increase to 15 mg weekly  NovoLog: Continue 26 units 3 times a day plus sliding scale    Orders:    Tirzepatide (Mounjaro) 15 MG/0.5ML SOAJ; Inject 15 mg under the skin once a week

## 2025-03-21 NOTE — PROGRESS NOTES
"Patient ID: Juan Antonio Guy is a 59 y.o. male Date of Birth 1965       Chief Complaint   Patient presents with   • Follow Up Wound Care Visit     Leg wounds       Allergies:  Carbamazepine, Clindamycin, Phenytoin, and Pneumococcal vaccine    Diagnosis:      Diagnosis ICD-10-CM Associated Orders   1. Venous ulcer of right lower extremity without varicose veins ([facility])  I87.2 Wound cleansing and dressings    L97.919 Wound Procedure Treatment Venous Ulcer Posterior;Right;Lower Leg      2. Type 2 diabetes mellitus with hyperglycemia, with long-term current use of insulin ([facility])  E11.65     Z79.4               Assessment & Plan:  VSU RLE: HEALED!  Skin remains very dry and flaking however wounds are healed/completely epithelialized.  Continue to recommend moisturizing with AmLactin  He again arrived today without compression.  Again reviewed and discussed the importance of long-term edema control and use of long-term compression. Recommended gentle compression stockings.   Counseled on importance of frequent elevation of leg and increase exercise/walking.  Moisturize skin daily with skin nourishing cream.   Continue to follow regularly with podiatry  Type 2 diabetes: Recommend he continue to follow closely with his providers for improving diabetic control.  Importance of this discussed.  He expressed understanding  Follow up as needed or call with questions or concerns    Portions of the record may have been created with voice recognition software. Occasional wrong word or \"sound alike\" substitutions may have occurred due to the inherent limitations of voice recognition software. Read the chart carefully and recognize, using context, where substitutions have occurred.    Subjective:   3/7/2025: Juan Antonio is a 59-year-old male with a past medical history including but not limited to uncontrolled diabetes, hypertension, lymphedema, ambulatory dysfunction, COPD, CHF who presents to the wound center today for follow-up " visit.  He initially presented to the wound center last week due to concerns for left diabetic foot wound.  He was recommended to utilize antifungal foot wash twice a week, apply AmLactin to his legs, and apply Dermagran to his wound.  In addition he was instructed to utilize compression with mammograms.  When he arrived to the wound center there was no dressing in place.  He reports he has not had fever or chills.  States his leg swelling is chronic.  Denies other new acute complaints today.    3/21/2025: Juan Antonio presents today for follow-up.  He reports he is doing well and has no new acute issues.  Denies fever, chills. He states he has not had drainage for a few days.  He states he has been trying to wear the Spandagrip but was not able to get it on this am.         The following portions of the patient's history were reviewed and updated as appropriate:   Patient Active Problem List   Diagnosis   • Cellulitis of right lower extremity   • Diabetic foot ulcer (HCC)   • Type 2 diabetes mellitus with hyperglycemia, with long-term current use of insulin (HCC)   • Morbid obesity (HCC)   • Hypothyroid   • Anxiety   • Neuropathy   • Essential hypertension   • H/O osteomyelitis   • Hx pulmonary embolism   • Encounter for medical examination to establish care   • Diabetic foot ulcer associated with type 2 diabetes mellitus (HCC)   • Chronic venous hypertension (idiopathic) with ulcer and inflammation of left lower extremity (HCC)   • COPD with acute exacerbation (HCC)   • Congestive heart failure (HCC)   • Atrial fibrillation (HCC)   • Major depressive disorder   • Obstructive sleep apnea syndrome   • Other emphysema (HCC)   • Thrombocytopenia (HCC)   • Ambulatory dysfunction   • Anemia in chronic kidney disease   • Restless leg syndrome   • Polypharmacy   • Diabetic peripheral neuropathy (HCC)   • Iron deficiency anemia   • Depression, recurrent ([facility])   • Suicidal ideation   • Atopic dermatitis of scalp   • Financial  difficulties   • Transportation insecurity   • Glaucoma of both eyes   • History of vertebral compression fracture   • COVID-19   • Neoplasm of uncertain behavior of colon     Past Medical History:   Diagnosis Date   • Ambulatory dysfunction    • Anemia    • Anxiety    • Arthritis    • Atrial fibrillation (HCC)    • CHF (congestive heart failure) (HCC)    • Chronic kidney disease, stage 3 (HCC)    • Chronic ulcer of left foot (HCC)    • Chronic venous hypertension (idiopathic) with ulcer and inflammation of left lower extremity (HCC)    • Congestive heart failure, unspecified HF chronicity, unspecified heart failure type (HCC)    • COPD with acute exacerbation (HCC)    • Encephalopathy    • History of depression    • History of osteomyelitis    • Hyperkalemia    • Hyperlipidemia    • Hypertension    • Hypoglycemia    • Hypomagnesemia    • Hyponatremia    • Hypothyroidism    • Morbid obesity (HCC)    • Pneumonia    • Preop cardiovascular exam    • Pulmonary embolism (HCC)    • RLS (restless legs syndrome)    • Sepsis (HCC)    • Sleep apnea    • Thrombocytopenia (HCC)    • Type 2 diabetes mellitus (HCC)    • Urinary retention    • Vitamin D deficiency      Past Surgical History:   Procedure Laterality Date   • COLON SURGERY Right 06/2024    right side of colon   • GALLBLADDER SURGERY     • TONSILLECTOMY AND ADENOIDECTOMY       Family History   Problem Relation Age of Onset   • Cancer Mother         lymphoma   • Hypertension Mother    • Heart disease Father    • Hypertension Father    • Diabetes Sister    • Cancer Maternal Grandmother       Social History     Socioeconomic History   • Marital status: Single     Spouse name: Not on file   • Number of children: Not on file   • Years of education: Not on file   • Highest education level: Not on file   Occupational History   • Not on file   Tobacco Use   • Smoking status: Never   • Smokeless tobacco: Never   Vaping Use   • Vaping status: Never Used   Substance and Sexual  Activity   • Alcohol use: No   • Drug use: No   • Sexual activity: Not Currently     Partners: Female   Other Topics Concern   • Not on file   Social History Narrative   • Not on file     Social Drivers of Health     Financial Resource Strain: Low Risk  (6/21/2024)    Received from [facility]    Overall Financial Resource Strain (CARDIA)    • Difficulty of Paying Living Expenses: Not very hard   Recent Concern: Financial Resource Strain - High Risk (4/23/2024)    [facility]    Overall Financial Resource Strain (CARDIA)    • Difficulty of Paying Living Expenses: Very hard   Food Insecurity: No Food Insecurity (7/25/2024)    Nursing - Inadequate Food Risk Classification    • Worried About Running Out of Food in the Last Year: Never true    • Ran Out of Food in the Last Year: Never true    • Ran Out of Food in the Last Year: Not on file   Transportation Needs: Unmet Transportation Needs (7/25/2024)    PRAPARE - Transportation    • Lack of Transportation (Medical): Yes    • Lack of Transportation (Non-Medical): Yes   Physical Activity: Not on file   Stress: Not on file   Social Connections: Unknown (6/18/2024)    Received from [facility]    Social Connections    • How often do you feel lonely or isolated from those around you? (Adult - for ages 18 years and over): Not on file   Intimate Partner Violence: Not At Risk (6/21/2024)    Received from [facility]    Humiliation, Afraid, Rape, and Kick questionnaire    • Fear of Current or Ex-Partner: No    • Emotionally Abused: No    • Physically Abused: No    • Sexually Abused: No   Housing Stability: Low Risk  (7/25/2024)    Housing Stability Vital Sign    • Unable to Pay for Housing in the Last Year: No    • Number of Times Moved in the Last Year: 0    • Homeless in the Last Year: No        Current Outpatient Medications:   •  acetaminophen (TYLENOL) 650 mg CR tablet, Take 1  tablet (650 mg total) by mouth every 8 (eight) hours as needed for mild pain or moderate pain, Disp: 90 tablet, Rfl: 0  •  Acidophilus Lactobacillus CAPS, Take by mouth, Disp: , Rfl:   •  albuterol (PROVENTIL HFA,VENTOLIN HFA) 90 mcg/act inhaler, Inhale 2 puffs every 6 (six) hours as needed for wheezing, Disp: 3 Inhaler, Rfl: 3  •  ammonium lactate (LAC-HYDRIN) 12 % cream, Apply topically in the morning To feet and leg dry skin (Patient not taking: Reported on 3/12/2025), Disp: 385 g, Rfl: 3  •  atorvastatin (LIPITOR) 40 mg tablet, TAKE 1 TABLET BY MOUTH ONCE DAILY., Disp: 90 tablet, Rfl: 1  •  azelastine (ASTELIN) 0.1 % nasal spray, 2 sprays into each nostril in the morning (Patient taking differently: 2 sprays into each nostril as needed), Disp: 30 mL, Rfl: 11  •  baclofen 10 mg tablet, , Disp: , Rfl:   •  Blood Glucose Monitoring Suppl (OneTouch Verio Reflect) w/Device KIT, Check blood sugars four times daily. Please substitute with appropriate alternative as covered by patient's insurance. Dx: E11.65, Disp: 1 kit, Rfl: 0  •  cetirizine (ZyrTEC) 10 mg tablet, TAKE 1 TABLET BY MOUTH ONCE DAILY., Disp: 90 tablet, Rfl: 1  •  citalopram (CeleXA) 40 mg tablet, Take 1 tablet (40 mg total) by mouth daily, Disp: 90 tablet, Rfl: 1  •  clobetasol propionate (Clobex) 0.05 % shampoo, Apply 1 application topically 3 (three) times a week, Disp: 118 mL, Rfl: 3  •  clonazePAM (KlonoPIN) 0.5 mg tablet, TAKE 1 TABLET BY MOUTH EVERY 12 (TWELVE) HOURS AS NEEDED FOR ANXIETY, Disp: 60 tablet, Rfl: 0  •  docusate sodium (COLACE) 100 mg capsule, Take 100 mg by mouth 2 (two) times a day, Disp: , Rfl:   •  Easy Touch Pen Needles 31G X 8 MM MISC, Use 1 each 3 (three) times a day, Disp: , Rfl:   •  Eliquis 5 MG, TAKE 1 TABLET BY MOUTH TWO TIMES A DAY., Disp: 180 tablet, Rfl: 1  •  ergocalciferol (VITAMIN D2) 50,000 units, TAKE 1 CAPSULE (50,000 UNITS TOTAL) BY MOUTH ONCE A WEEK, Disp: 4 capsule, Rfl: 5  •  ferrous sulfate 325 (65 Fe) mg  tablet, Take 325 mg by mouth daily with breakfast, Disp: , Rfl:   •  folic acid (FOLVITE) 1 mg tablet, TAKE 1 TABLET BY MOUTH ONCE DAILY., Disp: 90 tablet, Rfl: 1  •  furosemide (LASIX) 40 mg tablet, Take 1 tablet (40 mg total) by mouth daily, Disp: 90 tablet, Rfl: 0  •  gabapentin (NEURONTIN) 600 MG tablet, Take 600 mg by mouth 2 (two) times a day, Disp: , Rfl:   •  glucose blood (OneTouch Verio) test strip, Check blood sugars four times daily. Please substitute with appropriate alternative as covered by patient's insurance. Dx: E11.65, Disp: 400 each, Rfl: 3  •  insulin aspart (NovoLOG FlexPen) 100 UNIT/ML injection pen, Inject 26 Units under the skin 3 (three) times a day with meals Per sliding scale - managed by Dr. Tabares + scheduled, Disp: 15 mL, Rfl: 0  •  insulin glargine (Toujeo Max SoloStar) 300 units/mL CONCENTRATED U-300 injection pen (2-unit dial), Inject 66 Units under the skin daily, Disp: 6 mL, Rfl: 2  •  ketoconazole (NIZORAL) 2 % shampoo, Apply 1 Application topically 2 (two) times a week To feet in shower, Disp: 120 mL, Rfl: 0  •  lamoTRIgine (LaMICtal) 25 mg tablet, Take 1 tablet (25 mg total) by mouth 2 (two) times a day, Disp: 180 tablet, Rfl: 1  •  latanoprost (XALATAN) 0.005 % ophthalmic solution, Administer 0.005 drops to both eyes, Disp: , Rfl:   •  levothyroxine 300 MCG tablet, Take 1 tablet (300 mcg total) by mouth daily Take with 50 mcg tablet, Disp: 90 tablet, Rfl: 0  •  Linzess 290 MCG CAPS, TAKE 1 CAPSULE BY MOUTH ONCE DAILY., Disp: 90 capsule, Rfl: 1  •  lisinopril (ZESTRIL) 2.5 mg tablet, TAKE 1 TABLET BY MOUTH ONCE DAILY., Disp: 90 tablet, Rfl: 1  •  magnesium Oxide (MAG-OX) 400 mg TABS, TAKE 1 TABLET BY MOUTH TWO TIMES A DAY., Disp: 180 tablet, Rfl: 1  •  metoprolol tartrate (LOPRESSOR) 25 mg tablet, Take 1 tablet by mouth (25 mg) 1 hour prior to coronary CTA.  Hold for heart rate less than 50 bpm., Disp: 1 tablet, Rfl: 0  •  nystatin (MYCOSTATIN) powder, Apply topically as  needed, Disp: , Rfl:   •  OneTouch Delica Lancets 33G MISC, Check blood sugars four times daily. Please substitute with appropriate alternative as covered by patient's insurance. Dx: E11.65, Disp: 400 each, Rfl: 3  •  oxyCODONE (OXY-IR) 5 MG capsule, Take 5 mg by mouth as needed for moderate pain (Patient not taking: Reported on 9/4/2024), Disp: , Rfl:   •  pantoprazole (PROTONIX) 40 mg tablet, TAKE 1 TABLET (40 MG TOTAL) BY MOUTH DAILY, Disp: 90 tablet, Rfl: 1  •  potassium chloride (Klor-Con M20) 20 mEq tablet, TAKE 1 TABLET BY MOUTH EACH MORNING., Disp: 90 tablet, Rfl: 1  •  rOPINIRole (REQUIP) 1 mg tablet, Take 1 mg by mouth 3 (three) times a day, Disp: , Rfl:   •  tamsulosin (FLOMAX) 0.4 mg, TAKE 1 CAPSULE DAILY WITH DINNER, Disp: 100 capsule, Rfl: 1  •  timolol (TIMOPTIC) 0.5 % ophthalmic solution, Administer 1 drop to the right eye 2 (two) times a day, Disp: , Rfl:   •  Tirzepatide (Mounjaro) 12.5 MG/0.5ML SOAJ, Inject 12.5 mg under the skin once a week, Disp: 2 mL, Rfl: 0  •  vitamin B-12 (VITAMIN B-12) 1,000 mcg tablet, Take 1 tablet (1,000 mcg total) by mouth daily, Disp: 90 tablet, Rfl: 1    Review of Systems   Constitutional:  Negative for chills, fever and unexpected weight change.   Respiratory:  Negative for shortness of breath.    Cardiovascular:  Positive for leg swelling. Negative for chest pain and palpitations.   Musculoskeletal:  Positive for gait problem.   Skin:  Positive for wound (Has not had drainage).   Psychiatric/Behavioral:  The patient is not nervous/anxious.        Objective:  /66   Pulse 58   Temp (!) 97 °F (36.1 °C)   Resp 18   Pain Score: 0-No pain     Physical Exam  Vitals reviewed.   Constitutional:       General: He is not in acute distress.     Appearance: He is not ill-appearing, toxic-appearing or diaphoretic.      Comments: No acute distress, disheveled in appearance.  BMI > 49   Cardiovascular:      Comments: Despite edema, was able to palpate DP/PT bilaterally  which are diminished  Pulmonary:      Effort: Pulmonary effort is normal. No respiratory distress.   Musculoskeletal:      Right lower leg: Edema present.      Left lower leg: Edema present.   Feet:      Right foot:      Toenail Condition: Fungal disease present.     Left foot:      Toenail Condition: Fungal disease present.     Comments: L foot healed    Tinea pedis b/l   Skin:     General: Skin is warm and dry.             Comments: 1-  Skin remains very dry and flaking however wound is healed/completely epithelialized.      Bilateral lower extremities with evidence of chronic venous insufficiency/venous stasis with areas of hyperpigmentation/hemosiderin staining, dry flaking skin, lower extremity edema.  Stemmers sign positive bilaterally consistent with lymphedema      Lymphedema with positive Stemmer sign   Neurological:      Mental Status: He is alert and oriented to person, place, and time.   Psychiatric:         Mood and Affect: Mood normal.         Behavior: Behavior normal.       Wound 03/07/25 Venous Ulcer Leg Posterior;Right;Lower (Active)   Wound Image    03/21/25 0942   Wound Description Epithelialization 03/21/25 0944   Non-staged Wound Description Full thickness 03/07/25 1032   Wound Length (cm) 0 cm 03/21/25 0944   Wound Width (cm) 0 cm 03/21/25 0944   Wound Depth (cm) 0 cm 03/21/25 0944   Wound Surface Area (cm^2) 0 cm^2 03/21/25 0944   Wound Volume (cm^3) 0 cm^3 03/21/25 0944   Calculated Wound Volume (cm^3) 0 cm^3 03/21/25 0944   Change in Wound Size % 100 03/21/25 0944   Drainage Amount None 03/21/25 0944   Drainage Description Clear 03/07/25 1032   Serene-wound Assessment Edema;Scaly;Dry 03/21/25 0944   Treatments Cleansed 03/21/25 0944             Lab Results   Component Value Date    HGBA1C 10 (A) 02/07/2025       Wound Instructions:  Orders Placed This Encounter   Procedures   • Wound cleansing and dressings     Wound cleansing and dressings Diabetic Ulcer Left Plantar wound healed   New wound  right lower leg                                         Wash lboth feet and legs  with antifungal wash 3 times a week.   The dressing must stay dry and intact. You may shower with dressing protected by cast cover or bag. Or you may sponge bathe. Call wound center or home health nurse if dressing becomes wet.   On dressing change days, cleanse the wound with wound cleanser or mild soap and water, rinse, pat dry.     Wound healed.    Apply amlactin lotion to feet and legs for dry skin.    Please apply lotion 2 times a day   Spandagrip Size G     Tubular elastic bandage: Apply from base of toes to behind the knee. Apply in AM, may remove for sleep.  Avoid prolonged standing in one place.  Elevate leg(s) above the level of the heart when sitting or as much as possible.     If you develop fever, chills, nausea or vomiting, increase in drainage or foul smelling drainage go to the closest emergency department for evaluation.            Follow up as needed     Standing Status:   Future     Expiration Date:   3/28/2025   • Wound Procedure Treatment Venous Ulcer Posterior;Right;Lower Leg     This order was created via procedure documentation     Selena Alvarez MD

## 2025-03-21 NOTE — PROGRESS NOTES
[facility] Clinical Pharmacy Services  Kvng Hernandez    Administrative Statements   Encounter provider Kvng Hernandez    The Patient is located at Home and in the following state in which I hold an active license [place].    The patient was identified by name and date of birth. Juan Antonio Guy was informed that this is a telemedicine visit and that the visit is being conducted through Telephone.  My office door was closed. No one else was in the room.  He acknowledged consent and understanding of privacy and security of the video platform. The patient has agreed to participate and understands they can discontinue the visit at any time.    Assessment/ Plan     Assessment & Plan  Type 2 diabetes mellitus with hyperglycemia, with long-term current use of insulin ([facility])    Lab Results   Component Value Date    HGBA1C 10 (A) 02/07/2025     Goal A1c <7% .   On Additional Therapies:  Statin: yes  ACEI/ARB: yes    Assessment:   Tolerating Mounjaro 12.5 milligrams weekly.  Per endo goal is to maximize dose of Mounjaro to 15 mg weekly.  He put a new Dexcom sensor on today during our phone call and plan to follow-up in 2 weeks to go through glucose report with him.     Changes to medication regimen:  Toujeo: Continue 66 units daily   Mounjaro: Increase to 15 mg weekly  NovoLog: Continue 26 units 3 times a day plus sliding scale    Orders:    Tirzepatide (Mounjaro) 15 MG/0.5ML SOAJ; Inject 15 mg under the skin once a week      Follow-up: 2 weeks     Subjective   Appointment today is for follow-up of type 2 diabetes.  Diabetes related complications include CKD, diabetic foot ulcer, neuropathy, heart failure.  He is on a regimen of Mounjaro and basal bolus insulin.  Patient saw his endocrinologist on March 12 and was instructed to increase his NovoLog to 30 units with meals in the days following steroid injection.  Otherwise he is to increase Mounjaro in 4 weeks to max dose if tolerating        Medication  Adherence/ Tolerability/ Cost:  insulin aspart 26 units TID plus sliding scale . Estimates he missed 2 x/ weeks   Toujeo- 66 units daily   Mounjaro 12.5 mg weekly- increased 1 week ago.     Historical DM Meds (reason for discontinuation):  Jardiance     Review of Systems   Gastrointestinal:  Negative for abdominal pain, constipation, diarrhea, nausea and vomiting.        2. Lifestyle:   Wakes at 6- 7 AM   Breakfast:: may or may not eat  Lunch 11- 1 PM: sometimes light like tuna fish   Dinner: Moms meals   Admits to overly snacking at night    3. Home monitoring devices  Glucometer: Yes, Brand:   Continuous Glucose Monitor: Yes, Brand: Dexcom     Patient had low glucose event at PT where he felt dizzy/ sweaty. He treated with sugar tablets and then had iced tea.  We reviewed treatment of hypoglycemia.  Advised he keeps glucose tablets or regular soda/juice/ice tea on hand during PT in case of low glucose events.    Objective       D[facility]   Has not had sensor on in past 2 months. He needs his sister to help him reapply.  He applied new sensor today while we were on the phone with his sisters help    Blood Glucose Readings  The patient is currently checking blood glucose 1-3 times per day. Patient does not report with SMBG logs.    Self-reports AM readings have been 160-220 mg/dL     ASCVD Risk:  The ASCVD Risk score (Marcia DK, et al., 2019) failed to calculate for the following reasons:    The valid total cholesterol range is 130 to 320 mg/dL     Vitals:  There were no vitals filed for this visit.    Eye Exam:    Lab Results   Component Value Date    LEFTDIABRET Positive 12/19/2024    RIGHTDIABRET Positive 12/19/2024       Labs:  Lab Results   Component Value Date    SODIUM 140 12/24/2024    K 4.1 12/24/2024    EGFR 98 12/24/2024    CREATININE 0.87 12/24/2024    OYVRNEQC52 335 12/24/2024    MICROALBCRE 9 02/07/2025       Lab Results   Component Value Date    HGBA1C 10 (A) 02/07/2025     HGBA1C 9.4 (H) 12/24/2024    HGBA1C 10.9 (A) 11/06/2024       Pharmacist Tracking Tool     Pharmacist Tracking Tool  Reason For Outreach: Embedded Pharmacist  Demographics:  Intervention Method: Phone  Type of Intervention: Follow-Up  Topics Addressed: Diabetes  Pharmacologic Interventions: Dose or Frequency Adjusted and Med Rec  Non-Pharmacologic Interventions: Adherence addressed, Disease state education, Medication/Device education, and Personal CGM  Time:  Direct Patient Care:  15  mins  Care Coordination:  10  mins  Recommendation Recipient: Patient/Caregiver and Provider  Outcome: Accepted

## 2025-03-21 NOTE — PATIENT INSTRUCTIONS
Orders Placed This Encounter   Procedures    Wound cleansing and dressings     Wound cleansing and dressings Diabetic Ulcer Left Plantar wound healed   New wound right lower leg                                         Wash lboth feet and legs  with antifungal wash 3 times a week.   The dressing must stay dry and intact. You may shower with dressing protected by cast cover or bag. Or you may sponge bathe. Call wound center or home health nurse if dressing becomes wet.   On dressing change days, cleanse the wound with wound cleanser or mild soap and water, rinse, pat dry.     Wound healed.    Apply amlactin lotion to feet and legs for dry skin.    Please apply lotion 2 times a day   Spandagrip Size G     Tubular elastic bandage: Apply from base of toes to behind the knee. Apply in AM, may remove for sleep.  Avoid prolonged standing in one place.  Elevate leg(s) above the level of the heart when sitting or as much as possible.     If you develop fever, chills, nausea or vomiting, increase in drainage or foul smelling drainage go to the closest emergency department for evaluation.            Follow up as needed     Standing Status:   Future     Expiration Date:   3/28/2025

## 2025-03-21 NOTE — PROGRESS NOTES
Wound Procedure Treatment Venous Ulcer Posterior;Right;Lower Leg    Performed by: Kandy Tse RN  Authorized by: Selena Alvarez MD  Associated wounds:   Wound 03/07/25 Venous Ulcer Leg Posterior;Right;Lower    Dressing secured with:  Size G   Comments:  Amlactin lotion

## 2025-03-24 ENCOUNTER — OFFICE VISIT (OUTPATIENT)
Dept: PHYSICAL THERAPY | Facility: CLINIC | Age: 60
End: 2025-03-24
Payer: COMMERCIAL

## 2025-03-24 ENCOUNTER — TELEMEDICINE (OUTPATIENT)
Dept: FAMILY MEDICINE CLINIC | Facility: CLINIC | Age: 60
End: 2025-03-24

## 2025-03-24 DIAGNOSIS — I87.332 CHRONIC VENOUS HYPERTENSION (IDIOPATHIC) WITH ULCER AND INFLAMMATION OF LEFT LOWER EXTREMITY (HCC): ICD-10-CM

## 2025-03-24 DIAGNOSIS — L97.508 DIABETIC ULCER OF FOOT ASSOCIATED WITH TYPE 2 DIABETES MELLITUS, WITH OTHER ULCER SEVERITY, UNSPECIFIED LATERALITY, UNSPECIFIED PART OF FOOT (HCC): ICD-10-CM

## 2025-03-24 DIAGNOSIS — I50.9 CONGESTIVE HEART FAILURE, UNSPECIFIED HF CHRONICITY, UNSPECIFIED HEART FAILURE TYPE (HCC): ICD-10-CM

## 2025-03-24 DIAGNOSIS — E66.01 MORBID OBESITY (HCC): ICD-10-CM

## 2025-03-24 DIAGNOSIS — E11.65 TYPE 2 DIABETES MELLITUS WITH HYPERGLYCEMIA, WITH LONG-TERM CURRENT USE OF INSULIN (HCC): Primary | ICD-10-CM

## 2025-03-24 DIAGNOSIS — Z86.79 HISTORY OF HEART FAILURE: ICD-10-CM

## 2025-03-24 DIAGNOSIS — E11.65 TYPE 2 DIABETES MELLITUS WITH HYPERGLYCEMIA, WITH LONG-TERM CURRENT USE OF INSULIN (HCC): ICD-10-CM

## 2025-03-24 DIAGNOSIS — Z79.4 TYPE 2 DIABETES MELLITUS WITH HYPERGLYCEMIA, WITH LONG-TERM CURRENT USE OF INSULIN (HCC): ICD-10-CM

## 2025-03-24 DIAGNOSIS — Z79.4 TYPE 2 DIABETES MELLITUS WITH HYPERGLYCEMIA, WITH LONG-TERM CURRENT USE OF INSULIN (HCC): Primary | ICD-10-CM

## 2025-03-24 DIAGNOSIS — E03.9 HYPOTHYROIDISM, UNSPECIFIED TYPE: ICD-10-CM

## 2025-03-24 DIAGNOSIS — I48.91 ATRIAL FIBRILLATION, UNSPECIFIED TYPE (HCC): ICD-10-CM

## 2025-03-24 DIAGNOSIS — M25.511 ACUTE PAIN OF RIGHT SHOULDER: Primary | ICD-10-CM

## 2025-03-24 DIAGNOSIS — E11.621 DIABETIC ULCER OF FOOT ASSOCIATED WITH TYPE 2 DIABETES MELLITUS, WITH OTHER ULCER SEVERITY, UNSPECIFIED LATERALITY, UNSPECIFIED PART OF FOOT (HCC): ICD-10-CM

## 2025-03-24 DIAGNOSIS — I10 ESSENTIAL HYPERTENSION: ICD-10-CM

## 2025-03-24 PROCEDURE — 97110 THERAPEUTIC EXERCISES: CPT

## 2025-03-24 PROCEDURE — PBNCHG PB NO CHARGE PLACEHOLDER: Performed by: PHARMACIST

## 2025-03-24 RX ORDER — TIRZEPATIDE 15 MG/.5ML
15 INJECTION, SOLUTION SUBCUTANEOUS WEEKLY
Qty: 6 ML | Refills: 3 | Status: SHIPPED | OUTPATIENT
Start: 2025-03-24

## 2025-03-24 NOTE — PROGRESS NOTES
"Daily Note     Today's date: 3/24/2025  Patient name: Juan Antonio Guy  : 1965  MRN: [id number]  Referring provider: Mo Ceja MD  Dx:   Encounter Diagnosis     ICD-10-CM    1. Acute pain of right shoulder  M25.511                      Visit: 4/10   Subjective: Patient w c/o B shd soreness today stating, \"it must be the damp weather\".      Objective: See treatment diary below      Assessment: Patient began tx session on UBE x 8 min due arriving late to session.  Pt began working on OH pulleys and then his seated UE exercises w 4#, and was able to complete. Pt then began to c/o having a \"sugar drop\" and was visibly sweating.  Pt was provided with peanut butter crackers and \"felt better\".  Pt cut tx session short due to arriving late and then to not feeling his best.  Patient will continue to benefit from further PT to progress towards goals.       Plan: Continue per plan of care.      Precautions:           Manuals 3/20  3/24/25 3/5/25 3/13 3/17/25      R shd PROM    NT      CK                               Neuro Re-Ed             Shoulder shrugs  4# 20x  20x 4#  20x 4# 4# 20x  20x 4#    Front raises  4# 20x  20x 4#  20x 4# 4# 20x  20x 4#    Lateral raises 4# 20x   20x 4# 20x 4#  4#20x  20x 4#    Shoulder press   OH press 4# 20x   20x 4#   20x 4#   X10 4# 4# 20x  20x 4#    Seated rows  4# 20x   20x 4#   20x 4#  4# 20x   seated rows 4# 2x10   Ther Ex            UBE(improve R shoulder ROM)  L1 10' L1 8 min L1 10'  10 min   L1  L2 10'   Seated Shoulder flexion/abduction with cane  NT 20x ea  20x each  20x ea    Bicep curls 4# 20x   20x 4#  20x 4#  4# 20x  20x 4#    MTP/LTP seated  GTB 2x10 each    NT   GTB 2x10 ea  GTB 2x10   GTB  2x10 ea    Seated ER/IR with TB  GTB  2x10 each   NT  GTB 2x10 ea  GTB 2x10   GTB  2x10 ea                               Ther Activity                                         Gait Training                                         Modalities             CP PRN     NT   MHP R 10'   " post treat   MHP B shoulders 10'

## 2025-03-24 NOTE — PATIENT INSTRUCTIONS
Toujeo: Continue 66 units daily   Mounjaro: In 3 weeks increase to 15 mg weekly  NovoLog: Continue 26 units 3 times a day plus sliding scale    Make sure to have hypoglycemia treatment available during physical therapy. Reach out with any more low glucose events < 70 mg/dL    Hypoglycemia Review  Check your blood sugar if you feel like you are low  Inform your care team if you have patterns of lows  Treat with 15 grams of carbohydrates if blood sugar is < 70  3-4 glucose tablets   4 oz of REGULAR soda or juice  1 Tablespoon of honey, table sugar, jelly  2 Tablespoons of raisins  20 skittles  3 rolls of smarties  4 large marshmallows  20 small jelly beans  1 small fruit snack package  Repeat blood sugar test after 15 minutes  Once your sugar is corrected above 70, you should have a snack or meal that includes carbohydrate and protein.

## 2025-03-25 RX ORDER — POTASSIUM CHLORIDE 1500 MG/1
TABLET, EXTENDED RELEASE ORAL
Qty: 90 TABLET | Refills: 1 | Status: SHIPPED | OUTPATIENT
Start: 2025-03-25

## 2025-03-26 ENCOUNTER — OFFICE VISIT (OUTPATIENT)
Dept: PHYSICAL THERAPY | Facility: CLINIC | Age: 60
End: 2025-03-26
Payer: COMMERCIAL

## 2025-03-26 DIAGNOSIS — M25.511 ACUTE PAIN OF RIGHT SHOULDER: Primary | ICD-10-CM

## 2025-03-26 PROCEDURE — 97140 MANUAL THERAPY 1/> REGIONS: CPT

## 2025-03-26 PROCEDURE — 97110 THERAPEUTIC EXERCISES: CPT

## 2025-03-26 NOTE — PROGRESS NOTES
"Daily Note     Today's date: 3/26/2025  Patient name: Juan Antonio Guy  : 1965  MRN: [id number]  Referring provider: Mo Ceja MD  Dx:   Encounter Diagnosis     ICD-10-CM    1. Acute pain of right shoulder  M25.511                      Visit: 4/10   Subjective: Patient  admits to R shd \"soreness\".      Objective: See treatment diary below      Assessment: Patient began tx session on UBE x 10 min for warm up.   Pt was able to complete his exercises protocol with good pacing, and no c/o increased pain.  Pt also kailash R shd PROM well.  Pt finished session with MH to the R shd.   Patient will continue to benefit from further PT to progress towards goals.       Plan: Continue per plan of care.      Precautions:           Manuals 3/20  3/24/25 3/26/25   3/13 3/17/25      R shd PROM    NT  CK    CK                               Neuro Re-Ed             Shoulder shrugs  4# 20x  20x 4#  20x 4# 4# 20x  20x 4#    Front raises  4# 20x  20x 4#  20x 4# 4# 20x  20x 4#    Lateral raises 4# 20x   20x 4# 20x 4#  4#20x  20x 4#    Shoulder press   OH press 4# 20x   20x 4#   20x 4#   X10 4# 4# 20x  20x 4#    Seated rows  4# 20x   20x 4#   20x 4#  4# 20x   seated rows 4# 2x10   Ther Ex            UBE(improve R shoulder ROM)  L1 10' L1 8 min L2 10'  10 min   L1  L2 10'   Seated Shoulder flexion/abduction with cane  NT 20x ea  20x each  20x ea    Bicep curls 4# 20x   20x 4#  20x 4#  4# 20x  20x 4#    MTP/LTP seated  GTB 2x10 each    NT   GTB 2x10 ea  GTB 2x10   GTB  2x10 ea    Seated ER/IR with TB  GTB  2x10 each   NT  GTB 2x10 ea  GTB 2x10   GTB  2x10 ea                               Ther Activity                                         Gait Training                                         Modalities             CP PRN     NT   MHP R 10'   post treat   MHP B shoulders 10'                                "

## 2025-03-31 ENCOUNTER — APPOINTMENT (OUTPATIENT)
Dept: PHYSICAL THERAPY | Facility: CLINIC | Age: 60
End: 2025-03-31
Payer: COMMERCIAL

## 2025-04-02 ENCOUNTER — OFFICE VISIT (OUTPATIENT)
Dept: PHYSICAL THERAPY | Facility: CLINIC | Age: 60
End: 2025-04-02
Payer: COMMERCIAL

## 2025-04-02 DIAGNOSIS — M25.511 ACUTE PAIN OF RIGHT SHOULDER: Primary | ICD-10-CM

## 2025-04-02 PROCEDURE — 97110 THERAPEUTIC EXERCISES: CPT

## 2025-04-02 PROCEDURE — 97140 MANUAL THERAPY 1/> REGIONS: CPT

## 2025-04-02 NOTE — PROGRESS NOTES
"Daily Note     Today's date: 2025  Patient name: Juan Antonio Guy  : 1965  MRN: [id number]  Referring provider: Mo Ceja MD  Dx:   No diagnosis found.                 Visit: 4/10   Subjective: Patient  admits to persisting R shd \"soreness\".      Objective: See treatment diary below      Assessment: Patient began tx session on UBE x 10 min for warm up.   Pt was able to complete his exercise protocol with good pacing, and c/o some R shd pain with AROM. Pt kailash R shd PROM well, with improved all plane shd mobility at end of session.  Pt finished with MH to the B shds.     Patient will continue to benefit from further PT to progress towards goals.       Plan: Continue per plan of care.      Precautions:           Manuals 3/20  3/24/25 3/26/25   4/2/25 3/17/25      R shd PROM    NT  CK  CK  CK                               Neuro Re-Ed             Shoulder shrugs  4# 20x  20x 4#  20x 4# 4# 20x  20x 4#    Front raises  4# 20x  20x 4#  20x 4# 4# 20x  20x 4#    Lateral raises 4# 20x   20x 4# 20x 4#  4#20x  20x 4#    Shoulder press   OH press 4# 20x   20x 4#   20x 4#   X10 4# 4# 20x  20x 4#    Seated rows  4# 20x   20x 4#   20x 4#  4# 20x   seated rows 4# 2x10   Ther Ex            UBE(improve R shoulder ROM)  L1 10' L1 8 min L2 10'  12min   L1.6  L2 10'   Seated Shoulder flexion/abduction with cane  NT 20x ea  20x each  20x ea    Bicep curls 4# 20x   20x 4#  20x 4#  4# 20x  20x 4#    MTP/LTP seated  GTB 2x10 each    NT   GTB 2x10 ea  GTB 2x10   GTB  2x10 ea    Seated ER/IR with TB  GTB  2x10 each   NT  GTB 2x10 ea  GTB 2x10   GTB  2x10 ea                               Ther Activity                                         Gait Training                                         Modalities             CP PRN     NT   MHP R 10'  MHP  B shds  10'  MHP B shoulders 10'                                "

## 2025-04-04 NOTE — PROGRESS NOTES
[facility]  Kvng Hernandez    Administrative Statements   Encounter provider Kvng Hernandez    The Patient is located at Home and in the following state in which I hold an active license [place].    The patient was identified by name and date of birth. Juan Antonio Guy was informed that this is a telemedicine visit and that the visit is being conducted through Telephone.  My office door was closed. No one else was in the room.  He acknowledged consent and understanding of privacy and security of the video platform. The patient has agreed to participate and understands they can discontinue the visit at any time.    Assessment/ Plan     Assessment & Plan  Type 2 diabetes mellitus with hyperglycemia, with long-term current use of insulin ([facility])    Lab Results   Component Value Date    HGBA1C 10 (A) 02/07/2025     Goal A1c <7% .   On Additional Therapies:  Statin: yes  ACEI/ARB: yes  Glucose control on CGM report  Within goal range of 70 - 180 mg/dL 39% of time (goal >70% of time)  Average glucose 197 mg/dL (goal <154 mg/dL)  GMI 8%      Assessment:   Dexcom CGM data shows improvement in glucose control.  He does have decreased appetite with Mounjaro but is able to tolerate this.  He had 1 low glucose event overnight due to not eating enough during the day otherwise he has not had recurrent hypoglycemia.  Most mornings he wakes up with a glucose of 150 to 160 mg/dL.     Changes to medication regimen:  Toujeo: Continue 66 units daily   Mounjaro: Increase to 15 mg weekly  NovoLog: Continue 26 units 3 times a day plus sliding scale    Orders:    Tirzepatide (Mounjaro) 15 MG/0.5ML SOAJ; Inject 15 mg under the skin once a week      Follow-up: 3 weeks     Subjective   Appointment today is for follow-up of type 2 diabetes.  Diabetes related complications include CKD, diabetic foot ulcer, neuropathy, heart failure.  He is on a regimen of Mounjaro and basal bolus insulin.  Patient  saw his endocrinologist on March 12 and was instructed to increase his NovoLog to 30 units with meals in the days following steroid injection.  He reapplied new Dexcom CGM and appointment today is to review Dexcom CGM data.        Medication Adherence/ Tolerability/ Cost:  insulin aspart 26 units TID plus sliding scale . Estimates he missed 2 x/ weeks   Toujeo- 66 units daily   Mounjaro 12.5 mg weekly- increased 3 week ago.     Historical DM Meds (reason for discontinuation):  Jardiance     Review of Systems   Gastrointestinal:  Negative for abdominal pain, constipation, diarrhea, nausea and vomiting.        2. Lifestyle:   Wakes at 6- 7 AM   Breakfast:: may or may not eat  Lunch 11- 1 PM: sometimes light like tuna fish   Dinner: Moms meals   Admits to overly snacking at night    3. Home monitoring devices  Glucometer: Yes, Brand: One touch verio flex   Continuous Glucose Monitor: Yes, Brand: Dexcom     Patient had low glucose event Friday into Saturday ( 40 mg/dL on CGM and treated). Patient attributes this to not eating.     Objective       Dexcom G7- Read off   Dexcom  Data: 14 day report    Average glucose: 197 mg/dL  GMI: 8%    Time in target  Very High (>250 mg/dL): 21%  High (180 - 250 mg/dL): 38%  In range (70 - 180 mg/dL): 39%  Low ( < 70 mg/dL): 1%  Very low (<54 mg/dL): 1%     ASCVD Risk:  The ASCVD Risk score (Marcia DK, et al., 2019) failed to calculate for the following reasons:    The valid total cholesterol range is 130 to 320 mg/dL     Vitals:  There were no vitals filed for this visit.    Eye Exam:    Lab Results   Component Value Date    LEFTDIABRET Positive 12/19/2024    RIGHTDIABRET Positive 12/19/2024       Labs:  Lab Results   Component Value Date    SODIUM 140 12/24/2024    K 4.1 12/24/2024    EGFR 98 12/24/2024    CREATININE 0.87 12/24/2024    VNCHMWIU82 335 12/24/2024    MICROALBCRE 9 02/07/2025       Lab Results   Component Value Date    HGBA1C 10 (A) 02/07/2025    HGBA1C  9.4 (H) 12/24/2024    HGBA1C 10.9 (A) 11/06/2024       Pharmacist Tracking Tool     Pharmacist Tracking Tool  Reason For Outreach: Embedded Pharmacist  Demographics:  Intervention Method: Phone  Type of Intervention: Follow-Up  Topics Addressed: Diabetes  Pharmacologic Interventions: Dose or Frequency Adjusted and Med Rec  Non-Pharmacologic Interventions: Adherence addressed, Disease state education, Medication/Device education, and Personal CGM  Time:  Direct Patient Care:  15  mins  Care Coordination:  10  mins  Recommendation Recipient: Patient/Caregiver and Provider  Outcome: Accepted

## 2025-04-04 NOTE — ASSESSMENT & PLAN NOTE
Lab Results   Component Value Date    HGBA1C 10 (A) 02/07/2025     Goal A1c <7% .   On Additional Therapies:  Statin: yes  ACEI/ARB: yes  Glucose control on CGM report  Within goal range of 70 - 180 mg/dL 39% of time (goal >70% of time)  Average glucose 197 mg/dL (goal <154 mg/dL)  GMI 8%      Assessment:   Dexcom CGM data shows improvement in glucose control.  He does have decreased appetite with Mounjaro but is able to tolerate this.  He had 1 low glucose event overnight due to not eating enough during the day otherwise he has not had recurrent hypoglycemia.  Most mornings he wakes up with a glucose of 150 to 160 mg/dL.     Changes to medication regimen:  Toujeo: Continue 66 units daily   Mounjaro: Increase to 15 mg weekly  NovoLog: Continue 26 units 3 times a day plus sliding scale    Orders:    Tirzepatide (Mounjaro) 15 MG/0.5ML SOAJ; Inject 15 mg under the skin once a week

## 2025-04-07 ENCOUNTER — APPOINTMENT (OUTPATIENT)
Dept: PHYSICAL THERAPY | Facility: CLINIC | Age: 60
End: 2025-04-07
Payer: COMMERCIAL

## 2025-04-07 ENCOUNTER — TELEMEDICINE (OUTPATIENT)
Dept: FAMILY MEDICINE CLINIC | Facility: CLINIC | Age: 60
End: 2025-04-07

## 2025-04-07 DIAGNOSIS — E11.65 TYPE 2 DIABETES MELLITUS WITH HYPERGLYCEMIA, WITH LONG-TERM CURRENT USE OF INSULIN (HCC): Primary | ICD-10-CM

## 2025-04-07 DIAGNOSIS — Z79.4 TYPE 2 DIABETES MELLITUS WITH HYPERGLYCEMIA, WITH LONG-TERM CURRENT USE OF INSULIN (HCC): Primary | ICD-10-CM

## 2025-04-07 PROCEDURE — PBNCHG PB NO CHARGE PLACEHOLDER: Performed by: PHARMACIST

## 2025-04-07 RX ORDER — TIRZEPATIDE 15 MG/.5ML
15 INJECTION, SOLUTION SUBCUTANEOUS WEEKLY
Qty: 6 ML | Refills: 3 | Status: SHIPPED | OUTPATIENT
Start: 2025-04-07

## 2025-04-09 ENCOUNTER — OFFICE VISIT (OUTPATIENT)
Dept: PHYSICAL THERAPY | Facility: CLINIC | Age: 60
End: 2025-04-09
Payer: COMMERCIAL

## 2025-04-09 DIAGNOSIS — M25.511 ACUTE PAIN OF RIGHT SHOULDER: Primary | ICD-10-CM

## 2025-04-09 PROCEDURE — 97140 MANUAL THERAPY 1/> REGIONS: CPT

## 2025-04-09 PROCEDURE — 97110 THERAPEUTIC EXERCISES: CPT

## 2025-04-09 NOTE — PROGRESS NOTES
Daily Note     Today's date: 2025  Patient name: Juan Antonio Guy  : 1965  MRN: [id number]  Referring provider: Mo Ceja MD  Dx:   Encounter Diagnosis     ICD-10-CM    1. Acute pain of right shoulder  M25.511           Start Time: 1100  Stop Time: 1200  Total time in clinic (min): 60 minutes    Subjective: Patient states that his R shoulder is sore today.           Objective: See treatment diary below          Assessment: Began therapy session on UBE for 10 minutes with increased resistance to level 2. Held 4# weight OH due to pain in right  shoulder.  Therapeutic exercise program was completed with good technique and post-exercise fatigue present . Ended therapy session with MHP to bilateral shoulders.  Continue to recommend PT in order to achieve maximal functional independence.        Plan: Continue per plan of care.      Precautions:           Manuals 3/20  3/24/25 3/26/25   4/2/25 4/9/25      R shd PROM    NT  CK  CK BC                               Neuro Re-Ed             Shoulder shrugs  4# 20x  20x 4#  20x 4# 4# 20x  Held    Front raises  4# 20x  20x 4#  20x 4# 4# 20x  Held   Lateral raises 4# 20x   20x 4# 20x 4#  4#20x  Held   Shoulder press   OH press 4# 20x   20x 4#   20x 4#   X10 4# 4# 20x  Held   Seated rows  4# 20x   20x 4#   20x 4#  4# 20x   seated rows 4# 2x10   Ther Ex            UBE(improve R shoulder ROM)  L1 10' L1 8 min L2 10'  12min   L1.6  L2 10'   Seated Shoulder flexion/abduction with cane 20x ea  NT 20x ea  20x each  20x ea    Bicep curls 4# 20x   20x 4#  20x 4#  4# 20x  20x 4#    MTP/LTP seated  GTB 2x10 each    NT   GTB 2x10 ea  GTB 2x10   BlueTB  2x10 ea    Seated ER/IR with TB  GTB  2x10 each   NT  GTB 2x10 ea  GTB 2x10   BlueTB  2x10 ea    Seated Shoulder ER/ADD           GTB 2x10                  Ther Activity                                         Gait Training                                         Modalities             CP PRN     NT   MHP R 10'  MHP  B  shds  10'  P B shoulders 10'

## 2025-04-10 DIAGNOSIS — E11.65 TYPE 2 DIABETES MELLITUS WITH HYPERGLYCEMIA, WITH LONG-TERM CURRENT USE OF INSULIN (HCC): Primary | ICD-10-CM

## 2025-04-10 DIAGNOSIS — Z79.4 TYPE 2 DIABETES MELLITUS WITH HYPERGLYCEMIA, WITH LONG-TERM CURRENT USE OF INSULIN (HCC): Primary | ICD-10-CM

## 2025-04-10 RX ORDER — GABAPENTIN 600 MG/1
600 TABLET ORAL 2 TIMES DAILY
Qty: 180 TABLET | Refills: 1 | Status: SHIPPED | OUTPATIENT
Start: 2025-04-10

## 2025-04-14 ENCOUNTER — OFFICE VISIT (OUTPATIENT)
Dept: NEUROLOGY | Facility: CLINIC | Age: 60
End: 2025-04-14
Payer: COMMERCIAL

## 2025-04-14 VITALS
SYSTOLIC BLOOD PRESSURE: 130 MMHG | WEIGHT: 315 LBS | HEART RATE: 72 BPM | BODY MASS INDEX: 42.66 KG/M2 | OXYGEN SATURATION: 98 % | DIASTOLIC BLOOD PRESSURE: 60 MMHG | HEIGHT: 72 IN

## 2025-04-14 DIAGNOSIS — E11.65 TYPE 2 DIABETES MELLITUS WITH HYPERGLYCEMIA, WITH LONG-TERM CURRENT USE OF INSULIN (HCC): ICD-10-CM

## 2025-04-14 DIAGNOSIS — G25.81 RESTLESS LEG SYNDROME: ICD-10-CM

## 2025-04-14 DIAGNOSIS — Z87.898 HISTORY OF SEIZURE: Primary | ICD-10-CM

## 2025-04-14 DIAGNOSIS — Z79.4 TYPE 2 DIABETES MELLITUS WITH HYPERGLYCEMIA, WITH LONG-TERM CURRENT USE OF INSULIN (HCC): ICD-10-CM

## 2025-04-14 DIAGNOSIS — G47.33 OBSTRUCTIVE SLEEP APNEA SYNDROME: ICD-10-CM

## 2025-04-14 DIAGNOSIS — G62.9 NEUROPATHY: ICD-10-CM

## 2025-04-14 PROCEDURE — 99214 OFFICE O/P EST MOD 30 MIN: CPT | Performed by: PHYSICIAN ASSISTANT

## 2025-04-14 NOTE — ASSESSMENT & PLAN NOTE
Patient with longstanding peripheral neuropathy, likely caused by uncontrolled DM, as well as contribution from B12 deficiency.     He takes gabapentin 600mg BID for neuropathy symptoms, managed by PCP.      We again discussed neuropathy is likely the main cause of his balance difficulty and falls, although likely multifactorial. We discussed fall precautions in detail. He should continue to use his walker. He has not driven in >2 years and should not be driving given the severity of his neuropathy.      He should continue to follow with podiatry for foot and nail care. He has chronic diabetic ulcers.     We discussed the importance of controlling his DM under the direction of his PCP and endocrinology to help slow progression of his neuropathy. Should continue B12 supplement and monitoring through his hematologist.

## 2025-04-14 NOTE — PATIENT INSTRUCTIONS
No concern for recurrent seizure.  Will continue off anti-seizure medication (you are on lamotrigine for mood at a low dose, which is also an anti-seizure medication)    I placed a new referral to sleep medicine for sleep apnea and restless leg syndrome.  I would suggest calling them to schedule, not sure why you have not gotten a call yet.    Continue to work on diabetic management to help slow progression of neuropathy.   Continue to follow with hematology for B12 replacement, as B12 deficiency can also contribute to neuropathy.       Follow up as-needed

## 2025-04-14 NOTE — ASSESSMENT & PLAN NOTE
Patient reports history of RLS, also has iron deficiency anemia. He should continue to follow with hematology for MERCEDES, which contributes to RLS.    I referred him to sleep medicine, also because he has untreated NEAL.  Would defer management to sleep med.  He is on Requip currently.  Orders:    Ambulatory Referral to Sleep Medicine; Future

## 2025-04-14 NOTE — ASSESSMENT & PLAN NOTE
History of NEAL, does not use CPAP.  Referred to sleep medicine over 6 months ago, still not contacted by their office.  I placed a new referral today and encouraged that he call for an appt.   Orders:    Ambulatory Referral to Sleep Medicine; Future

## 2025-04-14 NOTE — PROGRESS NOTES
"Name: Juan Antonio Guy      : 1965      MRN: [id number]  Encounter Provider: Maira Durán PA-C  Encounter Date: 2025   Encounter department: [facility] [place]  :  Assessment & Plan  History of seizure  Patient referred for reported history of \"seizure disorder\", although his history is very unclear, and description of the 2 \"major seizures\" he had in  are extremely atypical for seizure. He apparently was on phenobarbital for several years before coming off in the s and then not having any seizures for a long time until about 5 years ago when he was having falls due to losing his balance, without any LOC. This is also not really concerning for seizure. I reviewed several notes from 2 different local neurologists who said he has a \"questionable history of seizures\" although absolutely no description of these seizures. Reportedly prior MRI and EEG normal (there is a normal EEG from [facility] in  in his chart, no MRI). At one point he was advised to d/c his Keppra, but appears that was not done. He is also on a very low dose of lamotrigine for mood.     Updated brain MRI and routine EEG were completed after establishing care here. No structural abnormality on MRI, and routine EEG is normal.    We had discussed history not convincing for epileptic seizures. Question of psychiatric events at that time, although there are no records and we are not going to be able to obtain records from . Considering he has not had any clear seizures on subtherapeutic doses of levetiracetam and lamotrigine, unlikely that he has underlying epilepsy.     Levetiracetam was discontinued since there were no high risk findings on his MRI or EEG.     He has done well without events concerning for seizure since medication was stopped 6 months ago.  He remains on low dose lamotrigine for mood.     Will continue off AED.  If there are any events concerning for seizure, would suggest escalating " lamotrigine dose.      Plan:  - continue off AED (on low dose lamotrigine for mood)   - patient to contact the office if any events concerning for seizure. If so, would favor increasing his lamotrigine       Neuropathy  Patient with longstanding peripheral neuropathy, likely caused by uncontrolled DM, as well as contribution from B12 deficiency.     He takes gabapentin 600mg BID for neuropathy symptoms, managed by PCP.      We again discussed neuropathy is likely the main cause of his balance difficulty and falls, although likely multifactorial. We discussed fall precautions in detail. He should continue to use his walker. He has not driven in >2 years and should not be driving given the severity of his neuropathy.      He should continue to follow with podiatry for foot and nail care. He has chronic diabetic ulcers.     We discussed the importance of controlling his DM under the direction of his PCP and endocrinology to help slow progression of his neuropathy. Should continue B12 supplement and monitoring through his hematologist.        Obstructive sleep apnea syndrome  History of NEAL, does not use CPAP.  Referred to sleep medicine over 6 months ago, still not contacted by their office.  I placed a new referral today and encouraged that he call for an appt.   Orders:    Ambulatory Referral to Sleep Medicine; Future    Restless leg syndrome  Patient reports history of RLS, also has iron deficiency anemia. He should continue to follow with hematology for MERCEDES, which contributes to RLS.    I referred him to sleep medicine, also because he has untreated NEAL.  Would defer management to sleep med.  He is on Requip currently.  Orders:    Ambulatory Referral to Sleep Medicine; Future    At this time, he can follow up as-needed.        History of Present Illness   HPI   Juan Antonio Guy is a 59 y.o. male with extensive PMH including PE, Afib on Eliquis, CHF, COPD, NEAL not on CPAP, HTN, DM, neuropathy, B12 deficiency,  "hypothyroidism, MERCEDES, RLS, Bipolar disorder, depression, anxiety, LE edema with chronic ulcers and cellulitis, who presents today for neurologic follow up.     Interval history 4/14/2025  Patient reports he has been doing well since last visit. He has been off levetiracetam for the last 6 months.  There have not been any events concerning for seizure.   He reports his neuropathy is a little worse, has some symptoms in the hands.  A1C was 10 in Feb 2025, has been following with endocrinology.  He uses his walker, no recent falls.   He is following with hematology for B12 deficiency and MERCEDES.  He still was never contacted by sleep medicine to schedule a consult for RLS and untreated sleep apnea.     AED/side effects/compliance:  Lamotrigine 25mg BID (for mood)  Also on gabapentin 600mg twice a day for neuropathy   Also on clonazepam 0.5mg twice a day for anxiety     Event/Seizure semiology:  -patient reports both initial seizures in 1982 were \"severe\" and \"violent\". Reports he was \"throwing people around\", was apparently conscious and \"it took 10-12 people to get me on the ground and hold me down to get me in the ambulance\". Told me he had to be put in handcuffs because he was so violent and he \"broke the handcuffs\".   -more recent events 4-5 years ago described as \"falls because of losing balance, without LOC\", apparently someone was concerned these were seizures      Intake history 8/15/2024   Patient reports he has most recently been followed by neurologist Dr. Leone who is no longer in the area. He was previously seeing another neurologist, Dr. Sousa. He reports he was seeing neurology for neuropathy, seizure disorder and RLS.     He reports he has had peripheral neuropathy for many years. I reviewed some prior neurology notes scanned into the chart in [facility]. Appears he also has CTS. He reports he has numbness in the feet and lower legs, so bad that he \"doesn't need to be numbed when they are doing wound care on my " "ulcers\". He reports balance difficulty and falls. His DM is not well controlled, recent A1C 8.0, down from 9.9 earlier this year. He was also noted to have B12 deficiency recently, level 174. He is on gabapentin 600mg BID for neuropathy. He sees podiatry and wound care for chronic ulcers. He has had many hospitalizations for cellulitis. He has chronic LE edema.   He reports having RLS, on Requip 1mg BID. He has MERCEDES. He has NEAL but does not wear his CPAP. He had never seen a sleep specialist.      In regards to seizure history, this is not well defined. I read some notes from Dr. Sousa and Dr. Leone and these notes do not say anything about seizure semiology, what type of seizures, or any other history. Both says he has a \"questionable seizure disorder\" and notes state that MRIs and EEGs were normal. There are no MRI reports on file. There is a routine EEG from [facility] 11/30/2022 which was read as a normal EEG. At one point, he was taken off Keppra, per the OV note, but the next visit it said to continue (maybe was not communicated to his pharmacy that he should stop).   Patient tells me his first seizure was in 1982 and it was \"really severe\". He describes that there was no warning and he was \"very violent\" and \"throwing people around\". He does not think he lost consciousness, but was \"tossing people off me\" and \"it took 10-12 people to tackle me to the ground and hold me down so they could get me in the ambulance\". He described being told he had to be \"tied up with belts\" to restrain him. He tells me he was \"put into a coma x 2 weeks to get the seizing to stop\". He says he was put on phenobarbital and then another seizure happened later that year that was also \"severe\". He said he drove from [facility] to work at [employer] and was violent when he got to work and \"broke the handcuffs\" they had to put him in to restrain him. He does not remember these events really, but was told about them. He tells me that with " "the second seizure, when he was in the ER, his seizure was so severe that he was \"moving the gurney all around the ER\" because of how violent he was being. He is not sure if he was on anything else besides phenobarbital, \"maybe something that starts with an M\". He recalls seeing a Dr. Gramajo at the time (neurology). He believes that sometime in the s he was taken off phenobarbital. He did not have any apparent seizures until maybe 4-5 years ago. When I asked him what those seizures were like, he said \"I was having falls due to losing my balance\", but denied any LOC, \"I remember each and every fall\".   He is not sure when he was diagnosed with the Bipolar and depression/anxiety, unsure if he had any mental health issues in  when the apparent seizures happened.      He denies any recent events of LOC, altered awareness, jerking/twitching, abnormal involuntary movements, behavioral arrest, etc.      He reports he lives alone, but lives next door to his sister.  He has not driven in about 2 years, but says he still has a license. Does not have a car.    Interval history 10/14/2024  Patient reports he has been doing well since last visit. He denies any events concerning for seizure. He reports his neuropathy is stable, uses his walker, no recent falls.   He was never contacted by sleep medicine to schedule a consult for RLS and untreated sleep apnea.      MRI brain seizure w/wo contrast 2024  No acute infarction, edema, or pathologic intra-axial enhancement.  There is no evidence of mesial temporal sclerosis.  Mild chronic microangiopathic ischemic changes.  Low-lying cerebellar tonsils without   meeting criteria for Chiari I malformation (Patient says this is a chronic finding)     Routine EEG 2024  Normal      Special Features  Status epilepticus: No  Self Injury Seizures: No  Precipitating Factors: No     Epilepsy Risk Factors:  Abnormal pregnancy: No  Abnormal birth/: No  Abnormal " Development: No  Febrile seizures, simple: No  Febrile seizures, complex: No  CNS infection: No  Cerebral palsy: No  Head injury (moderate/severe): No  CNS neoplasm: No  CNS malformation: No  Neurosurgical procedure: No  Stroke: No  Alcohol abuse: No  Drug abuse: No  Family history Sz/epilepsy: No     Prior AEDs:  Phenobarbital, ?others   Keppra      Prior workup:    MRI brain seizure w/wo contrast 9/20/2024  No acute infarction, edema, or pathologic intra-axial enhancement.  There is no evidence of mesial temporal sclerosis.  Mild chronic microangiopathic ischemic changes.  Low-lying cerebellar tonsils without   meeting criteria for Chiari I malformation (Patient says this is a chronic finding)     Routine EEG 9/25/2024  Normal      11/30/2022 [facility]  Normal routine EEG      Past psychiatric history:  Bipolar, depression, history of inpatient admissions     Social history:  On disability   No alcohol, no drugs, no tobacco   Driving: (has license but does not have a car to drive)  Lives alone: yes. Sister lives next door        Review of Systems   Constitutional: Negative.  Negative for chills, fatigue and fever.   HENT: Negative.  Negative for hearing loss, tinnitus and trouble swallowing.    Eyes:  Negative for photophobia, pain and visual disturbance.   Respiratory: Negative.  Negative for cough and shortness of breath.    Cardiovascular: Negative.  Negative for chest pain and palpitations.   Gastrointestinal:  Negative for constipation, diarrhea, nausea and vomiting.   Endocrine: Negative.    Genitourinary: Negative.  Negative for difficulty urinating and urgency.   Musculoskeletal:  Positive for back pain and gait problem.   Skin: Negative.  Negative for rash.   Neurological:  Positive for weakness and numbness. Negative for dizziness, tremors, seizures and headaches.   Hematological: Negative.    Psychiatric/Behavioral:  Negative for confusion and sleep disturbance.     I have personally reviewed the MA's review  of systems and made changes as necessary.    Current Outpatient Medications on File Prior to Visit   Medication Sig Dispense Refill    acetaminophen (TYLENOL) 650 mg CR tablet Take 1 tablet (650 mg total) by mouth every 8 (eight) hours as needed for mild pain or moderate pain 90 tablet 0    Acidophilus Lactobacillus CAPS Take by mouth      albuterol (PROVENTIL HFA,VENTOLIN HFA) 90 mcg/act inhaler Inhale 2 puffs every 6 (six) hours as needed for wheezing 3 Inhaler 3    ammonium lactate (LAC-HYDRIN) 12 % cream Apply topically in the morning To feet and leg dry skin 385 g 3    atorvastatin (LIPITOR) 40 mg tablet TAKE 1 TABLET BY MOUTH ONCE DAILY. 90 tablet 1    azelastine (ASTELIN) 0.1 % nasal spray 2 sprays into each nostril in the morning 30 mL 11    baclofen 10 mg tablet       Blood Glucose Monitoring Suppl (OneTouch Verio Reflect) w/Device KIT Check blood sugars four times daily. Please substitute with appropriate alternative as covered by patient's insurance. Dx: E11.65 1 kit 0    cetirizine (ZyrTEC) 10 mg tablet TAKE 1 TABLET BY MOUTH ONCE DAILY. 90 tablet 1    citalopram (CeleXA) 40 mg tablet Take 1 tablet (40 mg total) by mouth daily 90 tablet 1    clonazePAM (KlonoPIN) 0.5 mg tablet TAKE 1 TABLET BY MOUTH EVERY 12 (TWELVE) HOURS AS NEEDED FOR ANXIETY 60 tablet 0    docusate sodium (COLACE) 100 mg capsule Take 100 mg by mouth 2 (two) times a day      Easy Touch Pen Needles 31G X 8 MM MISC Use 1 each 3 (three) times a day      Eliquis 5 MG TAKE 1 TABLET BY MOUTH TWO TIMES A DAY. 180 tablet 1    ergocalciferol (VITAMIN D2) 50,000 units TAKE 1 CAPSULE (50,000 UNITS TOTAL) BY MOUTH ONCE A WEEK 4 capsule 5    ferrous sulfate 325 (65 Fe) mg tablet Take 325 mg by mouth daily with breakfast      folic acid (FOLVITE) 1 mg tablet TAKE 1 TABLET BY MOUTH ONCE DAILY. 90 tablet 1    furosemide (LASIX) 40 mg tablet Take 1 tablet (40 mg total) by mouth daily 90 tablet 0    gabapentin (NEURONTIN) 600 MG tablet TAKE 1 TABLET  TWICE DAILY 180 tablet 1    glucose blood (OneTouch Verio) test strip Check blood sugars four times daily. Please substitute with appropriate alternative as covered by patient's insurance. Dx: E11.65 400 each 3    insulin aspart (NovoLOG FlexPen) 100 UNIT/ML injection pen Inject 26 Units under the skin 3 (three) times a day with meals Per sliding scale - managed by Dr. Tabares + scheduled 15 mL 0    insulin glargine (Toujeo Max SoloStar) 300 units/mL CONCENTRATED U-300 injection pen (2-unit dial) Inject 66 Units under the skin daily 6 mL 2    ketoconazole (NIZORAL) 2 % shampoo Apply 1 Application topically 2 (two) times a week To feet in shower 120 mL 0    lamoTRIgine (LaMICtal) 25 mg tablet Take 1 tablet (25 mg total) by mouth 2 (two) times a day 180 tablet 1    latanoprost (XALATAN) 0.005 % ophthalmic solution Administer 0.005 drops to both eyes      levothyroxine 300 MCG tablet Take 1 tablet (300 mcg total) by mouth daily Take with 50 mcg tablet 90 tablet 0    Linzess 290 MCG CAPS TAKE 1 CAPSULE BY MOUTH ONCE DAILY. 90 capsule 1    lisinopril (ZESTRIL) 2.5 mg tablet TAKE 1 TABLET BY MOUTH ONCE DAILY. 90 tablet 1    magnesium Oxide (MAG-OX) 400 mg TABS TAKE 1 TABLET BY MOUTH TWO TIMES A DAY. 180 tablet 1    metoprolol tartrate (LOPRESSOR) 25 mg tablet Take 1 tablet by mouth (25 mg) 1 hour prior to coronary CTA.  Hold for heart rate less than 50 bpm. 1 tablet 0    nystatin (MYCOSTATIN) powder Apply topically as needed      OneTouch Delica Lancets 33G MISC Check blood sugars four times daily. Please substitute with appropriate alternative as covered by patient's insurance. Dx: E11.65 400 each 3    oxyCODONE (OXY-IR) 5 MG capsule Take 5 mg by mouth as needed for moderate pain      pantoprazole (PROTONIX) 40 mg tablet TAKE 1 TABLET (40 MG TOTAL) BY MOUTH DAILY 90 tablet 1    potassium chloride (Klor-Con M20) 20 mEq tablet TAKE 1 TABLET BY MOUTH EACH MORNING. 90 tablet 1    rOPINIRole (REQUIP) 1 mg tablet Take 1 mg by  mouth 3 (three) times a day      tamsulosin (FLOMAX) 0.4 mg TAKE 1 CAPSULE DAILY WITH DINNER 100 capsule 1    timolol (TIMOPTIC) 0.5 % ophthalmic solution Administer 1 drop to the right eye 2 (two) times a day      Tirzepatide (Mounjaro) 15 MG/0.5ML SOAJ Inject 15 mg under the skin once a week 6 mL 3    vitamin B-12 (VITAMIN B-12) 1,000 mcg tablet Take 1 tablet (1,000 mcg total) by mouth daily 90 tablet 1    clobetasol propionate (Clobex) 0.05 % shampoo Apply 1 application topically 3 (three) times a week 118 mL 3     No current facility-administered medications on file prior to visit.         Objective   /60 (BP Location: Left arm, Patient Position: Sitting, Cuff Size: Adult)   Pulse 72   Ht 6' (1.829 m)   Wt (!) 164 kg (362 lb)   SpO2 98%   BMI 49.10 kg/m²     Physical Exam  Constitutional:       Appearance: He is obese.   Eyes:      Extraocular Movements: EOM normal.      Pupils: Pupils are equal, round, and reactive to light.   Neurological:      Mental Status: He is alert.      Motor: Motor strength is normal.     Deep Tendon Reflexes:      Reflex Scores:       Bicep reflexes are 1+ on the right side and 1+ on the left side.       Brachioradialis reflexes are 1+ on the right side and 1+ on the left side.       Achilles reflexes are 0 on the right side and 0 on the left side.  Psychiatric:         Mood and Affect: Mood normal.         Speech: Speech normal.         Behavior: Behavior normal.       Neurological Exam  Mental Status  Alert. Oriented to person, place, time and situation. Speech is normal. Language is fluent with no aphasia. Attention and concentration are normal.    Cranial Nerves  CN II: Visual fields full to confrontation.  CN III, IV, VI: Extraocular movements intact bilaterally. Pupils equal round and reactive to light bilaterally.  CN V: Facial sensation is normal.  CN VII: Full and symmetric facial movement.  CN VIII: Hearing is normal.  CN IX, X: Palate elevates symmetrically  CN  XI: Shoulder shrug strength is normal.  CN XII: Tongue midline without atrophy or fasciculations.    Motor   Normal muscle tone. Strength is 5/5 throughout all four extremities.    Sensory  Light touch is normal in upper and lower extremities.     Reflexes                                            Right                      Left  Brachioradialis                    1+                         1+  Biceps                                 1+                         1+  Patellar                                Tr                         Tr  Achilles                                0                         0    Coordination  Right: Finger-to-nose normal.Left: Finger-to-nose normal.    Gait    Wide based gait, slow, using walker .

## 2025-04-16 ENCOUNTER — OFFICE VISIT (OUTPATIENT)
Dept: PHYSICAL THERAPY | Facility: CLINIC | Age: 60
End: 2025-04-16
Attending: FAMILY MEDICINE
Payer: COMMERCIAL

## 2025-04-16 DIAGNOSIS — M25.511 ACUTE PAIN OF RIGHT SHOULDER: Primary | ICD-10-CM

## 2025-04-16 PROCEDURE — 97140 MANUAL THERAPY 1/> REGIONS: CPT

## 2025-04-16 PROCEDURE — 97110 THERAPEUTIC EXERCISES: CPT

## 2025-04-16 RX ORDER — LANCETS 33 GAUGE
EACH MISCELLANEOUS
Qty: 400 EACH | Refills: 3 | Status: SHIPPED | OUTPATIENT
Start: 2025-04-16

## 2025-04-16 NOTE — PROGRESS NOTES
Daily Note     Today's date: 2025  Patient name: Juan Antonio Guy  : 1965  MRN: [id number]  Referring provider: Mo Ceja MD  Dx:   Encounter Diagnosis     ICD-10-CM    1. Acute pain of right shoulder  M25.511           Start Time: 1015  Stop Time: 1115  Total time in clinic (min): 60 minutes    Subjective: Patient states that his right and left shoulder are bothering him today.           Objective: See treatment diary below.          Assessment: Began therapy session on UBE for 10 minutes with good toleration. During today's therapy session held 4# weights for OH due to pain in right/left  shoulder.  Therapeutic exercise program was completed with good technique and post-exercise fatigue present . Ended therapy session with MHP to bilateral shoulders.  Continue to recommend PT in order to achieve maximal functional independence.           Plan: Continue per plan of care.          Precautions:           Manuals 4/16/25  3/24/25 3/26/25   4/2/25 4/9/25      R shd PROM  BC  NT  CK  CK BC                               Neuro Re-Ed             Shoulder shrugs  Held  20x 4#  20x 4# 4# 20x  Held    Front raises  Held  20x 4#  20x 4# 4# 20x  Held   Lateral raises Held  20x 4# 20x 4#  4#20x  Held   Shoulder press   OH press Held  20x 4#   20x 4#   X10 4# 4# 20x  Held   Seated rows  Seated rows 4# 2x10   20x 4#   20x 4#  4# 20x   seated rows 4# 2x10   Ther Ex            UBE(improve R shoulder ROM)  L2 10' alt  L1 8 min L2 10'  12min   L1.6  L2 10'   Seated Shoulder flexion/abduction with cane 20x ea  NT 20x ea  20x each  20x ea    Bicep curls 4# 20x   20x 4#  20x 4#  4# 20x  20x 4#    MTP/LTP seated  BlueTB 2x10 each    NT   GTB 2x10 ea  GTB 2x10   BlueTB  2x10 ea    Seated ER/IR with TB  BlackTB  2x10 each   NT  GTB 2x10 ea  GTB 2x10   BlueTB  2x10 ea    Seated Shoulder ER/ADD   GreenTB 2x10 ea         GTB 2x10                  Ther Activity                                         Gait Training                                          Modalities             CP PRN   MHP B shoulders 10'     NT   MHP R 10'  MHP  B shds  10'  MHP B shoulders 10'

## 2025-04-18 DIAGNOSIS — I50.9 CONGESTIVE HEART FAILURE, UNSPECIFIED HF CHRONICITY, UNSPECIFIED HEART FAILURE TYPE (HCC): ICD-10-CM

## 2025-04-18 DIAGNOSIS — I87.332 CHRONIC VENOUS HYPERTENSION (IDIOPATHIC) WITH ULCER AND INFLAMMATION OF LEFT LOWER EXTREMITY (HCC): ICD-10-CM

## 2025-04-18 DIAGNOSIS — M86.9 OSTEOMYELITIS, UNSPECIFIED SITE, UNSPECIFIED TYPE (HCC): ICD-10-CM

## 2025-04-18 DIAGNOSIS — E66.01 MORBID OBESITY (HCC): ICD-10-CM

## 2025-04-18 DIAGNOSIS — R39.11 BENIGN PROSTATIC HYPERPLASIA WITH URINARY HESITANCY: ICD-10-CM

## 2025-04-18 DIAGNOSIS — E11.621 DIABETIC ULCER OF FOOT ASSOCIATED WITH TYPE 2 DIABETES MELLITUS, WITH OTHER ULCER SEVERITY, UNSPECIFIED LATERALITY, UNSPECIFIED PART OF FOOT (HCC): ICD-10-CM

## 2025-04-18 DIAGNOSIS — L97.508 DIABETIC ULCER OF FOOT ASSOCIATED WITH TYPE 2 DIABETES MELLITUS, WITH OTHER ULCER SEVERITY, UNSPECIFIED LATERALITY, UNSPECIFIED PART OF FOOT (HCC): ICD-10-CM

## 2025-04-18 DIAGNOSIS — I48.91 ATRIAL FIBRILLATION, UNSPECIFIED TYPE (HCC): ICD-10-CM

## 2025-04-18 DIAGNOSIS — N40.1 BENIGN PROSTATIC HYPERPLASIA WITH URINARY HESITANCY: ICD-10-CM

## 2025-04-18 DIAGNOSIS — J44.1 COPD WITH ACUTE EXACERBATION (HCC): ICD-10-CM

## 2025-04-18 RX ORDER — TAMSULOSIN HYDROCHLORIDE 0.4 MG/1
CAPSULE ORAL
Qty: 100 CAPSULE | Refills: 1 | Status: SHIPPED | OUTPATIENT
Start: 2025-04-18

## 2025-04-21 ENCOUNTER — APPOINTMENT (OUTPATIENT)
Dept: PHYSICAL THERAPY | Facility: CLINIC | Age: 60
End: 2025-04-21
Payer: COMMERCIAL

## 2025-04-23 ENCOUNTER — OFFICE VISIT (OUTPATIENT)
Dept: PHYSICAL THERAPY | Facility: CLINIC | Age: 60
End: 2025-04-23
Attending: FAMILY MEDICINE
Payer: COMMERCIAL

## 2025-04-23 DIAGNOSIS — M25.511 ACUTE PAIN OF RIGHT SHOULDER: Primary | ICD-10-CM

## 2025-04-23 PROCEDURE — 97110 THERAPEUTIC EXERCISES: CPT

## 2025-04-23 PROCEDURE — 97140 MANUAL THERAPY 1/> REGIONS: CPT

## 2025-04-23 NOTE — PROGRESS NOTES
Daily Note     Today's date: 2025  Patient name: Juan Antonio Guy  : 1965  MRN: [id number]  Referring provider: Mo Ceja MD  Dx:   Encounter Diagnosis     ICD-10-CM    1. Acute pain of right shoulder  M25.511           Start Time: 1050  Stop Time: 1145  Total time in clinic (min): 55 minutes    Subjective: Patient states that he feels his shoulder are okay prior to therapy but then bother him afterwards.           Objective: See treatment diary below.          Assessment: Began therapy session on UBE for 10 minutes with good toleration. During today's therapy session held 4# weights for OH due to pain in right/left  shoulder.  Therapeutic exercise program was completed with good technique and post-exercise fatigue present . Ended therapy session with MHP to bilateral shoulders.  Continue to recommend PT in order to achieve maximal functional independence.       Plan: Continue per plan of care.      Precautions:           Manuals 4/16/25  4/23/25 3/26/25   4/2/25 4/9/25      R shd PROM  BC D/C   CK  CK BC                               Neuro Re-Ed             Shoulder shrugs  Held  4# 20x  20x 4# 4# 20x  Held    Front raises  Held  Held 20x 4# 4# 20x  Held   Lateral raises Held  held  20x 4#  4#20x  Held   Shoulder press   OH press Held  Held  20x 4#   X10 4# 4# 20x  Held   Seated rows  Seated rows 4# 2x10   Held   20x 4#  4# 20x   seated rows 4# 2x10   Ther Ex            UBE(improve R shoulder ROM)  L2 10' alt  L2 10 min L2 10'  12min   L1.6  L2 10'   Seated Shoulder flexion/abduction with cane 20x ea  NT 20x ea  20x each  20x ea    Bicep curls 4# 20x   20x 4#  20x 4#  4# 20x  20x 4#    MTP/LTP seated  BlueTB 2x10 each   BlackTB 2x10 each    GTB 2x10 ea  GTB 2x10   BlueTB  2x10 ea    Seated ER/IR with TB  BlackTB  2x10 each  BlackTB  2x10 each   GTB 2x10 ea  GTB 2x10   BlueTB  2x10 ea    Seated Shoulder ER/ADD   GreenTB 2x10 ea   GreenTB 2x10 ea       GTB 2x10                  Ther Activity                                          Gait Training                                         Modalities             CP PRN   MHP B shoulders 10'       MHP R 10'  MHP  B shds  10'  MHP B shoulders 10'

## 2025-04-25 DIAGNOSIS — F41.9 ANXIETY: ICD-10-CM

## 2025-04-25 DIAGNOSIS — I48.91 ATRIAL FIBRILLATION, UNSPECIFIED TYPE (HCC): ICD-10-CM

## 2025-04-25 RX ORDER — CLONAZEPAM 0.5 MG/1
TABLET ORAL
Qty: 60 TABLET | Refills: 0 | Status: SHIPPED | OUTPATIENT
Start: 2025-04-25

## 2025-04-25 RX ORDER — APIXABAN 5 MG/1
5 TABLET, FILM COATED ORAL 2 TIMES DAILY
Qty: 180 TABLET | Refills: 1 | Status: SHIPPED | OUTPATIENT
Start: 2025-04-25

## 2025-04-28 ENCOUNTER — OFFICE VISIT (OUTPATIENT)
Dept: PHYSICAL THERAPY | Facility: CLINIC | Age: 60
End: 2025-04-28
Attending: FAMILY MEDICINE
Payer: COMMERCIAL

## 2025-04-28 DIAGNOSIS — M25.511 ACUTE PAIN OF RIGHT SHOULDER: Primary | ICD-10-CM

## 2025-04-28 PROCEDURE — 97110 THERAPEUTIC EXERCISES: CPT

## 2025-04-28 NOTE — PROGRESS NOTES
[facility]  Kvng Hernandez    Administrative Statements   Encounter provider Kvng Hernandez    The Patient is located at Home and in the following state in which I hold an active license [place].    The patient was identified by name and date of birth. Juan Antonio Guy was informed that this is a telemedicine visit and that the visit is being conducted through Telephone.  My office door was closed. No one else was in the room.  He acknowledged consent and understanding of privacy and security of the video platform. The patient has agreed to participate and understands they can discontinue the visit at any time.    Assessment/ Plan     Assessment & Plan  Type 2 diabetes mellitus with hyperglycemia, with long-term current use of insulin ([facility])    Lab Results   Component Value Date    HGBA1C 10 (A) 02/07/2025     Goal A1c <7% .   On Additional Therapies:  Statin: yes  ACEI/ARB: yes  Glucose control on CGM report  Within goal range of 70 - 180 mg/dL 44% of time (goal >70% of time)  Average glucose 192 mg/dL (goal <154 mg/dL)  GMI 7.9%      Assessment:   Dexcom CGM data shows improvement in glucose control.  He does have decreased appetite with Mounjaro but is able to tolerate this.  He has not increased Mounjaro to 15 mg weekly yet.  He was using up his current supply of 12.5 mg weekly.  He will start the 15 mg weekly dose today.    He was also asking about how to order refills on his Dexcom sensors. I did provide him with the phone number for [employer] to reorder his supply.     Changes to medication regimen:  Toujeo: Continue 66 units daily   Mounjaro: Increase to 15 mg weekly today  NovoLog: Continue 26 units 3 times a day plus sliding scale           Follow-up: 6 weeks     Subjective   Appointment today is for follow-up of type 2 diabetes.  Diabetes related complications include CKD, diabetic foot ulcer, neuropathy, heart failure.  He is on a regimen of Mounjaro and  basal bolus insulin.  Patient saw his endocrinologist on March 12 and was instructed to increase his NovoLog to 30 units with meals in the days following steroid injection.  He reapplied new Dexcom CGM and appointment today is to review Dexcom CGM data.  Last appointment with clinical pharmacist his Mounjaro was increased to 15 mg weekly however he has not increased yet.  Today would be the first day of the higher dose of Mounjaro.        Medication Adherence/ Tolerability/ Cost:  insulin aspart 26 units TID plus sliding scale   Toujeo- 66 units daily   Mounjaro 12.5 mg weekly- has not increased yet to 15 mg weekly.     Historical DM Meds (reason for discontinuation):  Jardiance     Review of Systems   Gastrointestinal:  Negative for abdominal pain, constipation, diarrhea, nausea and vomiting.        2. Lifestyle:   Wakes at 6- 7 AM   Breakfast:: may or may not eat  Lunch 11- 1 PM: sometimes light like tuna fish   Dinner: Moms meals   Admits to overly snacking at night    3. Home monitoring devices  Glucometer: Yes, Brand: One touch verio flex   Continuous Glucose Monitor: Yes, Brand: Dexcom       Objective       Dexcom G7- Read off   Dexcom  Data: 30 day report    Average glucose: 192 mg/dL  GMI: 7.9%    Time in target  Very High (>250 mg/dL): 20%  High (180 - 250 mg/dL): 34%  In range (70 - 180 mg/dL): 44%  Low ( < 70 mg/dL): 1%  Very low (<54 mg/dL): 1%     ASCVD Risk:  The ASCVD Risk score (Marcia DK, et al., 2019) failed to calculate for the following reasons:    The valid total cholesterol range is 130 to 320 mg/dL     Vitals:  There were no vitals filed for this visit.    Eye Exam:    Lab Results   Component Value Date    LEFTDIABRET Positive 12/19/2024    RIGHTDIABRET Positive 12/19/2024       Labs:  Lab Results   Component Value Date    SODIUM 140 12/24/2024    K 4.1 12/24/2024    EGFR 98 12/24/2024    CREATININE 0.87 12/24/2024    VYKAXYJS45 335 12/24/2024    MICROALBCRE 9 02/07/2025        Lab Results   Component Value Date    HGBA1C 10 (A) 02/07/2025    HGBA1C 9.4 (H) 12/24/2024    HGBA1C 10.9 (A) 11/06/2024       Pharmacist Tracking Tool     Pharmacist Tracking Tool  Reason For Outreach: Embedded Pharmacist  Demographics:  Intervention Method: Phone  Type of Intervention: Follow-Up  Topics Addressed: Diabetes  Pharmacologic Interventions: N/A  Non-Pharmacologic Interventions: Adherence addressed, Disease state education, Medication/Device education, and Personal CGM  Time:  Direct Patient Care:  15  mins  Care Coordination:  10  mins  Recommendation Recipient: Patient/Caregiver and Provider  Outcome: Accepted

## 2025-04-28 NOTE — PROGRESS NOTES
Daily Note     Today's date: 2025  Patient name: Juan Antonio Guy  : 1965  MRN: [id number]  Referring provider: Mo Ceja MD  Dx:   Encounter Diagnosis     ICD-10-CM    1. Acute pain of right shoulder  M25.511           Start Time: 1045  Stop Time: 1150  Total time in clinic (min): 65 minutes    Subjective: Patient states that both of his shoulders are feeling good today.         Objective: See treatment diary below.        Assessment: Therapeutic exercise program was completed with good technique and post-exercise fatigue present. Continue to recommend PT in order to achieve maximal functional independence.          Plan: Continue per plan of care.      Precautions:           Manuals 25                                 Neuro Re-Ed             Shoulder shrugs  Held  4# 20x  20x 4# 4# 20x  Held    Front raises  Held  Held Held  4# 20x  Held   Lateral raises Held  held  Held  4#20x  Held   Shoulder press   OH press Held  Held Held  4# 20x  Held   Seated rows  Seated rows 4# 2x10   Held   20x 4#  4# 20x   seated rows 4# 2x10   Ther Ex            UBE(improve R shoulder ROM)  L2 10' alt  L2 10 min L2 10'  12min   L1.6  L2 10'   Seated Shoulder flexion/abduction with cane 20x ea  NT 20x ea  20x each  20x ea    Bicep curls 4# 20x   20x 4#  20x 4#  4# 20x  20x 4#    MTP/LTP seated  BlueTB 2x10 each   BlackTB 2x10 each    GTB 2x10 ea  GTB 2x10   BlueTB  2x10 ea    Seated ER/IR with TB  BlackTB  2x10 each  BlackTB  2x10 each   GTB 2x10 ea  GTB 2x10   BlueTB  2x10 ea    Seated Shoulder ER/ADD   GreenTB 2x10 ea   GreenTB 2x10 ea   Green TB 2x10 ea     GTB 2x10                  Ther Activity                                         Gait Training                                         Modalities             CP PRN   MHP B shoulders 10'       MHP R 10'  MHP  B shds  10'  MHP B shoulders 10'

## 2025-04-28 NOTE — ASSESSMENT & PLAN NOTE
Lab Results   Component Value Date    HGBA1C 10 (A) 02/07/2025     Goal A1c <7% .   On Additional Therapies:  Statin: yes  ACEI/ARB: yes  Glucose control on CGM report  Within goal range of 70 - 180 mg/dL 44% of time (goal >70% of time)  Average glucose 192 mg/dL (goal <154 mg/dL)  GMI 7.9%      Assessment:   Dexcom CGM data shows improvement in glucose control.  He does have decreased appetite with Mounjaro but is able to tolerate this.  He has not increased Mounjaro to 15 mg weekly yet.  He was using up his current supply of 12.5 mg weekly.  He will start the 15 mg weekly dose today.    He was also asking about how to order refills on his Dexcom sensors. I did provide him with the phone number for [employer] to reorder his supply.     Changes to medication regimen:  Toujeo: Continue 66 units daily   Mounjaro: Increase to 15 mg weekly today  NovoLog: Continue 26 units 3 times a day plus sliding scale

## 2025-04-29 ENCOUNTER — TELEMEDICINE (OUTPATIENT)
Dept: FAMILY MEDICINE CLINIC | Facility: CLINIC | Age: 60
End: 2025-04-29

## 2025-04-29 ENCOUNTER — TELEPHONE (OUTPATIENT)
Age: 60
End: 2025-04-29

## 2025-04-29 DIAGNOSIS — E11.65 TYPE 2 DIABETES MELLITUS WITH HYPERGLYCEMIA, WITH LONG-TERM CURRENT USE OF INSULIN (HCC): Primary | ICD-10-CM

## 2025-04-29 DIAGNOSIS — Z79.4 TYPE 2 DIABETES MELLITUS WITH HYPERGLYCEMIA, WITH LONG-TERM CURRENT USE OF INSULIN (HCC): Primary | ICD-10-CM

## 2025-04-29 PROCEDURE — PBNCHG PB NO CHARGE PLACEHOLDER: Performed by: PHARMACIST

## 2025-04-29 NOTE — TELEPHONE ENCOUNTER
Called looking to see if  we rec'd a fax from them for durable medical supplies.  Spoke to clerical and pcp has completed the order.

## 2025-04-30 ENCOUNTER — TELEPHONE (OUTPATIENT)
Dept: FAMILY MEDICINE CLINIC | Facility: CLINIC | Age: 60
End: 2025-04-30

## 2025-04-30 ENCOUNTER — OFFICE VISIT (OUTPATIENT)
Dept: PHYSICAL THERAPY | Facility: CLINIC | Age: 60
End: 2025-04-30
Attending: FAMILY MEDICINE
Payer: COMMERCIAL

## 2025-04-30 DIAGNOSIS — J44.1 COPD WITH ACUTE EXACERBATION (HCC): ICD-10-CM

## 2025-04-30 DIAGNOSIS — M25.511 ACUTE PAIN OF RIGHT SHOULDER: Primary | ICD-10-CM

## 2025-04-30 DIAGNOSIS — I50.32 CHRONIC DIASTOLIC CONGESTIVE HEART FAILURE (HCC): ICD-10-CM

## 2025-04-30 DIAGNOSIS — R26.2 AMBULATORY DYSFUNCTION: Primary | ICD-10-CM

## 2025-04-30 PROCEDURE — 97110 THERAPEUTIC EXERCISES: CPT

## 2025-04-30 NOTE — PROGRESS NOTES
"Daily Note     Today's date: 2025  Patient name: Juan Antonio Guy  : 1965  MRN: [id number]  Referring provider: Mo Ceja MD  Dx:   Encounter Diagnosis     ICD-10-CM    1. Acute pain of right shoulder  M25.511                      Subjective: Patient states that \"my shoulders feel pretty good today\". \"I have good days and bad days\".          Objective: See treatment diary below.        Assessment: Pt began tx session on UBE x 10 min for warm up, F/B therapeutic exercise program.  He completed same  with good technique and post-exercise fatigue present.  Pt wishes to continue as he feels it is helping.  Continue to recommend PT in order to achieve maximal functional independence.          Plan: Continue per plan of care.       Precautions:           Manuals 25                                 Neuro Re-Ed             Shoulder shrugs  Held  4# 20x  20x 4# 4# 20x  Held    Front raises  Held  Held Held  4# 20x  Held   Lateral raises Held  held  Held  4#20x  Held   Shoulder press   OH press Held  Held Held  4# 20x  Held   Seated rows  Seated rows 4# 2x10   Held   20x 4#  4# 20x   seated rows 4# 2x10   Ther Ex            UBE(improve R shoulder ROM)  L2 10' alt  L2 10 min L2 10'  10min   L1.6  L2 10'   Seated Shoulder flexion/abduction with cane 20x ea  NT 20x ea  20x each  20x ea    Bicep curls 4# 20x   20x 4#  20x 4#  4# 20x  20x 4#    MTP/LTP seated  BlueTB 2x10 each   BlackTB 2x10 each    GTB 2x10 ea  GTB 2x10   BlueTB  2x10 ea    Seated ER/IR with TB  BlackTB  2x10 each  BlackTB  2x10 each   GTB 2x10 ea  GTB 2x10   BlueTB  2x10 ea    Seated Shoulder ER/ADD   GreenTB 2x10 ea   GreenTB 2x10 ea   Green TB 2x10 ea   GTB  X20 ea  GTB 2x10                  Ther Activity                                         Gait Training                                         Modalities             CP PRN   MHP B shoulders 10'       MHP R 10'  MHP  B shds  10'  MHP B shoulders 10' "

## 2025-04-30 NOTE — TELEPHONE ENCOUNTER
----- Message from Amilcar Appiah PA-C sent at 4/30/2025  6:58 AM EDT -----  Got a fax yesterday from [employer] to put an order for patient rollator replacement.  I am unable to specify it is a replacement on better issue.  I put in a DME order to fax to them since they said that is another option.  Can we fax this to [employer]?

## 2025-05-01 ENCOUNTER — TELEPHONE (OUTPATIENT)
Age: 60
End: 2025-05-01

## 2025-05-01 ENCOUNTER — TELEPHONE (OUTPATIENT)
Dept: FAMILY MEDICINE CLINIC | Facility: CLINIC | Age: 60
End: 2025-05-01

## 2025-05-01 DIAGNOSIS — F41.9 ANXIETY: ICD-10-CM

## 2025-05-01 DIAGNOSIS — F33.9 DEPRESSION, RECURRENT (HCC): Primary | ICD-10-CM

## 2025-05-01 NOTE — TELEPHONE ENCOUNTER
Pt is asking if you could put in a new referral for psychiatry, Susana Cohen.  He only saw her once because he lost his insurance, but he would like to go back to see her.

## 2025-05-01 NOTE — TELEPHONE ENCOUNTER
Patient called and said he needs an updated referral to see Apryl Avilez which I believe is his Psychiatrist. Please advise [phone] thank you.

## 2025-05-02 DIAGNOSIS — F32.2 SEVERE DEPRESSION (HCC): ICD-10-CM

## 2025-05-02 DIAGNOSIS — F33.1 MODERATE EPISODE OF RECURRENT MAJOR DEPRESSIVE DISORDER (HCC): ICD-10-CM

## 2025-05-02 DIAGNOSIS — I10 ESSENTIAL HYPERTENSION: ICD-10-CM

## 2025-05-02 DIAGNOSIS — B35.3 TINEA PEDIS OF BOTH FEET: ICD-10-CM

## 2025-05-02 DIAGNOSIS — Z86.79 HISTORY OF HEART FAILURE: ICD-10-CM

## 2025-05-02 RX ORDER — LAMOTRIGINE 25 MG/1
25 TABLET ORAL 2 TIMES DAILY
Qty: 180 TABLET | Refills: 1 | Status: SHIPPED | OUTPATIENT
Start: 2025-05-02

## 2025-05-02 RX ORDER — KETOCONAZOLE 20 MG/ML
1 SHAMPOO, SUSPENSION TOPICAL 2 TIMES WEEKLY
Qty: 120 ML | Refills: 0 | Status: SHIPPED | OUTPATIENT
Start: 2025-05-05

## 2025-05-02 RX ORDER — ATORVASTATIN CALCIUM 40 MG/1
40 TABLET, FILM COATED ORAL DAILY
Qty: 90 TABLET | Refills: 1 | Status: SHIPPED | OUTPATIENT
Start: 2025-05-02

## 2025-05-02 RX ORDER — LISINOPRIL 2.5 MG/1
2.5 TABLET ORAL DAILY
Qty: 90 TABLET | Refills: 1 | Status: SHIPPED | OUTPATIENT
Start: 2025-05-02

## 2025-05-05 ENCOUNTER — APPOINTMENT (OUTPATIENT)
Dept: PHYSICAL THERAPY | Facility: CLINIC | Age: 60
End: 2025-05-05
Payer: COMMERCIAL

## 2025-05-07 ENCOUNTER — OFFICE VISIT (OUTPATIENT)
Dept: PHYSICAL THERAPY | Facility: CLINIC | Age: 60
End: 2025-05-07
Attending: FAMILY MEDICINE
Payer: COMMERCIAL

## 2025-05-07 DIAGNOSIS — M25.511 ACUTE PAIN OF RIGHT SHOULDER: Primary | ICD-10-CM

## 2025-05-07 PROCEDURE — 97110 THERAPEUTIC EXERCISES: CPT

## 2025-05-07 NOTE — PROGRESS NOTES
PT Discharge    Today's date: 2025  Patient name: Juan Antonio Guy  : 1965  MRN: [id number]  Referring provider: Mo Ceja MD  Dx:   Encounter Diagnosis     ICD-10-CM    1. Acute pain of right shoulder  M25.511                 Start Time: 1030  Stop Time: 1130  Total time in clinic (min): 60 minutes    Assessment  Impairments: abnormal muscle tone, abnormal or restricted ROM, abnormal movement, activity intolerance, impaired physical strength, lacks appropriate home exercise program, pain with function, poor posture  and poor body mechanics  Symptom irritability: moderate    Assessment details: Juan Antonio is a 59 y.o male who has attended 32 OP PT with signs and symptoms consistent with R shoulder bursitis. Upon discharge, patient has  reached his maximal function capacity with   bilateral shoulder ROM and strength. He is limited OH/behind his back by pain. At this time patient will transition to HEP and potentially will attend step-down program to maintain strength and mobility within right shoulder.     Understanding of Dx/Px/POC: fair     Prognosis: fair    Goals  STG(In 4 weeks)  Patient will initiate HEP. MET  Patient will decrease R shoulder pain from 10/10  to 5/10  in order to improve QOL. MET  Patient will increase R shoulder ROM to WFL. MET  Patient will increase FOTO score from  IE to D/C score in order to improve QOL. Progressing    LTG(In 8 weeks)  Patient will decrease R shoulder pain no more than 5/10  in order to improve QOL. Progressing   Patient will increase R shoulder strength by 1-2 MMT in order to improve ability to /grasp objects.  MET  Patient will be independent with HEP at D/c. MET                Plan  Patient would benefit from: PT eval and skilled physical therapy  Planned modality interventions: cryotherapy    Planned therapy interventions: self care, home exercise program, neuromuscular re-education, manual therapy and joint mobilization    Frequency: 1x  week  Duration in weeks: 4  Plan of Care expiration date: 2025  Treatment plan discussed with: patient        Subjective Evaluation    History of Present Illness  Mechanism of injury: Patient presents with C/C of R shoulder pain that has been ongoing for a couple of weeks but he states that he has had it for several years he just keeps flaring it up periodically. Patient states that he got a cortisone injection 2024 but only had relief initially.           Quality of life: fair    Patient Goals  Patient goals for therapy: increased motion, decreased pain and increased strength    Pain  Current pain ratin  At best pain ratin  At worst pain ratin  Location: R shoulder  Quality: discomfort  Aggravating factors: lifting      Diagnostic Tests  X-ray: normal  Treatments  Previous treatment: physical therapy  Current treatment: physical therapy        Objective     Active Range of Motion   Left Shoulder   Normal active range of motion    Right Shoulder   Flexion: WFL and with pain  Extension: WFL and with pain  Abduction: WFL and with pain    Strength/Myotome Testing     Left Shoulder   Normal muscle strength    Right Shoulder     Planes of Motion   Flexion: 4   Extension: 4   Abduction: 4-   Adduction: 4   External rotation at 90°: 4-   Internal rotation at 90°: 4-     Tests     Right Shoulder   Positive AC shear.              Precautions:           Manuals 25                                  Neuro Re-Ed             Shoulder shrugs  Held  4# 20x  20x 4# 4# 20x  Held    Front raises  Held  Held Held  4# 20x  Held   Lateral raises Held  held  Held  4#20x  Held   Shoulder press   OH press Held  Held Held  4# 20x  Held   Seated rows  Seated rows 4# 2x10   Held   20x 4#  4# 20x   seated rows 4# 2x10   Ther Ex             UBE(improve R shoulder ROM)  L2 10' alt  L2 10 min L2 10'  10min   L1.6  L2 10'   Seated Shoulder flexion/abduction with cane 20x ea  NT  20x ea  20x each  20x ea    Bicep curls 4# 20x   20x 4#  20x 4#  4# 20x  20x 4#    MTP/LTP seated  BlueTB 2x10 each   BlackTB 2x10 each    GTB 2x10 ea  GTB 2x10   BlueTB  2x10 ea    Seated ER/IR with TB  BlackTB  2x10 each  BlackTB  2x10 each   GTB 2x10 ea  GTB 2x10   BlueTB  2x10 ea    Seated Shoulder ER/ADD   GreenTB 2x10 ea   GreenTB 2x10 ea   Green TB 2x10 ea   GTB  X20 ea  GTB 2x10                  Ther Activity                                         Gait Training                                         Modalities             CP PRN   MHP B shoulders 10'       MHP R 10'  MHP  B shds  10'  MHP B shoulders 10'                        [

## 2025-05-14 ENCOUNTER — APPOINTMENT (OUTPATIENT)
Dept: PHYSICAL THERAPY | Facility: CLINIC | Age: 60
End: 2025-05-14
Attending: FAMILY MEDICINE
Payer: COMMERCIAL

## 2025-05-14 DIAGNOSIS — M25.511 ACUTE PAIN OF RIGHT SHOULDER: Primary | ICD-10-CM

## 2025-05-14 PROCEDURE — 97110 THERAPEUTIC EXERCISES: CPT

## 2025-05-14 PROCEDURE — 97164 PT RE-EVAL EST PLAN CARE: CPT

## 2025-05-14 NOTE — PROGRESS NOTES
PT Re-Evaluation     Today's date: 2025  Patient name: Juan Antonio Guy  : 1965  MRN: [id number]  Referring provider: Mo Ceja MD  Dx:   Encounter Diagnosis     ICD-10-CM    1. Acute pain of right shoulder  M25.511                 Start Time: 09  Stop Time: 1100  Total time in clinic (min): 75 minutes    Assessment  Impairments: abnormal muscle tone, abnormal or restricted ROM, abnormal movement, activity intolerance, impaired physical strength, lacks appropriate home exercise program, pain with function, poor posture  and poor body mechanics  Symptom irritability: moderate    Assessment details: Juan Antonio is a 59 y.o male who has attended 33 OP PT with signs and symptoms consistent with R shoulder bursitis. Upon discharge, patient has  reached his maximal function capacity with   bilateral shoulder ROM and strength. He is limited OH/behind his back by pain. At this time patient will transition to HEP and potentially will attend step-down program to maintain strength and mobility within right shoulder.     Understanding of Dx/Px/POC: fair     Prognosis: fair    Goals  STG(In 4 weeks)  Patient will initiate HEP. MET  Patient will decrease R shoulder pain from 10/10  to 5/10  in order to improve QOL. MET  Patient will increase R shoulder ROM to WFL. MET  Patient will increase FOTO score from  IE to D/C score in order to improve QOL. Progressing    LTG(In 8 weeks)  Patient will decrease R shoulder pain no more than 5/10  in order to improve QOL. Progressing   Patient will increase R shoulder strength by 3-4 MMT in order to improve ability to /grasp objects.  Progressing   Patient will be independent with HEP at D/c. MET                Plan  Patient would benefit from: PT eval and skilled physical therapy  Planned modality interventions: cryotherapy    Planned therapy interventions: self care, home exercise program, neuromuscular re-education, manual therapy and joint mobilization    Frequency:  1x week  Duration in weeks: 4  Plan of Care expiration date: 2025  Treatment plan discussed with: patient        Subjective Evaluation    History of Present Illness  Mechanism of injury: Patient presents with C/C of R shoulder pain that has been ongoing for a couple of weeks but he states that he has had it for several years he just keeps flaring it up periodically. Patient states that he got a cortisone injection 2024 but only had relief initially.           Quality of life: fair    Patient Goals  Patient goals for therapy: increased motion, decreased pain and increased strength    Pain  Current pain ratin  At best pain ratin  At worst pain ratin  Location: R shoulder  Quality: discomfort  Aggravating factors: lifting      Diagnostic Tests  X-ray: normal  Treatments  Previous treatment: physical therapy  Current treatment: physical therapy        Objective     Active Range of Motion   Left Shoulder   Normal active range of motion    Right Shoulder   Flexion: WFL and with pain  Extension: WFL and with pain  Abduction: WFL and with pain    Strength/Myotome Testing     Left Shoulder   Normal muscle strength    Right Shoulder     Planes of Motion   Flexion: 4   Extension: 4   Abduction: 4-   Adduction: 4   External rotation at 90°: 4-   Internal rotation at 90°: 4-     Tests     Right Shoulder   Positive AC shear.              Precautions:           Manuals 25                                  Neuro Re-Ed             Shoulder shrugs  Held  4# 20x  20x 4# 4# 20x  Held    Front raises  Held  Held Held  4# 20x  Held   Lateral raises Held  held  Held  4#20x  Held   Shoulder press   OH press Held  Held Held  4# 20x  Held   Seated rows  Seated rows 4# 2x10   Held   20x 4#  4# 20x   seated rows 4# 2x10   Ther Ex             UBE(improve R shoulder ROM)  L2 10' alt  L2 10 min L2 10'  10min   L1.6  L2 10'   Seated Shoulder flexion/abduction with cane 20x ea  NT  20x ea  20x each  20x ea    Bicep curls 4# 20x   20x 4#  20x 4#  4# 20x  20x 4#    MTP/LTP seated  BlueTB 2x10 each   BlackTB 2x10 each    GTB 2x10 ea  GTB 2x10   BlueTB  2x10 ea    Seated ER/IR with TB  BlackTB  2x10 each  BlackTB  2x10 each   GTB 2x10 ea  GTB 2x10   BlueTB  2x10 ea    Seated Shoulder ER/ADD   GreenTB 2x10 ea   GreenTB 2x10 ea   Green TB 2x10 ea   GTB  X20 ea  GTB 2x10                  Ther Activity                                         Gait Training                                         Modalities             CP PRN   MHP  R shoulders 10'       MHP R 10'  MHP  B shds  10'  MHP B shoulders 10'                        [

## 2025-05-14 NOTE — LETTER
May 14, 2025    Mo Ceja MD  [address] [facility] [address]    Patient: Juan Antonio Guy   YOB: 1965   Date of Visit: 2025     Encounter Diagnosis     ICD-10-CM    1. Acute pain of right shoulder  M25.511           Dear Dr. Mo Ceja MD:    Thank you for your recent referral of Juan Antonio Guy. Please review the attached evaluation summary from Juan Antonio's recent visit.     Please verify that you agree with the plan of care by signing the attached order.     If you have any questions or concerns, please do not hesitate to call.     I sincerely appreciate the opportunity to share in the care of one of your patients and hope to have another opportunity to work with you in the near future.       Sincerely,    Macho Garza, PT      Referring Provider:      I certify that I have read the below Plan of Care and certify the need for these services furnished under this plan of treatment while under my care.                    Mo Ceja MD  [address] [facility] [address]  Via In Basket          PT Re-Evaluation     Today's date: 2025  Patient name: Juan Antonio Guy  : 1965  MRN: [id number]  Referring provider: Mo Ceja MD  Dx:   Encounter Diagnosis     ICD-10-CM    1. Acute pain of right shoulder  M25.511                 Start Time: 0945  Stop Time: 1100  Total time in clinic (min): 75 minutes    Assessment  Impairments: abnormal muscle tone, abnormal or restricted ROM, abnormal movement, activity intolerance, impaired physical strength, lacks appropriate home exercise program, pain with function, poor posture  and poor body mechanics  Symptom irritability: moderate    Assessment details: Juan Antonio is a 59 y.o male who has attended 33 OP PT with signs and symptoms consistent with R shoulder bursitis. Upon discharge, patient has  reached his maximal function capacity with   bilateral shoulder ROM and strength. He is limited OH/behind his back by  pain. At this time patient will transition to HEP and potentially will attend step-down program to maintain strength and mobility within right shoulder.     Understanding of Dx/Px/POC: fair     Prognosis: fair    Goals  STG(In 4 weeks)  Patient will initiate HEP. MET  Patient will decrease R shoulder pain from 10/10  to 5/10  in order to improve QOL. MET  Patient will increase R shoulder ROM to WFL. MET  Patient will increase FOTO score from  IE to D/C score in order to improve QOL. Progressing    LTG(In 8 weeks)  Patient will decrease R shoulder pain no more than 5/10  in order to improve QOL. Progressing   Patient will increase R shoulder strength by 3-4 MMT in order to improve ability to /grasp objects.  Progressing   Patient will be independent with HEP at D/c. MET                Plan  Patient would benefit from: PT eval and skilled physical therapy  Planned modality interventions: cryotherapy    Planned therapy interventions: self care, home exercise program, neuromuscular re-education, manual therapy and joint mobilization    Frequency: 1x week  Duration in weeks: 4  Plan of Care expiration date: 2025  Treatment plan discussed with: patient        Subjective Evaluation    History of Present Illness  Mechanism of injury: Patient presents with C/C of R shoulder pain that has been ongoing for a couple of weeks but he states that he has had it for several years he just keeps flaring it up periodically. Patient states that he got a cortisone injection 2024 but only had relief initially.           Quality of life: fair    Patient Goals  Patient goals for therapy: increased motion, decreased pain and increased strength    Pain  Current pain ratin  At best pain ratin  At worst pain ratin  Location: R shoulder  Quality: discomfort  Aggravating factors: lifting      Diagnostic Tests  X-ray: normal  Treatments  Previous treatment: physical therapy  Current treatment: physical  therapy        Objective     Active Range of Motion   Left Shoulder   Normal active range of motion    Right Shoulder   Flexion: WFL and with pain  Extension: WFL and with pain  Abduction: WFL and with pain    Strength/Myotome Testing     Left Shoulder   Normal muscle strength    Right Shoulder     Planes of Motion   Flexion: 4   Extension: 4   Abduction: 4-   Adduction: 4   External rotation at 90°: 4-   Internal rotation at 90°: 4-     Tests     Right Shoulder   Positive AC shear.              Precautions:           Manuals 5/14/25 4/23/25 4/28/25 4/30/25 5/7/25                                  Neuro Re-Ed             Shoulder shrugs  Held  4# 20x  20x 4# 4# 20x  Held    Front raises  Held  Held Held  4# 20x  Held   Lateral raises Held  held  Held  4#20x  Held   Shoulder press   OH press Held  Held Held  4# 20x  Held   Seated rows  Seated rows 4# 2x10   Held   20x 4#  4# 20x   seated rows 4# 2x10   Ther Ex             UBE(improve R shoulder ROM)  L2 10' alt  L2 10 min L2 10'  10min   L1.6  L2 10'   Seated Shoulder flexion/abduction with cane 20x ea  NT 20x ea  20x each  20x ea    Bicep curls 4# 20x   20x 4#  20x 4#  4# 20x  20x 4#    MTP/LTP seated  BlueTB 2x10 each   BlackTB 2x10 each    GTB 2x10 ea  GTB 2x10   BlueTB  2x10 ea    Seated ER/IR with TB  BlackTB  2x10 each  BlackTB  2x10 each   GTB 2x10 ea  GTB 2x10   BlueTB  2x10 ea    Seated Shoulder ER/ADD   GreenTB 2x10 ea   GreenTB 2x10 ea   Green TB 2x10 ea   GTB  X20 ea  GTB 2x10                  Ther Activity                                         Gait Training                                         Modalities             CP PRN   MHP  R shoulders 10'       MHP R 10'  MHP  B shds  10'  MHP B shoulders 10'                        [

## 2025-05-16 ENCOUNTER — TELEPHONE (OUTPATIENT)
Dept: BEHAVIORAL/MENTAL HEALTH CLINIC | Facility: CLINIC | Age: 60
End: 2025-05-16

## 2025-05-16 DIAGNOSIS — K58.9 IRRITABLE BOWEL SYNDROME, UNSPECIFIED TYPE: ICD-10-CM

## 2025-05-16 RX ORDER — LINACLOTIDE 290 UG/1
CAPSULE, GELATIN COATED ORAL DAILY
Qty: 90 CAPSULE | Refills: 1 | Status: SHIPPED | OUTPATIENT
Start: 2025-05-16

## 2025-05-16 NOTE — TELEPHONE ENCOUNTER
Returned phone call to the client to reschedule his appointment, message was left with a family member for the client to contact this therapist to schedule an appointment

## 2025-05-19 ENCOUNTER — OFFICE VISIT (OUTPATIENT)
Dept: PHYSICAL THERAPY | Facility: CLINIC | Age: 60
End: 2025-05-19
Attending: FAMILY MEDICINE
Payer: COMMERCIAL

## 2025-05-19 DIAGNOSIS — M25.511 ACUTE PAIN OF RIGHT SHOULDER: Primary | ICD-10-CM

## 2025-05-19 PROCEDURE — 97110 THERAPEUTIC EXERCISES: CPT

## 2025-05-19 NOTE — PROGRESS NOTES
Daily Note     Today's date: 2025  Patient name: Juan Antonio Guy  : 1965  MRN: [id number]  Referring provider: Mo Ceja MD  Dx:   Encounter Diagnosis     ICD-10-CM    1. Acute pain of right shoulder  M25.511           Start Time: 1100  Stop Time: 1200  Total time in clinic (min): 60 minutes    Subjective: Patient states that his right shoulder is feeling good today.           Objective: See treatment diary below.        Assessment:  Therapeutic exercise program was completed with good technique and post-exercise fatigue present . Continue to recommend PT in order to achieve maximal functional independence.        Plan: Continue per plan of care.      Precautions:           Manuals 25                                  Neuro Re-Ed             Shoulder shrugs  Held  Held  20x 4# 4# 20x  Held    Front raises  Held  Held Held  4# 20x  Held   Lateral raises Held  held  Held  4#20x  Held   Shoulder press   OH press Held  Held Held  4# 20x  Held   Seated rows  Seated rows 4# 2x10  Seated rows 4# 2x10   20x 4#  4# 20x   seated rows 4# 2x10   Ther Ex             UBE(improve R shoulder ROM)  L2 10' alt  L2 10 min L2 10'  10min   L1.6  L2 10'   Seated Shoulder flexion/abduction with cane 20x ea  NT  20x ea  20x each  20x ea    Bicep curls 4# 20x   20x 4#  20x 4#  4# 20x  20x 4#    MTP/LTP seated  BlueTB 2x10 each   BlackTB 2x10 each    GTB 2x10 ea  GTB 2x10   BlueTB  2x10 ea    Seated ER/IR with TB  BlackTB  2x10 each  BlackTB  2x10 each   GTB 2x10 ea  GTB 2x10   BlueTB  2x10 ea    Seated Shoulder ER/ADD   GreenTB 2x10 ea   GreenTB 2x10 ea   Green TB 2x10 ea   GTB  X20 ea  GTB 2x10                  Ther Activity                                         Gait Training                                         Modalities             CP PRN   MHP  R shoulders 10'     MHP to B shoulders 10'  MHP R 10'  MHP  B shds  10'  MHP B shoulders 10'                        [

## 2025-05-21 ENCOUNTER — OFFICE VISIT (OUTPATIENT)
Dept: PHYSICAL THERAPY | Facility: CLINIC | Age: 60
End: 2025-05-21
Attending: FAMILY MEDICINE
Payer: COMMERCIAL

## 2025-05-21 DIAGNOSIS — M25.511 ACUTE PAIN OF RIGHT SHOULDER: Primary | ICD-10-CM

## 2025-05-21 PROCEDURE — 97110 THERAPEUTIC EXERCISES: CPT

## 2025-05-21 NOTE — PROGRESS NOTES
Daily Note     Today's date: 2025  Patient name: Juan Antonio Guy  : 1965  MRN: [id number]  Referring provider: Mo Ceja MD  Dx:   Encounter Diagnosis     ICD-10-CM    1. Acute pain of right shoulder  M25.511           Start Time: 1100  Stop Time: 1200  Total time in clinic (min): 60 minutes    Subjective: Patient states that both of his shoulders are bothering him today.           Objective: See treatment diary below.          Assessment:  Therapeutic exercise program was completed with good technique and post-exercise fatigue present . Continue to recommend PT in order to achieve maximal functional independence.         Plan: Continue per plan of care.      Precautions:           Manuals 25                                  Neuro Re-Ed             Shoulder shrugs  Held  Held  Held  4# 20x  Held    Front raises  Held  Held Held  4# 20x  Held   Lateral raises Held  held  Held  4#20x  Held   Shoulder press   OH press Held  Held Held  4# 20x  Held   Seated rows  Seated rows 4# 2x10  Seated rows 4# 2x10   20x 5#  4# 20x   seated rows 4# 2x10   Ther Ex             UBE(improve R shoulder ROM)  L2 10' alt  L2 10 min L2 10'  10min   L1.6  L2 10'   Seated Shoulder flexion/abduction with cane 20x ea  NT  20x ea  20x each  20x ea    Bicep curls 4# 20x   20x 4#  20x 5#  4# 20x  20x 4#    MTP/LTP seated  BlueTB 2x10 each   BlackTB 2x10 each    GTB 2x10 ea  GTB 2x10   BlueTB  2x10 ea    Seated ER/IR with TB  BlackTB  2x10 each  BlackTB  2x10 each   BlackTB  2x10 ea  GTB 2x10   BlueTB  2x10 ea    Seated Shoulder ER/ADD   GreenTB 2x10 ea   GreenTB 2x10 ea   BlueTB 2x10 ea   GTB  X20 ea  GTB 2x10                  Ther Activity                                         Gait Training                                         Modalities             CP PRN   MHP  R shoulders 10'     MHP to B shoulders 10'  MHP B 10'  MHP  B shds  10'  MHP B shoulders 10'                        [

## 2025-05-27 ENCOUNTER — OFFICE VISIT (OUTPATIENT)
Dept: PHYSICAL THERAPY | Facility: CLINIC | Age: 60
End: 2025-05-27
Attending: FAMILY MEDICINE
Payer: COMMERCIAL

## 2025-05-27 DIAGNOSIS — M25.511 ACUTE PAIN OF RIGHT SHOULDER: Primary | ICD-10-CM

## 2025-05-27 PROCEDURE — 97110 THERAPEUTIC EXERCISES: CPT

## 2025-05-27 NOTE — PROGRESS NOTES
Daily Note     Today's date: 2025  Patient name: Juan Antonio uGy  : 1965  MRN: [id number]  Referring provider: Mo Ceja MD  Dx:   Encounter Diagnosis     ICD-10-CM    1. Acute pain of right shoulder  M25.511           Start Time: 1015  Stop Time: 1115  Total time in clinic (min): 60 minutes    Subjective: Patient states that his shoulders are feeling good at the moment.           Objective: See treatment diary below.          Assessment: Began therapy session on UBE for 10 minutes with good toleration. Therapeutic exercise program was completed with good technique and post-exercise fatigue present . Continue to recommend PT in order to achieve maximal functional independence.        Plan: Continue per plan of care.      Precautions:           Manuals 25                                  Neuro Re-Ed             Shoulder shrugs  Held  Held  Held  4# 20x  Held    Front raises  Held  Held Held  4# 20x  Held   Lateral raises Held  held  Held  4#20x  Held   Shoulder press   OH press Held  Held Held  4# 20x  Held   Seated rows  Seated rows 4# 2x10  Seated rows 4# 2x10   20x 5#  4# 20x   seated rows 4# 2x10   Ther Ex             UBE(improve R shoulder ROM)  L2 10' alt  L2 10 min L2 10'  10min   L1.6  L2 10'   Seated Shoulder flexion/abduction with cane 20x ea  NT  20x ea  20x each  20x ea    Bicep curls 4# 20x   20x 4#  20x 5#  4# 20x  20x 4#    MTP/LTP seated  BlueTB 2x10 each   BlackTB 2x10 each    GTB 2x10 ea  GTB 2x10   BlueTB  2x10 ea    Seated ER/IR with TB  BlackTB  2x10 each  BlackTB  2x10 each   BlackTB  2x10 ea  GTB 2x10   BlueTB  2x10 ea    Seated Shoulder ER/ADD   GreenTB 2x10 ea   GreenTB 2x10 ea   BlueTB 2x10 ea   GTB  X20 ea  GTB 2x10                  Ther Activity                                         Gait Training                                         Modalities             CP PRN   MHP  R shoulders 10'     MHP to B shoulders 10'  MHP B 10'   SUE JIMENEZ shds  10'  ANDRES JIMENEZ shoulders 10'                        [

## 2025-05-28 ENCOUNTER — APPOINTMENT (OUTPATIENT)
Dept: PHYSICAL THERAPY | Facility: CLINIC | Age: 60
End: 2025-05-28
Attending: FAMILY MEDICINE
Payer: COMMERCIAL

## 2025-06-02 ENCOUNTER — OFFICE VISIT (OUTPATIENT)
Dept: PHYSICAL THERAPY | Facility: CLINIC | Age: 60
End: 2025-06-02
Attending: FAMILY MEDICINE
Payer: COMMERCIAL

## 2025-06-02 DIAGNOSIS — M25.511 ACUTE PAIN OF RIGHT SHOULDER: Primary | ICD-10-CM

## 2025-06-02 PROCEDURE — 97110 THERAPEUTIC EXERCISES: CPT

## 2025-06-02 NOTE — PROGRESS NOTES
"Daily Note     Today's date: 2025  Patient name: Juan Antonio Guy  : 1965  MRN: [id number]  Referring provider: Mo Ceja MD  Dx:   Encounter Diagnosis     ICD-10-CM    1. Acute pain of right shoulder  M25.511            VISIT: 4          Subjective: Patient states that he is experiencing B wrist/\"carpal tunnel\" pain, as well as some persisting B shd pain R>L today.          Objective: See treatment diary below.          Assessment: Began therapy session on UBE for 10 minutes with good toleration. Therapeutic exercise program was completed with good technique and post-exercise fatigue present. ROM limitations remain.  Rec'd MH to B shds post exercise w good skin integriy pre/post.  Continue to recommend PT in order to achieve maximal functional independence.        Plan: Continue per plan of care.      Precautions:           Manuals 25                                  Neuro Re-Ed             Shoulder shrugs  Held  Held  Held  4# 20x  Held    Front raises  Held  Held Held  4# 20x  Held   Lateral raises Held  held  Held  4#20x  Held   Shoulder press   OH press Held  Held Held  4# 20x  Held   Seated rows  Seated rows 4# 2x10  Seated rows 4# 2x10   20x 5#  4# 20x   seated rows 4# 2x10   Ther Ex             UBE(improve R shoulder ROM)  L2 10' alt  L2 10 min L2 10'  10min   L1.6  L2 10'   Seated Shoulder flexion/abduction with cane 20x ea  NT  20x ea  20x each  20x ea    Bicep curls 4# 20x   20x 4#  20x 5#  4# 20x  20x 4#    MTP/LTP seated  BlueTB 2x10 each   BlackTB 2x10 each    GTB 2x10 ea  BLK 2x10   BlueTB  2x10 ea    Seated ER/IR with TB  BlackTB  2x10 each  BlackTB  2x10 each   BlackTB  2x10 ea   BLK 2x10   2x10 ea  BlueTB  2x10 ea    Seated Shoulder ER/ADD   GreenTB 2x10 ea   GreenTB 2x10 ea   BlueTB 2x10 ea   BTB  X20 ea  GTB 2x10                  Ther Activity                                         Gait Training                                       "   Modalities             CP PRN   MHP  R shoulders 10'     MHP to B shoulders 10'  MHP B 10'  MHP  B shds  10'  MHP B shoulders 10'                        [

## 2025-06-03 ENCOUNTER — DOCUMENTATION (OUTPATIENT)
Dept: BEHAVIORAL/MENTAL HEALTH CLINIC | Facility: CLINIC | Age: 60
End: 2025-06-03

## 2025-06-03 NOTE — PROGRESS NOTES
Psychotherapy Discharge Summary    Preferred Name: Juan Antonio Guy  YOB: 1965    Admission date to psychotherapy: 12/26/24    Referred by: Client's PCP    Presenting Problem: anxiety and depression     Course of treatment included : individual therapy     Progress/Outcome of Treatment Goals (brief summary of course of treatment) client only showed for the intake    Treatment Complications (if any): lack of attendance    Treatment Progress: poor    Current [facility] Psychiatric Provider: Susaan REARDON LCSW    Discharge Medications include: as found in epic    Discharge Date: 06/02/25    Discharge Diagnosis: No diagnosis found.    Criteria for Discharge: two or more unexcused absences for services    Patient is not cleared to return to Susana Cohen LCSW for continued treatment.    Rationale: client has been referred to this therapist a number of times and has not followed through with therapy and has done no call no show    Aftercare recommendations include (include specific referral names and phone numbers, if appropriate): to seek services for his MH    Prognosis: poor

## 2025-06-04 ENCOUNTER — OFFICE VISIT (OUTPATIENT)
Dept: PHYSICAL THERAPY | Facility: CLINIC | Age: 60
End: 2025-06-04
Attending: FAMILY MEDICINE
Payer: COMMERCIAL

## 2025-06-04 DIAGNOSIS — M25.511 ACUTE PAIN OF RIGHT SHOULDER: Primary | ICD-10-CM

## 2025-06-04 PROCEDURE — 97110 THERAPEUTIC EXERCISES: CPT

## 2025-06-04 NOTE — PROGRESS NOTES
Daily Note     Today's date: 2025  Patient name: Juan Antonio Guy  : 1965  MRN: [id number]  Referring provider: Mo Ceja MD  Dx:   Encounter Diagnosis     ICD-10-CM    1. Acute pain of right shoulder  M25.511           Start Time: 1045  Stop Time: 1200  Total time in clinic (min): 75 minutes    Subjective:  Patient states that his shoulders are bothering him a lot today.       Objective: See treatment diary below.        Assessment: Therapeutic exercise program was completed with good technique and post-exercise fatigue present. Continue to recommend PT in order to achieve maximal functional independence.        Plan: Continue per plan of care.      Precautions:           Manuals 25                                  Neuro Re-Ed             Shoulder shrugs  Held  Held  Held  4# 20x  Held    Front raises  Held  Held Held  4# 20x  Held   Lateral raises Held  held  Held  4#20x  Held   Shoulder press   OH press Held  Held Held  4# 20x  Held   Seated rows  Seated rows 4# 2x10  Seated rows 4# 2x10   20x 5#  4# 20x   seated rows 4# 2x10   Ther Ex             UBE(improve R shoulder ROM)  L2 10' alt  L2 10 min L2 10'  10min   L1.6  L2 10'   Seated Shoulder flexion/abduction with cane 20x ea  NT  20x ea  20x each  20x ea    Bicep curls 4# 20x   20x 4#  20x 5#  4# 20x  20x 4#    MTP/LTP seated  BlueTB 2x10 each   BlackTB 2x10 each    GTB 2x10 ea  BLK 2x10   BlackTB  2x10 ea    Seated ER/IR with TB  BlackTB  2x10 each  BlackTB  2x10 each   BlackTB  2x10 ea   BLK 2x10   2x10 ea  BackTB  2x10 ea    Seated Shoulder ER/ADD   GreenTB 2x10 ea   GreenTB 2x10 ea   BlueTB 2x10 ea   BTB  X20 ea  GTB 2x10                  Ther Activity                                         Gait Training                                         Modalities             CP PRN   MHP  R shoulders 10'     MHP to B shoulders 10'  MHP B 10'  MHP  B shds  10'  MHP R shoulder 10'                        [

## 2025-06-06 DIAGNOSIS — F41.9 ANXIETY: ICD-10-CM

## 2025-06-06 RX ORDER — CLONAZEPAM 0.5 MG/1
TABLET ORAL
Qty: 60 TABLET | Refills: 0 | Status: SHIPPED | OUTPATIENT
Start: 2025-06-06

## 2025-06-09 ENCOUNTER — OFFICE VISIT (OUTPATIENT)
Dept: PHYSICAL THERAPY | Facility: CLINIC | Age: 60
End: 2025-06-09
Attending: FAMILY MEDICINE
Payer: COMMERCIAL

## 2025-06-09 DIAGNOSIS — M25.511 ACUTE PAIN OF RIGHT SHOULDER: Primary | ICD-10-CM

## 2025-06-09 PROCEDURE — 97110 THERAPEUTIC EXERCISES: CPT

## 2025-06-09 NOTE — PROGRESS NOTES
Daily Note     Today's date: 2025  Patient name: Juan Antonio Guy  : 1965  MRN: [id number]  Referring provider: Mo Ceja MD  Dx:   Encounter Diagnosis     ICD-10-CM    1. Acute pain of right shoulder  M25.511           Start Time: 1045  Stop Time: 1150  Total time in clinic (min): 65 minutes    Subjective: Patient states that both of his shoulders are bothering him today.       Objective: See treatment diary below      Assessment: Patient was able to progress weight from 4# to 5# for shoulder shrugs, rows , and bicep curls in order to improve strength. Therapeutic exercise program was completed with good technique and post-exercise fatigue present. Ended therapy session with MHP to both shoulders. Continue to recommend PT in order to achieve maximal functional independence.        Plan: Continue per plan of care.      Precautions:           Manuals 25                                  Neuro Re-Ed             Shoulder shrugs  5# 2x10  Held  Held  4# 20x  Held    Front raises  Held  Held Held  4# 20x  Held   Lateral raises Held  held  Held  4#20x  Held   Shoulder press   OH press Held  Held Held  4# 20x  Held   Seated rows  Seated rows 5# 2x10  Seated rows 4# 2x10   20x 5#  4# 20x   seated rows 4# 2x10   Ther Ex             UBE(improve R shoulder ROM)  L2 10' alt  L2 10 min L2 10'  10min   L1.6  L2 10'   Seated Shoulder flexion/abduction with cane 20x ea  NT  20x ea  20x each  20x ea    Bicep curls 5# 20x   20x 4#  20x 5#  4# 20x  20x 4#    MTP/LTP seated  BlueTB 2x10 each   BlackTB 2x10 each    GTB 2x10 ea  BLK 2x10   BlackTB  2x10 ea    Seated ER/IR with TB  BlackTB  2x10 each  BlackTB  2x10 each   BlackTB  2x10 ea   BLK 2x10   2x10 ea  BackTB  2x10 ea    Seated Shoulder ER/ADD   GreenTB 2x10 ea   GreenTB 2x10 ea   BlueTB 2x10 ea   BTB  X20 ea  GTB 2x10                  Ther Activity                                         Gait Training                                          Modalities             CP PRN   MHP  B shoulders 10'     MHP to B shoulders 10'  MHP B 10'  MHP  B shds  10'  MHP R shoulder 10'

## 2025-06-11 ENCOUNTER — OFFICE VISIT (OUTPATIENT)
Dept: OBGYN CLINIC | Facility: CLINIC | Age: 60
End: 2025-06-11
Payer: COMMERCIAL

## 2025-06-11 ENCOUNTER — HOSPITAL ENCOUNTER (OUTPATIENT)
Dept: RADIOLOGY | Facility: CLINIC | Age: 60
Discharge: HOME | End: 2025-06-11
Attending: STUDENT IN AN ORGANIZED HEALTH CARE EDUCATION/TRAINING PROGRAM
Payer: COMMERCIAL

## 2025-06-11 VITALS — BODY MASS INDEX: 42.66 KG/M2 | HEIGHT: 72 IN | WEIGHT: 315 LBS

## 2025-06-11 DIAGNOSIS — M25.562 ACUTE PAIN OF BOTH KNEES: ICD-10-CM

## 2025-06-11 DIAGNOSIS — M79.2 RADICULAR PAIN IN RIGHT ARM: ICD-10-CM

## 2025-06-11 DIAGNOSIS — R20.0 BILATERAL HAND NUMBNESS: Primary | ICD-10-CM

## 2025-06-11 DIAGNOSIS — M25.561 ACUTE PAIN OF BOTH KNEES: ICD-10-CM

## 2025-06-11 DIAGNOSIS — M25.511 CHRONIC RIGHT SHOULDER PAIN: ICD-10-CM

## 2025-06-11 DIAGNOSIS — M54.2 NECK PAIN ON RIGHT SIDE: ICD-10-CM

## 2025-06-11 DIAGNOSIS — M17.0 PRIMARY OSTEOARTHRITIS OF BOTH KNEES: ICD-10-CM

## 2025-06-11 DIAGNOSIS — G89.29 CHRONIC RIGHT SHOULDER PAIN: ICD-10-CM

## 2025-06-11 DIAGNOSIS — M75.41 IMPINGEMENT SYNDROME OF RIGHT SHOULDER: ICD-10-CM

## 2025-06-11 PROCEDURE — 73562 X-RAY EXAM OF KNEE 3: CPT

## 2025-06-11 PROCEDURE — 20610 DRAIN/INJ JOINT/BURSA W/O US: CPT | Performed by: STUDENT IN AN ORGANIZED HEALTH CARE EDUCATION/TRAINING PROGRAM

## 2025-06-11 PROCEDURE — 99214 OFFICE O/P EST MOD 30 MIN: CPT | Performed by: STUDENT IN AN ORGANIZED HEALTH CARE EDUCATION/TRAINING PROGRAM

## 2025-06-11 RX ORDER — FLUOXETINE HYDROCHLORIDE 40 MG/1
CAPSULE ORAL
COMMUNITY

## 2025-06-11 RX ADMIN — TRIAMCINOLONE ACETONIDE 40 MG: 40 INJECTION, SUSPENSION INTRA-ARTICULAR; INTRAMUSCULAR at 11:15

## 2025-06-11 RX ADMIN — BUPIVACAINE HYDROCHLORIDE 2 ML: 2.5 INJECTION, SOLUTION INFILTRATION; PERINEURAL at 11:15

## 2025-06-11 NOTE — PROGRESS NOTES
Right subacromial CSI  EMG ordered  Referred to Dr. Pascal and Dr. PAGE After EMG  Ddx: CTS, cervicla radicular pain    Some reloeif with subacromial Csi, c/o pain from right neck to right arm  Numbness of hands, dropping objects

## 2025-06-11 NOTE — ASSESSMENT & PLAN NOTE
Potentially multifactorial causes given complaint of neck pain, right shoulder pain, right arm numbness  Ddx: CTS, cervical radicular pain, right shoulder tendinosis/biciptial tendinosis, diabetic neuropathy    Clinical exam and radiographic imaging reviewed with patient/parent today, with impression as per above. I have discussed with the patient the pathophysiology of this diagnosis and reviewed how the examination correlates with this diagnosis.    Prior imaging reviewed as noted below  Patient reports some relief right shoulder pain/motion function from subacromial injection given in the past.  No relief of numbness of bilateral upper extremities or neck pain.  Patient has been referred to PT in the past as well.  Concurrently notes numbness of bilateral upper extremities, worse on right compared to left.  Also complains of intermittently dropping objects with bilateral hands.  Indeterminant numbness distribution when discussing with patient today and clinical exam could suggest carpal tunnel syndrome versus diabetic neuropathy versus cervical source of upper extremity pain and numbness as well.  EMG ordered of upper extremities for further clarification  Patient has wrist brace on left side and I did prescribe him a universal wrist brace for his right wrist.  I counseled use of the braces only at night while sleeping.  I did offer diagnostic/therapeutic carpal tunnel cortisone injection today of his bilateral wrist but patient feels that he would rather pursue a surgical intervention for carpal tunnel syndrome rather than a temporary fix if it is needed given he feels the introvert and dropping and numbness of his hand has been ongoing for years.  In the interim, I did offer to repeat subacromial cortisone injection today.  Patient states he did notice there was some response from the subacromial injection would like to repeat this today going forward along with further EMG studies.    Other factors:  BMI  49  Congestive heart failure  CKD 3  COPD  Type 2 diabetes with peripheral neuropathy  Anxiety/depression  Ambulatory dysfunction with use of rollator for mobility  History of pulmonary embolism and on Eliquis    Orders:    Ambulatory Referral to Orthopedic Surgery; Future    Cock Up Wrist Splint    Ambulatory referral to Spine & Pain Management; Future    EMG; Future    Large joint arthrocentesis: R subacromial bursa

## 2025-06-11 NOTE — PATIENT INSTRUCTIONS
"Patient Education     Steroid injection   The Basics   Written by the doctors and editors at [facility]   What is a steroid injection? -- Steroids, also known as \"glucocorticoids,\" are medicines that help reduce swelling and pain. Doctors sometimes inject a steroid medicine into a joint or other part of the body to relieve pain. This is also sometimes called a \"cortisone shot.\"  After the injection, the steroid starts to work within a few days.  How long does a steroid injection work? -- It depends on the person and where the injection is given. For some people, the effects of a steroid injection can last for a few weeks or longer.  Sometimes, the doctor also injects a medicine called a \"local anesthetic\" with the steroid. This might help relieve pain until the steroid starts to work.  A steroid injection can help with pain, but it won't cure the problem that is causing the pain.  How do I prepare for a steroid injection? -- The doctor or nurse will tell you if you need to do anything special to prepare. Before your procedure, your doctor will do an exam.  Your doctor will also ask you about your \"health history.\" This involves asking you questions about any health problems you have or had in the past, past surgeries, and any medicines you take. Tell them about:   Any medicines you are taking - This includes any prescription or \"over-the-counter\" medicines you use, plus any herbal supplements you take. It helps to write down and bring a list of any medicines you take, or bring a bag with all of your medicines with you.   Any allergies you have   Any bleeding problems you have   Any other steroid injections you have had  Ask the doctor or nurse if you have questions or if there is anything you do not understand.  How is a steroid injection given? -- When it is time for the injection:   The doctor will clean the skin over the area where they plan to give the shot. (This is called the \"injection site.\")   They might use " ultrasound or a special kind of X-ray during the procedure. This is to check where to give the steroid.   Sometimes, they might give a shot of medicine to numb the skin.   They will inject the steroid.   Then, they will take out the needle and cover the injection site with a bandage.  What happens after a steroid injection? -- You can go home after the injection.  For the next few days, you might want to:   Apply a cold gel pack, bag of ice, or bag of frozen vegetables to the injection site every 1 to 2 hours, for 15 minutes each time. Put a thin towel between the ice (or other cold object) and your skin.   Rest the treated body part for a few days.   Take medicines to relieve pain. Examples include acetaminophen (sample brand name: Tylenol), ibuprofen (sample brand names: Advil, Motrin), or naproxen (sample brand name: Aleve).  If you have diabetes, the doctor might want you to check your blood sugar levels more often for a few days. The steroid medicine might temporarily raise your blood sugar.  What are the risks of a steroid injection? -- Your doctor will talk to you about all of the possible risks, and answer your questions. Possible risks include:   Bleeding, bruising, or soreness at the injection site   Damage to parts near the injection site - This might include cartilage damage, injury to nerves, tendon rupture, or thinning of skin and bones.   Skin around the injection site becoming lighter or whiter in color   Infection   Health conditions like diabetes or high blood pressure getting worse for a few days   Allergic reaction to the medicine  What else should I know? -- Most of the time, doctors limit the total number of steroid injections to a certain area.  A steroid injection might be only 1 part of your treatment plan. Take your other medicines as instructed. Also, follow your doctor's recommendations about other treatments. These might include things like physical therapy or devices like a brace or  cane.  All topics are updated as new evidence becomes available and our peer review process is complete.  This topic retrieved from [employer] on: Apr 25, 2024.  Topic 992113 Version 2.0  Release: 32.4.2 - C32.114  © 2024 [employer] and/or its affiliates. All rights reserved.  Consumer Information Use and Disclaimer   Disclaimer: This generalized information is a limited summary of diagnosis, treatment, and/or medication information. It is not meant to be comprehensive and should be used as a tool to help the user understand and/or assess potential diagnostic and treatment options. It does NOT include all information about conditions, treatments, medications, side effects, or risks that may apply to a specific patient. It is not intended to be medical advice or a substitute for the medical advice, diagnosis, or treatment of a health care provider based on the health care provider's examination and assessment of a patient's specific and unique circumstances. Patients must speak with a health care provider for complete information about their health, medical questions, and treatment options, including any risks or benefits regarding use of medications. This information does not endorse any treatments or medications as safe, effective, or approved for treating a specific patient. [employer] and its affiliates disclaim any warranty or liability relating to this information or the use thereof.The use of this information is governed by the Terms of Use, available at https://www.[employer]uwer.com/en/know/clinical-effectiveness-terms. 2024© [employer] and its affiliates and/or licensors. All rights reserved.  Copyright   © 2024 [employer] and/or its affiliates. All rights reserved.

## 2025-06-11 NOTE — PROGRESS NOTES
Name: Juan Antonio Guy      : 1965      MRN: [id number]  Encounter Provider: Khadar Samuels MD  Encounter Date: 2025   Encounter department: [facility] [place]      Assessment & Plan  Bilateral hand numbness  Chronic right shoulder pain  Radicular pain in right arm  Neck pain on right side  Impingement syndrome of right shoulder  Potentially multifactorial causes given complaint of neck pain, right shoulder pain, right arm numbness  Ddx: CTS, cervical radicular pain, right shoulder tendinosis/biciptial tendinosis, diabetic neuropathy    Clinical exam and radiographic imaging reviewed with patient/parent today, with impression as per above. I have discussed with the patient the pathophysiology of this diagnosis and reviewed how the examination correlates with this diagnosis.    Prior imaging reviewed as noted below  Patient reports some relief right shoulder pain/motion function from subacromial injection given in the past.  No relief of numbness of bilateral upper extremities or neck pain.  Patient has been referred to PT in the past as well.  Concurrently notes numbness of bilateral upper extremities, worse on right compared to left.  Also complains of intermittently dropping objects with bilateral hands.  Indeterminant numbness distribution when discussing with patient today and clinical exam could suggest carpal tunnel syndrome versus diabetic neuropathy versus cervical source of upper extremity pain and numbness as well.  EMG ordered of upper extremities for further clarification  Patient has wrist brace on left side and I did prescribe him a universal wrist brace for his right wrist.  I counseled use of the braces only at night while sleeping.  I did offer diagnostic/therapeutic carpal tunnel cortisone injection today of his bilateral wrist but patient feels that he would rather pursue a surgical intervention for carpal tunnel syndrome rather than a temporary fix if it  is needed given he feels the introvert and dropping and numbness of his hand has been ongoing for years.  In the interim, I did offer to repeat subacromial cortisone injection today.  Patient states he did notice there was some response from the subacromial injection would like to repeat this today going forward along with further EMG studies.    Other factors:  BMI 49  Congestive heart failure  CKD 3  COPD  Type 2 diabetes with peripheral neuropathy  Anxiety/depression  Ambulatory dysfunction with use of rollator for mobility  History of pulmonary embolism and on Eliquis    Orders:    Ambulatory Referral to Orthopedic Surgery; Future    Cock Up Wrist Splint    Ambulatory referral to Spine & Pain Management; Future    EMG; Future    Large joint arthrocentesis: R subacromial bursa    Acute pain of both knees  Primary osteoarthritis of both knees    Imaging obtained/reviewed as per below. I will follow up official radiology interpretation.  Educated patient on various conservative treatment options including but are certainly not limited to: rest, ice, heat, compression, elevation, activity modification, anti-inflammatory medication, physical therapy, and/or injections.  Treatment options were discussed at length, including risks and benefits; after discussing these treatment options, the patient elected to defer intra-articular bilateral knee cortisone injections today as he feels the pain is not severe enough to warrant.    Orders:    XR knee 3 vw right non injury; Future    XR knee 3 vw left non injury; Future         Return for obtain EMG; see Dr. Pascal and Dr. Wise after EMG.    History of Present Illness       Chief Complaint   Patient presents with    Right Shoulder - Follow-up       HPI:  Juan Antonio Guy is a 59 y.o. male  who presents for     Visit 6/11/2025:  Follow-up right shoulder pain  History as per below.  On last visit was given a subacromial cortisone injection.  States that there was partial  relief of his right shoulder pain with activities for a few weeks after this injection.  He has been attending physical therapy as well with some relief.  However these interventions did not satisfactorily improve his issue as he still continued to have midline and right paracervical neck pain that would sometimes radiate into his upper arm.  He also states continued numbness of his upper extremities, worse on his right compared to his left.      He states the numbness has been an ongoing issue for years.  He states intermittently dropping objects with his bilateral hands as well as been ongoing for years.  He states is a more frustrating issue at this point and was told in the past that he had carpal tunnel syndrome.  However he denies undergoing carpal tunnel injections or undergoing surgery.  He always felt that these interventions potentially may not work and therefore felt that he could watch and wait to see if it resolves on its own.  He does use a left wrist brace which she is currently wearing.    He also notes that he would want to address his bilateral knee pain.  He states pain is aggravated when transitioning from sitting to standing in order to use his rollator for mobility.  He states when he is finally up and walking the pain is not severe.  Describes this as a sharp/aching pain of mild and sometimes moderate intensity.  He reports crepitus of his knees but denies any swelling, discoloration.  Nuys prior surgeries of his knees in the past.  Denies undergoing intra-articular cortisone injections of his knees in the past.      Visit 9/4/2024 :  Initial evaluation of right shoulder pain/left knee pain:  Patient had radiographs ordered from his PCP already of his right shoulder and left knee and these will be obtained as noted above.  Pertinent PMHx as above  Reports ongoing right shoulder pain for years but recently worsening over the past couple weeks without a precipitating injury.  He gives example of  pain exacerbated when reaching outward from his couch to  objects and feeling a sharp/tight/aching pain of his shoulder.  He noticed that he was having increased stiffness with range of motion's in general and difficulty reaching above shoulder height on his back.  He reports clicking sensation of his shoulder but states is a chronic issue.  Denies shoulder swelling or discoloration.  Has been taking Tylenol without relief.  He reports that he is not supposed to be taking NSAIDs.  He has not had a cortisone injection of his right shoulder before.  He denies prior surgeries of his right shoulder in the past.  He has not seen formal PT for his shoulder previously.     Separately at the end of visit he also had complaints of his left knee pain:  X-ray imaging of his affected knee from his PCP was completed today as per above.  Briefly, patient denies a precipitating injury but notes pain within his knee with prolonged weightbearing.  He states he has had cortisone injections of his left knee in the past due to arthritis.  Unsure how long injections lasted.     Past Medical History[1]    Past Surgical History[2]    Medications Ordered Prior to Encounter[3]     Social History     Objective     Ht 6' (1.829 m)   Wt (!) 164 kg (362 lb)   BMI 49.10 kg/m²     Constitutional:  see vital signs  Gen: BMI 49, normocephalic/atraumatic, well-groomed  Pulmonary/Chest: Effort normal. No respiratory distress.      Khadar Samuels MD     Ortho Exam  Gait: Patient is slow to transition from sitting to standing.  Once he is able to transition he uses a rollator for mobility.    Shoulder exam:       RIGHT    Inspection Ecchymosis None     Swelling None     Increased Warmth None     Other     Rotator cuff ER 5/5     IR 5/5     Abduction 5/5    ROM FF AROM/PROM: 150/150       Abduction AROM/PROM: 150/150     ER0 AROM/PROM: 60/60     IRb T7    TTP:  + Posterolateral aspect over greater tuberosity/subacromial region, mildly over  anterior aspect of shoulder over bicipital groove, right paracervical neck/trapezius    Special Tests: O'Briens   Negative slap    Cross body Adduction Negative AC    Speeds  Negative biceps    Yergason's Negative biceps    Drop arm Negative     Neer Positive     Webb Positive        UE NV Exam: +2 Radial pulses bilaterally    Cervical  ROM: intact in all planes of motion  Flexion: chin within 3-4cm of chest  Extension: 50, aggravates right paracervical neck pain  Lateral flexion: 30 bilaterally, aggravates right paracervical neck pain  Rotation: 70 bilaterally, aggravates right paracervical neck pain    Midline spinous process tenderness: None  Muscular Tenderness: + Right paracervical musculature  Sensation UE Bilateral:  C5: normal  C6: Decreased on right compared to left  C7: Decreased on right compared to left  C8: Decreased on right compared to left  T1: normal  Strength UE: 5/5 elbow, wrist, fingers bilateral  Spurlings: negative b/l, but aggravates right paracervical neck pain    Other test:  Tinel's test of bilateral carpal tunnels-patient denies if he notices any exacerbation of the numbness of his bilateral hands as he feels this is a chronic present condition  Carpal compression test-positive bilaterally  No thenar atrophy of bilateral hands on inspection    Right Knee Exam:  Inspection: No edema, erythema, ecchymosis, open wounds  Increased Warmth: no  Tenderness: +minimally MJL, medial patellofemoral joint line  ROM: 0-130  Knee flexion strength: 5-/5  Knee extension strength: 5/5  Patellar Apprehension: negative  Patellar Grind: +  Varus laxity: negative  Valgus laxity: negative  Dashawn: negative     Left Knee Exam:  Inspection: No edema, erythema, ecchymosis, open wounds  Increased Warmth: no  Tenderness: +minimally MJL, medial patellofemoral joint line  ROM: 0-130  Knee flexion strength: 5-/5  Knee extension strength: 5/5  Patellar Apprehension: negative  Patellar Grind: +  Varus laxity:  negative  Valgus laxity: negative  Dashawn: negative         Imaging Studies (I personally reviewed images in PACS and report):  XR knee 3 vw left non injury  Result Date: 6/11/2025  Impression         Normal medial lateral compartment spaces. Moderate degenerative changes of the patellofemoral space however. Patellar enthesopathy. Small effusion. No fracture         Electronically signed: 06/11/2025 03:08 PM Guy Houston MD    XR knee 3 vw right non injury  Result Date: 6/11/2025  Impression         Preserved compartment spaces without significant degenerative narrowing. Mild hypertrophic changes with small marginal osteophyte formation as above. Small to moderate effusion         Electronically signed: 06/11/2025 03:07 PM Guy Houston MD    XR knee 4+ vw left injury  Result Date: 9/4/2024  Impression         Preserved lateral compartment spaces with minimal marginal osteophytes. Small patellar enthesopathy. Question patellofemoral degenerative changes difficult to assess without sunrise projection.  No fracture or effusion     Electronically signed: 09/04/2024 04:32 PM Guy Houston MD        MRI right shoulder without contrast 2/26/2025:  IMPRESSION:     Supraspinatus and subscapularis tendinosis as described. No full-thickness rotator cuff tear.     Mild subacromial/subdeltoid bursitis.     Intra-articular long head biceps tendinosis with small focal split tear of the extra-articular long head biceps.  Large joint arthrocentesis: R subacromial bursa    Performed by: Khadar Samuels MD  Authorized by: Khadar Samuels MD    Universal Protocol:  procedure performed by consultantConsent: Verbal consent obtained  Risks and benefits: risks, benefits and alternatives were discussed  Consent given by: patient  Patient understanding: patient states understanding of the procedure being performed  Site marked: the operative site was marked  Radiology Images displayed and confirmed. If images not available, report  "reviewed: imaging studies available  Patient identity confirmed: verbally with patient  Supporting Documentation  Indications: pain and diagnostic evaluation     Is this a Visco injection? NoProcedure Details  Location: shoulder - R subacromial bursa  Preparation: Patient was prepped and draped in the usual sterile fashion  Needle size: 22 G  Ultrasound guidance: no  Approach: posterior  Medications administered: 40 mg triamcinolone acetonide 40 mg/mL; 2 mL bupivacaine 0.25 %    Patient tolerance: patient tolerated the procedure well with no immediate complications  Dressing:  Sterile dressing applied          -----------------------------------------------------------------  Portions of the record may have been created with voice recognition software. Occasional wrong word or \"sound a like\" substitutions may have occurred due to the inherent limitations of voice recognition software. Read the chart carefully and recognize, using context, where substitutions have occurred.          [1]   Past Medical History:  Diagnosis Date    Ambulatory dysfunction     Anemia     Anxiety     Arthritis     Atrial fibrillation (HCC)     CHF (congestive heart failure) ([facility])     Chronic kidney disease, stage 3 ([facility])     Chronic ulcer of left foot ([facility])     Chronic venous hypertension (idiopathic) with ulcer and inflammation of left lower extremity ([facility])     Congestive heart failure, unspecified HF chronicity, unspecified heart failure type ([facility])     COPD with acute exacerbation ([facility])     Encephalopathy     History of depression     History of osteomyelitis     Hyperkalemia     Hyperlipidemia     Hypertension     Hypoglycemia     Hypomagnesemia     Hyponatremia     Hypothyroidism     Morbid obesity ([facility])     Pneumonia     Preop cardiovascular exam     Pulmonary embolism ([facility])     RLS (restless legs syndrome)     Sepsis ([facility])     Sleep apnea     Thrombocytopenia (HCC)     Type 2 diabetes mellitus (HCC)     Urinary retention     Vitamin " D deficiency    [2]   Past Surgical History:  Procedure Laterality Date    COLON SURGERY Right 06/2024    right side of colon    GALLBLADDER SURGERY      TONSILLECTOMY AND ADENOIDECTOMY     [3]   Current Outpatient Medications on File Prior to Visit   Medication Sig Dispense Refill    Acidophilus Lactobacillus CAPS Take by mouth      albuterol (PROVENTIL HFA,VENTOLIN HFA) 90 mcg/act inhaler Inhale 2 puffs every 6 (six) hours as needed for wheezing 3 Inhaler 3    atorvastatin (LIPITOR) 40 mg tablet TAKE 1 TABLET BY MOUTH ONCE DAILY. 90 tablet 1    azelastine (ASTELIN) 0.1 % nasal spray 2 sprays into each nostril in the morning 30 mL 11    baclofen 10 mg tablet       Blood Glucose Monitoring Suppl (OneTouch Verio Reflect) w/Device KIT Check blood sugars four times daily. Please substitute with appropriate alternative as covered by patient's insurance. Dx: E11.65 1 kit 0    cetirizine (ZyrTEC) 10 mg tablet TAKE 1 TABLET BY MOUTH ONCE DAILY. 90 tablet 1    citalopram (CeleXA) 40 mg tablet Take 1 tablet (40 mg total) by mouth daily 90 tablet 1    clonazePAM (KlonoPIN) 0.5 mg tablet TAKE 1 TABLET BY MOUTH EVERY 12 (TWELVE) HOURS AS NEEDED FOR ANXIETY 60 tablet 0    docusate sodium (COLACE) 100 mg capsule Take 100 mg by mouth in the morning and 100 mg in the evening.      Easy Touch Pen Needles 31G X 8 MM MISC Use 1 each in the morning and 1 each in the evening and 1 each before bedtime.      Eliquis 5 MG TAKE 1 TABLET BY MOUTH TWO TIMES A DAY. 180 tablet 1    ergocalciferol (VITAMIN D2) 50,000 units TAKE 1 CAPSULE (50,000 UNITS TOTAL) BY MOUTH ONCE A WEEK 4 capsule 5    ferrous sulfate 325 (65 Fe) mg tablet Take 325 mg by mouth daily with breakfast      FLUoxetine (PROzac) 40 MG capsule Take by mouth      folic acid (FOLVITE) 1 mg tablet TAKE 1 TABLET BY MOUTH ONCE DAILY. 90 tablet 1    furosemide (LASIX) 40 mg tablet Take 1 tablet (40 mg total) by mouth daily 90 tablet 0    gabapentin (NEURONTIN) 600 MG tablet TAKE 1  TABLET TWICE DAILY 180 tablet 1    glucose blood (OneTouch Verio) test strip Check blood sugars four times daily. Please substitute with appropriate alternative as covered by patient's insurance. Dx: E11.65 400 each 3    insulin aspart (NovoLOG FlexPen) 100 UNIT/ML injection pen Inject 26 Units under the skin 3 (three) times a day with meals Per sliding scale - managed by Dr. Tabares + scheduled 15 mL 0    insulin glargine (Toujeo Max SoloStar) 300 units/mL CONCENTRATED U-300 injection pen (2-unit dial) Inject 66 Units under the skin daily 6 mL 2    ketoconazole (NIZORAL) 2 % shampoo APPLY 1 APPLICATION TOPICALLY 2 (TWO) TIMES A WEEK TO FEET IN SHOWER 120 mL 0    lamoTRIgine (LaMICtal) 25 mg tablet TAKE 1 TABLET (25 MG TOTAL) BY MOUTH 2 (TWO) TIMES A  tablet 1    latanoprost (XALATAN) 0.005 % ophthalmic solution Administer 0.005 drops to both eyes      levothyroxine 300 MCG tablet Take 1 tablet (300 mcg total) by mouth daily Take with 50 mcg tablet 90 tablet 0    Linzess 290 MCG CAPS TAKE 1 CAPSULE BY MOUTH ONCE DAILY. 90 capsule 1    lisinopril (ZESTRIL) 2.5 mg tablet TAKE 1 TABLET BY MOUTH ONCE DAILY. 90 tablet 1    magnesium Oxide (MAG-OX) 400 mg TABS TAKE 1 TABLET BY MOUTH TWO TIMES A DAY. 180 tablet 1    metoprolol tartrate (LOPRESSOR) 25 mg tablet Take 1 tablet by mouth (25 mg) 1 hour prior to coronary CTA.  Hold for heart rate less than 50 bpm. 1 tablet 0    nystatin (MYCOSTATIN) powder Apply topically as needed      pantoprazole (PROTONIX) 40 mg tablet TAKE 1 TABLET (40 MG TOTAL) BY MOUTH DAILY 90 tablet 1    potassium chloride (Klor-Con M20) 20 mEq tablet TAKE 1 TABLET BY MOUTH EACH MORNING. 90 tablet 1    rOPINIRole (REQUIP) 1 mg tablet Take 1 mg by mouth in the morning and 1 mg in the evening and 1 mg before bedtime.      tamsulosin (FLOMAX) 0.4 mg TAKE 1 CAPSULE DAILY WITH DINNER 100 capsule 1    timolol (TIMOPTIC) 0.5 % ophthalmic solution Administer 1 drop to the right eye in the morning and 1  drop in the evening.      Tirzepatide (Mounjaro) 15 MG/0.5ML SOAJ Inject 15 mg under the skin once a week 6 mL 3    vitamin B-12 (VITAMIN B-12) 1,000 mcg tablet Take 1 tablet (1,000 mcg total) by mouth daily 90 tablet 1    acetaminophen (TYLENOL) 650 mg CR tablet Take 1 tablet (650 mg total) by mouth every 8 (eight) hours as needed for mild pain or moderate pain (Patient not taking: Reported on 6/11/2025) 90 tablet 0    ammonium lactate (LAC-HYDRIN) 12 % cream Apply topically in the morning To feet and leg dry skin (Patient not taking: Reported on 6/11/2025) 385 g 3    clobetasol propionate (Clobex) 0.05 % shampoo Apply 1 application topically 3 (three) times a week 118 mL 3    Empagliflozin (Jardiance) 10 MG TABS tablet Take by mouth (Patient not taking: Reported on 6/11/2025)      Lancets (OneTouch Delica Plus Oanneg06M) MISC USE TO CHECK BLOOD SUGARS FOUR TIMES A DAY (Patient not taking: Reported on 6/11/2025) 400 each 3    oxyCODONE (OXY-IR) 5 MG capsule Take 5 mg by mouth as needed for moderate pain (Patient not taking: Reported on 6/11/2025)       No current facility-administered medications on file prior to visit.

## 2025-06-12 ENCOUNTER — APPOINTMENT (OUTPATIENT)
Dept: PHYSICAL THERAPY | Facility: CLINIC | Age: 60
End: 2025-06-12
Attending: FAMILY MEDICINE
Payer: COMMERCIAL

## 2025-06-16 ENCOUNTER — OFFICE VISIT (OUTPATIENT)
Dept: PHYSICAL THERAPY | Facility: CLINIC | Age: 60
End: 2025-06-16
Attending: FAMILY MEDICINE
Payer: COMMERCIAL

## 2025-06-16 DIAGNOSIS — M25.511 ACUTE PAIN OF RIGHT SHOULDER: Primary | ICD-10-CM

## 2025-06-16 PROCEDURE — 97110 THERAPEUTIC EXERCISES: CPT

## 2025-06-16 RX ORDER — TRIAMCINOLONE ACETONIDE 40 MG/ML
40 INJECTION, SUSPENSION INTRA-ARTICULAR; INTRAMUSCULAR
Status: COMPLETED | OUTPATIENT
Start: 2025-06-11 | End: 2025-06-11

## 2025-06-16 RX ORDER — BUPIVACAINE HYDROCHLORIDE 2.5 MG/ML
2 INJECTION, SOLUTION INFILTRATION; PERINEURAL
Status: COMPLETED | OUTPATIENT
Start: 2025-06-11 | End: 2025-06-11

## 2025-06-16 NOTE — PROGRESS NOTES
Daily Note     Today's date: 2025  Patient name: Juan Antonio Guy  : 1965  MRN: [id number]  Referring provider: Mo Ceja MD  Dx:   Encounter Diagnosis     ICD-10-CM    1. Acute pain of right shoulder  M25.511           Start Time: 1045  Stop Time: 1153  Total time in clinic (min): 68 minutes    Subjective: Patient states that he got a cortisone injection last Thursday that has help ease the pain his in right shoulder.           Objective: See treatment diary below.          Assessment: Began therapy session with UBE for 10 minutes to improve shoulder strength. Patient was able to progress  theraband resistance from GTB to BlueTB for bilateral shoulder ER Therapeutic exercise program was completed with good technique and post-exercise fatigue present. Ended therapy session with MHP to both shoulders. Continue to recommend PT in order to achieve maximal functional independence.         Plan: Continue per plan of care.      Precautions:           Manuals 25                                  Neuro Re-Ed             Shoulder shrugs  5# 2x10  5# 2x10  Held  4# 20x  Held    Front raises  Held  Held Held  4# 20x  Held   Lateral raises Held  held  Held  4#20x  Held   Shoulder press   OH press Held  Held Held  4# 20x  Held   Seated rows  Seated rows 5# 2x10  Seated rows 5# 2x10   20x 5#  4# 20x   seated rows 4# 2x10   Ther Ex             UBE(improve R shoulder ROM)  L2 10' alt  L2 10 min L2 10'  10min   L1.6  L2 10'   Seated Shoulder flexion/abduction with cane 20x ea  NT  20x ea  20x each  20x ea    Bicep curls 5# 20x   20x 4#  20x 5#  4# 20x  20x 4#    MTP/LTP seated  BlueTB 2x10 each   BlackTB 2x10 each    GTB 2x10 ea  BLK 2x10   BlackTB  2x10 ea    Seated ER/IR with TB  BlackTB  2x10 each  BlackTB  2x10 each   BlackTB  2x10 ea   BLK 2x10   2x10 ea  BackTB  2x10 ea    Seated Shoulder ER/ADD   GreenTB 2x10 ea   BlueTB 2x10 ea   BlueTB 2x10 ea   BTB  X20 ea  GTB 2x10                   Ther Activity                                         Gait Training                                         Modalities             CP PRN   MHP  B shoulders 10'     MHP to B shoulders 10'  MHP B 10'  MHP  B shds  10'  MHP R shoulder 10'

## 2025-06-17 ENCOUNTER — TELEMEDICINE (OUTPATIENT)
Dept: FAMILY MEDICINE CLINIC | Facility: CLINIC | Age: 60
End: 2025-06-17

## 2025-06-17 DIAGNOSIS — Z79.4 TYPE 2 DIABETES MELLITUS WITH HYPERGLYCEMIA, WITH LONG-TERM CURRENT USE OF INSULIN (HCC): Primary | ICD-10-CM

## 2025-06-17 DIAGNOSIS — E11.65 TYPE 2 DIABETES MELLITUS WITH HYPERGLYCEMIA, WITH LONG-TERM CURRENT USE OF INSULIN (HCC): Primary | ICD-10-CM

## 2025-06-17 PROCEDURE — PBNCHG PB NO CHARGE PLACEHOLDER: Performed by: PHARMACIST

## 2025-06-17 NOTE — ASSESSMENT & PLAN NOTE
Lab Results   Component Value Date    HGBA1C 10 (A) 02/07/2025     Goal A1c <7% .   On Additional Therapies:  Statin: yes  ACEI/ARB: yes  Glucose control on CGM report 30 days  Average glucose 204 mg/dL (goal <154 mg/dL)  GMI 8.2%      Assessment:   Glucose control is improving however he does have hyperglycemia following steroid injections. He is missing doses of his Toujeo several times per week which is also contributing to higher blood sugar readings.     Jardiance was stopped in the past per patient because he was switched to Mounjaro. however he was not having side effects. We can consider restarting for additional glucose control based on next A1c.      Changes to medication regimen:  Toujeo: Continue 66 units daily . Move timing to lunch instead of after dinner to improve compiance  Mounjaro: Continue 15 mg weekly today  NovoLog: Continue 26 units 3 times a day plus sliding scale

## 2025-06-17 NOTE — PROGRESS NOTES
[facility] Clinical Pharmacy Services  Kvng Hernandez    Administrative Statements   Encounter provider Kvng Hernandez    The Patient is located at Home and in the following state in which I hold an active license [place].    The patient was identified by name and date of birth. Juan Antonio Guy was informed that this is a telemedicine visit and that the visit is being conducted through Telephone.  My office door was closed. No one else was in the room.  He acknowledged consent and understanding of privacy and security of the video platform. The patient has agreed to participate and understands they can discontinue the visit at any time.    Assessment/ Plan     Assessment & Plan  Type 2 diabetes mellitus with hyperglycemia, with long-term current use of insulin ([facility])    Lab Results   Component Value Date    HGBA1C 10 (A) 02/07/2025     Goal A1c <7% .   On Additional Therapies:  Statin: yes  ACEI/ARB: yes  Glucose control on CGM report 30 days  Average glucose 204 mg/dL (goal <154 mg/dL)  GMI 8.2%      Assessment:   Glucose control is improving however he does have hyperglycemia following steroid injections. He is missing doses of his Toujeo several times per week which is also contributing to higher blood sugar readings.     Jardiance was stopped in the past per patient because he was switched to Mounjaro. however he was not having side effects. We can consider restarting for additional glucose control based on next A1c.      Changes to medication regimen:  Toujeo: Continue 66 units daily . Move timing to lunch instead of after dinner to improve compiance  Mounjaro: Continue 15 mg weekly today  NovoLog: Continue 26 units 3 times a day plus sliding scale              Follow-up: 4 weeks     Subjective   Appointment today is for follow-up of type 2 diabetes.  Diabetes related complications include CKD, diabetic foot ulcer, neuropathy, heart failure.  He is on a regimen of Mounjaro and basal bolus  insulin.  Patient saw his endocrinologist on March 12 and was instructed to increase his NovoLog to 30 units with meals in the days following steroid injection.  Last appointment with clinical pharmacist his Mounjaro was increased to 15 mg weekly.     He did get a cortisone shot that caused hyperglycemia.         Medication Adherence/ Tolerability/ Cost:  insulin aspart 26 units TID plus sliding scale   Toujeo- 66 units daily. Reports some missed doses. Missed ~3 times in past 2 weeks. Injects after dinner.   Mounjaro 15 mg weekly- denied missed doses     Historical DM Meds (reason for discontinuation):  Jardiance     Review of Systems   Gastrointestinal:  Negative for abdominal pain, constipation, diarrhea, nausea and vomiting.        2. Lifestyle:   Wakes at 6- 7 AM   Breakfast:: may or may not eat  Lunch 11- 1 PM: sometimes light like tuna fish   Dinner: Moms meals   Admits to overly snacking at night    3. Home monitoring devices  Glucometer: Yes, Brand: One touch verio flex   Continuous Glucose Monitor: Yes, Brand: Dexcom       Objective       Dexcom G7- Read off   Dexcom  Data: 14 day report    Average glucose: 226 mg/dL  GMI: 8.7%    Time in target  Very High (>250 mg/dL): 38 %  High (180 - 250 mg/dL): 24%  In range (70 - 180 mg/dL): 36%  Low ( < 70 mg/dL): 1%  Very low (<54 mg/dL): 1%     Dexcom 30 day:  Average glucose: 204 mg/dL  GMI 8.2%    ASCVD Risk:  The ASCVD Risk score (Marcia DK, et al., 2019) failed to calculate for the following reasons:    The valid total cholesterol range is 130 to 320 mg/dL     Vitals:  There were no vitals filed for this visit.    Eye Exam:    Lab Results   Component Value Date    LEFTDIABRET Positive 12/19/2024    RIGHTDIABRET Positive 12/19/2024       Labs:  Lab Results   Component Value Date    SODIUM 138 04/29/2025    K 4.2 04/29/2025    EGFR 93 04/29/2025    CREATININE 0.93 04/29/2025    OWTRDVZV06 269 04/29/2025    MICROALBCRE 9 02/07/2025       Lab  Results   Component Value Date    HGBA1C 10 (A) 02/07/2025    HGBA1C 9.4 (H) 12/24/2024    HGBA1C 10.9 (A) 11/06/2024       Pharmacist Tracking Tool     Pharmacist Tracking Tool  Reason For Outreach: Embedded Pharmacist  Demographics:  Intervention Method: Phone  Type of Intervention: Follow-Up  Topics Addressed: Diabetes  Pharmacologic Interventions: Dose or Frequency Adjusted  Non-Pharmacologic Interventions: Adherence addressed, Disease state education, Medication/Device education, and Personal CGM  Time:  Direct Patient Care: 15 mins  Care Coordination: 10 mins  Recommendation Recipient: Patient/Caregiver and Provider  Outcome: Accepted

## 2025-06-18 ENCOUNTER — TELEPHONE (OUTPATIENT)
Dept: ADMINISTRATIVE | Facility: OTHER | Age: 60
End: 2025-06-18

## 2025-06-18 NOTE — TELEPHONE ENCOUNTER
----- Message from Jaqui MCNAMARA sent at 6/18/2025 10:05 AM EDT -----  06/18/25 10:05 Mary Ann, our patient Juan Antonio Guy has had CRC: Colonoscopy completed/performed. Please assist in updating the patient chart by pulling the Care Everywhere (CE) document. The date of service is 6/10/25. Thank you,Jaqui ROLDAN

## 2025-06-18 NOTE — TELEPHONE ENCOUNTER
Upon review of the In Basket request we were able to locate, review, and update the patient chart as requested for CRC: Colonoscopy.    Any additional questions or concerns should be emailed to the Practice Liaisons via the appropriate education email address, please do not reply via In Basket.    Thank you  Annette Henriquez MA   [facility]

## 2025-06-19 ENCOUNTER — OFFICE VISIT (OUTPATIENT)
Dept: FAMILY MEDICINE CLINIC | Facility: CLINIC | Age: 60
End: 2025-06-19
Payer: COMMERCIAL

## 2025-06-19 ENCOUNTER — OFFICE VISIT (OUTPATIENT)
Dept: PHYSICAL THERAPY | Facility: CLINIC | Age: 60
End: 2025-06-19
Attending: FAMILY MEDICINE
Payer: COMMERCIAL

## 2025-06-19 VITALS
SYSTOLIC BLOOD PRESSURE: 130 MMHG | BODY MASS INDEX: 42.66 KG/M2 | HEART RATE: 65 BPM | WEIGHT: 315 LBS | TEMPERATURE: 97.9 F | HEIGHT: 72 IN | OXYGEN SATURATION: 97 % | DIASTOLIC BLOOD PRESSURE: 60 MMHG

## 2025-06-19 DIAGNOSIS — M54.2 NECK PAIN: ICD-10-CM

## 2025-06-19 DIAGNOSIS — F33.9 DEPRESSION, RECURRENT (HCC): ICD-10-CM

## 2025-06-19 DIAGNOSIS — E66.01 MORBID OBESITY (HCC): ICD-10-CM

## 2025-06-19 DIAGNOSIS — E11.65 TYPE 2 DIABETES MELLITUS WITH HYPERGLYCEMIA, WITH LONG-TERM CURRENT USE OF INSULIN (HCC): ICD-10-CM

## 2025-06-19 DIAGNOSIS — M25.512 CHRONIC PAIN OF BOTH SHOULDERS: ICD-10-CM

## 2025-06-19 DIAGNOSIS — G47.33 OBSTRUCTIVE SLEEP APNEA SYNDROME: ICD-10-CM

## 2025-06-19 DIAGNOSIS — J44.1 COPD WITH ACUTE EXACERBATION (HCC): ICD-10-CM

## 2025-06-19 DIAGNOSIS — E11.621 DIABETIC ULCER OF LEFT HEEL ASSOCIATED WITH TYPE 2 DIABETES MELLITUS, LIMITED TO BREAKDOWN OF SKIN (HCC): ICD-10-CM

## 2025-06-19 DIAGNOSIS — I10 ESSENTIAL HYPERTENSION: ICD-10-CM

## 2025-06-19 DIAGNOSIS — J43.8 OTHER EMPHYSEMA (HCC): ICD-10-CM

## 2025-06-19 DIAGNOSIS — J32.9 CHRONIC SINUSITIS, UNSPECIFIED LOCATION: ICD-10-CM

## 2025-06-19 DIAGNOSIS — E03.9 HYPOTHYROIDISM, UNSPECIFIED TYPE: ICD-10-CM

## 2025-06-19 DIAGNOSIS — L97.508 DIABETIC ULCER OF FOOT ASSOCIATED WITH TYPE 2 DIABETES MELLITUS, WITH OTHER ULCER SEVERITY, UNSPECIFIED LATERALITY, UNSPECIFIED PART OF FOOT (HCC): ICD-10-CM

## 2025-06-19 DIAGNOSIS — D69.6 THROMBOCYTOPENIA (HCC): ICD-10-CM

## 2025-06-19 DIAGNOSIS — M25.511 ACUTE PAIN OF RIGHT SHOULDER: Primary | ICD-10-CM

## 2025-06-19 DIAGNOSIS — D50.9 IRON DEFICIENCY ANEMIA, UNSPECIFIED IRON DEFICIENCY ANEMIA TYPE: ICD-10-CM

## 2025-06-19 DIAGNOSIS — E11.42 DIABETIC PERIPHERAL NEUROPATHY (HCC): ICD-10-CM

## 2025-06-19 DIAGNOSIS — I87.332 CHRONIC VENOUS HYPERTENSION (IDIOPATHIC) WITH ULCER AND INFLAMMATION OF LEFT LOWER EXTREMITY (HCC): ICD-10-CM

## 2025-06-19 DIAGNOSIS — I50.32 CHRONIC DIASTOLIC CONGESTIVE HEART FAILURE (HCC): ICD-10-CM

## 2025-06-19 DIAGNOSIS — M25.511 CHRONIC PAIN OF BOTH SHOULDERS: ICD-10-CM

## 2025-06-19 DIAGNOSIS — R09.82 POST-NASAL DRIP: ICD-10-CM

## 2025-06-19 DIAGNOSIS — Z79.4 TYPE 2 DIABETES MELLITUS WITH HYPERGLYCEMIA, WITH LONG-TERM CURRENT USE OF INSULIN (HCC): ICD-10-CM

## 2025-06-19 DIAGNOSIS — E11.621 DIABETIC ULCER OF FOOT ASSOCIATED WITH TYPE 2 DIABETES MELLITUS, WITH OTHER ULCER SEVERITY, UNSPECIFIED LATERALITY, UNSPECIFIED PART OF FOOT (HCC): ICD-10-CM

## 2025-06-19 DIAGNOSIS — I48.0 PAROXYSMAL ATRIAL FIBRILLATION (HCC): Primary | ICD-10-CM

## 2025-06-19 DIAGNOSIS — G89.29 CHRONIC PAIN OF BOTH SHOULDERS: ICD-10-CM

## 2025-06-19 DIAGNOSIS — L97.421 DIABETIC ULCER OF LEFT HEEL ASSOCIATED WITH TYPE 2 DIABETES MELLITUS, LIMITED TO BREAKDOWN OF SKIN (HCC): ICD-10-CM

## 2025-06-19 PROBLEM — I50.9 CONGESTIVE HEART FAILURE (HCC): Status: RESOLVED | Noted: 2021-01-04 | Resolved: 2025-06-19

## 2025-06-19 LAB — SL AMB POCT HEMOGLOBIN AIC: 8.3 (ref ?–6.5)

## 2025-06-19 PROCEDURE — G2211 COMPLEX E/M VISIT ADD ON: HCPCS

## 2025-06-19 PROCEDURE — 97110 THERAPEUTIC EXERCISES: CPT

## 2025-06-19 PROCEDURE — 83036 HEMOGLOBIN GLYCOSYLATED A1C: CPT

## 2025-06-19 PROCEDURE — 99214 OFFICE O/P EST MOD 30 MIN: CPT

## 2025-06-19 RX ORDER — FLUTICASONE PROPIONATE 50 MCG
1 SPRAY, SUSPENSION (ML) NASAL DAILY
Qty: 9.9 ML | Refills: 0 | Status: SHIPPED | OUTPATIENT
Start: 2025-06-19

## 2025-06-19 RX ORDER — MONTELUKAST SODIUM 10 MG/1
10 TABLET ORAL
COMMUNITY
Start: 2025-06-18

## 2025-06-19 NOTE — PROGRESS NOTES
Daily Note     Today's date: 2025  Patient name: Juan Antonio Guy  : 1965  MRN: [id number]  Referring provider: Mo Ceja MD  Dx:   Encounter Diagnosis     ICD-10-CM    1. Acute pain of right shoulder  M25.511           Start Time: 1051  Stop Time: 1152  Total time in clinic (min): 61 minutes    Subjective: Patient states that his shoulder is feeling good today.           Objective: See treatment diary below.          Assessment: Began therapy session on UBE for 10 minutes at level 4 with good toleration. Therapeutic exercise program was completed with good technique and post-exercise fatigue present . Continue to recommend PT in order to achieve maximal functional independence.        Plan: Continue per plan of care.      Precautions:           Manuals 25                                  Neuro Re-Ed             Shoulder shrugs  5# 2x10  5# 2x10  5# 2x10 4# 20x  Held    Front raises  Held  Held Held  4# 20x  Held   Lateral raises Held  held  Held  4#20x  Held   Shoulder press   OH press Held  Held Held  4# 20x  Held   Seated rows  Seated rows 5# 2x10  Seated rows 5# 2x10   20x 5#  4# 20x   seated rows 4# 2x10   Ther Ex             UBE(improve R shoulder ROM)  L2 10' alt  L2 10 min L2 10'  10min   L1.6  L2 10'   Seated Shoulder flexion/abduction with cane 20x ea  NT  20x ea  20x each  20x ea    Bicep curls 5# 20x   20x 4#  20x 5#  4# 20x  20x 4#    MTP/LTP seated  BlueTB 2x10 each   BlackTB 2x10 each    BlackTB 2x10 ea  BLK 2x10   BlackTB  2x10 ea    Seated ER/IR with TB  BlackTB  2x10 each  BlackTB  2x10 each   BlackTB  2x10 ea   BLK 2x10   2x10 ea  BackTB  2x10 ea    Seated Shoulder horizontal ER/ADD   GreenTB 2x10 ea   BlueTB 2x10 ea   BlueTB 2x10 ea   BTB  X20 ea  GTB 2x10                  Ther Activity                                         Gait Training                                         Modalities             CP PRN   MHP  B shoulders 10'     MHP to B  shoulders 10'  MHP B 10'  MHP  B shds  10'  MHP R shoulder 10'

## 2025-06-19 NOTE — ASSESSMENT & PLAN NOTE
Educated on importance of CPAP compliance and referral already placed with sleep medicine.  Continue with follow-up with this.

## 2025-06-19 NOTE — PROGRESS NOTES
Name: Juan Antonio Guy      : 1965      MRN: [id number]  Encounter Provider: Amilcar Appiah PA-C  Encounter Date: 2025   Encounter department: [facility]  :  Assessment & Plan  Paroxysmal atrial fibrillation (HCC)         Chronic diastolic congestive heart failure (HCC)  Wt Readings from Last 3 Encounters:   25 (!) 167 kg (368 lb 12.8 oz)   25 (!) 164 kg (362 lb)   25 (!) 164 kg (362 lb)     Appears euvolemic on history and physical today.  Blood pressure at goal.  Continue current management.  Educated on ER precautions.          Orders:    Lipid Panel with Direct LDL reflex; Future    Chronic venous hypertension (idiopathic) with ulcer and inflammation of left lower extremity (HCC)  Continue follow-up with wound specialty.  Orders:    Lipid Panel with Direct LDL reflex; Future    Essential hypertension  At goal today.  Continue current management.  Encouraged to take at home blood pressures and contact office if persistently elevated.       COPD with acute exacerbation (HCC)  Not in acute exacerbation today.  Continue albuterol as needed.  Contact office if exacerbation occurs.       Other emphysema (HCC)  Not in acute exacerbation today.  Continue albuterol as needed.  Contact office if exacerbation occurs.       Obstructive sleep apnea syndrome  Educated on importance of CPAP compliance and referral already placed with sleep medicine.  Continue with follow-up with this.       Type 2 diabetes mellitus with hyperglycemia, with long-term current use of insulin (HCC)    Lab Results   Component Value Date    HGBA1C 8.3 (A) 2025     A1c did improve to 8.3.  Previously was on Jardiance, but patient states he was taken off of this for Mounjaro.  States he tolerated this with no issues or side effects at all.  Will place patient on Jardiance and follow-up in 3 months with A1c.  Continue follow-up and plan per endocrinology as well.  Continue follow-up with  clinical pharmacy.  Orders:    POCT hemoglobin A1c    Comprehensive metabolic panel; Future    Lipid Panel with Direct LDL reflex; Future    Empagliflozin 25 MG TABS; Take 1 tablet (25 mg total) by mouth daily    Diabetic ulcer of left heel associated with type 2 diabetes mellitus, limited to breakdown of skin (HCC)    Lab Results   Component Value Date    HGBA1C 8.3 (A) 06/19/2025     Continue follow-up with podiatry.  Orders:    POCT hemoglobin A1c    Comprehensive metabolic panel; Future    Lipid Panel with Direct LDL reflex; Future    Diabetic ulcer of foot associated with type 2 diabetes mellitus, with other ulcer severity, unspecified laterality, unspecified part of foot (HCC)    Lab Results   Component Value Date    HGBA1C 8.3 (A) 06/19/2025     Continue follow-up with podiatry.  Orders:    POCT hemoglobin A1c    Comprehensive metabolic panel; Future    Lipid Panel with Direct LDL reflex; Future    Diabetic peripheral neuropathy (HCC)    Lab Results   Component Value Date    HGBA1C 8.3 (A) 06/19/2025     Continue follow-up with podiatry.  Orders:    POCT hemoglobin A1c    Comprehensive metabolic panel; Future    Lipid Panel with Direct LDL reflex; Future    Hypothyroidism, unspecified type  Will continue to monitor.  Continue levothyroxine.  Orders:    TSH, 3rd generation with Free T4 reflex; Future    Thrombocytopenia (HCC)  Will follow-up pending lab results.  Orders:    CBC and differential; Future    Depression, recurrent (HCC)    Stable.  Denies SI/HI.  Contact office with worsening.       Iron deficiency anemia, unspecified iron deficiency anemia type  Continue follow-up with hematology oncology.  Will continue to monitor.    Orders:    Iron Panel (Includes Ferritin, Iron Sat%, Iron, and TIBC); Future    Morbid obesity (HCC)  Educated on healthy diet and activity.       Neck pain  Continue follow-up with specialty.       Chronic pain of both shoulders  Continue follow-up with specialty.       Chronic  "sinusitis, unspecified location  Continue follow-up with ENT.  Lung exam normal today.  Do believe that \"wheezing\", is postnasal drip.  Contact office if any worsening with this.  Will prescribe Flonase.  Educated on side effects of medication.  Educated on signs/symptoms of exacerbation, and is to contact office if these occur.       Post-nasal drip  See above.  Orders:    fluticasone (FLONASE) 50 mcg/act nasal spray; 1 spray into each nostril daily           History of Present Illness   Patient is a 59-year-old male presenting for follow-up visit.  Patient recently had colonoscopy.  States that everything was benign.  Closely monitored by specialty.  Patient is having neck pain radiating down both arms.  States he is seeing specialist in 2 weeks regarding this issue.  Does have wheezing at night.  States he is actually having it right now as well.  Does have congestion.  Saw ENT yesterday placed on Singulair.  Denies SOB, productive cough, fever, chills, URI.  Does have chronic allergies.      Review of Systems   Constitutional:  Negative for appetite change, chills, diaphoresis, fatigue and fever.   HENT:  Negative for congestion, ear discharge, ear pain, postnasal drip, rhinorrhea, sinus pressure, sinus pain, sneezing and sore throat.    Eyes:  Negative for pain, discharge, redness, itching and visual disturbance.   Respiratory:  Positive for wheezing. Negative for apnea, cough, chest tightness and shortness of breath.    Cardiovascular:  Negative for chest pain, palpitations and leg swelling.   Gastrointestinal:  Negative for abdominal pain, blood in stool, constipation, diarrhea, nausea and vomiting.   Endocrine: Negative for cold intolerance, heat intolerance, polydipsia and polyuria.   Genitourinary:  Negative for dysuria, flank pain, frequency, hematuria and urgency.   Musculoskeletal:  Negative for arthralgias, back pain, myalgias, neck pain and neck stiffness.   Skin:  Negative for color change and rash. "   Allergic/Immunologic: Negative.    Neurological:  Negative for dizziness, tremors, seizures, syncope, facial asymmetry, speech difficulty, weakness, light-headedness, numbness and headaches.   Hematological:  Negative for adenopathy. Does not bruise/bleed easily.   Psychiatric/Behavioral:  Negative for agitation, confusion, decreased concentration, dysphoric mood, hallucinations, self-injury, sleep disturbance and suicidal ideas. The patient is not nervous/anxious and is not hyperactive.    All other systems reviewed and are negative.      Objective   /60 (BP Location: Left arm, Patient Position: Sitting)   Pulse 65   Temp 97.9 °F (36.6 °C) (Tympanic)   Ht 6' (1.829 m)   Wt (!) 167 kg (368 lb 12.8 oz)   SpO2 97%   BMI 50.02 kg/m²      Physical Exam  Vitals and nursing note reviewed.   Constitutional:       General: He is not in acute distress.     Appearance: Normal appearance. He is well-developed. He is obese. He is not ill-appearing, toxic-appearing or diaphoretic.   HENT:      Head: Normocephalic and atraumatic.      Right Ear: Tympanic membrane normal.      Left Ear: Tympanic membrane normal.      Nose: Nose normal.      Mouth/Throat:      Mouth: Mucous membranes are moist.      Pharynx: Oropharynx is clear.      Comments: Postnasal drip    Eyes:      Extraocular Movements: Extraocular movements intact.      Conjunctiva/sclera: Conjunctivae normal.      Pupils: Pupils are equal, round, and reactive to light.     Neck:      Vascular: No carotid bruit.     Cardiovascular:      Rate and Rhythm: Normal rate and regular rhythm.      Pulses: Normal pulses.      Heart sounds: Normal heart sounds. No murmur heard.  Pulmonary:      Effort: Pulmonary effort is normal. No respiratory distress.      Breath sounds: Normal breath sounds. No stridor. No wheezing, rhonchi or rales.   Chest:      Chest wall: No tenderness.   Abdominal:      General: Bowel sounds are normal.      Palpations: Abdomen is soft. There  is no mass.      Tenderness: There is no abdominal tenderness.     Musculoskeletal:         General: No swelling or tenderness. Normal range of motion.      Cervical back: Normal range of motion and neck supple. No tenderness.      Right lower leg: No edema.      Left lower leg: No edema.   Lymphadenopathy:      Cervical: No cervical adenopathy.     Skin:     General: Skin is warm and dry.      Capillary Refill: Capillary refill takes less than 2 seconds.      Findings: No erythema or rash.     Neurological:      General: No focal deficit present.      Mental Status: He is alert and oriented to person, place, and time. Mental status is at baseline.      Cranial Nerves: No cranial nerve deficit.      Motor: No weakness.      Coordination: Coordination normal.      Gait: Gait normal.     Psychiatric:         Mood and Affect: Mood normal.         Behavior: Behavior normal.         Thought Content: Thought content normal.         Judgment: Judgment normal.

## 2025-06-19 NOTE — ASSESSMENT & PLAN NOTE
Wt Readings from Last 3 Encounters:   06/19/25 (!) 167 kg (368 lb 12.8 oz)   06/11/25 (!) 164 kg (362 lb)   04/14/25 (!) 164 kg (362 lb)     Appears euvolemic on history and physical today.  Blood pressure at goal.  Continue current management.  Educated on ER precautions.          Orders:    Lipid Panel with Direct LDL reflex; Future

## 2025-06-19 NOTE — ASSESSMENT & PLAN NOTE
Lab Results   Component Value Date    HGBA1C 8.3 (A) 06/19/2025     A1c did improve to 8.3.  Previously was on Jardiance, but patient states he was taken off of this for Mounjaro.  States he tolerated this with no issues or side effects at all.  Will place patient on Jardiance and follow-up in 3 months with A1c.  Continue follow-up and plan per endocrinology as well.  Continue follow-up with clinical pharmacy.  Orders:    POCT hemoglobin A1c    Comprehensive metabolic panel; Future    Lipid Panel with Direct LDL reflex; Future    Empagliflozin 25 MG TABS; Take 1 tablet (25 mg total) by mouth daily

## 2025-06-19 NOTE — ASSESSMENT & PLAN NOTE
At goal today.  Continue current management.  Encouraged to take at home blood pressures and contact office if persistently elevated.

## 2025-06-19 NOTE — ASSESSMENT & PLAN NOTE
Will continue to monitor.  Continue levothyroxine.  Orders:    TSH, 3rd generation with Free T4 reflex; Future

## 2025-06-19 NOTE — ASSESSMENT & PLAN NOTE
Lab Results   Component Value Date    HGBA1C 8.3 (A) 06/19/2025     Continue follow-up with podiatry.  Orders:    POCT hemoglobin A1c    Comprehensive metabolic panel; Future    Lipid Panel with Direct LDL reflex; Future

## 2025-06-19 NOTE — ASSESSMENT & PLAN NOTE
Continue follow-up with hematology oncology.  Will continue to monitor.    Orders:    Iron Panel (Includes Ferritin, Iron Sat%, Iron, and TIBC); Future

## 2025-06-19 NOTE — ASSESSMENT & PLAN NOTE
Not in acute exacerbation today.  Continue albuterol as needed.  Contact office if exacerbation occurs.

## 2025-06-24 ENCOUNTER — OFFICE VISIT (OUTPATIENT)
Dept: PHYSICAL THERAPY | Facility: CLINIC | Age: 60
End: 2025-06-24
Attending: FAMILY MEDICINE
Payer: COMMERCIAL

## 2025-06-24 ENCOUNTER — APPOINTMENT (OUTPATIENT)
Dept: LAB | Facility: CLINIC | Age: 60
End: 2025-06-24
Payer: COMMERCIAL

## 2025-06-24 DIAGNOSIS — M25.511 ACUTE PAIN OF RIGHT SHOULDER: Primary | ICD-10-CM

## 2025-06-24 DIAGNOSIS — D50.9 IRON DEFICIENCY ANEMIA, UNSPECIFIED IRON DEFICIENCY ANEMIA TYPE: ICD-10-CM

## 2025-06-24 LAB
ALBUMIN SERPL BCG-MCNC: 4.1 G/DL (ref 3.5–5)
ALP SERPL-CCNC: 74 U/L (ref 34–104)
ALT SERPL W P-5'-P-CCNC: 15 U/L (ref 7–52)
ANION GAP SERPL CALCULATED.3IONS-SCNC: 10 MMOL/L (ref 4–13)
AST SERPL W P-5'-P-CCNC: 16 U/L (ref 13–39)
BASOPHILS # BLD AUTO: 0.02 THOUSANDS/ÂΜL (ref 0–0.1)
BASOPHILS NFR BLD AUTO: 0 % (ref 0–1)
BILIRUB SERPL-MCNC: 1.37 MG/DL (ref 0.2–1)
BUN SERPL-MCNC: 24 MG/DL (ref 5–25)
CALCIUM SERPL-MCNC: 9.6 MG/DL (ref 8.4–10.2)
CHLORIDE SERPL-SCNC: 103 MMOL/L (ref 96–108)
CHOLEST SERPL-MCNC: 129 MG/DL (ref ?–200)
CO2 SERPL-SCNC: 27 MMOL/L (ref 21–32)
CREAT SERPL-MCNC: 1.19 MG/DL (ref 0.6–1.3)
EOSINOPHIL # BLD AUTO: 0.06 THOUSAND/ÂΜL (ref 0–0.61)
EOSINOPHIL NFR BLD AUTO: 1 % (ref 0–6)
ERYTHROCYTE [DISTWIDTH] IN BLOOD BY AUTOMATED COUNT: 14.3 % (ref 11.6–15.1)
FERRITIN SERPL-MCNC: 71 NG/ML (ref 30–336)
GFR SERPL CREATININE-BSD FRML MDRD: 65 ML/MIN/1.73SQ M
GLUCOSE P FAST SERPL-MCNC: 80 MG/DL (ref 65–99)
HCT VFR BLD AUTO: 44.7 % (ref 36.5–49.3)
HDLC SERPL-MCNC: 54 MG/DL
HGB BLD-MCNC: 13.7 G/DL (ref 12–17)
IMM GRANULOCYTES # BLD AUTO: 0.01 THOUSAND/UL (ref 0–0.2)
IMM GRANULOCYTES NFR BLD AUTO: 0 % (ref 0–2)
IRON SATN MFR SERPL: 17 % (ref 15–50)
IRON SERPL-MCNC: 66 UG/DL (ref 50–212)
LDLC SERPL CALC-MCNC: 50 MG/DL (ref 0–100)
LYMPHOCYTES # BLD AUTO: 1.7 THOUSANDS/ÂΜL (ref 0.6–4.47)
LYMPHOCYTES NFR BLD AUTO: 22 % (ref 14–44)
MCH RBC QN AUTO: 28.6 PG (ref 26.8–34.3)
MCHC RBC AUTO-ENTMCNC: 30.6 G/DL (ref 31.4–37.4)
MCV RBC AUTO: 93 FL (ref 82–98)
MONOCYTES # BLD AUTO: 0.93 THOUSAND/ÂΜL (ref 0.17–1.22)
MONOCYTES NFR BLD AUTO: 12 % (ref 4–12)
NEUTROPHILS # BLD AUTO: 5.11 THOUSANDS/ÂΜL (ref 1.85–7.62)
NEUTS SEG NFR BLD AUTO: 65 % (ref 43–75)
NRBC BLD AUTO-RTO: 0 /100 WBCS
PLATELET # BLD AUTO: 128 THOUSANDS/UL (ref 149–390)
PMV BLD AUTO: 9.1 FL (ref 8.9–12.7)
POTASSIUM SERPL-SCNC: 3.8 MMOL/L (ref 3.5–5.3)
PROT SERPL-MCNC: 7.3 G/DL (ref 6.4–8.4)
RBC # BLD AUTO: 4.79 MILLION/UL (ref 3.88–5.62)
SODIUM SERPL-SCNC: 140 MMOL/L (ref 135–147)
TIBC SERPL-MCNC: 383.6 UG/DL (ref 250–450)
TRANSFERRIN SERPL-MCNC: 274 MG/DL (ref 203–362)
TRIGL SERPL-MCNC: 126 MG/DL (ref ?–150)
TSH SERPL DL<=0.05 MIU/L-ACNC: 3.21 UIU/ML (ref 0.45–4.5)
UIBC SERPL-MCNC: 318 UG/DL (ref 155–355)
WBC # BLD AUTO: 7.83 THOUSAND/UL (ref 4.31–10.16)

## 2025-06-24 PROCEDURE — 83540 ASSAY OF IRON: CPT

## 2025-06-24 PROCEDURE — 82728 ASSAY OF FERRITIN: CPT

## 2025-06-24 PROCEDURE — 97110 THERAPEUTIC EXERCISES: CPT

## 2025-06-24 PROCEDURE — 85025 COMPLETE CBC W/AUTO DIFF WBC: CPT

## 2025-06-24 PROCEDURE — 80061 LIPID PANEL: CPT

## 2025-06-24 PROCEDURE — 80053 COMPREHEN METABOLIC PANEL: CPT

## 2025-06-24 PROCEDURE — 84443 ASSAY THYROID STIM HORMONE: CPT

## 2025-06-24 PROCEDURE — 36415 COLL VENOUS BLD VENIPUNCTURE: CPT

## 2025-06-24 PROCEDURE — 83550 IRON BINDING TEST: CPT

## 2025-06-24 NOTE — PROGRESS NOTES
"Daily Note     Today's date: 2025  Patient name: Juan Antonio Guy  : 1965  MRN: [id number]  Referring provider: Mo Ceja MD  Dx:   Encounter Diagnosis     ICD-10-CM    1. Acute pain of right shoulder  M25.511               VISIT: 9       Subjective: Patient states that his shoulder is feeling good today, \"the cortisone shot is helping\".           Objective: See treatment diary below.          Assessment: Began therapy session on UBE for 10 minutes at level 4 with good toleration. Therapeutic exercise program was completed with good technique and post-exercise fatigue present . Continue to recommend PT in order to achieve maximal functional independence.        Plan: Continue per plan of care.      Precautions:           Manuals 25                                  Neuro Re-Ed             Shoulder shrugs  5# 2x10  5# 2x10  5# 2x10 5# 20x  Held    Front raises  Held  Held Held  Held  Held   Lateral raises Held  held  Held  Held Held   Shoulder press   OH press Held  Held Held  Held Held   Seated rows  Seated rows 5# 2x10  Seated rows 5# 2x10   20x 5#  5 # 20x   seated rows 4# 2x10   Ther Ex             UBE(improve R shoulder ROM)  L2 10' alt  L2 10 min L2 10'  10min   L2 L2 10'   Seated Shoulder flexion/abduction with cane 20x ea  NT  20x ea  20x each  20x ea    Bicep curls 5# 20x   20x 4#  20x 5#  5# 20x  20x 4#    MTP/LTP seated  BlueTB 2x10 each   BlackTB 2x10 each    BlackTB 2x10 ea  BLK 2x10   BlackTB  2x10 ea    Seated ER/IR with TB  BlackTB  2x10 each  BlackTB  2x10 each   BlackTB  2x10 ea   BLK 2x10   2x10 ea  BackTB  2x10 ea    Seated Shoulder horizontal ER/ADD   GreenTB 2x10 ea   BlueTB 2x10 ea   BlueTB 2x10 ea   BTB  X20 ea  GTB 2x10                  Ther Activity                                         Gait Training                                         Modalities             CP PRN   MHP  B shoulders 10'     MHP to B shoulders 10'  MHP B 10'  MHP  B " shds  10'  P R shoulder 10'

## 2025-06-25 ENCOUNTER — APPOINTMENT (OUTPATIENT)
Dept: PHYSICAL THERAPY | Facility: CLINIC | Age: 60
End: 2025-06-25
Attending: FAMILY MEDICINE
Payer: COMMERCIAL

## 2025-06-25 NOTE — PROGRESS NOTES
PT Re-Evaluation     Today's date: 2025  Patient name: Juan Antonio Guy  : 1965  MRN: [id number]  Referring provider: Mo Ceja MD  Dx:   No diagnosis found.                       Assessment  Impairments: abnormal muscle tone, abnormal or restricted ROM, abnormal movement, activity intolerance, impaired physical strength, lacks appropriate home exercise program, pain with function, poor posture  and poor body mechanics  Symptom irritability: moderate    Assessment details: Juan Antonio is a 59 y.o male who has attended 33 OP PT with signs and symptoms consistent with R shoulder bursitis. Upon discharge, patient has  reached his maximal function capacity with   bilateral shoulder ROM and strength. He is limited OH/behind his back by pain. At this time patient will transition to HEP and potentially will attend step-down program to maintain strength and mobility within right shoulder.     Understanding of Dx/Px/POC: fair     Prognosis: fair    Goals  STG(In 4 weeks)  Patient will initiate HEP. MET  Patient will decrease R shoulder pain from 10/10  to 5/10  in order to improve QOL. MET  Patient will increase R shoulder ROM to WFL. MET  Patient will increase FOTO score from  IE to D/C score in order to improve QOL. Progressing    LTG(In 8 weeks)  Patient will decrease R shoulder pain no more than 5/10  in order to improve QOL. Progressing   Patient will increase R shoulder strength by 3-4 MMT in order to improve ability to /grasp objects.  Progressing   Patient will be independent with HEP at D/c. MET                Plan  Patient would benefit from: PT eval and skilled physical therapy  Planned modality interventions: cryotherapy    Planned therapy interventions: self care, home exercise program, neuromuscular re-education, manual therapy and joint mobilization    Frequency: 1x week  Duration in weeks: 4  Plan of Care expiration date: 2025  Treatment plan discussed with: patient        Subjective  Evaluation    History of Present Illness  Mechanism of injury: Patient presents with C/C of R shoulder pain that has been ongoing for a couple of weeks but he states that he has had it for several years he just keeps flaring it up periodically. Patient states that he got a cortisone injection 2024 but only had relief initially.           Quality of life: fair    Patient Goals  Patient goals for therapy: increased motion, decreased pain and increased strength    Pain  Current pain ratin  At best pain ratin  At worst pain ratin  Location: R shoulder  Quality: discomfort  Aggravating factors: lifting      Diagnostic Tests  X-ray: normal  Treatments  Previous treatment: physical therapy  Current treatment: physical therapy        Objective     Active Range of Motion   Left Shoulder   Normal active range of motion    Right Shoulder   Flexion: WFL and with pain  Extension: WFL and with pain  Abduction: WFL and with pain    Strength/Myotome Testing     Left Shoulder   Normal muscle strength    Right Shoulder     Planes of Motion   Flexion: 4   Extension: 4   Abduction: 4-   Adduction: 4   External rotation at 90°: 4-   Internal rotation at 90°: 4-     Tests     Right Shoulder   Positive AC shear.              Precautions:               Manuals 25                                  Neuro Re-Ed             Shoulder shrugs  5# 2x10  5# 2x10  5# 2x10 5# 20x  Held    Front raises  Held  Held Held  Held  Held   Lateral raises Held  held  Held  Held Held   Shoulder press   OH press Held  Held Held  Held Held   Seated rows  Seated rows 5# 2x10  Seated rows 5# 2x10   20x 5#  5 # 20x   seated rows 4# 2x10   Ther Ex             UBE(improve R shoulder ROM)  L2 10' alt  L2 10 min L2 10'  10min   L2 L2 10'   Seated Shoulder flexion/abduction with cane 20x ea  NT  20x ea  20x each  20x ea    Bicep curls 5# 20x   20x 4#  20x 5#  5# 20x  20x 4#    MTP/LTP seated  BlueTB 2x10 each    BlackTB 2x10 each    BlackTB 2x10 ea  BLK 2x10   BlackTB  2x10 ea    Seated ER/IR with TB  BlackTB  2x10 each  BlackTB  2x10 each   BlackTB  2x10 ea   BLK 2x10   2x10 ea  BackTB  2x10 ea    Seated Shoulder horizontal ER/ADD   GreenTB 2x10 ea   BlueTB 2x10 ea   BlueTB 2x10 ea   BTB  X20 ea  GTB 2x10                  Ther Activity                                         Gait Training                                         Modalities             CP PRN   MHP  B shoulders 10'     MHP to B shoulders 10'  MHP B 10'  MHP  B shds  10'  MHP R shoulder 10'

## 2025-06-30 ENCOUNTER — OFFICE VISIT (OUTPATIENT)
Dept: PHYSICAL THERAPY | Facility: CLINIC | Age: 60
End: 2025-06-30
Attending: FAMILY MEDICINE
Payer: COMMERCIAL

## 2025-06-30 DIAGNOSIS — M25.511 ACUTE PAIN OF RIGHT SHOULDER: Primary | ICD-10-CM

## 2025-06-30 PROCEDURE — 97110 THERAPEUTIC EXERCISES: CPT

## 2025-06-30 NOTE — PROGRESS NOTES
Daily Note     Today's date: 2025  Patient name: Juan Antonio Guy  : 1965  MRN: [id number]  Referring provider: Mo Ceja MD  Dx:   Encounter Diagnosis     ICD-10-CM    1. Acute pain of right shoulder  M25.511           Start Time: 1045  Stop Time: 1145  Total time in clinic (min): 60 minutes    Subjective: Patient states that his right shoulder is feeling good today.           Objective: See treatment diary below.          Assessment: Began therapy session with UBE to improve R shoulder strength. Initiated OH mobility with cane seated but holding strengthening OH activity Therapeutic exercise program was completed with good technique and post-exercise fatigue present. Ended therapy session with MHP in seated position to bilateral shoulders.  Continue to recommend PT in order to achieve maximal functional independence.        Plan: Continue per plan of care.      Precautions:               Manuals 25                                  Neuro Re-Ed             Shoulder shrugs  5# 2x10  5# 2x10  5# 2x10 5# 20x  Held    Front raises  Held  Held Held  Held  Held   Lateral raises Held  held  Held  Held Held   Shoulder press   OH press Held  Held Held  Held Held   Seated rows  Seated rows 5# 2x10  Seated rows 5# 2x10   20x 5#  5 # 20x   seated rows 4# 2x10   Ther Ex             UBE(improve R shoulder ROM)  L2 10' alt  L2 10 min L2 10'  10min   L2 L2 10'   Seated Shoulder flexion/abduction with cane 20x ea  NT  20x ea  20x each  20x ea    Bicep curls 5# 20x   20x 4#  20x 5#  5# 20x  20x 4#    MTP/LTP seated  BlueTB 2x10 each   BlackTB 2x10 each    BlackTB 2x10 ea  BLK 2x10   BlackTB  2x10 ea    Seated ER/IR with TB  BlackTB  2x10 each  BlackTB  2x10 each   BlackTB  2x10 ea   BLK 2x10   2x10 ea  BackTB  2x10 ea    Seated Shoulder horizontal ER/ADD   BlackTB 2x10 ea   BlueTB 2x10 ea   BlueTB 2x10 ea   BTB  X20 ea  GTB 2x10                  Ther Activity                                          Gait Training                                         Modalities             CP PRN   MHP  B shoulders 10'     MHP to B shoulders 10'  MHP B 10'  MHP  B shds  10'  MHP R shoulder 10'

## 2025-07-02 ENCOUNTER — OFFICE VISIT (OUTPATIENT)
Dept: PHYSICAL THERAPY | Facility: CLINIC | Age: 60
End: 2025-07-02
Attending: FAMILY MEDICINE
Payer: COMMERCIAL

## 2025-07-02 DIAGNOSIS — M25.511 ACUTE PAIN OF RIGHT SHOULDER: Primary | ICD-10-CM

## 2025-07-02 PROCEDURE — 97110 THERAPEUTIC EXERCISES: CPT

## 2025-07-02 NOTE — PROGRESS NOTES
Daily Note     Today's date: 2025  Patient name: Juan Antonio Guy  : 1965  MRN: [id number]  Referring provider: Mo Ceja MD  Dx:   Encounter Diagnosis     ICD-10-CM    1. Acute pain of right shoulder  M25.511             Start Time: 1050  Stop Time: 1143  Total time in clinic (min): 53 minutes    Subjective: Patient states that his right shoulder is feeling good today.           Objective: See treatment diary below.          Assessment: Began therapy session with UBE to improve R shoulder strength.Therapeutic exercise program was completed with good technique and post-exercise fatigue present. Ended therapy session with MHP in seated position to bilateral shoulders.  Continue to recommend PT in order to achieve maximal functional independence.        Plan: Continue per plan of care.      Precautions:               Manuals 25                                  Neuro Re-Ed             Shoulder shrugs  5# 2x10  5# 2x10  5# 2x10 5# 20x  Held    Front raises  Held  Held Held  Held  Held   Lateral raises Held  held  Held  Held Held   Shoulder press   OH press Held  Held Held  Held Held   Seated rows  Seated rows 5# 2x10  Seated rows 5# 2x10   20x 5#  5 # 20x   seated rows 4# 2x10   Ther Ex             UBE(improve R shoulder ROM)  L2 10' alt  L2 10 min L2 10'  10min   L2 L2 10'   Seated Shoulder flexion/abduction with cane 20x ea  NT  20x ea  20x each  20x ea    Bicep curls 5# 20x   20x 4#  20x 5#  5# 20x  20x 4#    MTP/LTP seated  BlueTB 2x10 each   BlackTB 2x10 each    BlackTB 2x10 ea  BLK 2x10   BlackTB  2x10 ea    Seated ER/IR with TB  BlackTB  2x10 each  BlackTB  2x10 each   BlackTB  2x10 ea   BLK 2x10   2x10 ea  BackTB  2x10 ea    Seated Shoulder horizontal ER/ADD   BlackTB 2x10 ea   BlueTB 2x10 ea   BlueTB 2x10 ea   BTB  X20 ea  GTB 2x10                  Ther Activity                                         Gait Training                                          Modalities             CP PRN   MHP  B shoulders 10'     MHP to B shoulders 10'  MHP B 10'  MHP  B shds  10'  MHP R shoulder 10'

## 2025-07-08 ENCOUNTER — OFFICE VISIT (OUTPATIENT)
Dept: PHYSICAL THERAPY | Facility: CLINIC | Age: 60
End: 2025-07-08
Attending: FAMILY MEDICINE
Payer: COMMERCIAL

## 2025-07-08 DIAGNOSIS — M25.511 ACUTE PAIN OF RIGHT SHOULDER: Primary | ICD-10-CM

## 2025-07-08 PROCEDURE — 97110 THERAPEUTIC EXERCISES: CPT

## 2025-07-08 NOTE — PROGRESS NOTES
"Daily Note     Today's date: 2025  Patient name: Juan Antonio Guy  : 1965  MRN: [id number]  Referring provider: Mo Ceja MD  Dx:   Encounter Diagnosis     ICD-10-CM    1. Acute pain of right shoulder  M25.511           Start Time: 1030  Stop Time: 1115  Total time in clinic (min): 45 minutes    Visit: 3  Subjective: Patient stated doing \"good\" today.       Objective: See treatment diary below      Assessment: Tolerated treatment well. Patient exhibited good technique with therapeutic exercises. No changes with intensity at this time. Minimal to no fatigue noted post treat. Patient continues to benefit from further PT to progress towards goals.       Plan: Continue per plan of care.                  Manuals 25                                  Neuro Re-Ed             Shoulder shrugs  5# 2x10  5# 2x10  5# 2x10  5# 20x  Held    Front raises  Held  Held  Held  Held   Lateral raises Held  held   Held Held   Shoulder press   OH press Held  Held  Held Held   Seated rows  Seated rows 5# 2x10  Seated rows 5# 2x10  5# 2x10  5 # 20x   seated rows 4# 2x10   Ther Ex             UBE(improve R shoulder ROM)  L2 10' alt  L2 10 min L4 10'  10min   L2 L2 10'   Seated Shoulder flexion/abduction with cane 20x ea  NT   20x each  20x ea    Bicep curls 5# 20x   20x 4#  5# 2x10  5# 20x  20x 4#    MTP/LTP seated  BlueTB 2x10 each   BlackTB 2x10 each  Black TB  20x each  BLK 2x10   BlackTB  2x10 ea    Seated ER/IR with TB  BlackTB  2x10 each  BlackTB  2x10 each   Black TB    2x10 each   BLK 2x10   2x10 ea  BackTB  2x10 ea    Seated Shoulder horizontal ER/ADD   BlackTB 2x10 ea   BlueTB 2x10 ea  BlueTB   2x10 each   BTB  X20 ea  GTB 2x10                  Ther Activity                                         Gait Training                                         Modalities             CP PRN   MHP  B shoulders 10'     MHP to B shoulders 10'  MHP B 10'  MHP  B shds  10'  MHP R shoulder 10'        "

## 2025-07-09 ENCOUNTER — OFFICE VISIT (OUTPATIENT)
Dept: PHYSICAL THERAPY | Facility: CLINIC | Age: 60
End: 2025-07-09
Attending: FAMILY MEDICINE
Payer: COMMERCIAL

## 2025-07-09 DIAGNOSIS — M25.511 ACUTE PAIN OF RIGHT SHOULDER: Primary | ICD-10-CM

## 2025-07-09 PROCEDURE — 97110 THERAPEUTIC EXERCISES: CPT

## 2025-07-09 NOTE — PROGRESS NOTES
"Daily Note     Today's date: 2025  Patient name: Juan Antonio Guy  : 1965  MRN: [id number]  Referring provider: Mo Ceja MD  Dx:   Encounter Diagnosis     ICD-10-CM    1. Acute pain of right shoulder  M25.511                      Visit: 4  Subjective: Patient stated doing \"good\" today.       Objective: See treatment diary below      Assessment: Tolerated treatment well. Patient exhibited good technique with therapeutic exercises. No changes with intensity at this time.  Pt noted B shd fatigue post tx today stating, \"I really felt that work out today\".   Patient continues to benefit from further PT to progress towards goals.       Plan: Continue per plan of care.                  Manuals 25                                  Neuro Re-Ed             Shoulder shrugs  5# 2x10  5# 2x10  5# 2x10  5# 20x  Held    Front raises  Held  Held  Held  Held   Lateral raises Held  held   Held Held   Shoulder press   OH press Held  Held  Held Held   Seated rows  Seated rows 5# 2x10  Seated rows 5# 2x10  5# 2x10  5 # 20x   seated rows 4# 2x10   Ther Ex             UBE(improve R shoulder ROM)  L2 10' alt  L2 10 min L4 10'  10min   L3 L2 10'   Seated Shoulder flexion/abduction with cane 20x ea  NT   20x each  20x ea    Bicep curls 5# 20x   20x 4#  5# 2x10  5# 20x  20x 4#    MTP/LTP seated  BlueTB 2x10 each   BlackTB 2x10 each  Black TB  20x each  BLK 2x10   BlackTB  2x10 ea    Seated ER/IR with TB  BlackTB  2x10 each  BlackTB  2x10 each   Black TB    2x10 each   BLK 2x10   2x10 ea  BackTB  2x10 ea    Seated Shoulder horizontal ER/ADD   BlackTB 2x10 ea   BlueTB 2x10 ea  BlueTB   2x10 each   BTB  X20 ea  GTB 2x10                  Ther Activity                                         Gait Training                                         Modalities             CP PRN   MHP  B shoulders 10'     MHP to B shoulders 10'  MHP B 10'  MHP  B shds  10'  MHP R shoulder 10'                           "

## 2025-07-11 DIAGNOSIS — T78.40XS ALLERGY, SEQUELA: ICD-10-CM

## 2025-07-11 DIAGNOSIS — F41.9 ANXIETY: ICD-10-CM

## 2025-07-12 RX ORDER — CETIRIZINE HYDROCHLORIDE 10 MG/1
10 TABLET ORAL DAILY
Qty: 30 TABLET | Refills: 5 | Status: SHIPPED | OUTPATIENT
Start: 2025-07-12

## 2025-07-14 RX ORDER — CLONAZEPAM 0.5 MG/1
TABLET ORAL
Qty: 60 TABLET | Refills: 0 | Status: SHIPPED | OUTPATIENT
Start: 2025-07-14

## 2025-07-15 ENCOUNTER — OFFICE VISIT (OUTPATIENT)
Dept: PHYSICAL THERAPY | Facility: CLINIC | Age: 60
End: 2025-07-15
Attending: FAMILY MEDICINE
Payer: COMMERCIAL

## 2025-07-15 DIAGNOSIS — M25.511 ACUTE PAIN OF RIGHT SHOULDER: Primary | ICD-10-CM

## 2025-07-15 PROCEDURE — 97110 THERAPEUTIC EXERCISES: CPT

## 2025-07-15 PROCEDURE — 97140 MANUAL THERAPY 1/> REGIONS: CPT

## 2025-07-15 NOTE — PROGRESS NOTES
"Daily Note     Today's date: 7/15/2025  Patient name: Juan Antonio Guy  : 1965  MRN: [id number]  Referring provider: Mo Ceja MD  Dx:   Encounter Diagnosis     ICD-10-CM    1. Acute pain of right shoulder  M25.511                      Visit: 5  Subjective: Patient reports feeling \"a little off today\", and states, \"my stomach has been bothering me, I have no appetite\". He denies any shd pain at this time. \"The exercises and the cortisone shots must be working\".      Objective: See treatment diary below      Assessment: Tolerated treatment well. Patient exhibited good technique with therapeutic exercises. No changes with intensity at this time.  Pt was agreeable to some PROM at R shd for improved soft tissue extensibility..tolerated well. Pt noted B shd fatigue post tx today.   Received MH to B shds post exercise x 10 min w good skin integeity pre/post.  Patient continues to benefit from further PT to progress towards goals.       Plan: Continue per plan of care.                  Manuals 6/30/25 7/2/25 7/7 7/9/25 7/15/25                                  Neuro Re-Ed             Shoulder shrugs  5# 2x10  5# 2x10  5# 2x10  5# 20x  5#  x20   Front raises  Held  Held  Held     Lateral raises Held  held   Held    Shoulder press   OH press Held  Held  Held    Seated rows  Seated rows 5# 2x10  Seated rows 5# 2x10  5# 2x10  5 # 20x   seated rows 5#  2x10   Ther Ex             UBE(improve R shoulder ROM)  L2 10' alt  L2 10 min L4 10'  10min   L3 L3 10'   Seated Shoulder flexion/abduction with cane 20x ea  NT   20x each  20x ea    Bicep curls 5# 20x   20x 4#  5# 2x10  5# 20x  20x 5#    MTP/LTP seated  BlueTB 2x10 each   BlackTB 2x10 each  Black TB  20x each  BLK 2x10   BlackTB  2x10 ea    Seated ER/IR with TB  BlackTB  2x10 each  BlackTB  2x10 each   Black TB    2x10 each   BLK 2x10   2x10 ea  BackTB  2x10 ea    Seated Shoulder horizontal ER/ADD   BlackTB 2x10 ea   BlueTB 2x10 ea  BlueTB   2x10 each   BTB  X20 ea "  BTB 2x10                  Ther Activity                                         Gait Training                                         Modalities             CP PRN   MHP  B shoulders 10'     MHP to B shoulders 10'  MHP B 10'  MHP  B shds  10'  MHP R shoulder 10'

## 2025-07-16 ENCOUNTER — TELEMEDICINE (OUTPATIENT)
Dept: FAMILY MEDICINE CLINIC | Facility: CLINIC | Age: 60
End: 2025-07-16

## 2025-07-16 DIAGNOSIS — E11.65 TYPE 2 DIABETES MELLITUS WITH HYPERGLYCEMIA, WITH LONG-TERM CURRENT USE OF INSULIN (HCC): Primary | ICD-10-CM

## 2025-07-16 DIAGNOSIS — Z79.4 TYPE 2 DIABETES MELLITUS WITH HYPERGLYCEMIA, WITH LONG-TERM CURRENT USE OF INSULIN (HCC): Primary | ICD-10-CM

## 2025-07-16 PROCEDURE — NURSE: Performed by: PHARMACIST

## 2025-07-16 NOTE — PROGRESS NOTES
[facility]  Kvng Hernandez    Administrative Statements   Encounter provider Kvng Hernandez    The Patient is located at Home and in the following state in which I hold an active license [place].    The patient was identified by name and date of birth. Juan Antonio Guy was informed that this is a telemedicine visit and that the visit is being conducted through Telephone.  My office door was closed. No one else was in the room.  He acknowledged consent and understanding of privacy and security of the video platform. The patient has agreed to participate and understands they can discontinue the visit at any time.    Assessment/ Plan     Assessment & Plan  Type 2 diabetes mellitus with hyperglycemia, with long-term current use of insulin ([facility])    Lab Results   Component Value Date    HGBA1C 8.3 (A) 06/19/2025     Goal A1c <7% .   On Additional Therapies:  Statin: yes  ACEI/ARB: yes  Glucose control on CGM report 30 days  Average glucose 205 mg/dL (goal <154 mg/dL)  GMI 8.2%      Assessment:   A1c improved from 10% to 8.3%. He did start Jardiance and is tolerating. He is having some GI side effects to Mounjaro.  We discussed tips for managing and decreasing nausea.      Plan:  Toujeo: Continue 66 units daily  Mounjaro: Continue 15 mg weekly today  NovoLog: Continue 26 units 3 times a day plus sliding scale  Jardiance: Continue 25 mg daily  Utilize OTC Tums plus Gas X for gas and bloating               Follow-up:   8 weeks with PCP  9 weeks with endo  12 weeks with clinic pharmacist     Subjective   Appointment today is for follow-up of type 2 diabetes.  Diabetes related complications include CKD, diabetic foot ulcer, neuropathy, heart failure.  He is on a regimen of Mounjaro and basal bolus insulin and Jardiance.  Patient saw his endocrinologist on March 12 and was instructed to increase his NovoLog to 30 units with meals in the days following steroid injection.  He  recently saw his PCP who started Jardiance.           Medication Adherence/ Tolerability/ Cost:  insulin aspart 26 units TID plus sliding scale   Toujeo- 66 units daily using at lunch. Using more consistently   Mounjaro 15 mg weekly- Occasionally gets stomach upset and bloating/ belching.   Jardiance 25 mg- Started ~ 3 weeks ago    Historical DM Meds (reason for discontinuation):  Jardiance     Review of Systems   Gastrointestinal:  Negative for abdominal pain, constipation, diarrhea, nausea and vomiting.        2. Lifestyle:   Wakes at 6- 7 AM   Breakfast:: may or may not eat  Lunch 11- 1 PM: sometimes light like tuna fish   Dinner: Moms meals   Admits to overly snacking at night    3. Home monitoring devices  Glucometer: Yes, Brand: One touch verio flex   Continuous Glucose Monitor: Yes, Brand: Dexcom       Objective       Dexcom G7- Read off   Dexcom  Data: 14 day report    Average glucose: 205 mg/dL  GMI: 8.2%    Time in target  Very High (>250 mg/dL): 23 %  High (180 - 250 mg/dL): 38%  In range (70 - 180 mg/dL): 38%  Low ( < 70 mg/dL): 1%  Very low (<54 mg/dL): 0%       ASCVD Risk:  The ASCVD Risk score (Marcia DK, et al., 2019) failed to calculate for the following reasons:    The valid total cholesterol range is 130 to 320 mg/dL     Vitals:  There were no vitals filed for this visit.    Eye Exam:    Lab Results   Component Value Date    LEFTDIABRET Positive 12/19/2024    RIGHTDIABRET Positive 12/19/2024       Labs:  Lab Results   Component Value Date    SODIUM 140 06/24/2025    K 3.8 06/24/2025    EGFR 65 06/24/2025    CREATININE 1.19 06/24/2025    GLUF 80 06/24/2025    IGRFZALE93 269 04/29/2025    MICROALBCRE 9 02/07/2025       Lab Results   Component Value Date    HGBA1C 8.3 (A) 06/19/2025    HGBA1C 10 (A) 02/07/2025    HGBA1C 9.4 (H) 12/24/2024       Pharmacist Tracking Tool     Pharmacist Tracking Tool  Reason For Outreach: Embedded Pharmacist  Demographics:  Intervention Method:  Phone  Type of Intervention: Follow-Up  Topics Addressed: Diabetes  Pharmacologic Interventions: Prevent or Manage AUGUST  Non-Pharmacologic Interventions: Disease state education, Home Monitoring, Medication/Device education, and Personal CGM  Time:  Direct Patient Care: 15 mins  Care Coordination: 10 mins  Recommendation Recipient: Patient/Caregiver and Provider  Outcome: Accepted

## 2025-07-16 NOTE — PATIENT INSTRUCTIONS
Nausea/ indigestion/ and gas are common side effects of Mounjaro. Some tips for managing or decreasing nausea include:  Eat bland, low-fat foods, like crackers, toast, and rice   Eat foods that contain water, like soups and gelatin   Avoid lying down after you eat   Eat more slowly   Avoid foods that are high in fat or grease    You can utilize over the counter products such as TUMS (calcium carbonate) and Gas-X (Simethicone) fur supportive therapy

## 2025-07-16 NOTE — ASSESSMENT & PLAN NOTE
Lab Results   Component Value Date    HGBA1C 8.3 (A) 06/19/2025     Goal A1c <7% .   On Additional Therapies:  Statin: yes  ACEI/ARB: yes  Glucose control on CGM report 30 days  Average glucose 205 mg/dL (goal <154 mg/dL)  GMI 8.2%      Assessment:   A1c improved from 10% to 8.3%. He did start Jardiance and is tolerating. He is having some GI side effects to Mounjaro.  We discussed tips for managing and decreasing nausea.      Plan:  Toujeo: Continue 66 units daily  Mounjaro: Continue 15 mg weekly today  NovoLog: Continue 26 units 3 times a day plus sliding scale  Jardiance: Continue 25 mg daily  Utilize OTC Tums plus Gas X for gas and bloating

## 2025-07-17 ENCOUNTER — OFFICE VISIT (OUTPATIENT)
Dept: PHYSICAL THERAPY | Facility: CLINIC | Age: 60
End: 2025-07-17
Attending: FAMILY MEDICINE
Payer: COMMERCIAL

## 2025-07-17 DIAGNOSIS — M25.511 ACUTE PAIN OF RIGHT SHOULDER: Primary | ICD-10-CM

## 2025-07-17 PROCEDURE — 97110 THERAPEUTIC EXERCISES: CPT

## 2025-07-17 NOTE — PROGRESS NOTES
"Daily Note     Today's date: 2025  Patient name: Juan Antonio Guy  : 1965  MRN: [id number]  Referring provider: Mo Ceja MD  Dx:   Encounter Diagnosis     ICD-10-CM    1. Acute pain of right shoulder  M25.511                      Visit: 6  Subjective: Pt denies any shd pain at this time. He continues w c/o \"stomach problemes\".      Objective: See treatment diary below      Assessment: Tolerated treatment well. Patient exhibited good technique with therapeutic exercises. No changes with intensity at this time.  Pt noted B shd fatigue post tx today.   Received MH to B shds post exercise x 10 min w good skin integeity pre/post.  Patient continues to benefit from further PT to progress towards goals.       Plan: Continue per plan of care.                  Manuals 7/17/25 7/2/25 7/7 7/9/25 7/15/25                                  Neuro Re-Ed             Shoulder shrugs  5# 2x10  5# 2x10  5# 2x10  5# 20x  5#  x20   Front raises  Held  Held  Held     Lateral raises Held  held   Held    Shoulder press   OH press Held  Held  Held    Seated rows  Seated rows 5# 2x10  Seated rows 5# 2x10  5# 2x10  5 # 20x   seated rows 5#  2x10   Ther Ex             UBE(improve R shoulder ROM)  L2 10' alt  L2 10 min L4 10'  10min   L3 L3 10'   Seated Shoulder flexion/abduction with cane 20x ea  NT   20x each  20x ea    Bicep curls 5# 20x   20x 4#  5# 2x10  5# 20x  20x 5#    MTP/LTP seated  BlkTB 2x10 each   BlackTB 2x10 each  Black TB  20x each  BLK 2x10   BlackTB  2x10 ea    Seated ER/IR with TB  BlackTB  2x10 each  BlackTB  2x10 each   Black TB    2x10 each   BLK 2x10   2x10 ea  BackTB  2x10 ea    Seated Shoulder horizontal ER/ADD   BlackTB 2x10 ea   BlueTB 2x10 ea  BlueTB   2x10 each   BTB  X20 ea  BTB 2x10                  Ther Activity                                         Gait Training                                         Modalities             CP PRN   MHP  B shoulders 10'     MHP to B shoulders 10'  MHP B 10'  " SUE  B shds  10'  SUE R shoulder 10'

## 2025-07-22 ENCOUNTER — OFFICE VISIT (OUTPATIENT)
Dept: PHYSICAL THERAPY | Facility: CLINIC | Age: 60
End: 2025-07-22
Attending: FAMILY MEDICINE
Payer: COMMERCIAL

## 2025-07-22 DIAGNOSIS — M25.511 ACUTE PAIN OF RIGHT SHOULDER: Primary | ICD-10-CM

## 2025-07-22 PROCEDURE — 97110 THERAPEUTIC EXERCISES: CPT

## 2025-07-22 NOTE — PROGRESS NOTES
"Daily Note     Today's date: 2025  Patient name: Juan Antonio Guy  : 1965  MRN: [id number]  Referring provider: Mo Ceja MD  Dx:   Encounter Diagnosis     ICD-10-CM    1. Acute pain of right shoulder  M25.511                      Visit: 7  Subjective: Pt states, \"I'm ok today\".      Objective: See treatment diary below      Assessment: Tolerated treatment well. Patient exhibited good technique with therapeutic exercises. No changes with intensity at this time.  Pt noted B shd fatigue post tx today.   Received MH to B shds post exercise x 10 min w good skin integeity pre/post.  Patient continues to benefit from further PT to progress towards goals.       Plan: Continue per plan of care.                  Manuals 7/17/25 7/22/25 7/7 7/9/25 7/15/25                                  Neuro Re-Ed             Shoulder shrugs  5# 2x10  5# 2x10  5# 2x10  5# 20x  5#  x20   Front raises  Held  Held  Held     Lateral raises Held  held   Held    Shoulder press   OH press Held  Held  Held    Seated rows  Seated rows 5# 2x10  Seated rows 5# 2x10  5# 2x10  5 # 20x   seated rows 5#  2x10   Ther Ex             UBE(improve R shoulder ROM)  L2 10' alt  L2 10 min L4 10'  10min   L3 L3 10'   Seated Shoulder flexion/abduction with cane 20x ea  NT   20x each  20x ea    Bicep curls 5# 20x   20x 5#  5# 2x10  5# 20x  20x 5#    MTP/LTP seated  BlkTB 2x10 each   BlackTB 2x10 each  Black TB  20x each  BLK 2x10   BlackTB  2x10 ea    Seated ER/IR with TB  BlackTB  2x10 each  BlackTB  2x10 each   Black TB    2x10 each   BLK 2x10   2x10 ea  BackTB  2x10 ea    Seated Shoulder horizontal ER/ADD   BlackTB 2x10 ea   BlackTB 2x10 ea  BlueTB   2x10 each   BTB  X20 ea  BTB 2x10                  Ther Activity                                         Gait Training                                         Modalities             CP PRN   MHP  B shoulders 10'     MHP to B shoulders 10'  MHP B 10'  MHP  B shds  10'  MHP R shoulder 10'            "

## 2025-07-24 ENCOUNTER — APPOINTMENT (OUTPATIENT)
Dept: PHYSICAL THERAPY | Facility: CLINIC | Age: 60
End: 2025-07-24
Attending: FAMILY MEDICINE
Payer: COMMERCIAL

## 2025-07-29 ENCOUNTER — OFFICE VISIT (OUTPATIENT)
Dept: PHYSICAL THERAPY | Facility: CLINIC | Age: 60
End: 2025-07-29
Attending: FAMILY MEDICINE
Payer: COMMERCIAL

## 2025-07-29 DIAGNOSIS — M25.511 ACUTE PAIN OF RIGHT SHOULDER: Primary | ICD-10-CM

## 2025-07-29 DIAGNOSIS — G25.81 RESTLESS LEG SYNDROME: Primary | ICD-10-CM

## 2025-07-29 PROCEDURE — 97110 THERAPEUTIC EXERCISES: CPT

## 2025-07-29 PROCEDURE — 97140 MANUAL THERAPY 1/> REGIONS: CPT

## 2025-07-31 ENCOUNTER — OFFICE VISIT (OUTPATIENT)
Dept: PHYSICAL THERAPY | Facility: CLINIC | Age: 60
End: 2025-07-31
Attending: FAMILY MEDICINE
Payer: COMMERCIAL

## 2025-07-31 DIAGNOSIS — M25.511 ACUTE PAIN OF RIGHT SHOULDER: Primary | ICD-10-CM

## 2025-07-31 PROCEDURE — 97140 MANUAL THERAPY 1/> REGIONS: CPT

## 2025-07-31 PROCEDURE — 97110 THERAPEUTIC EXERCISES: CPT

## 2025-07-31 RX ORDER — ROPINIROLE 1 MG/1
TABLET, FILM COATED ORAL
Qty: 90 TABLET | Refills: 3 | Status: SHIPPED | OUTPATIENT
Start: 2025-07-31

## 2025-08-04 ENCOUNTER — TELEPHONE (OUTPATIENT)
Age: 60
End: 2025-08-04

## 2025-08-04 DIAGNOSIS — L03.116 CELLULITIS OF LEFT LOWER EXTREMITY: Primary | ICD-10-CM

## 2025-08-04 DIAGNOSIS — S81.802A OPEN WOUND OF LEFT LOWER LEG, INITIAL ENCOUNTER: ICD-10-CM

## 2025-08-04 RX ORDER — SULFAMETHOXAZOLE AND TRIMETHOPRIM 800; 160 MG/1; MG/1
1 TABLET ORAL 2 TIMES DAILY
Qty: 14 TABLET | Refills: 0 | Status: SHIPPED | OUTPATIENT
Start: 2025-08-04 | End: 2025-08-11

## 2025-08-04 RX ORDER — CEPHALEXIN 500 MG/1
500 CAPSULE ORAL EVERY 6 HOURS SCHEDULED
Qty: 28 CAPSULE | Refills: 0 | Status: SHIPPED | OUTPATIENT
Start: 2025-08-04 | End: 2025-08-04

## 2025-08-14 ENCOUNTER — OFFICE VISIT (OUTPATIENT)
Dept: PHYSICAL THERAPY | Facility: CLINIC | Age: 60
End: 2025-08-14
Attending: FAMILY MEDICINE
Payer: COMMERCIAL

## 2025-08-19 ENCOUNTER — TELEPHONE (OUTPATIENT)
Age: 60
End: 2025-08-19

## 2025-08-19 ENCOUNTER — OFFICE VISIT (OUTPATIENT)
Dept: PHYSICAL THERAPY | Facility: CLINIC | Age: 60
End: 2025-08-19
Attending: FAMILY MEDICINE
Payer: COMMERCIAL

## 2025-08-19 DIAGNOSIS — M25.511 ACUTE PAIN OF RIGHT SHOULDER: Primary | ICD-10-CM

## 2025-08-19 DIAGNOSIS — Z79.4 TYPE 2 DIABETES MELLITUS WITH HYPERGLYCEMIA, WITH LONG-TERM CURRENT USE OF INSULIN (HCC): Primary | ICD-10-CM

## 2025-08-19 DIAGNOSIS — E11.65 TYPE 2 DIABETES MELLITUS WITH HYPERGLYCEMIA, WITH LONG-TERM CURRENT USE OF INSULIN (HCC): Primary | ICD-10-CM

## 2025-08-19 PROCEDURE — 97140 MANUAL THERAPY 1/> REGIONS: CPT

## 2025-08-19 PROCEDURE — 97110 THERAPEUTIC EXERCISES: CPT
